# Patient Record
Sex: FEMALE | Race: WHITE | NOT HISPANIC OR LATINO | Employment: FULL TIME | URBAN - METROPOLITAN AREA
[De-identification: names, ages, dates, MRNs, and addresses within clinical notes are randomized per-mention and may not be internally consistent; named-entity substitution may affect disease eponyms.]

---

## 2020-06-12 ENCOUNTER — COMMUNICATION - HEALTHEAST (OUTPATIENT)
Dept: SCHEDULING | Facility: CLINIC | Age: 60
End: 2020-06-12

## 2020-06-15 ENCOUNTER — OFFICE VISIT - HEALTHEAST (OUTPATIENT)
Dept: FAMILY MEDICINE | Facility: CLINIC | Age: 60
End: 2020-06-15

## 2020-06-15 DIAGNOSIS — F17.200 TOBACCO USE DISORDER: ICD-10-CM

## 2020-06-15 DIAGNOSIS — R06.02 SHORTNESS OF BREATH: ICD-10-CM

## 2020-06-15 DIAGNOSIS — J01.00 ACUTE NON-RECURRENT MAXILLARY SINUSITIS: ICD-10-CM

## 2020-06-15 DIAGNOSIS — R22.1 NECK MASS: ICD-10-CM

## 2020-06-15 ASSESSMENT — PATIENT HEALTH QUESTIONNAIRE - PHQ9: SUM OF ALL RESPONSES TO PHQ QUESTIONS 1-9: 6

## 2020-06-23 ENCOUNTER — OFFICE VISIT - HEALTHEAST (OUTPATIENT)
Dept: FAMILY MEDICINE | Facility: CLINIC | Age: 60
End: 2020-06-23

## 2020-06-23 DIAGNOSIS — R35.0 URINARY FREQUENCY: ICD-10-CM

## 2020-06-23 DIAGNOSIS — R22.1 NECK MASS: ICD-10-CM

## 2020-06-23 DIAGNOSIS — R60.0 BILATERAL LEG EDEMA: ICD-10-CM

## 2020-06-23 DIAGNOSIS — F17.200 TOBACCO USE DISORDER: ICD-10-CM

## 2020-06-23 LAB
ALBUMIN SERPL-MCNC: 3.1 G/DL (ref 3.5–5)
ALP SERPL-CCNC: 98 U/L (ref 45–120)
ALT SERPL W P-5'-P-CCNC: 27 U/L (ref 0–45)
ANION GAP SERPL CALCULATED.3IONS-SCNC: 5 MMOL/L (ref 5–18)
AST SERPL W P-5'-P-CCNC: 13 U/L (ref 0–40)
BASOPHILS # BLD AUTO: 0.1 THOU/UL (ref 0–0.2)
BASOPHILS NFR BLD AUTO: 1 % (ref 0–2)
BILIRUB SERPL-MCNC: 0.4 MG/DL (ref 0–1)
BUN SERPL-MCNC: 8 MG/DL (ref 8–22)
CALCIUM SERPL-MCNC: 9.4 MG/DL (ref 8.5–10.5)
CHLORIDE BLD-SCNC: 98 MMOL/L (ref 98–107)
CO2 SERPL-SCNC: 32 MMOL/L (ref 22–31)
CREAT SERPL-MCNC: 0.73 MG/DL (ref 0.6–1.1)
EOSINOPHIL # BLD AUTO: 0.2 THOU/UL (ref 0–0.4)
EOSINOPHIL NFR BLD AUTO: 2 % (ref 0–6)
ERYTHROCYTE [DISTWIDTH] IN BLOOD BY AUTOMATED COUNT: 13.2 % (ref 11–14.5)
GFR SERPL CREATININE-BSD FRML MDRD: >60 ML/MIN/1.73M2
GLUCOSE BLD-MCNC: 115 MG/DL (ref 70–125)
HBA1C MFR BLD: 6.8 % (ref 3.5–6)
HCT VFR BLD AUTO: 53 % (ref 35–47)
HGB BLD-MCNC: 16.3 G/DL (ref 12–16)
LYMPHOCYTES # BLD AUTO: 2.4 THOU/UL (ref 0.8–4.4)
LYMPHOCYTES NFR BLD AUTO: 30 % (ref 20–40)
MCH RBC QN AUTO: 31.3 PG (ref 27–34)
MCHC RBC AUTO-ENTMCNC: 30.8 G/DL (ref 32–36)
MCV RBC AUTO: 102 FL (ref 80–100)
MONOCYTES # BLD AUTO: 0.7 THOU/UL (ref 0–0.9)
MONOCYTES NFR BLD AUTO: 8 % (ref 2–10)
NEUTROPHILS # BLD AUTO: 4.6 THOU/UL (ref 2–7.7)
NEUTROPHILS NFR BLD AUTO: 57 % (ref 50–70)
PLATELET # BLD AUTO: 228 THOU/UL (ref 140–440)
PMV BLD AUTO: 11.2 FL (ref 8.5–12.5)
POTASSIUM BLD-SCNC: 4.4 MMOL/L (ref 3.5–5)
PROT SERPL-MCNC: 8.7 G/DL (ref 6–8)
RBC # BLD AUTO: 5.21 MILL/UL (ref 3.8–5.4)
SODIUM SERPL-SCNC: 135 MMOL/L (ref 136–145)
T3FREE SERPL-MCNC: 2.9 PG/ML (ref 1.9–3.9)
T4 FREE SERPL-MCNC: 0.6 NG/DL (ref 0.7–1.8)
TSH SERPL DL<=0.005 MIU/L-ACNC: 14.85 UIU/ML (ref 0.3–5)
WBC: 8 THOU/UL (ref 4–11)

## 2020-06-23 ASSESSMENT — MIFFLIN-ST. JEOR: SCORE: 1498.48

## 2020-06-25 ENCOUNTER — HOSPITAL ENCOUNTER (OUTPATIENT)
Dept: CT IMAGING | Facility: CLINIC | Age: 60
Discharge: HOME | End: 2020-06-25
Attending: FAMILY MEDICINE

## 2020-06-25 ENCOUNTER — AMBULATORY - HEALTHEAST (OUTPATIENT)
Dept: FAMILY MEDICINE | Facility: CLINIC | Age: 60
End: 2020-06-25

## 2020-06-25 DIAGNOSIS — R22.1 NECK MASS: ICD-10-CM

## 2020-06-25 DIAGNOSIS — R06.02 SHORTNESS OF BREATH: ICD-10-CM

## 2020-06-25 DIAGNOSIS — E01.0 THYROMEGALY: ICD-10-CM

## 2020-06-26 ENCOUNTER — COMMUNICATION - HEALTHEAST (OUTPATIENT)
Dept: FAMILY MEDICINE | Facility: CLINIC | Age: 60
End: 2020-06-26

## 2020-07-31 ENCOUNTER — TRANSCRIBE ORDERS (OUTPATIENT)
Dept: OTHER | Age: 60
End: 2020-07-31

## 2020-07-31 ENCOUNTER — OFFICE VISIT - HEALTHEAST (OUTPATIENT)
Dept: OTOLARYNGOLOGY | Facility: CLINIC | Age: 60
End: 2020-07-31

## 2020-07-31 DIAGNOSIS — E01.0 THYROMEGALY: Primary | ICD-10-CM

## 2020-07-31 DIAGNOSIS — E01.0 THYROMEGALY: ICD-10-CM

## 2020-08-03 ENCOUNTER — TELEPHONE (OUTPATIENT)
Dept: OTOLARYNGOLOGY | Facility: CLINIC | Age: 60
End: 2020-08-03

## 2020-08-03 NOTE — TELEPHONE ENCOUNTER
[facility] Call Center    Phone Message    May a detailed message be left on voicemail: yes     Reason for Call: Appointment Intake    Referring Provider Name: Referred by Dr. Jatin Nam at [facility]   Diagnosis and/or Symptoms: Thyromegaly    Referral diagnosis is not listed in our scheduling protocols. Please advise and contact patient for scheduling. Referral/Records have been faxed to clinic for review    Action Taken: Message routed to:  [facility]    Travel Screening: Not Applicable

## 2020-08-04 NOTE — TELEPHONE ENCOUNTER
FUTURE VISIT INFORMATION      FUTURE VISIT INFORMATION:    Date: 8/14/2020    Time: 1PM    Location: [facility]  REFERRAL INFORMATION:    Referring provider:  Jatin Nam MD      Referring providers clinic:  [facility]       Reason for visit/diagnosis  Thyromegaly - referred by Dr. Jatin Nam at [facility]    RECORDS REQUESTED FROM:       Clinic name Comments Records Status Imaging Status   [facility]  07/31/2020 note with Jatin Nam MD   Care Everywhere     [facility] 06/23/2020 note with Ana Goetz MD   Care Everywhere     [facility] imaging 6/25/2020 CT Neck and CT Chest  Care everywhere req 8/4/2020 - PACS                       8/4/2020 3:45PM sent a fax to [facility] for images - [place]   8/5/2020 4:03PM images from [facility] in PACS - [facility]

## 2020-08-04 NOTE — TELEPHONE ENCOUNTER
Spoke with patient and scheduled for 8/14 with Dr. Martinez.     Patients stated that she will call back for clinic directions, as her phone was about to die. I provided her with the ENT call center number.

## 2020-08-14 ENCOUNTER — RECORDS - HEALTHEAST (OUTPATIENT)
Dept: ADMINISTRATIVE | Facility: OTHER | Age: 60
End: 2020-08-14

## 2020-08-14 ENCOUNTER — PRE VISIT (OUTPATIENT)
Dept: OTOLARYNGOLOGY | Facility: CLINIC | Age: 60
End: 2020-08-14

## 2020-08-14 ENCOUNTER — OFFICE VISIT (OUTPATIENT)
Dept: OTOLARYNGOLOGY | Facility: CLINIC | Age: 60
End: 2020-08-14
Payer: COMMERCIAL

## 2020-08-14 VITALS
DIASTOLIC BLOOD PRESSURE: 85 MMHG | HEART RATE: 102 BPM | OXYGEN SATURATION: 93 % | HEIGHT: 62 IN | BODY MASS INDEX: 44.35 KG/M2 | SYSTOLIC BLOOD PRESSURE: 128 MMHG | TEMPERATURE: 97 F | RESPIRATION RATE: 16 BRPM | WEIGHT: 241 LBS

## 2020-08-14 DIAGNOSIS — E01.0 THYROMEGALY: Primary | ICD-10-CM

## 2020-08-14 RX ORDER — LEVOTHYROXINE SODIUM 50 UG/1
TABLET ORAL
COMMUNITY
Start: 2020-07-27 | End: 2022-09-09

## 2020-08-14 SDOH — HEALTH STABILITY: MENTAL HEALTH: HOW OFTEN DO YOU HAVE A DRINK CONTAINING ALCOHOL?: 2-3 TIMES A WEEK

## 2020-08-14 ASSESSMENT — MIFFLIN-ST. JEOR: SCORE: 1621.42

## 2020-08-14 ASSESSMENT — PAIN SCALES - GENERAL: PAINLEVEL: NO PAIN (0)

## 2020-08-14 NOTE — NURSING NOTE
"Chief Complaint   Patient presents with     Consult     thyromegaly      Blood pressure 128/85, pulse 102, temperature 97  F (36.1  C), resp. rate 16, height 1.575 m (5' 2\"), weight 109.3 kg (241 lb), SpO2 93 %.    Sujit Ngo LPN    "

## 2020-08-14 NOTE — PROGRESS NOTES
"Dear Dr. Nma    I had the pleasure of meeting Lidia Cyril in consultation today at the [facility] at your request.     HISTORY OF PRESENT ILLNESS:   As you know, Mrs. Nam is a pleasant 60F who you referred for a thyroid goiter. This has been present since at least 2008. It has slowly grown in size and she has particularly noticed this over the last year. She has never had a biopsy of this. Her recent TSH was 14.85 and T4 0.6 and she was appropriately started on synthroid. She is currently on 50 mcg synthroid.     She does notice some maneuvers that seem to aggravate her breathing. When she flexes her neck forward and when she lifts her arms above her head she does feel that her breathing gets tight. She has not noticed food getting stuck but she does make sure to chew her food well prior to swallowing. She does not have any pain in the neck nor has she noticed any voice changes.     She has no family history of thyroid cancer.     MEDICATIONS:     Current Outpatient Medications   Medication Sig Dispense Refill     levothyroxine (SYNTHROID/LEVOTHROID) 50 MCG tablet          ALLERGIES:    Allergies   Allergen Reactions     Erythromycin      Psyllium      Seasonal Allergies      Hay fever       HABITS/SOCIAL HISTORY:   She has smoked 1 PPD for about 30 years. She drinks ETOH occasionally.     PAST MEDICAL HISTORY:   Hypothyroid     PAST SURGICAL HISTORY:   Appendectomy    FAMILY HISTORY:    DM  CAD  HTN    REVIEW OF SYSTEMS:  12 point ROS was negative other than the symptoms noted above in the HPI.  Patient Supplied Answers to Review of Systems   ENT ROS 8/14/2020   Ears, Nose, Throat Nasal congestion or drainage   Cardiopulmonary Cough   Allergy/Immunology Allergies or hay fever   Endocrine Frequent urination         PHYSICAL EXAMINATION:   /85   Pulse 102   Temp 97  F (36.1  C)   Resp 16   Ht 1.575 m (5' 2\")   Wt 109.3 kg (241 lb)   SpO2 93%   BMI 44.08 kg/m  "   Alert, NAD  Normocephalic, atraumatic  EOMs intact  Normal auricles bilaterally  No anterior nasal drainage  No lesions seen in the oral cavity or oropharynx  Large thyroid goiter involving the central and lateral neck. Extends superiorly to the angles of the mandible bilaterally. Does not seem to extend into the chest on exam. Mobile  CN II-XII intact  Breathing comfortably     PROCEDURES:   Fiberoptic laryngoscopy was performed today due to the large thyroid goiter. Scope was advanced into the right nasal cavity. Nasopharynx was clear. Base of tongue and vallecula was clear. There was a posterior bulge in the oropharynx at the level of the base of tongue secondary to the goiter. The vocal cords appeared mobile bilaterally. The airway was patent.     RESULTS REVIEWED:   Reviewed the CT neck from 6/26/20. Large thyroid goiter without mediastinal extension.     IMPRESSION AND PLAN:   60F presenting with a large thyroid goiter that has been present since at least 2008. She seems to have some signs of airway obstruction with certain maneuvers such as raising her arms and flexing her neck forward. There are no signs of malignancy on her CT scan and the natural history favors a benign goiter.     I discussed treatment options with her that include observation vs total thyroidectomy. Given her symptoms, she will likely need this removed at some point but at this point she seems like she would like to continue thinking about her options. I discussed the risks including but not limited to infection, bleeding, injury to unilateral recurrent laryngeal nerve that could affect her voice or risk of bilateral recurrent laryngeal nerve injury that could lead to airway compromise and potential need for tracheostomy. She also understands the risk of hypoparathyroidism and the need for lifelong thyroid hormone replacement. She comfortable with the discussion and understands the risks. At this point she would like to think about it  and begin to arrange some things for work and will get back to us.  We will set up an appt for 8 weeks.     Thank you very much for the opportunity to participate in the care of your patient.      Armaan Martinez MD  Otolaryngology- Head & Neck Surgery

## 2020-08-14 NOTE — LETTER
8/14/2020       RE: Lidia Nam  [address] [address]     Dear Colleague,    Thank you for referring your patient, Lidia Nam, to the [facility] at [facility]. Please see a copy of my visit note below.    Dear Dr. Nam    I had the pleasure of meeting Lidia Nam in consultation today at the [facility] at your request.     HISTORY OF PRESENT ILLNESS:   As you know, Mrs. Nam is a pleasant 60F who you referred for a thyroid goiter. This has been present since at least 2008. It has slowly grown in size and she has particularly noticed this over the last year. She has never had a biopsy of this. Her recent TSH was 14.85 and T4 0.6 and she was appropriately started on synthroid. She is currently on 50 mcg synthroid.     She does notice some maneuvers that seem to aggravate her breathing. When she flexes her neck forward and when she lifts her arms above her head she does feel that her breathing gets tight. She has not noticed food getting stuck but she does make sure to chew her food well prior to swallowing. She does not have any pain in the neck nor has she noticed any voice changes.     She has no family history of thyroid cancer.     MEDICATIONS:     Current Outpatient Medications   Medication Sig Dispense Refill     levothyroxine (SYNTHROID/LEVOTHROID) 50 MCG tablet          ALLERGIES:    Allergies   Allergen Reactions     Erythromycin      Psyllium      Seasonal Allergies      Hay fever       HABITS/SOCIAL HISTORY:   She has smoked 1 PPD for about 30 years. She drinks ETOH occasionally.     PAST MEDICAL HISTORY:   Hypothyroid     PAST SURGICAL HISTORY:   Appendectomy    FAMILY HISTORY:    DM  CAD  HTN    REVIEW OF SYSTEMS:  12 point ROS was negative other than the symptoms noted above in the HPI.  Patient Supplied Answers to Review of Systems  [facility] ROS 8/14/2020   Ears, Nose, Throat Nasal  "congestion or drainage   Cardiopulmonary Cough   Allergy/Immunology Allergies or hay fever   Endocrine Frequent urination         PHYSICAL EXAMINATION:   /85   Pulse 102   Temp 97  F (36.1  C)   Resp 16   Ht 1.575 m (5' 2\")   Wt 109.3 kg (241 lb)   SpO2 93%   BMI 44.08 kg/m    Alert, NAD  Normocephalic, atraumatic  EOMs intact  Normal auricles bilaterally  No anterior nasal drainage  No lesions seen in the oral cavity or oropharynx  Large thyroid goiter involving the central and lateral neck. Extends superiorly to the angles of the mandible bilaterally. Does not seem to extend into the chest on exam. Mobile  CN II-XII intact  Breathing comfortably     PROCEDURES:   Fiberoptic laryngoscopy was performed today due to the large thyroid goiter. Scope was advanced into the right nasal cavity. Nasopharynx was clear. Base of tongue and vallecula was clear. There was a posterior bulge in the oropharynx at the level of the base of tongue secondary to the goiter. The vocal cords appeared mobile bilaterally. The airway was patent.     RESULTS REVIEWED:   Reviewed the CT neck from 6/26/20. Large thyroid goiter without mediastinal extension.     IMPRESSION AND PLAN:   60F presenting with a large thyroid goiter that has been present since at least 2008. She seems to have some signs of airway obstruction with certain maneuvers such as raising her arms and flexing her neck forward. There are no signs of malignancy on her CT scan and the natural history favors a benign goiter.     I discussed treatment options with her that include observation vs total thyroidectomy. Given her symptoms, she will likely need this removed at some point but at this point she seems like she would like to continue thinking about her options. I discussed the risks including but not limited to infection, bleeding, injury to unilateral recurrent laryngeal nerve that could affect her voice or risk of bilateral recurrent laryngeal nerve injury " that could lead to airway compromise and potential need for tracheostomy. She also understands the risk of hypoparathyroidism and the need for lifelong thyroid hormone replacement. She comfortable with the discussion and understands the risks. At this point she would like to think about it and begin to arrange some things for work and will get back to us.  We will set up an appt for 8 weeks.     Thank you very much for the opportunity to participate in the care of your patient.      Armaan Martinez MD  Otolaryngology- Head & Neck Surgery      Again, thank you for allowing me to participate in the care of your patient.      Sincerely,    Armaan Martinez MD

## 2020-09-28 ENCOUNTER — TELEPHONE (OUTPATIENT)
Dept: OTOLARYNGOLOGY | Facility: CLINIC | Age: 60
End: 2020-09-28

## 2020-09-28 NOTE — TELEPHONE ENCOUNTER
Writer called patient in regards to rescheduling missed appointment with Dr. Martinez. Writer left message and provided patient with name and call back number.      Lilian Huber LPN

## 2020-09-30 ENCOUNTER — TELEPHONE (OUTPATIENT)
Dept: OTOLARYNGOLOGY | Facility: CLINIC | Age: 60
End: 2020-09-30

## 2020-09-30 ENCOUNTER — COMMUNICATION - HEALTHEAST (OUTPATIENT)
Dept: FAMILY MEDICINE | Facility: CLINIC | Age: 60
End: 2020-09-30

## 2020-09-30 DIAGNOSIS — E01.0 THYROMEGALY: ICD-10-CM

## 2020-09-30 NOTE — TELEPHONE ENCOUNTER
Writer called patient. Line was not available.     Writer awaiting call back to reschedule telephone visit with Dr. Michelle Huber LPN

## 2020-10-06 ENCOUNTER — AMBULATORY - HEALTHEAST (OUTPATIENT)
Dept: FAMILY MEDICINE | Facility: CLINIC | Age: 60
End: 2020-10-06

## 2020-10-06 DIAGNOSIS — Z12.31 VISIT FOR SCREENING MAMMOGRAM: ICD-10-CM

## 2021-05-27 ASSESSMENT — PATIENT HEALTH QUESTIONNAIRE - PHQ9: SUM OF ALL RESPONSES TO PHQ QUESTIONS 1-9: 6

## 2021-06-04 VITALS
WEIGHT: 215 LBS | BODY MASS INDEX: 39.56 KG/M2 | SYSTOLIC BLOOD PRESSURE: 138 MMHG | DIASTOLIC BLOOD PRESSURE: 87 MMHG | HEART RATE: 87 BPM | HEIGHT: 62 IN | TEMPERATURE: 99 F | RESPIRATION RATE: 16 BRPM

## 2021-06-08 NOTE — PROGRESS NOTES
"Lidia Nam is a 60 y.o. female who is being evaluated via a billable telephone visit.      The patient has been notified of following:     \"This telephone visit will be conducted via a call between you and your physician/provider. We have found that certain health care needs can be provided without the need for a physical exam.  This service lets us provide the care you need with a short phone conversation.  If a prescription is necessary we can send it directly to your pharmacy.  If lab work is needed we can place an order for that and you can then stop by our lab to have the test done at a later time.    Telephone visits are billed at different rates depending on your insurance coverage. During this emergency period, for some insurers they may be billed the same as an in-person visit.  Please reach out to your insurance provider with any questions.    If during the course of the call the physician/provider feels a telephone visit is not appropriate, you will not be charged for this service.\"    Patient has given verbal consent to a Telephone visit? Yes    What phone number would you like to be contacted at? [phone]    Patient would like to receive their AVS by AVS Preference: Mail a copy.    Additional provider notes:     ASSESSMENT/PLAN:  1. Acute non-recurrent maxillary sinusitis  cefdinir (OMNICEF) 300 MG capsule   2. Neck mass  CT Soft Tissue Neck With Contrast    CT Chest With Contrast   3. Shortness of breath  CT Soft Tissue Neck With Contrast    CT Chest With Contrast   4. Nicotine Dependence         This is a 59 yo female with:  1.  Reported signs/symptoms of acute sinusitis - it is reasonable to treat with Cefdinir.  She has chronic symptoms, but acute worsening at this time.     2.  Neck mass - she has bilateral neck masses, increasing in size over time.  No clear explanation for the masses - will order a CT - soft tissue neck and CT - chest.  She is agreeable -  3.  Shortness of breath - this " "may be multifactorial - given that she is still smoking.  She may (and likely does) have COPD, but have to worry about possibility of malignancy with these symptoms.  Will order the CT of chest as well.   4.  Nicotine dependence - needs to quit smoking now!  Return in about 2 weeks (around 6/29/2020) for Recheck and review of workup. .      There are no discontinued medications.  There are no Patient Instructions on file for this visit.    Chief Complaint:  No chief complaint on file.      HPI:   Lidia Nam is a 60 y.o. female c/o    Last visit to medical provider was 2008  \"I work full time and just keep going\"  This year, got a flu vaccine - mandated to wear a mask - would cause a sinus infection; did lot of fluids/    Now, has to wear a mask/flu shield all day long  Pain under her eyes  Starting to develop \"thrush\"  When moving something out, it is horrible smelling - green    Has been having an issue x several months  When it dries up, there is a \"fur ball\" in the back of her throat - wakes up to clear it    Also - minor throat swelling on each side of neck in 2008 -- now it is so large it cuts off breathing; like a cluster - both sides; feels like there is several different shapes  Worries about her airway  Needs to open window/curtain to breathe when showering    Fluid retention - bottoms of feet to knees - calves are firm  Has maintained a steady 190#; after fluid started - 214#    Taking deep breaths, lung sounds are okay  Short of breath when it starts to clog up  Moves around slowly  Tries not to talk too much  Doesn't talk, because then triggers spasms  Gets winded when walking around    Exhausted when gets home from work    Used to wear a 16 inch necklace, now needs 22 inch necklace    Smokes 1 ppd - \"It didn't go up any\" - rolls her own (packs are only 18 cigarettes);  quit smoking about 2  Years  Son/daughter are both trying to quit    Has not been tested for COVID-19 at her facility - "   Facility has had a couple (4) employees test positive, but no residents positive ([facility]).  Patient works on behavior floor -       PMH:   Patient Active Problem List    Diagnosis Date Noted     Obesity      Nicotine Dependence      Urticaria      Allergies      No past medical history on file.  Past Surgical History:   Procedure Laterality Date     AR APPENDECTOMY      Description: Appendectomy;  Recorded: 11/21/2008;     AR LIGATE FALLOPIAN TUBE      Description: Tubal Ligation;  Recorded: 11/21/2008;     Social History     Socioeconomic History     Marital status:      Spouse name: Not on file     Number of children: Not on file     Years of education: Not on file     Highest education level: Not on file   Occupational History     Not on file   Social Needs     Financial resource strain: Not on file     Food insecurity     Worry: Not on file     Inability: Not on file     Transportation needs     Medical: Not on file     Non-medical: Not on file   Tobacco Use     Smoking status: Current Every Day Smoker     Smokeless tobacco: Never Used   Substance and Sexual Activity     Alcohol use: Not on file     Drug use: Not on file     Sexual activity: Not on file   Lifestyle     Physical activity     Days per week: Not on file     Minutes per session: Not on file     Stress: Not on file   Relationships     Social connections     Talks on phone: Not on file     Gets together: Not on file     Attends [employer]ism service: Not on file     Active member of club or organization: Not on file     Attends meetings of clubs or organizations: Not on file     Relationship status: Not on file     Intimate partner violence     Fear of current or ex partner: Not on file     Emotionally abused: Not on file     Physically abused: Not on file     Forced sexual activity: Not on file   Other Topics Concern     Not on file   Social History Narrative     Not on file     No family history on file.    Meds:    Current  Outpatient Medications:      cefdinir (OMNICEF) 300 MG capsule, Take 1 capsule (300 mg total) by mouth 2 (two) times a day for 10 days., Disp: 20 capsule, Rfl: 0    Allergies:  Allergies   Allergen Reactions     Erythromycin Base      Psyllium        ROS:  Pertinent positives as noted in HPI; otherwise 12 point ROS negative.      Physical Exam:  EXAM:    This is a telephone visit.       Results:  No results found for this or any previous visit.      Phone call duration:  24 minutes  4:20 - 4:44

## 2021-06-08 NOTE — TELEPHONE ENCOUNTER
"Pt reports \"pressure below eyes, stuffy congested nose with thick yellow to green nasal discharge and white patches on tongue\". No fever. Pain under L eye \"2-3\".  Pt reports \"couple of months\".     Advised pt to use a neti pot and see provider within three days per protocol.    Pt verbalizes understanding and agrees to plan.     Reason for Disposition    [1] Sinus congestion (pressure, fullness) AND [2] present > 10 days    Additional Information    Negative: Severe difficulty breathing (e.g., struggling for each breath, speaks in single words)    Negative: Sounds like a life-threatening emergency to the triager    Negative: [1] Sinus infection AND [2] taking an antibiotic AND [3] symptoms continue    Negative: [1] Difficulty breathing AND [2] not from stuffy nose (e.g., not relieved by cleaning out the nose)    Negative: [1] SEVERE headache AND [2] fever    Negative: [1] Redness or swelling on the cheek, forehead or around the eye AND [2] fever    Negative: Fever > 104 F (40 C)    Negative: Patient sounds very sick or weak to the triager    Negative: [1] SEVERE pain AND [2] not improved 2 hours after pain medicine    Negative: [1] Redness or swelling on the cheek, forehead or around the eye AND [2] no fever    Negative: [1] Fever > 101 F (38.3 C) AND [2] age > 60    Negative: [1] Fever > 100.0 F (37.8 C) AND [2] bedridden (e.g., nursing home patient, CVA, chronic illness, recovering from surgery)    Negative: [1] Fever > 100.0 F (37.8 C) AND [2] diabetes mellitus or weak immune system (e.g., HIV positive, cancer chemo, splenectomy, organ transplant, chronic steroids)    Negative: Fever present > 3 days (72 hours)    Negative: [1] Fever returns after gone for over 24 hours AND [2] symptoms worse or not improved    Negative: [1] Sinus pain (not just congestion) AND [2] fever    Negative: Earache    Protocols used: SINUS PAIN OR CONGESTION-A-AH      "

## 2021-06-09 NOTE — PROGRESS NOTES
"ASSESSMENT/PLAN:  1. Neck mass  Thyroid Stimulating Hormone (TSH)    T4, Free    T3 (Triiodothyronine), Free    HM1(CBC and Differential)    HM1 (CBC with Diff)   2. Urinary frequency  Glycosylated Hemoglobin A1c   3. Bilateral leg edema  Comprehensive Metabolic Panel   4. Nicotine Dependence         This is a 59 yo female with:  1.  Neck mass - this is extraordinarily large - it has been growing over at least 10 years.  Patient has been watching this grow, but didn't feel she had \"time\" to come in for evaluation.  She is beginning to find that she feels some pressure on her trachea with this - feels a little difficulty with swallowing/breathing.  This a a very large lump - bilateral - anterior neck - ?thyroid vs. Submandibular.  Will check thyroid labs - encourage patient to get her CT scan scheduled.    2.  Urinary frequency - will check A1c to rule out diabetes  3.  Bilateral leg edema - ?may have some relation to #1?  Is there some chest/abdominal compression  4.  Nicotine Dependence - patient continues to smoke -  has quit smoking, but patient continues.    Return in about 2 weeks (around 7/7/2020) for Recheck.      There are no discontinued medications.  There are no Patient Instructions on file for this visit.    Chief Complaint:  Chief Complaint   Patient presents with     Follow-up     sinus infection       HPI:   Lidia Nam is a 60 y.o. female c/o  Treated for sinus infection -  Feeling better -   Still has mass in anterior neck - \"since the last time I saw you (2007?)\"  Able to move mass back and forth - no pain, just feels like it's starting to choke her    Now having swelling in lower extremities    PMH:   Patient Active Problem List    Diagnosis Date Noted     Thyromegaly 06/25/2020     Obesity      Nicotine Dependence      Urticaria      Allergies      History reviewed. No pertinent past medical history.  Past Surgical History:   Procedure Laterality Date     WI APPENDECTOMY      " Description: Appendectomy;  Recorded: 11/21/2008;     [place] LIGATE FALLOPIAN TUBE      Description: Tubal Ligation;  Recorded: 11/21/2008;     Social History     Socioeconomic History     Marital status:      Spouse name: Not on file     Number of children: Not on file     Years of education: Not on file     Highest education level: Not on file   Occupational History     Not on file   Social Needs     Financial resource strain: Not on file     Food insecurity     Worry: Not on file     Inability: Not on file     Transportation needs     Medical: Not on file     Non-medical: Not on file   Tobacco Use     Smoking status: Current Every Day Smoker     Smokeless tobacco: Never Used   Substance and Sexual Activity     Alcohol use: Not on file     Drug use: Not on file     Sexual activity: Not on file   Lifestyle     Physical activity     Days per week: Not on file     Minutes per session: Not on file     Stress: Not on file   Relationships     Social connections     Talks on phone: Not on file     Gets together: Not on file     Attends Confucianism service: Not on file     Active member of club or organization: Not on file     Attends meetings of clubs or organizations: Not on file     Relationship status: Not on file     Intimate partner violence     Fear of current or ex partner: Not on file     Emotionally abused: Not on file     Physically abused: Not on file     Forced sexual activity: Not on file   Other Topics Concern     Not on file   Social History Narrative     Not on file     History reviewed. No pertinent family history.    Meds:    Current Outpatient Medications:      levothyroxine (SYNTHROID, LEVOTHROID) 50 MCG tablet, Take 1 tablet (50 mcg total) by mouth daily., Disp: 30 tablet, Rfl: 2    Allergies:  Allergies   Allergen Reactions     Erythromycin Base      Psyllium        ROS:  Pertinent positives as noted in HPI; otherwise 12 point ROS negative.  Recent swelling in lower extremities  Voids frequently  "  Started eating lunch - never used to      Physical Exam:  EXAM:  /87 (Patient Site: Right Arm, Patient Position: Sitting, Cuff Size: Adult Regular)   Pulse 87   Temp 99  F (37.2  C) (Tympanic)   Resp 16   Ht 5' 2\" (1.575 m)   Wt 215 lb (97.5 kg)   BMI 39.32 kg/m     Gen:  NAD, appears well, well-hydrated  HEENT:  TMs nl, oropharynx benign, nasal mucosa nl, conjunctiva clear  Neck:  Supple, mass - anterior neck 30 x 20 mm;  no thyromegaly, no carotid bruits, no JVD; no other adenopathy noted  Lungs: rhonchi at bilateral lung bases  Cor:  RRR no murmur  Abd:  Soft, nontender, BS+, no masses, no guarding or rebound, no HSM  Extr:  Neg., right shoulder limited ROM ; swelling at base of left thumb; lower extremity swelling  Neuro:  No asymmetry  Skin:  Warm/dry        Results:  Results for orders placed or performed in visit on 06/23/20   Comprehensive Metabolic Panel   Result Value Ref Range    Sodium 135 (L) 136 - 145 mmol/L    Potassium 4.4 3.5 - 5.0 mmol/L    Chloride 98 98 - 107 mmol/L    CO2 32 (H) 22 - 31 mmol/L    Anion Gap, Calculation 5 5 - 18 mmol/L    Glucose 115 70 - 125 mg/dL    BUN 8 8 - 22 mg/dL    Creatinine 0.73 0.60 - 1.10 mg/dL    GFR MDRD Af Amer >60 >60 mL/min/1.73m2    GFR MDRD Non Af Amer >60 >60 mL/min/1.73m2    Bilirubin, Total 0.4 0.0 - 1.0 mg/dL    Calcium 9.4 8.5 - 10.5 mg/dL    Protein, Total 8.7 (H) 6.0 - 8.0 g/dL    Albumin 3.1 (L) 3.5 - 5.0 g/dL    Alkaline Phosphatase 98 45 - 120 U/L    AST 13 0 - 40 U/L    ALT 27 0 - 45 U/L   Glycosylated Hemoglobin A1c   Result Value Ref Range    Hemoglobin A1c 6.8 (H) 3.5 - 6.0 %   Thyroid Stimulating Hormone (TSH)   Result Value Ref Range    TSH 14.85 (H) 0.30 - 5.00 uIU/mL   T4, Free   Result Value Ref Range    Free T4 0.6 (L) 0.7 - 1.8 ng/dL   T3 (Triiodothyronine), Free   Result Value Ref Range    T3, Free 2.9 1.9 - 3.9 pg/mL   HM1 (CBC with Diff)   Result Value Ref Range    WBC 8.0 4.0 - 11.0 thou/uL    RBC 5.21 3.80 - 5.40 mill/uL "    Hemoglobin 16.3 (H) 12.0 - 16.0 g/dL    Hematocrit 53.0 (H) 35.0 - 47.0 %     (H) 80 - 100 fL    MCH 31.3 27.0 - 34.0 pg    MCHC 30.8 (L) 32.0 - 36.0 g/dL    RDW 13.2 11.0 - 14.5 %    Platelets 228 140 - 440 thou/uL    MPV 11.2 8.5 - 12.5 fL    Neutrophils % 57 50 - 70 %    Lymphocytes % 30 20 - 40 %    Monocytes % 8 2 - 10 %    Eosinophils % 2 0 - 6 %    Basophils % 1 0 - 2 %    Neutrophils Absolute 4.6 2.0 - 7.7 thou/uL    Lymphocytes Absolute 2.4 0.8 - 4.4 thou/uL    Monocytes Absolute 0.7 0.0 - 0.9 thou/uL    Eosinophils Absolute 0.2 0.0 - 0.4 thou/uL    Basophils Absolute 0.1 0.0 - 0.2 thou/uL

## 2021-06-10 NOTE — PATIENT INSTRUCTIONS - HE
Your exam today shows normal vocal cord mobility  Your recent CT show the thyroid gland is pressing on your airway  I am referring you to the [facility] Otolaryngology department for further evaluation.

## 2021-06-11 NOTE — TELEPHONE ENCOUNTER
Refill Request  Did you contact pharmacy: No  Medication name:   Requested Prescriptions     Pending Prescriptions Disp Refills     levothyroxine (SYNTHROID, LEVOTHROID) 50 MCG tablet 30 tablet 2     Sig: Take 1 tablet (50 mcg total) by mouth daily.     Who prescribed the medication: Ana Goetz MD   Requested [facility]: [facility]  Is patient out of medication: No.  1 days left  Patient notified refills processed in 3 business days:  yes  Okay to leave a detailed message: yes  After the message was routed to Ana Goetz MD, the patient states am I coming in tomorrow for a recheck blood test? The patient would like a call.

## 2021-06-16 PROBLEM — E01.0 THYROMEGALY: Status: ACTIVE | Noted: 2020-06-25

## 2021-06-20 NOTE — LETTER
Letter by Ana Goetz MD at      Author: Ana Goetz MD Service: -- Author Type: --    Filed:  Encounter Date: 6/26/2020 Status: (Other)         Lidia Nam  [address]  [facility] [address]             June 26, 2020         Dear Ms. Cyril,    Below are the results from your recent visit:    Resulted Orders   CT Soft Tissue Neck With Contrast    Narrative    EXAM: CT SOFT TISSUE NECK W CONTRAST  LOCATION: [facility]  DATE/TIME: 6/25/2020 11:26 AM    INDICATION: Neck mass, multiple (Age > 15y)  COMPARISON: None.  CONTRAST: Iohexol (Omni) 100 mL  TECHNIQUE: Routine with IV contrast. Multiplanar reformats. Dose reduction techniques were used.    FINDINGS:   THYROID: Markedly enlarged multinodular goiter. The cephalad extent of the goiter extends up to the level of the C1 lateral masses on the right up to the C2 vertebral body on the left. The thyroid lobes of the contact each other medially in the   retropharyngeal space to the level of C2-C3, displacing the oropharynx and hypopharynx ventrally. The trachea and esophagus both appeared narrowed by the thyroid goiter with the trachea measuring as narrow as 9 mm in the transverse dimension at the level   of C7 (series 3, image 70). No substernal extension.    SUPRAHYOID NECK: Normal nasopharynx. Oropharynx is distorted by the thyroid goiter and narrowed in the AP diameter. Normal parapharyngeal and  spaces. Normal oral cavity and floor of mouth structures.    INFRAHYOID NECK: Supraglottic larynx is distorted and narrowed in the AP diameter. Hypopharynx is distorted and displaced by the goiter. The glottic and infraglottic larynx are normal.    SALIVARY GLANDS: Normal parotid and submandibular glands.    LYMPH NODES: No pathologic lymph nodes by size or morphology criteria.     VESSELS: Mild atherosclerotic plaque at the carotid bifurcations.    VISUALIZED INTRACRANIAL/ORBITS/SINUSES: No abnormality of the  visualized intracranial compartment or orbits. Visualized paranasal sinuses and mastoid air cells are clear.    OTHER: No destructive osseous lesion. Multilevel cervical spondylosis. Disc bulge at C3-C4 causes severe central spinal canal stenosis. Severe neural foraminal stenosis on the left C3-C4 through C6-C7. The included lung apices are clear.      Impression    1.  Massive multinodular goiter distorts and displaces the upper aerodigestive tract and causes mild stenosis of the trachea. No substernal extension. Recommend otolaryngology consultation.  2.  Multilevel cervical spondylosis. Severe spinal canal stenosis at C3-C4. Severe neural foraminal stenosis on the left from C3-C4 through C6-C7         This is as we expected - and as we discussed.  Please make sure to get to the ENT specialist.      Please call with questions or contact us using [email or web address].    Sincerely,        Electronically signed by Ana Goetz MD

## 2021-06-20 NOTE — LETTER
Letter by Ana Goetz MD at      Author: Ana Goetz MD Service: -- Author Type: --    Filed:  Encounter Date: 6/26/2020 Status: (Other)         Lidia Nam  [address]  [facility] [address]             June 26, 2020         Dear Ms. Nam,    Below are the results from your recent visit:    Resulted Orders   CT Chest With Contrast    Narrative    EXAM: CT CHEST W CONTRAST  LOCATION: [facility]  DATE/TIME: 6/25/2020 11:24 AM    INDICATION: multiple lumps bilateral neck - feels like they are cutting off airway  COMPARISON: CT neck the same date  TECHNIQUE: CT chest with IV contrast. Multiplanar reformats were obtained. Dose reduction techniques were used.    CONTRAST: Iohexol (Omni) 350 100 mL    FINDINGS:   LUNGS AND PLEURA: Bilateral markedly enlarged heterogeneous thyroid gland containing multiple coarse calcifications. Associated moderate tracheal narrowing. The central airways are otherwise unremarkable. No focal consolidation or pleural effusion. No   mass or suspicious nodule.    MEDIASTINUM/AXILLAE: No thoracic adenopathy. Normal heart size. Trace pericardial fluid. Mild coronary artery calcifications.    UPPER ABDOMEN: Hepatic steatosis.    MUSCULOSKELETAL: Spinal degenerative changes.      Impression    1.  Markedly enlarged heterogeneous thyroid with scattered coarse calcifications. This likely reflects a large thyroid goiter. Associated moderate tracheal narrowing. Given the patient's symptoms, consider ENT consultation.  2.  No acute or suspicious intrathoracic findings.  3.  Mild coronary calcifications and trace pericardial fluid.  4.  Hepatic steatosis.      A phone call report regarding the critical findings on this exam was made to Dr. Trevizo at 12:52 PM on 6/25/2020.        Here's your CT scan - that we discussed.      Please call with questions or contact us using [employer].    Sincerely,        Electronically signed by Ana Goetz  MD

## 2021-06-20 NOTE — LETTER
Letter by Ana Goetz MD at      Author: Ana Goetz MD Service: -- Author Type: --    Filed:  Encounter Date: 6/26/2020 Status: (Other)         Lidia Nam  [facility] [address]  [facility] [address]             June 26, 2020         Dear Ms. Nam,    Below are the results from your recent visit:    Resulted Orders   Comprehensive Metabolic Panel   Result Value Ref Range    Sodium 135 (L) 136 - 145 mmol/L    Potassium 4.4 3.5 - 5.0 mmol/L    Chloride 98 98 - 107 mmol/L    CO2 32 (H) 22 - 31 mmol/L    Anion Gap, Calculation 5 5 - 18 mmol/L    Glucose 115 70 - 125 mg/dL    BUN 8 8 - 22 mg/dL    Creatinine 0.73 0.60 - 1.10 mg/dL    GFR MDRD Af Amer >60 >60 mL/min/1.73m2    GFR MDRD Non Af Amer >60 >60 mL/min/1.73m2    Bilirubin, Total 0.4 0.0 - 1.0 mg/dL    Calcium 9.4 8.5 - 10.5 mg/dL    Protein, Total 8.7 (H) 6.0 - 8.0 g/dL    Albumin 3.1 (L) 3.5 - 5.0 g/dL    Alkaline Phosphatase 98 45 - 120 U/L    AST 13 0 - 40 U/L    ALT 27 0 - 45 U/L    Narrative    Fasting Glucose reference range is 70-99 mg/dL per  American Diabetes Association (ADA) guidelines.   Glycosylated Hemoglobin A1c   Result Value Ref Range    Hemoglobin A1c 6.8 (H) 3.5 - 6.0 %   Thyroid Stimulating Hormone (TSH)   Result Value Ref Range    TSH 14.85 (H) 0.30 - 5.00 uIU/mL   T4, Free   Result Value Ref Range    Free T4 0.6 (L) 0.7 - 1.8 ng/dL   T3 (Triiodothyronine), Free   Result Value Ref Range    T3, Free 2.9 1.9 - 3.9 pg/mL   HM1 (CBC with Diff)   Result Value Ref Range    WBC 8.0 4.0 - 11.0 thou/uL    RBC 5.21 3.80 - 5.40 mill/uL    Hemoglobin 16.3 (H) 12.0 - 16.0 g/dL    Hematocrit 53.0 (H) 35.0 - 47.0 %     (H) 80 - 100 fL    MCH 31.3 27.0 - 34.0 pg    MCHC 30.8 (L) 32.0 - 36.0 g/dL    RDW 13.2 11.0 - 14.5 %    Platelets 228 140 - 440 thou/uL    MPV 11.2 8.5 - 12.5 fL    Neutrophils % 57 50 - 70 %    Lymphocytes % 30 20 - 40 %    Monocytes % 8 2 - 10 %    Eosinophils % 2 0 - 6 %    Basophils % 1 0 - 2 %     Neutrophils Absolute 4.6 2.0 - 7.7 thou/uL    Lymphocytes Absolute 2.4 0.8 - 4.4 thou/uL    Monocytes Absolute 0.7 0.0 - 0.9 thou/uL    Eosinophils Absolute 0.2 0.0 - 0.4 thou/uL    Basophils Absolute 0.1 0.0 - 0.2 thou/uL       Here's the results we discussed.  Make sure to start your Levothyroxine - we should recheck labs in 6 weeks.  Make sure to follow up with an ENT specialist.     Please call with questions or contact us using [email or web address].    Sincerely,        Electronically signed by Ana Goetz MD

## 2021-06-29 NOTE — PROGRESS NOTES
"Progress Notes by Jatin Nam MD at 7/31/2020 11:20 AM     Author: Jatin Nam MD Service: -- Author Type: Physician    Filed: 8/3/2020  8:05 AM Encounter Date: 7/31/2020 Status: Signed    : Jatin Nam MD (Physician)       CHIEF COMPLAINT:   Chief Complaint   Patient presents with   ? Advice Only     thyromegaly, CT 6/25           HISTORY OF PRESENT ILLNESS    Lidia\"was seen at the behest of [facility] for evaluation of enlarged thyroid gland (enlarging over 10 years at least).   Patient notes some difficulty with breathing when she tilts her head forward.    Sometimes will have dependent nasal obstruction at nighttime.   Recent CT scan was performed.  She has been diagnosed as hypothyroid and has been on synthyroid 50 mcg daily for the past 4 weeks.   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   Active Non-Hospital Problems    Diagnosis   ? Thyromegaly   ? Obesity     Created by Conversion       ? Nicotine Dependence     Created by [facility] Annotation: Nov 21 2008 11:22AM Ana Jeter: one   ppd       ? Urticaria     Created by [facility] Annotation: Nov 21 2008 11:27AM - Ana Goetz:   years ago had an Epi-pen       ? Allergies     Created by [facility] Annotation: Nov 21 2008 11:27Ana Puente:   mild seasonal -              REVIEW OF SYSTEMS    Review of Systems: a 10-system review is reviewed at this encounter.  See scanned document.     Erythromycin base and Psyllium     PHYSICAL EXAM:     CONSTITUTIONAL:   General Appearance:  Normal, well developed, well nourished, no obvious distress     HEAD: Normal appearance and Symmetry:  No cutaneous lesions.      EARS:    Clinical speech reception threshold:  Normal, able to hear normal speech.  Auricles:  Normal    Normal TM's bilaterally. Normal auditory canals and external ears. Non-tender.       NOSE:    Architecture: "  Normal, Grossly normal external nasal architecture with no masses or lesions.  Mucosa:  Normal mucosa, No polyps or masses.  Septum:  Normal   Turbinates:  Normal, No turbinate abnormalities     ORAL CAVITY/OROPHARYNX    Lips:  Normal.  Mucosa:  Normal, Moist mucous membranes.  Tongue:  Normal, midline  Palate: Normal  Oral pharynx:  Normal,   Tonsil Hypertrophy:2+     NECK    Lymphadenopathy:  None  Trachea:  midline.  Thyroid:  enlarged (see photo)               NEUROLOGIC:   Normal gait and station     RESPIRATORY:   Symmetry and Respiratory effort       Fiberoptic exam:    Is introduced into the right nasal cavity.   There is a left sided pharyngeal wall bulge obscurring the laryngx.   There is considerable pachydermia of arytenoid mucosa/posterior commisure.  TVCs are fully mobile.  Scope is withdrawn.     CT Imagin/25    1.  Massive multinodular goiter distorts and displaces the upper aerodigestive tract and causes mild stenosis of the trachea. No substernal extension. Recommend otolaryngology consultation.  2.  Multilevel cervical spondylosis. Severe spinal canal stenosis at C3-C4. Severe neural foraminal stenosis on the left from C3-C4 through C6-C7    IMPRESSION:    Encounter Diagnosis   Name Primary?   ? Thyromegaly Yes          RECOMMENDATIONS:  Your exam today shows normal vocal cord mobility  Your recent CT show the thyroid gland is pressing on your airway  I am referring you to the [facility] Otolaryngology department for further evaluation.

## 2021-07-28 NOTE — PATIENT INSTRUCTIONS
1. You were seen in the ENT Clinic today by Dr. Martinez.  If you have any questions or concerns after your appointment, please call   -  ENT Clinic: [phone]      Lilian Huber LPN  [facility] Otolaryngology  [phone]     Detail Level: Detailed

## 2022-08-31 ENCOUNTER — OFFICE VISIT (OUTPATIENT)
Dept: FAMILY MEDICINE | Facility: CLINIC | Age: 62
End: 2022-08-31
Payer: MEDICAID

## 2022-08-31 VITALS
HEART RATE: 98 BPM | BODY MASS INDEX: 41.15 KG/M2 | WEIGHT: 225 LBS | SYSTOLIC BLOOD PRESSURE: 171 MMHG | RESPIRATION RATE: 20 BRPM | DIASTOLIC BLOOD PRESSURE: 108 MMHG | OXYGEN SATURATION: 94 % | TEMPERATURE: 99.6 F

## 2022-08-31 DIAGNOSIS — J01.00 ACUTE NON-RECURRENT MAXILLARY SINUSITIS: Primary | ICD-10-CM

## 2022-08-31 PROCEDURE — 99213 OFFICE O/P EST LOW 20 MIN: CPT | Performed by: PHYSICIAN ASSISTANT

## 2022-08-31 RX ORDER — DOXYCYCLINE 100 MG/1
100 CAPSULE ORAL 2 TIMES DAILY
Qty: 20 CAPSULE | Refills: 0 | Status: SHIPPED | OUTPATIENT
Start: 2022-08-31 | End: 2022-09-10

## 2022-08-31 ASSESSMENT — ENCOUNTER SYMPTOMS
SINUS PRESSURE: 1
FEVER: 0
SINUS PAIN: 1

## 2022-08-31 NOTE — PROGRESS NOTES
Patient presents with:  Sinus Problem: Pain under left eye congestion and stuffy nose x  2 months.      Clinical Decision Making:  Description of symptoms are consistent with bacterial sinusitis.  We discussed treatment options and patient would prefer to have treatment with doxycycline.      ICD-10-CM    1. Acute non-recurrent maxillary sinusitis  J01.00 doxycycline hyclate (VIBRAMYCIN) 100 MG capsule       Patient Instructions   1.  Take antibiotic according to bottle instructions with food to avoid stomach upset.   2. May consider Mucinex to help thin mucus secretions.  Continue with steam therapy.  3.  Take Advil or Tylenol as needed for pain or fever control.  4.  Follow-up if you not having any improvement in your symptoms over the next 5 days.        HPI:  Lidia Nam is a 62 year old female who presents today complaining of nasal congestion for the past 2 months and now experiencing pain under the left eye.  She has been using steam therapy.    Patient spoke in depth about the loss of her job, changing of her insurance, difficulty with her thyroid condition.  I did allow the patient to vent, but these facts were not significantly pertinent to her current condition.    History obtained from the patient.    Problem List:  2020-06: Thyromegaly  Obesity  Nicotine Dependence  Urticaria  Allergies      No past medical history on file.    Social History     Tobacco Use     Smoking status: Current Every Day Smoker     Packs/day: 1.00     Years: 39.00     Pack years: 39.00     Types: Cigarettes     Smokeless tobacco: Never Used   Substance Use Topics     Alcohol use: Yes       Review of Systems   Constitutional: Negative for fever.   HENT: Positive for congestion, postnasal drip, sinus pressure and sinus pain.        Vitals:    08/31/22 1821   BP: (!) 171/108   Pulse: 98   Resp: 20   Temp: 99.6  F (37.6  C)   TempSrc: Oral   SpO2: 94%   Weight: 102.1 kg (225 lb)       Physical Exam  Vitals and nursing note  reviewed.   Constitutional:       General: She is not in acute distress.     Appearance: She is not toxic-appearing or diaphoretic.   HENT:      Head: Normocephalic and atraumatic.      Right Ear: External ear normal.      Left Ear: External ear normal.   Eyes:      Conjunctiva/sclera: Conjunctivae normal.   Pulmonary:      Effort: Pulmonary effort is normal. No respiratory distress.      Breath sounds: No stridor. Rales (Expiratory in all lung fields secondary to thyromegaly) present. No wheezing or rhonchi.   Neurological:      Mental Status: She is alert.   Psychiatric:         Mood and Affect: Mood normal.         Behavior: Behavior normal.         Thought Content: Thought content normal.         Judgment: Judgment normal.       At the end of the encounter, I discussed results, diagnosis, medications. Discussed red flags for immediate return to clinic/ER, as well as indications for follow up if no improvement. Patient understood and agreed to plan. Patient was stable for discharge.

## 2022-08-31 NOTE — PATIENT INSTRUCTIONS
1.  Take antibiotic according to bottle instructions with food to avoid stomach upset.   2. May consider Mucinex to help thin mucus secretions.  Continue with steam therapy.  3.  Take Advil or Tylenol as needed for pain or fever control.  4.  Follow-up if you not having any improvement in your symptoms over the next 5 days.

## 2022-09-06 PROBLEM — F17.200 NICOTINE DEPENDENCE: Status: ACTIVE | Noted: 2022-09-06

## 2022-09-07 ENCOUNTER — OFFICE VISIT (OUTPATIENT)
Dept: FAMILY MEDICINE | Facility: CLINIC | Age: 62
End: 2022-09-07
Payer: COMMERCIAL

## 2022-09-07 VITALS
SYSTOLIC BLOOD PRESSURE: 136 MMHG | DIASTOLIC BLOOD PRESSURE: 74 MMHG | RESPIRATION RATE: 24 BRPM | BODY MASS INDEX: 43.75 KG/M2 | HEART RATE: 94 BPM | TEMPERATURE: 97.2 F | HEIGHT: 61 IN | WEIGHT: 231.75 LBS

## 2022-09-07 DIAGNOSIS — Z13.220 SCREENING FOR HYPERLIPIDEMIA: ICD-10-CM

## 2022-09-07 DIAGNOSIS — R60.1 GENERALIZED EDEMA: ICD-10-CM

## 2022-09-07 DIAGNOSIS — Z11.59 NEED FOR HEPATITIS C SCREENING TEST: ICD-10-CM

## 2022-09-07 DIAGNOSIS — Z23 NEED FOR INFLUENZA VACCINATION: ICD-10-CM

## 2022-09-07 DIAGNOSIS — Z11.4 SCREENING FOR HIV (HUMAN IMMUNODEFICIENCY VIRUS): ICD-10-CM

## 2022-09-07 DIAGNOSIS — E01.0 THYROMEGALY: ICD-10-CM

## 2022-09-07 DIAGNOSIS — E11.9 TYPE 2 DIABETES MELLITUS WITHOUT COMPLICATION, WITHOUT LONG-TERM CURRENT USE OF INSULIN (H): ICD-10-CM

## 2022-09-07 DIAGNOSIS — Z12.31 VISIT FOR SCREENING MAMMOGRAM: ICD-10-CM

## 2022-09-07 DIAGNOSIS — Z00.00 ADULT GENERAL MEDICAL EXAM: Primary | ICD-10-CM

## 2022-09-07 DIAGNOSIS — Z12.4 CERVICAL CANCER SCREENING: ICD-10-CM

## 2022-09-07 DIAGNOSIS — Z12.11 SCREEN FOR COLON CANCER: ICD-10-CM

## 2022-09-07 DIAGNOSIS — R39.9 UTI SYMPTOMS: ICD-10-CM

## 2022-09-07 DIAGNOSIS — E66.01 MORBID OBESITY (H): ICD-10-CM

## 2022-09-07 LAB
ALBUMIN SERPL BCG-MCNC: 3.8 G/DL (ref 3.5–5.2)
ALBUMIN UR-MCNC: NEGATIVE MG/DL
ALP SERPL-CCNC: 98 U/L (ref 35–104)
ALT SERPL W P-5'-P-CCNC: 22 U/L (ref 10–35)
ANION GAP SERPL CALCULATED.3IONS-SCNC: 8 MMOL/L (ref 7–15)
APPEARANCE UR: CLEAR
AST SERPL W P-5'-P-CCNC: 27 U/L (ref 10–35)
BACTERIA #/AREA URNS HPF: ABNORMAL /HPF
BILIRUB SERPL-MCNC: 0.5 MG/DL
BILIRUB UR QL STRIP: NEGATIVE
BUN SERPL-MCNC: 7.3 MG/DL (ref 8–23)
CALCIUM SERPL-MCNC: 9.7 MG/DL (ref 8.8–10.2)
CHLORIDE SERPL-SCNC: 94 MMOL/L (ref 98–107)
CHOLEST SERPL-MCNC: 273 MG/DL
COLOR UR AUTO: YELLOW
CREAT SERPL-MCNC: 0.67 MG/DL (ref 0.51–0.95)
DEPRECATED HCO3 PLAS-SCNC: 30 MMOL/L (ref 22–29)
ERYTHROCYTE [DISTWIDTH] IN BLOOD BY AUTOMATED COUNT: 13.4 % (ref 10–15)
GFR SERPL CREATININE-BSD FRML MDRD: >90 ML/MIN/1.73M2
GLUCOSE SERPL-MCNC: 153 MG/DL (ref 70–99)
GLUCOSE UR STRIP-MCNC: NEGATIVE MG/DL
HBA1C MFR BLD: 7.7 % (ref 0–5.6)
HCT VFR BLD AUTO: 53.8 % (ref 35–47)
HDLC SERPL-MCNC: 41 MG/DL
HGB BLD-MCNC: 17.7 G/DL (ref 11.7–15.7)
HGB UR QL STRIP: ABNORMAL
KETONES UR STRIP-MCNC: NEGATIVE MG/DL
LDLC SERPL CALC-MCNC: 207 MG/DL
LEUKOCYTE ESTERASE UR QL STRIP: NEGATIVE
MCH RBC QN AUTO: 32.7 PG (ref 26.5–33)
MCHC RBC AUTO-ENTMCNC: 32.9 G/DL (ref 31.5–36.5)
MCV RBC AUTO: 99 FL (ref 78–100)
NITRATE UR QL: NEGATIVE
NONHDLC SERPL-MCNC: 232 MG/DL
PH UR STRIP: 6.5 [PH] (ref 5–8)
PLATELET # BLD AUTO: 201 10E3/UL (ref 150–450)
POTASSIUM SERPL-SCNC: 4.4 MMOL/L (ref 3.4–5.3)
PROT SERPL-MCNC: 9 G/DL (ref 6.4–8.3)
RBC # BLD AUTO: 5.41 10E6/UL (ref 3.8–5.2)
RBC #/AREA URNS AUTO: ABNORMAL /HPF
SODIUM SERPL-SCNC: 132 MMOL/L (ref 136–145)
SP GR UR STRIP: 1.01 (ref 1–1.03)
SQUAMOUS #/AREA URNS AUTO: ABNORMAL /LPF
T4 FREE SERPL-MCNC: 0.22 NG/DL (ref 0.9–1.7)
TRIGL SERPL-MCNC: 126 MG/DL
TSH SERPL DL<=0.005 MIU/L-ACNC: 59.7 UIU/ML (ref 0.3–4.2)
UROBILINOGEN UR STRIP-ACNC: 0.2 E.U./DL
WBC # BLD AUTO: 7.6 10E3/UL (ref 4–11)
WBC #/AREA URNS AUTO: ABNORMAL /HPF

## 2022-09-07 PROCEDURE — 84443 ASSAY THYROID STIM HORMONE: CPT | Performed by: FAMILY MEDICINE

## 2022-09-07 PROCEDURE — 80053 COMPREHEN METABOLIC PANEL: CPT | Performed by: FAMILY MEDICINE

## 2022-09-07 PROCEDURE — 84439 ASSAY OF FREE THYROXINE: CPT | Performed by: FAMILY MEDICINE

## 2022-09-07 PROCEDURE — 90471 IMMUNIZATION ADMIN: CPT | Performed by: FAMILY MEDICINE

## 2022-09-07 PROCEDURE — 99213 OFFICE O/P EST LOW 20 MIN: CPT | Mod: 25 | Performed by: FAMILY MEDICINE

## 2022-09-07 PROCEDURE — 83036 HEMOGLOBIN GLYCOSYLATED A1C: CPT | Performed by: FAMILY MEDICINE

## 2022-09-07 PROCEDURE — 85027 COMPLETE CBC AUTOMATED: CPT | Performed by: FAMILY MEDICINE

## 2022-09-07 PROCEDURE — 90682 RIV4 VACC RECOMBINANT DNA IM: CPT | Performed by: FAMILY MEDICINE

## 2022-09-07 PROCEDURE — 36415 COLL VENOUS BLD VENIPUNCTURE: CPT | Performed by: FAMILY MEDICINE

## 2022-09-07 PROCEDURE — G0145 SCR C/V CYTO,THINLAYER,RESCR: HCPCS | Performed by: FAMILY MEDICINE

## 2022-09-07 PROCEDURE — 81001 URINALYSIS AUTO W/SCOPE: CPT | Performed by: FAMILY MEDICINE

## 2022-09-07 PROCEDURE — 99396 PREV VISIT EST AGE 40-64: CPT | Mod: 25 | Performed by: FAMILY MEDICINE

## 2022-09-07 PROCEDURE — 87624 HPV HI-RISK TYP POOLED RSLT: CPT | Performed by: FAMILY MEDICINE

## 2022-09-07 PROCEDURE — 80061 LIPID PANEL: CPT | Performed by: FAMILY MEDICINE

## 2022-09-07 ASSESSMENT — ENCOUNTER SYMPTOMS
DIZZINESS: 0
SORE THROAT: 0
HEMATOCHEZIA: 0
ARTHRALGIAS: 1
COUGH: 1
FREQUENCY: 1
DYSURIA: 0
HEADACHES: 0
BREAST MASS: 0
WEAKNESS: 1
DIARRHEA: 0
CONSTIPATION: 1
NERVOUS/ANXIOUS: 1
JOINT SWELLING: 0
PALPITATIONS: 0
HEMATURIA: 0
ABDOMINAL PAIN: 1
EYE PAIN: 0
FEVER: 0
HEARTBURN: 0
NAUSEA: 0
CHILLS: 0
MYALGIAS: 1

## 2022-09-07 NOTE — LETTER
September 15, 2022      Lidai Nam  [address] [place] [id number]        Dear ,    We are writing to inform you of your test results.    Your labs show:  1.  Hypothyroidism -  You are back on your Levothyroxine - it is important to recheck this. Orders already in the chart.  2.  Your blood sugar is high; and the A1c is 7.7    This is consistent with diabetes.  I'd be happy to put in a referral for     Resulted Orders   UA reflex to Microscopic and Culture   Result Value Ref Range    Color Urine Yellow Colorless, Straw, Light Yellow, Yellow    Appearance Urine Clear Clear    Glucose Urine Negative Negative mg/dL    Bilirubin Urine Negative Negative    Ketones Urine Negative Negative mg/dL    Specific Gravity Urine 1.010 1.005 - 1.030    Blood Urine Trace (A) Negative    pH Urine 6.5 5.0 - 8.0    Protein Albumin Urine Negative Negative mg/dL    Urobilinogen Urine 0.2 0.2, 1.0 E.U./dL    Nitrite Urine Negative Negative    Leukocyte Esterase Urine Negative Negative   Urine Microscopic   Result Value Ref Range    Bacteria Urine None Seen None Seen /HPF    RBC Urine None Seen 0-2 /HPF /HPF    WBC Urine None Seen 0-5 /HPF /HPF    Squamous Epithelials Urine Few (A) None Seen /LPF    Narrative    Urine Culture not indicated   Lipid panel reflex to direct LDL Fasting   Result Value Ref Range    Cholesterol 273 (H) <200 mg/dL    Triglycerides 126 <150 mg/dL    Direct Measure HDL 41 (L) >=50 mg/dL    LDL Cholesterol Calculated 207 (H) <=100 mg/dL    Non HDL Cholesterol 232 (H) <130 mg/dL    Narrative    Cholesterol  Desirable:  <200 mg/dL    Triglycerides  Normal:  Less than 150 mg/dL  Borderline High:  150-199 mg/dL  High:  200-499 mg/dL  Very High:  Greater than or equal to 500 mg/dL    Direct Measure HDL  Female:  Greater than or equal to 50 mg/dL   Male:  Greater than or equal to 40 mg/dL    LDL Cholesterol  Desirable:  <100mg/dL  Above Desirable:  100-129 mg/dL   Borderline High:  130-159 mg/dL    High:  160-189 mg/dL   Very High:  >= 190 mg/dL    Non HDL Cholesterol  Desirable:  130 mg/dL  Above Desirable:  130-159 mg/dL  Borderline High:  160-189 mg/dL  High:  190-219 mg/dL  Very High:  Greater than or equal to 220 mg/dL   TSH   Result Value Ref Range    TSH 59.70 (H) 0.30 - 4.20 uIU/mL   T4, free   Result Value Ref Range    Free T4 0.22 (L) 0.90 - 1.70 ng/dL   CBC with platelets   Result Value Ref Range    WBC Count 7.6 4.0 - 11.0 10e3/uL    RBC Count 5.41 (H) 3.80 - 5.20 10e6/uL    Hemoglobin 17.7 (H) 11.7 - 15.7 g/dL    Hematocrit 53.8 (H) 35.0 - 47.0 %    MCV 99 78 - 100 fL    MCH 32.7 26.5 - 33.0 pg    MCHC 32.9 31.5 - 36.5 g/dL    RDW 13.4 10.0 - 15.0 %    Platelet Count 201 150 - 450 10e3/uL   Comprehensive metabolic panel (BMP + Alb, Alk Phos, ALT, AST, Total. Bili, TP)   Result Value Ref Range    Sodium 132 (L) 136 - 145 mmol/L    Potassium 4.4 3.4 - 5.3 mmol/L    Creatinine 0.67 0.51 - 0.95 mg/dL    Urea Nitrogen 7.3 (L) 8.0 - 23.0 mg/dL    Chloride 94 (L) 98 - 107 mmol/L    Carbon Dioxide (CO2) 30 (H) 22 - 29 mmol/L    Anion Gap 8 7 - 15 mmol/L    Glucose 153 (H) 70 - 99 mg/dL    Calcium 9.7 8.8 - 10.2 mg/dL    Protein Total 9.0 (H) 6.4 - 8.3 g/dL    Albumin 3.8 3.5 - 5.2 g/dL    Bilirubin Total 0.5 <=1.2 mg/dL    Alkaline Phosphatase 98 35 - 104 U/L    AST 27 10 - 35 U/L    ALT 22 10 - 35 U/L    GFR Estimate >90 >60 mL/min/1.73m2      Comment:      Effective December 21, 2021 eGFRcr in adults is calculated using the 2021 CKD-EPI creatinine equation which includes age and gender ( et al., NEJM, DOI: 10.1056/WYITxc7371430)   Hemoglobin A1c   Result Value Ref Range    Hemoglobin A1C 7.7 (H) 0.0 - 5.6 %      Comment:      Normal <5.7%   Prediabetes 5.7-6.4%    Diabetes 6.5% or higher     Note: Adopted from ADA consensus guidelines.       If you have any questions or concerns, please call the clinic at the number listed above.       Sincerely,      Ana Goetz MD

## 2022-09-07 NOTE — PROGRESS NOTES
"   SUBJECTIVE:   CC: Lidia Nam is an 62 year old woman who presents for preventive health visit.     Hasn't been taking thyroid medications - some phone messages - gave up -   Thyroid is getting bigger  Then, COVID - lock down -   Saw doctor at  - was told she didn't need surgery unless it was interfering with her swallowing        Swelling , everywhere is body got better  Ended up in Urgent Care last week -   \"no one looked at my sinus\"  Blood pressure is high  Was told how to take care of her sinus -   \"I knew what to do\"    Allergy pills dries it out too much  Gets stuck like a fur ball in base of neck  Was light green previously -   At home, can cough and spit -     In April, boss celebrated 40 years of service  Before [facility] - called in to office:    Lost her job - now unemployed  Finally [place] ([place])    Taking Mucinex - thought she was taking Nyquil    Dries her up a bit  Especially at bedtime -   Gets constipated -   Can't pass gas - needs to get to bathroom quickly -  Got some antibiotics -     Doesn't want to do colon cancer screening  Mom had polyps - but \"nothing every came of it\"  Has some bleeding with hemorrhoids    Belly is bloated -   Too much pressure - may have incontinence    Doesn't want mammogram              Healthy Habits:     Getting at least 3 servings of Calcium per day:  NO    Bi-annual eye exam:  NO    Dental care twice a year:  NO    Sleep apnea or symptoms of sleep apnea:  Sleep apnea    Diet:  Regular (no restrictions)    Frequency of exercise:  None    Taking medications regularly:  Yes    Medication side effects:  Not applicable    PHQ-2 Total Score: 2    Additional concerns today:  No      Today's PHQ-2 Score:   PHQ-2 ( 1999 Pfizer) 9/7/2022   Q1: Little interest or pleasure in doing things 1   Q2: Feeling down, depressed or hopeless 1   PHQ-2 Score 2   PHQ-2 Total Score (12-17 Years)- Positive if 3 or more points; Administer PHQ-A if positive -   Q1: " Little interest or pleasure in doing things Several days   Q2: Feeling down, depressed or hopeless Several days   PHQ-2 Score 2       Abuse: Current or Past (Physical, Sexual or Emotional) - No  Do you feel safe in your environment? Yes    Have you ever done Advance Care Planning? (For example, a Health Directive, POLST, or a discussion with a medical provider or your loved ones about your wishes): No, advance care planning information given to patient to review.  Advanced care planning was discussed at today's visit.    Social History     Tobacco Use     Smoking status: Current Every Day Smoker     Packs/day: 1.00     Years: 39.00     Pack years: 39.00     Types: Cigarettes     Smokeless tobacco: Never Used   Substance Use Topics     Alcohol use: Yes     If you drink alcohol do you typically have >3 drinks per day or >7 drinks per week? Yes      Alcohol Use 9/7/2022   Prescreen: >3 drinks/day or >7 drinks/week? Yes   Prescreen: >3 drinks/day or >7 drinks/week? -   AUDIT SCORE  11     AUDIT - Alcohol Use Disorders Identification Test - Reproduced from the World Health Organization Audit 2001 (Second Edition) 9/7/2022   1.  How often do you have a drink containing alcohol? 4 or more times a week   2.  How many drinks containing alcohol do you have on a typical day when you are drinking? 3 or 4   3.  How often do you have five or more drinks on one occasion? Less than monthly   4.  How often during the last year have you found that you were not able to stop drinking once you had started? Never   5.  How often during the last year have you failed to do what was normally expected of you because of drinking? Never   6.  How often during the last year have you needed a first drink in the morning to get yourself going after a heavy drinking session? Never   7.  How often during the last year have you had a feeling of guilt or remorse after drinking? Less than monthly   8.  How often during the last year have you been  unable to remember what happened the night before because of your drinking? Never   9.  Have you or someone else been injured because of your drinking? No   10. Has a relative, friend, doctor or other health care worker been concerned about your drinking or suggested you cut down? Yes, during the last year   TOTAL SCORE 11       Reviewed orders with patient.  Reviewed health maintenance and updated orders accordingly - Yes  BP Readings from Last 3 Encounters:   09/07/22 136/74   08/31/22 (!) 171/108   08/14/20 128/85    Wt Readings from Last 3 Encounters:   09/07/22 105.1 kg (231 lb 12 oz)   08/31/22 102.1 kg (225 lb)   08/14/20 109.3 kg (241 lb)                  Patient Active Problem List   Diagnosis     Obesity     Nicotine Dependence     Urticaria     Allergies     Thyromegaly     Nicotine dependence     Diabetes mellitus, type 2 (H)     Morbid obesity (H)     Past Surgical History:   Procedure Laterality Date     [facility] APPENDECTOMY      Description: Appendectomy;  Recorded: 11/21/2008;     [facility] LIGATE FALLOPIAN TUBE      Description: Tubal Ligation;  Recorded: 11/21/2008;       Social History     Tobacco Use     Smoking status: Current Every Day Smoker     Packs/day: 1.00     Years: 39.00     Pack years: 39.00     Types: Cigarettes     Smokeless tobacco: Never Used   Substance Use Topics     Alcohol use: Yes     No family history on file.      Current Outpatient Medications   Medication Sig Dispense Refill     levothyroxine (SYNTHROID/LEVOTHROID) 50 MCG tablet Take 1 tablet (50 mcg) by mouth daily 90 tablet 1     Allergies   Allergen Reactions     Erythromycin      Psyllium      Seasonal Allergies      Hay fever       Breast Cancer Screening:    Breast CA Risk Assessment (FHS-7) 9/7/2022   Do you have a family history of breast, colon, or ovarian cancer? No / Unknown         Mammogram Screening: Recommended mammography every 1-2 years with patient discussion and risk factor consideration  Pertinent mammograms are  "reviewed under the imaging tab.    History of abnormal Pap smear: NO - age 30-65 PAP every 5 years with negative HPV co-testing recommended     Reviewed and updated as needed this visit by clinical staff   Tobacco  Allergies  Meds                Reviewed and updated as needed this visit by Provider                   No past medical history on file.   Past Surgical History:   Procedure Laterality Date     [facility] APPENDECTOMY      Description: Appendectomy;  Recorded: 11/21/2008;     [facility] LIGATE FALLOPIAN TUBE      Description: Tubal Ligation;  Recorded: 11/21/2008;     OB History   No obstetric history on file.       Review of Systems   Constitutional: Negative for chills and fever.   HENT: Positive for congestion. Negative for ear pain, hearing loss and sore throat.    Eyes: Negative for pain and visual disturbance.   Respiratory: Positive for cough.    Cardiovascular: Positive for peripheral edema. Negative for chest pain and palpitations.   Gastrointestinal: Positive for abdominal pain and constipation. Negative for diarrhea, heartburn, hematochezia and nausea.   Breasts:  Negative for tenderness, breast mass and discharge.   Genitourinary: Positive for frequency. Negative for dysuria, genital sores, hematuria, pelvic pain, urgency, vaginal bleeding and vaginal discharge.   Musculoskeletal: Positive for arthralgias and myalgias. Negative for joint swelling.   Skin: Negative for rash.   Neurological: Positive for weakness. Negative for dizziness and headaches.   Psychiatric/Behavioral: Positive for mood changes. The patient is nervous/anxious.           OBJECTIVE:   BP (!) 152/88 (BP Location: Right arm, Patient Position: Sitting, Cuff Size: Adult Large)   Pulse 94   Temp 97.2  F (36.2  C)   Resp 24   Ht 1.562 m (5' 1.5\")   Wt 105.1 kg (231 lb 12 oz)   LMP  (LMP Unknown)   BMI 43.09 kg/m    Physical Exam  Constitutional:       General: She is not in acute distress.     Appearance: She is well-developed. "   HENT:      Right Ear: Tympanic membrane and external ear normal.      Left Ear: Tympanic membrane and external ear normal.      Nose: Nose normal.      Mouth/Throat:      Pharynx: No oropharyngeal exudate.   Eyes:      General:         Right eye: No discharge.         Left eye: No discharge.      Conjunctiva/sclera: Conjunctivae normal.      Pupils: Pupils are equal, round, and reactive to light.   Neck:      Thyroid: No thyromegaly.      Trachea: No tracheal deviation.      Comments: Very large swollen thyroid gland covering entire anterior neck -   Cardiovascular:      Rate and Rhythm: Normal rate and regular rhythm.      Pulses: Normal pulses.      Heart sounds: Normal heart sounds, S1 normal and S2 normal. No murmur heard.    No friction rub. No S3 or S4 sounds.   Pulmonary:      Effort: Pulmonary effort is normal. No respiratory distress.      Breath sounds: Normal breath sounds. No wheezing or rales.   Chest:   Breasts:      Right: No mass, nipple discharge or tenderness.      Left: No mass, nipple discharge or tenderness.       Abdominal:      General: Bowel sounds are normal.      Palpations: Abdomen is soft. There is no mass.      Tenderness: There is no abdominal tenderness.   Genitourinary:     Comments: PELVIC EXAM:External genitalia: normal  Vaginal mucosa normal  Vaginal discharge:none  Speculum exam shows a normal appearing cervix .   Bimanual exam: Cervix closed, firm, non tender  to motion.  Uterus  firm, regular, mobile, non tender to palpation. No adnexal masses or tenderness.     Musculoskeletal:         General: Normal range of motion.      Cervical back: Neck supple.   Lymphadenopathy:      Cervical: No cervical adenopathy.   Skin:     General: Skin is warm and dry.      Findings: No rash.   Neurological:      Mental Status: She is alert and oriented to person, place, and time.      Motor: No abnormal muscle tone.      Deep Tendon Reflexes: Reflexes are normal and symmetric.   Psychiatric:          Thought Content: Thought content normal.         Judgment: Judgment normal.           Diagnostic Test Results:  Labs reviewed in Epic  Results for orders placed or performed in visit on 09/07/22   UA reflex to Microscopic and Culture     Status: Abnormal    Specimen: Urine, Midstream   Result Value Ref Range    Color Urine Yellow Colorless, Straw, Light Yellow, Yellow    Appearance Urine Clear Clear    Glucose Urine Negative Negative mg/dL    Bilirubin Urine Negative Negative    Ketones Urine Negative Negative mg/dL    Specific Gravity Urine 1.010 1.005 - 1.030    Blood Urine Trace (A) Negative    pH Urine 6.5 5.0 - 8.0    Protein Albumin Urine Negative Negative mg/dL    Urobilinogen Urine 0.2 0.2, 1.0 E.U./dL    Nitrite Urine Negative Negative    Leukocyte Esterase Urine Negative Negative   Urine Microscopic     Status: Abnormal   Result Value Ref Range    Bacteria Urine None Seen None Seen /HPF    RBC Urine None Seen 0-2 /HPF /HPF    WBC Urine None Seen 0-5 /HPF /HPF    Squamous Epithelials Urine Few (A) None Seen /LPF    Narrative    Urine Culture not indicated   Lipid panel reflex to direct LDL Fasting     Status: Abnormal   Result Value Ref Range    Cholesterol 273 (H) <200 mg/dL    Triglycerides 126 <150 mg/dL    Direct Measure HDL 41 (L) >=50 mg/dL    LDL Cholesterol Calculated 207 (H) <=100 mg/dL    Non HDL Cholesterol 232 (H) <130 mg/dL    Narrative    Cholesterol  Desirable:  <200 mg/dL    Triglycerides  Normal:  Less than 150 mg/dL  Borderline High:  150-199 mg/dL  High:  200-499 mg/dL  Very High:  Greater than or equal to 500 mg/dL    Direct Measure HDL  Female:  Greater than or equal to 50 mg/dL   Male:  Greater than or equal to 40 mg/dL    LDL Cholesterol  Desirable:  <100mg/dL  Above Desirable:  100-129 mg/dL   Borderline High:  130-159 mg/dL   High:  160-189 mg/dL   Very High:  >= 190 mg/dL    Non HDL Cholesterol  Desirable:  130 mg/dL  Above Desirable:  130-159 mg/dL  Borderline High:  160-189  mg/dL  High:  190-219 mg/dL  Very High:  Greater than or equal to 220 mg/dL   TSH     Status: Abnormal   Result Value Ref Range    TSH 59.70 (H) 0.30 - 4.20 uIU/mL   T4, free     Status: Abnormal   Result Value Ref Range    Free T4 0.22 (L) 0.90 - 1.70 ng/dL   CBC with platelets     Status: Abnormal   Result Value Ref Range    WBC Count 7.6 4.0 - 11.0 10e3/uL    RBC Count 5.41 (H) 3.80 - 5.20 10e6/uL    Hemoglobin 17.7 (H) 11.7 - 15.7 g/dL    Hematocrit 53.8 (H) 35.0 - 47.0 %    MCV 99 78 - 100 fL    MCH 32.7 26.5 - 33.0 pg    MCHC 32.9 31.5 - 36.5 g/dL    RDW 13.4 10.0 - 15.0 %    Platelet Count 201 150 - 450 10e3/uL   Comprehensive metabolic panel (BMP + Alb, Alk Phos, ALT, AST, Total. Bili, TP)     Status: Abnormal   Result Value Ref Range    Sodium 132 (L) 136 - 145 mmol/L    Potassium 4.4 3.4 - 5.3 mmol/L    Creatinine 0.67 0.51 - 0.95 mg/dL    Urea Nitrogen 7.3 (L) 8.0 - 23.0 mg/dL    Chloride 94 (L) 98 - 107 mmol/L    Carbon Dioxide (CO2) 30 (H) 22 - 29 mmol/L    Anion Gap 8 7 - 15 mmol/L    Glucose 153 (H) 70 - 99 mg/dL    Calcium 9.7 8.8 - 10.2 mg/dL    Protein Total 9.0 (H) 6.4 - 8.3 g/dL    Albumin 3.8 3.5 - 5.2 g/dL    Bilirubin Total 0.5 <=1.2 mg/dL    Alkaline Phosphatase 98 35 - 104 U/L    AST 27 10 - 35 U/L    ALT 22 10 - 35 U/L    GFR Estimate >90 >60 mL/min/1.73m2   Hemoglobin A1c     Status: Abnormal   Result Value Ref Range    Hemoglobin A1C 7.7 (H) 0.0 - 5.6 %       ASSESSMENT/PLAN:   1. Adult general medical exam  This is a 63 yo female here for physical exam     2. Thyromegaly  Patient has profound thyromegaly - started on levothyroxine after last visit - but didn't continue due to difficulty with making contact with our clinic - and then blames pandemic and busy schedule not allowing her to follow up - nonetheless, she thinks she'll feel better if she gets back on her medication;  Will check labs - but will need Levothyroxine again.  I have put in future labs as well, so she doesn't have to  worry about telephone contact at that time.   - TSH; Future  - T4, free; Future  - CBC with platelets; Future  - TSH; Future  - T4, free; Future  - TSH  - T4, free  - CBC with platelets    3. Generalized edema  Likely related to hypothyroidism.  Check labs - restart Levothyroxine.   - Comprehensive metabolic panel (BMP + Alb, Alk Phos, ALT, AST, Total. Bili, TP); Future  - Hemoglobin A1c; Future  - Comprehensive metabolic panel (BMP + Alb, Alk Phos, ALT, AST, Total. Bili, TP)  - Hemoglobin A1c    4. Type 2 diabetes mellitus without complication, without long-term current use of insulin (H)  H/o type II DM per labs - will recheck     5. Morbid obesity (H)  May be related to thyroid issues - puts her at risk for DM.     6. Urinary tract infection  Patient has h/o UTI - feels some dysuria/linda quency - will check UA/UC.  Treat if culture positive.   - UA reflex to Microscopic and Culture; Future  - UA reflex to Microscopic and Culture  - Urine Microscopic    7. Visit for screening mammogram  Need breast cancer screening -     8. Screen for colon cancer  Due for colon cancer screening - discussed     9. Screening for HIV (human immunodeficiency virus)  Discussed recommendation for HIV screening - low risk    10. Need for hepatitis C screening test  Discussed recommendation for Hepatitis C screening - low risk - ordered      11. Cervical cancer screening  Due fore cervix cancer screening - ordered  - Pap Screen with HPV - recommended age 30 - 65 years  - HPV Hold (Lab Only)    12. Screening for hyperlipidemia  Due for lipid screening   - Lipid panel reflex to direct LDL Fasting; Future  - Lipid panel reflex to direct LDL Fasting    13. Need for influenza vaccination  Agrees to influenza screening   - INFLUENZA QUAD, RECOMBINANT, P-FREE (RIV4) (FLUBLOK) [[id number]]          COUNSELING:  Reviewed preventive health counseling, as reflected in patient instructions       Regular exercise       Healthy diet/nutrition    Estimated  "body mass index is 43.09 kg/m  as calculated from the following:    Height as of this encounter: 1.562 m (5' 1.5\").    Weight as of this encounter: 105.1 kg (231 lb 12 oz).    Weight management plan: Discussed healthy diet and exercise guidelines    She reports that she has been smoking cigarettes. She has a 39.00 pack-year smoking history. She has never used smokeless tobacco.  Nicotine/Tobacco Cessation Plan:   Information offered: Patient not interested at this time    Discussed lung cancer screening - declines.    Counseling Resources:  ATP IV Guidelines  Pooled Cohorts Equation Calculator  Breast Cancer Risk Calculator  BRCA-Related Cancer Risk Assessment: FHS-7 Tool  FRAX Risk Assessment  ICSI Preventive Guidelines  Dietary Guidelines for Americans, 2010  USDA's MyPlate  ASA Prophylaxis  Lung CA Screening    RUSSELL DELGADILLO MD  [facility]  "

## 2022-09-07 NOTE — LETTER
September 30, 2022      Lidia Nam  [address] [place] [id number]        Dear ,    Just wanted to make sure you got a copy of these:    Your cholesterol numbers are high, but may come down as your thyroid numbers improve.  Sugars are too high as well.  It would be reasonable to repeat those as well - if remains high, you will need medicine for that as well.      Resulted Orders   UA reflex to Microscopic and Culture   Result Value Ref Range    Color Urine Yellow Colorless, Straw, Light Yellow, Yellow    Appearance Urine Clear Clear    Glucose Urine Negative Negative mg/dL    Bilirubin Urine Negative Negative    Ketones Urine Negative Negative mg/dL    Specific Gravity Urine 1.010 1.005 - 1.030    Blood Urine Trace (A) Negative    pH Urine 6.5 5.0 - 8.0    Protein Albumin Urine Negative Negative mg/dL    Urobilinogen Urine 0.2 0.2, 1.0 E.U./dL    Nitrite Urine Negative Negative    Leukocyte Esterase Urine Negative Negative   Urine Microscopic   Result Value Ref Range    Bacteria Urine None Seen None Seen /HPF    RBC Urine None Seen 0-2 /HPF /HPF    WBC Urine None Seen 0-5 /HPF /HPF    Squamous Epithelials Urine Few (A) None Seen /LPF    Narrative    Urine Culture not indicated   Lipid panel reflex to direct LDL Fasting   Result Value Ref Range    Cholesterol 273 (H) <200 mg/dL    Triglycerides 126 <150 mg/dL    Direct Measure HDL 41 (L) >=50 mg/dL    LDL Cholesterol Calculated 207 (H) <=100 mg/dL    Non HDL Cholesterol 232 (H) <130 mg/dL    Narrative    Cholesterol  Desirable:  <200 mg/dL    Triglycerides  Normal:  Less than 150 mg/dL  Borderline High:  150-199 mg/dL  High:  200-499 mg/dL  Very High:  Greater than or equal to 500 mg/dL    Direct Measure HDL  Female:  Greater than or equal to 50 mg/dL   Male:  Greater than or equal to 40 mg/dL    LDL Cholesterol  Desirable:  <100mg/dL  Above Desirable:  100-129 mg/dL   Borderline High:  130-159 mg/dL   High:  160-189 mg/dL   Very High:  >= 190  mg/dL    Non HDL Cholesterol  Desirable:  130 mg/dL  Above Desirable:  130-159 mg/dL  Borderline High:  160-189 mg/dL  High:  190-219 mg/dL  Very High:  Greater than or equal to 220 mg/dL   TSH   Result Value Ref Range    TSH 59.70 (H) 0.30 - 4.20 uIU/mL   T4, free   Result Value Ref Range    Free T4 0.22 (L) 0.90 - 1.70 ng/dL   CBC with platelets   Result Value Ref Range    WBC Count 7.6 4.0 - 11.0 10e3/uL    RBC Count 5.41 (H) 3.80 - 5.20 10e6/uL    Hemoglobin 17.7 (H) 11.7 - 15.7 g/dL    Hematocrit 53.8 (H) 35.0 - 47.0 %    MCV 99 78 - 100 fL    MCH 32.7 26.5 - 33.0 pg    MCHC 32.9 31.5 - 36.5 g/dL    RDW 13.4 10.0 - 15.0 %    Platelet Count 201 150 - 450 10e3/uL   Comprehensive metabolic panel (BMP + Alb, Alk Phos, ALT, AST, Total. Bili, TP)   Result Value Ref Range    Sodium 132 (L) 136 - 145 mmol/L    Potassium 4.4 3.4 - 5.3 mmol/L    Creatinine 0.67 0.51 - 0.95 mg/dL    Urea Nitrogen 7.3 (L) 8.0 - 23.0 mg/dL    Chloride 94 (L) 98 - 107 mmol/L    Carbon Dioxide (CO2) 30 (H) 22 - 29 mmol/L    Anion Gap 8 7 - 15 mmol/L    Glucose 153 (H) 70 - 99 mg/dL    Calcium 9.7 8.8 - 10.2 mg/dL    Protein Total 9.0 (H) 6.4 - 8.3 g/dL    Albumin 3.8 3.5 - 5.2 g/dL    Bilirubin Total 0.5 <=1.2 mg/dL    Alkaline Phosphatase 98 35 - 104 U/L    AST 27 10 - 35 U/L    ALT 22 10 - 35 U/L    GFR Estimate >90 >60 mL/min/1.73m2      Comment:      Effective December 21, 2021 eGFRcr in adults is calculated using the 2021 CKD-EPI creatinine equation which includes age and gender (Cuco chavez al., NEJ, DOI: 10.1056/YIUXyv9174063)   Hemoglobin A1c   Result Value Ref Range    Hemoglobin A1C 7.7 (H) 0.0 - 5.6 %      Comment:      Normal <5.7%   Prediabetes 5.7-6.4%    Diabetes 6.5% or higher     Note: Adopted from ADA consensus guidelines.       If you have any questions or concerns, please call the clinic at the number listed above.       Sincerely,      Ana Goetz MD

## 2022-09-08 RX ORDER — LEVOTHYROXINE SODIUM 50 UG/1
TABLET ORAL
Status: CANCELLED | OUTPATIENT
Start: 2022-09-08

## 2022-09-08 NOTE — TELEPHONE ENCOUNTER
Medication Question or Refill        What medication are you calling about (include dose and sig)?:   levothyroxine (SYNTHROID/LEVOTHROID) 50 MCG tablet    Controlled Substance Agreement on file:   CSA -- Patient Level:    CSA: None found at the patient level.       Who prescribed the medication?: Dr. Trevizo    Do you need a refill? Yes:     When did you use the medication last? 2 years    Patient offered an appointment? No    Do you have any questions or concerns?  Yes:     Patient called to request provider to send in RX to pharmacy. This was not sent to pharmacy yesterday after her appt with provider. Please send RX to pharmacy.      Preferred Pharmacy:     [id number] - [place], [place] - [address] [place] [address] AT [facility] [address]  [address] [place] [address]  Phone: [phone] Fax: [phone]      Okay to leave a detailed message?: Yes at Home number on file [phone] (home)

## 2022-09-09 ENCOUNTER — TELEPHONE (OUTPATIENT)
Dept: FAMILY MEDICINE | Facility: CLINIC | Age: 62
End: 2022-09-09

## 2022-09-09 DIAGNOSIS — E01.0 THYROMEGALY: Primary | ICD-10-CM

## 2022-09-09 DIAGNOSIS — E03.9 HYPOTHYROIDISM, UNSPECIFIED TYPE: ICD-10-CM

## 2022-09-09 RX ORDER — LEVOTHYROXINE SODIUM 50 UG/1
50 TABLET ORAL DAILY
Qty: 90 TABLET | Refills: 1 | Status: SHIPPED | OUTPATIENT
Start: 2022-09-09 | End: 2022-11-15

## 2022-09-09 NOTE — TELEPHONE ENCOUNTER
General Call      Reason for Call:  Requesting lab results     What are your questions or concerns:  Pt would like all lab results done on 9/7/22 print out for pick when all results are in. Please print all labs results done on 9/7/22 for pt to . Pt would like a call when ok to come in to . Thank you.     Date of last appointment with provider: 9/7/22    Okay to leave a detailed message?: Yes at Home number on file [phone] (home)

## 2022-09-12 LAB
BKR LAB AP GYN ADEQUACY: NORMAL
BKR LAB AP GYN INTERPRETATION: NORMAL
BKR LAB AP HPV REFLEX: NORMAL
BKR LAB AP PREVIOUS ABNORMAL: NORMAL
PATH REPORT.COMMENTS IMP SPEC: NORMAL
PATH REPORT.COMMENTS IMP SPEC: NORMAL
PATH REPORT.RELEVANT HX SPEC: NORMAL

## 2022-09-14 LAB
HUMAN PAPILLOMA VIRUS 16 DNA: NEGATIVE
HUMAN PAPILLOMA VIRUS 18 DNA: NEGATIVE
HUMAN PAPILLOMA VIRUS FINAL DIAGNOSIS: NORMAL
HUMAN PAPILLOMA VIRUS OTHER HR: NEGATIVE

## 2022-11-03 ENCOUNTER — TELEPHONE (OUTPATIENT)
Dept: FAMILY MEDICINE | Facility: CLINIC | Age: 62
End: 2022-11-03

## 2022-11-03 NOTE — TELEPHONE ENCOUNTER
Reason for Call:  Appointment Request    Patient requesting this type of appt:  OV follow up for Thyroid med, SOB, bilateral edema    Requested provider: Ana Goetz    Reason patient unable to be scheduled: scheduled for 11/15/22 with PCP     When does patient want to be seen/preferred time: did not say    Okay to leave a detailed message?: Yes at Home number on file [phone] (home)    Call taken on 11/3/2022 at 5:23 PM by FUAD [place]

## 2022-11-15 ENCOUNTER — OFFICE VISIT (OUTPATIENT)
Dept: FAMILY MEDICINE | Facility: CLINIC | Age: 62
End: 2022-11-15
Payer: COMMERCIAL

## 2022-11-15 VITALS
WEIGHT: 229 LBS | TEMPERATURE: 97.4 F | OXYGEN SATURATION: 95 % | DIASTOLIC BLOOD PRESSURE: 84 MMHG | BODY MASS INDEX: 42.57 KG/M2 | HEART RATE: 95 BPM | SYSTOLIC BLOOD PRESSURE: 138 MMHG | RESPIRATION RATE: 26 BRPM

## 2022-11-15 DIAGNOSIS — R60.0 BILATERAL LOWER EXTREMITY EDEMA: ICD-10-CM

## 2022-11-15 DIAGNOSIS — B35.1 ONYCHOMYCOSIS: ICD-10-CM

## 2022-11-15 DIAGNOSIS — E03.9 HYPOTHYROIDISM, UNSPECIFIED TYPE: ICD-10-CM

## 2022-11-15 DIAGNOSIS — E01.0 THYROMEGALY: Primary | ICD-10-CM

## 2022-11-15 DIAGNOSIS — E11.65 TYPE 2 DIABETES MELLITUS WITH HYPERGLYCEMIA, WITHOUT LONG-TERM CURRENT USE OF INSULIN (H): ICD-10-CM

## 2022-11-15 DIAGNOSIS — E01.0 THYROMEGALY: ICD-10-CM

## 2022-11-15 DIAGNOSIS — R89.9 ABNORMAL LABORATORY TEST: ICD-10-CM

## 2022-11-15 LAB
ALBUMIN SERPL BCG-MCNC: 3.7 G/DL (ref 3.5–5.2)
ALP SERPL-CCNC: 100 U/L (ref 35–104)
ALT SERPL W P-5'-P-CCNC: 24 U/L (ref 10–35)
ANION GAP SERPL CALCULATED.3IONS-SCNC: 7 MMOL/L (ref 7–15)
AST SERPL W P-5'-P-CCNC: 26 U/L (ref 10–35)
BILIRUB SERPL-MCNC: 0.4 MG/DL
BUN SERPL-MCNC: 5.6 MG/DL (ref 8–23)
CALCIUM SERPL-MCNC: 9.8 MG/DL (ref 8.8–10.2)
CHLORIDE SERPL-SCNC: 96 MMOL/L (ref 98–107)
CREAT SERPL-MCNC: 0.62 MG/DL (ref 0.51–0.95)
DEPRECATED HCO3 PLAS-SCNC: 32 MMOL/L (ref 22–29)
GFR SERPL CREATININE-BSD FRML MDRD: >90 ML/MIN/1.73M2
GLUCOSE SERPL-MCNC: 111 MG/DL (ref 70–99)
HBA1C MFR BLD: 7.5 % (ref 0–5.6)
POTASSIUM SERPL-SCNC: 4.4 MMOL/L (ref 3.4–5.3)
PROT SERPL-MCNC: 8.8 G/DL (ref 6.4–8.3)
SODIUM SERPL-SCNC: 135 MMOL/L (ref 136–145)
T3FREE SERPL-MCNC: 2 PG/ML (ref 2–4.4)
T4 FREE SERPL-MCNC: 0.64 NG/DL (ref 0.9–1.7)
TSH SERPL DL<=0.005 MIU/L-ACNC: 35.3 UIU/ML (ref 0.3–4.2)

## 2022-11-15 PROCEDURE — 36415 COLL VENOUS BLD VENIPUNCTURE: CPT | Performed by: FAMILY MEDICINE

## 2022-11-15 PROCEDURE — 84439 ASSAY OF FREE THYROXINE: CPT | Performed by: FAMILY MEDICINE

## 2022-11-15 PROCEDURE — 99214 OFFICE O/P EST MOD 30 MIN: CPT | Performed by: FAMILY MEDICINE

## 2022-11-15 PROCEDURE — 83036 HEMOGLOBIN GLYCOSYLATED A1C: CPT | Performed by: FAMILY MEDICINE

## 2022-11-15 PROCEDURE — 80053 COMPREHEN METABOLIC PANEL: CPT | Performed by: FAMILY MEDICINE

## 2022-11-15 PROCEDURE — 84481 FREE ASSAY (FT-3): CPT | Performed by: FAMILY MEDICINE

## 2022-11-15 PROCEDURE — 84443 ASSAY THYROID STIM HORMONE: CPT | Performed by: FAMILY MEDICINE

## 2022-11-15 RX ORDER — LEVOTHYROXINE SODIUM 75 UG/1
75 TABLET ORAL DAILY
Qty: 90 TABLET | Refills: 0 | Status: SHIPPED | OUTPATIENT
Start: 2022-11-15 | End: 2023-02-15

## 2022-11-15 RX ORDER — POTASSIUM CHLORIDE 750 MG/1
10 TABLET, EXTENDED RELEASE ORAL 2 TIMES DAILY
Qty: 30 TABLET | Refills: 1 | Status: SHIPPED | OUTPATIENT
Start: 2022-11-15 | End: 2023-10-02

## 2022-11-15 RX ORDER — FUROSEMIDE 20 MG
20 TABLET ORAL 2 TIMES DAILY
Qty: 160 TABLET | Refills: 1 | Status: ON HOLD | OUTPATIENT
Start: 2022-11-15 | End: 2023-10-27

## 2022-11-15 NOTE — LETTER
November 15, 2022      Lidia Nam  [address] [place] [id number]        Dear ,    We are writing to inform you of your test results.    Your thyroid numbers suggest you would benefit from a higher dose of Levothyroxine.  I have sent a new prescription to your pharmacy.    Your A1c is slightly improved (from 7.7 to 7.5).  This is the right direction!  Keep up the diet/exercise .  We'll reheck in 2-3 months.      Resulted Orders   TSH   Result Value Ref Range    TSH 35.30 (H) 0.30 - 4.20 uIU/mL   T4, free   Result Value Ref Range    Free T4 0.64 (L) 0.90 - 1.70 ng/dL   T3, Free   Result Value Ref Range    T3 Free 2.0 2.0 - 4.4 pg/mL   Hemoglobin A1c   Result Value Ref Range    Hemoglobin A1C 7.5 (H) 0.0 - 5.6 %      Comment:      Normal <5.7%   Prediabetes 5.7-6.4%    Diabetes 6.5% or higher     Note: Adopted from ADA consensus guidelines.   Comprehensive metabolic panel (BMP + Alb, Alk Phos, ALT, AST, Total. Bili, TP)   Result Value Ref Range    Sodium 135 (L) 136 - 145 mmol/L    Potassium 4.4 3.4 - 5.3 mmol/L    Chloride 96 (L) 98 - 107 mmol/L    Carbon Dioxide (CO2) 32 (H) 22 - 29 mmol/L    Anion Gap 7 7 - 15 mmol/L    Urea Nitrogen 5.6 (L) 8.0 - 23.0 mg/dL    Creatinine 0.62 0.51 - 0.95 mg/dL    Calcium 9.8 8.8 - 10.2 mg/dL    Glucose 111 (H) 70 - 99 mg/dL    Alkaline Phosphatase 100 35 - 104 U/L    AST 26 10 - 35 U/L    ALT 24 10 - 35 U/L    Protein Total 8.8 (H) 6.4 - 8.3 g/dL    Albumin 3.7 3.5 - 5.2 g/dL    Bilirubin Total 0.4 <=1.2 mg/dL    GFR Estimate >90 >60 mL/min/1.73m2      Comment:      Effective December 21, 2021 eGFRcr in adults is calculated using the 2021 CKD-EPI creatinine equation which includes age and gender (Cuco et al., NEJM, DOI: 10.1056/[id number])       If you have any questions or concerns, please call the clinic at the number listed above.       Sincerely,      Ana Goetz MD

## 2022-11-15 NOTE — PROGRESS NOTES
1. Thyromegaly  This is a 63 yo female with thyromegaly - opted against any surgical option in spite of this very large thyroid (had exam a couple years ago); will recheck labs - continue to adjust levothyroxine   - TSH; Future  - T4, free; Future  - T3, Free; Future  - TSH  - T4, free  - T3, Free    2. Type 2 diabetes mellitus with hyperglycemia, without long-term current use of insulin (H)  Type II DM - reviewed -   - Hemoglobin A1c; Future  - Orthopedic  Referral; Future  - Hemoglobin A1c    3. Onychomycosis  Patient desires foot/toenail treatment - refer to podiatry (patient declined my intervention).   - Orthopedic  Referral; Future    4. Bilateral lower extremity edema  Patient does note she has increasing lower extremity edema.  Add Furosemide - she is reluctant to use Potassium.    - furosemide (LASIX) 20 MG tablet; Take 1 tablet (20 mg) by mouth 2 times daily  Dispense: 160 tablet; Refill: 1  - potassium chloride ER (K-TAB/KLOR-CON) 10 MEQ CR tablet; Take 1 tablet (10 mEq) by mouth 2 times daily  Dispense: 30 tablet; Refill: 1    5. Abnormal laboratory test  H/o abnormal labs - check CMP  - Comprehensive metabolic panel (BMP + Alb, Alk Phos, ALT, AST, Total. Bili, TP); Future  - Comprehensive metabolic panel (BMP + Alb, Alk Phos, ALT, AST, Total. 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  She exercises with enough effort to increase her heart rate 3 or less days per week.   She is taking medications regularly.       Review of Systems   Constitutional: Positive for fatigue. Negative for chills, fever and unexpected weight change.   HENT:        Large thyroid gland   Eyes: Negative for visual disturbance.   Respiratory: Positive for cough. Negative for shortness of breath and stridor.    Cardiovascular: Negative for chest pain and peripheral edema.   Endocrine: Negative for polydipsia and polyuria.   Genitourinary: Negative.    Musculoskeletal: Negative.    Skin: Negative.    Allergic/Immunologic: Negative.    Neurological: Negative.    Hematological: Does not bruise/bleed easily.   Psychiatric/Behavioral: Negative.    All other systems reviewed and are negative.           Objective    /84 (BP Location: Left arm, Patient Position: Sitting, Cuff Size: Adult Large)   Pulse 95   Temp 97.4  F (36.3  C) (Temporal)   Resp 26   Wt 103.9 kg (229 lb)   LMP  (LMP Unknown)   SpO2 95%   BMI 42.57 kg/m    Body mass index is 42.57 kg/m .  Physical Exam  Vitals reviewed.   Constitutional:       General: She is not in acute distress.     Appearance: Normal appearance. She is well-developed. She is not ill-appearing.   HENT:      Head: Normocephalic and atraumatic.      Right Ear: Tympanic membrane and external ear normal.      Left Ear: Tympanic membrane and external ear normal.      Nose: Nose normal.      Mouth/Throat:      Mouth: Mucous membranes are moist.      Pharynx: No oropharyngeal  exudate.   Eyes:      General:         Right eye: No discharge.         Left eye: No discharge.      Extraocular Movements: Extraocular movements intact.      Conjunctiva/sclera: Conjunctivae normal.   Neck:      Thyroid: No thyromegaly.      Trachea: No tracheal deviation.      Comments: Severe thyromegaly    Cardiovascular:      Rate and Rhythm: Normal rate and regular rhythm.      Pulses: Normal pulses.      Heart sounds: Normal heart sounds, S1 normal and S2 normal. No murmur heard.    No friction rub. No S3 or S4 sounds.   Pulmonary:      Effort: Pulmonary effort is normal. No respiratory distress.      Breath sounds: Normal breath sounds. No wheezing or rales.   Chest:   Breasts:     Right: No inverted nipple, mass, nipple discharge or skin change.      Left: No inverted nipple, mass, nipple discharge or skin change.   Abdominal:      General: Bowel sounds are normal.      Palpations: Abdomen is soft. There is no mass.      Tenderness: There is no abdominal tenderness.   Musculoskeletal:         General: Normal range of motion.      Cervical back: Neck supple.   Lymphadenopathy:      Cervical: No cervical adenopathy.      Upper Body:      Right upper body: No supraclavicular or axillary adenopathy.      Left upper body: No supraclavicular or axillary adenopathy.   Skin:     General: Skin is warm and dry.      Capillary Refill: Capillary refill takes less than 2 seconds.      Findings: No rash.   Neurological:      Mental Status: She is alert and oriented to person, place, and time.      Motor: No abnormal muscle tone.      Deep Tendon Reflexes: Reflexes are normal and symmetric.   Psychiatric:         Behavior: Behavior normal.         Thought Content: Thought content normal.         Judgment: Judgment normal.            Results for orders placed or performed in visit on 11/15/22   TSH     Status: Abnormal   Result Value Ref Range    TSH 35.30 (H) 0.30 - 4.20 uIU/mL   T4, free     Status: Abnormal   Result  Value Ref Range    Free T4 0.64 (L) 0.90 - 1.70 ng/dL   T3, Free     Status: Normal   Result Value Ref Range    T3 Free 2.0 2.0 - 4.4 pg/mL   Hemoglobin A1c     Status: Abnormal   Result Value Ref Range    Hemoglobin A1C 7.5 (H) 0.0 - 5.6 %   Comprehensive metabolic panel (BMP + Alb, Alk Phos, ALT, AST, Total. Bili, TP)     Status: Abnormal   Result Value Ref Range    Sodium 135 (L) 136 - 145 mmol/L    Potassium 4.4 3.4 - 5.3 mmol/L    Chloride 96 (L) 98 - 107 mmol/L    Carbon Dioxide (CO2) 32 (H) 22 - 29 mmol/L    Anion Gap 7 7 - 15 mmol/L    Urea Nitrogen 5.6 (L) 8.0 - 23.0 mg/dL    Creatinine 0.62 0.51 - 0.95 mg/dL    Calcium 9.8 8.8 - 10.2 mg/dL    Glucose 111 (H) 70 - 99 mg/dL    Alkaline Phosphatase 100 35 - 104 U/L    AST 26 10 - 35 U/L    ALT 24 10 - 35 U/L    Protein Total 8.8 (H) 6.4 - 8.3 g/dL    Albumin 3.7 3.5 - 5.2 g/dL    Bilirubin Total 0.4 <=1.2 mg/dL    GFR Estimate >90 >60 mL/min/1.73m2

## 2022-11-16 ENCOUNTER — TELEPHONE (OUTPATIENT)
Dept: FAMILY MEDICINE | Facility: CLINIC | Age: 62
End: 2022-11-16

## 2022-11-16 NOTE — TELEPHONE ENCOUNTER
----- Message from Ana Goetz MD sent at 11/15/2022 11:52 PM CST -----  Let patient know that she needs higher dose of Levothyroxine (thyroid).  I'll send a prescription to her pharmacy.   As well, her A1c is about the same - keep working on the diet/exercise.    Recheck in another couple months.

## 2022-11-20 ASSESSMENT — ENCOUNTER SYMPTOMS
PSYCHIATRIC NEGATIVE: 1
MUSCULOSKELETAL NEGATIVE: 1
FATIGUE: 1
STRIDOR: 0
BRUISES/BLEEDS EASILY: 0
POLYDIPSIA: 0
SHORTNESS OF BREATH: 0
CHILLS: 0
ALLERGIC/IMMUNOLOGIC NEGATIVE: 1
UNEXPECTED WEIGHT CHANGE: 0
NEUROLOGICAL NEGATIVE: 1
COUGH: 1
FEVER: 0

## 2023-02-02 ENCOUNTER — TELEPHONE (OUTPATIENT)
Dept: FAMILY MEDICINE | Facility: CLINIC | Age: 63
End: 2023-02-02
Payer: COMMERCIAL

## 2023-02-02 DIAGNOSIS — B35.1 ONYCHOMYCOSIS: ICD-10-CM

## 2023-02-02 DIAGNOSIS — E01.0 THYROMEGALY: Primary | ICD-10-CM

## 2023-02-02 DIAGNOSIS — E11.65 TYPE 2 DIABETES MELLITUS WITH HYPERGLYCEMIA, WITHOUT LONG-TERM CURRENT USE OF INSULIN (H): ICD-10-CM

## 2023-02-02 DIAGNOSIS — E03.9 HYPOTHYROIDISM, UNSPECIFIED TYPE: ICD-10-CM

## 2023-02-02 NOTE — TELEPHONE ENCOUNTER
Order/Referral Request    Who is requesting: Patient    Orders being requested: lab order for A1C, TSH, Potassium. Podiatry referral needed for toe nail.    Reason service is needed/diagnosis: Patient is requesting to recheck A1C, TSH, Potassium since the last time patient saw Dr. Trevizo on 11/15/2022 and with change of medication and future medication refill request.    When are orders needed by: as soon as possible before medication Levothyroxine Sodium 75 MCG Oral Tablet (SYNTHROID/LEVOTHROID) runs out.    Has this been discussed with Provider: No    Does patient have a preference on a Group/Provider/Facility? [facility].    Does patient have an appointment scheduled?: No. Please contact patient when lab order is enter and referral is enter to notify patient.    Where to send orders: Place orders within Epic    Okay to leave a detailed message?: Yes at Cell number on file:    Telephone Information:   Mobile [phone]

## 2023-02-09 ENCOUNTER — TELEPHONE (OUTPATIENT)
Dept: FAMILY MEDICINE | Facility: CLINIC | Age: 63
End: 2023-02-09
Payer: COMMERCIAL

## 2023-02-09 NOTE — TELEPHONE ENCOUNTER
Reason for Call:  Appointment Request    Patient requesting this type of appt:  Lab only    Requested provider: lab    Reason patient able to be scheduled: pt scheduled 2/15/23 at 8 am     Comments: A1C, BMP. T4, TSH    Okay to leave a detailed message?: No task is completed. Thanks    Call taken on 2/9/2023 at 9:23 AM by FUAD [place]

## 2023-02-12 DIAGNOSIS — E03.9 HYPOTHYROIDISM, UNSPECIFIED TYPE: ICD-10-CM

## 2023-02-12 DIAGNOSIS — E01.0 THYROMEGALY: ICD-10-CM

## 2023-02-13 NOTE — TELEPHONE ENCOUNTER
"Routing refill request to provider for review/approval because:  Labs out of range:  tsh    Last Written Prescription Date:  11/15/22  Last Fill Quantity: 90,  # refills: 0   Last office visit provider:  11/15/22     Requested Prescriptions   Pending Prescriptions Disp Refills     levothyroxine (SYNTHROID/LEVOTHROID) 75 MCG tablet [Pharmacy Med Name: LEVOTHYROXINE 0.075MG (75MCG) TABS] 90 tablet 0     Sig: TAKE 1 TABLET(75 MCG) BY MOUTH DAILY       Thyroid Protocol Failed - 2/12/2023  3:54 AM        Failed - Normal TSH on file in past 12 months     Recent Labs   Lab Test 11/15/22  1428   TSH 35.30*              Passed - Patient is 12 years or older        Passed - Recent (12 mo) or future (30 days) visit within the authorizing provider's specialty     Patient has had an office visit with the authorizing provider or a provider within the authorizing providers department within the previous 12 mos or has a future within next 30 days. See \"Patient Info\" tab in inbasket, or \"Choose Columns\" in Meds & Orders section of the refill encounter.              Passed - Medication is active on med list        Passed - No active pregnancy on record     If patient is pregnant or has had a positive pregnancy test, please check TSH.          Passed - No positive pregnancy test in past 12 months     If patient is pregnant or has had a positive pregnancy test, please check TSH.               Grecia Owen, RN 02/13/23 12:30 PM  "

## 2023-02-14 ENCOUNTER — TELEPHONE (OUTPATIENT)
Dept: FAMILY MEDICINE | Facility: CLINIC | Age: 63
End: 2023-02-14
Payer: COMMERCIAL

## 2023-02-14 DIAGNOSIS — E03.9 HYPOTHYROIDISM, UNSPECIFIED TYPE: ICD-10-CM

## 2023-02-14 DIAGNOSIS — E01.0 THYROMEGALY: ICD-10-CM

## 2023-02-14 NOTE — TELEPHONE ENCOUNTER
Patient calls back inquiring about phone number for the podiatry referral. Phone number for conciege given to pt. Caller verbalizes understanding.     Yue Berger CMA ,   [facility]  February 14, 2023 12:18 PM

## 2023-02-15 ENCOUNTER — LAB (OUTPATIENT)
Dept: LAB | Facility: CLINIC | Age: 63
End: 2023-02-15
Payer: COMMERCIAL

## 2023-02-15 DIAGNOSIS — E11.9 DIABETES MELLITUS, TYPE 2 (H): ICD-10-CM

## 2023-02-15 DIAGNOSIS — E03.9 HYPOTHYROIDISM, UNSPECIFIED TYPE: ICD-10-CM

## 2023-02-15 DIAGNOSIS — E11.65 TYPE 2 DIABETES MELLITUS WITH HYPERGLYCEMIA, WITHOUT LONG-TERM CURRENT USE OF INSULIN (H): ICD-10-CM

## 2023-02-15 DIAGNOSIS — E01.0 THYROMEGALY: ICD-10-CM

## 2023-02-15 DIAGNOSIS — Z11.4 SCREENING FOR HIV (HUMAN IMMUNODEFICIENCY VIRUS): ICD-10-CM

## 2023-02-15 DIAGNOSIS — Z11.59 NEED FOR HEPATITIS C SCREENING TEST: ICD-10-CM

## 2023-02-15 LAB
ANION GAP SERPL CALCULATED.3IONS-SCNC: 12 MMOL/L (ref 7–15)
BUN SERPL-MCNC: 8 MG/DL (ref 8–23)
CALCIUM SERPL-MCNC: 9.9 MG/DL (ref 8.8–10.2)
CHLORIDE SERPL-SCNC: 96 MMOL/L (ref 98–107)
CREAT SERPL-MCNC: 0.64 MG/DL (ref 0.51–0.95)
CREAT UR-MCNC: 76.7 MG/DL
DEPRECATED HCO3 PLAS-SCNC: 26 MMOL/L (ref 22–29)
GFR SERPL CREATININE-BSD FRML MDRD: >90 ML/MIN/1.73M2
GLUCOSE SERPL-MCNC: 252 MG/DL (ref 70–99)
HBA1C MFR BLD: 8.4 % (ref 0–5.6)
HCV AB SERPL QL IA: NONREACTIVE
HIV 1+2 AB+HIV1 P24 AG SERPL QL IA: NONREACTIVE
MICROALBUMIN UR-MCNC: 142 MG/L
MICROALBUMIN/CREAT UR: 185.14 MG/G CR (ref 0–25)
POTASSIUM SERPL-SCNC: 4.4 MMOL/L (ref 3.4–5.3)
SODIUM SERPL-SCNC: 134 MMOL/L (ref 136–145)
T4 FREE SERPL-MCNC: 0.73 NG/DL (ref 0.9–1.7)
TSH SERPL DL<=0.005 MIU/L-ACNC: 26.8 UIU/ML (ref 0.3–4.2)

## 2023-02-15 PROCEDURE — 84439 ASSAY OF FREE THYROXINE: CPT

## 2023-02-15 PROCEDURE — 82043 UR ALBUMIN QUANTITATIVE: CPT

## 2023-02-15 PROCEDURE — 80048 BASIC METABOLIC PNL TOTAL CA: CPT

## 2023-02-15 PROCEDURE — 84443 ASSAY THYROID STIM HORMONE: CPT

## 2023-02-15 PROCEDURE — 87389 HIV-1 AG W/HIV-1&-2 AB AG IA: CPT

## 2023-02-15 PROCEDURE — 86803 HEPATITIS C AB TEST: CPT

## 2023-02-15 PROCEDURE — 82570 ASSAY OF URINE CREATININE: CPT

## 2023-02-15 PROCEDURE — 36415 COLL VENOUS BLD VENIPUNCTURE: CPT

## 2023-02-15 PROCEDURE — 83036 HEMOGLOBIN GLYCOSYLATED A1C: CPT

## 2023-02-15 RX ORDER — LEVOTHYROXINE SODIUM 75 UG/1
TABLET ORAL
Qty: 90 TABLET | Refills: 0 | Status: SHIPPED | OUTPATIENT
Start: 2023-02-15 | End: 2023-02-16

## 2023-02-16 ENCOUNTER — TELEPHONE (OUTPATIENT)
Dept: FAMILY MEDICINE | Facility: CLINIC | Age: 63
End: 2023-02-16
Payer: COMMERCIAL

## 2023-02-16 DIAGNOSIS — E11.65 TYPE 2 DIABETES MELLITUS WITH HYPERGLYCEMIA, WITHOUT LONG-TERM CURRENT USE OF INSULIN (H): Primary | ICD-10-CM

## 2023-02-16 DIAGNOSIS — E01.0 THYROMEGALY: ICD-10-CM

## 2023-02-16 DIAGNOSIS — E03.9 HYPOTHYROIDISM, UNSPECIFIED TYPE: ICD-10-CM

## 2023-02-16 RX ORDER — LEVOTHYROXINE SODIUM 100 UG/1
100 TABLET ORAL DAILY
Qty: 90 TABLET | Refills: 1 | Status: SHIPPED | OUTPATIENT
Start: 2023-02-16 | End: 2023-05-04

## 2023-02-16 RX ORDER — METFORMIN HCL 500 MG
500 TABLET, EXTENDED RELEASE 24 HR ORAL
Qty: 90 TABLET | Refills: 1 | Status: SHIPPED | OUTPATIENT
Start: 2023-02-16 | End: 2023-05-04

## 2023-02-16 RX ORDER — LEVOTHYROXINE SODIUM 75 UG/1
75 TABLET ORAL DAILY
Qty: 90 TABLET | Refills: 0 | OUTPATIENT
Start: 2023-02-16

## 2023-02-16 NOTE — TELEPHONE ENCOUNTER
Patient calling back for A1C results.  Patient already informed of result note below.  Patient requesting results letter mailed to her home address which was done.  No further action needed.    Linh Boone RN  [facility]       Hemoglobin A1C 0.0 - 5.6 % 8.4 High   7.5 High

## 2023-02-16 NOTE — TELEPHONE ENCOUNTER
----- Message from Ana Goetz MD sent at 2/16/2023  9:44 AM CST -----  Please call patient and let her know we need to increase her thyroid medication dose; I've sent a new prescription to her pharmacy.  Recheck in 2 months.  As well, I'm going to send in a medication for diabetes as well - her sugars have worsened at this point.  Recheck in 2 months as well.

## 2023-02-16 NOTE — LETTER
February 16, 2023      Lidia Nam  [address] [place] [phone]        Dear ,    We are writing to inform you of your test results.      Resulted Orders   TSH   Result Value Ref Range    TSH 26.80 (H) 0.30 - 4.20 uIU/mL   T4, free   Result Value Ref Range    Free T4 0.73 (L) 0.90 - 1.70 ng/dL   Basic metabolic panel  (Ca, Cl, CO2, Creat, Gluc, K, Na, BUN)   Result Value Ref Range    Sodium 134 (L) 136 - 145 mmol/L    Potassium 4.4 3.4 - 5.3 mmol/L    Chloride 96 (L) 98 - 107 mmol/L    Carbon Dioxide (CO2) 26 22 - 29 mmol/L    Anion Gap 12 7 - 15 mmol/L    Urea Nitrogen 8.0 8.0 - 23.0 mg/dL    Creatinine 0.64 0.51 - 0.95 mg/dL    Calcium 9.9 8.8 - 10.2 mg/dL    Glucose 252 (H) 70 - 99 mg/dL    GFR Estimate >90 >60 mL/min/1.73m2      Comment:      eGFR calculated using 2021 CKD-EPI equation.   Hemoglobin A1c   Result Value Ref Range    Hemoglobin A1C 8.4 (H) 0.0 - 5.6 %      Comment:      Normal <5.7%   Prediabetes 5.7-6.4%    Diabetes 6.5% or higher     Note: Adopted from ADA consensus guidelines.   HIV Antigen Antibody Combo   Result Value Ref Range    HIV Antigen Antibody Combo Nonreactive Nonreactive      Comment:      HIV-1 p24 Ag & HIV-1/HIV-2 Ab Not Detected   Hepatitis C Screen Reflex to HCV RNA Quant and Genotype   Result Value Ref Range    Hepatitis C Antibody Nonreactive Nonreactive    Narrative    Assay performance characteristics have not been established for newborns, infants, and children.   Albumin Random Urine Quantitative with Creat Ratio   Result Value Ref Range    Creatinine Urine mg/dL 76.7 mg/dL      Comment:      The reference ranges have not been established in urine creatinine. The results should be integrated into the clinical context for interpretation.    Albumin Urine mg/L 142.0 mg/L      Comment:      The reference ranges have not been established in urine albumin. The results should be integrated into the clinical context for interpretation.    Albumin Urine mg/g  Cr 185.14 (H) 0.00 - 25.00 mg/g Cr      Comment:      Microalbuminuria is defined as an albumin:creatinine ratio of 17 to 299 for males and 25 to 299 for females. A ratio of albumin:creatinine of 300 or higher is indicative of overt proteinuria.  Due to biologic variability, positive results should be confirmed by a second, first-morning random or 24-hour timed urine specimen. If there is discrepancy, a third specimen is recommended. When 2 out of 3 results are in the microalbuminuria range, this is evidence for incipient nephropathy and warrants increased efforts at glucose control, blood pressure control, and institution of therapy with an angiotensin-converting-enzyme (ACE) inhibitor (if the patient can tolerate it).         If you have any questions or concerns, please call the clinic at the number listed above.       Sincerely,

## 2023-02-16 NOTE — TELEPHONE ENCOUNTER
Unable to leave message x 1. Please review message thread below and advise the patient as indicated. Please schedule if necessary or indicated in message thread.

## 2023-02-23 ENCOUNTER — TELEPHONE (OUTPATIENT)
Dept: FAMILY MEDICINE | Facility: CLINIC | Age: 63
End: 2023-02-23
Payer: COMMERCIAL

## 2023-02-23 NOTE — TELEPHONE ENCOUNTER
Order/Referral Request    Who is requesting: Pt    Orders being requested: Podiatry not orthopedic    Reason service is needed/diagnosis: Thick toenails    When are orders needed by: ASAP    Has this been discussed with Provider: Yes    Does patient have a preference on a Group/Provider/Facility? NA    Does patient have an appointment scheduled?: No    Where to send orders: Fax    Okay to leave a detailed message?: Yes at Home number on file [phone] (home)

## 2023-02-23 NOTE — TELEPHONE ENCOUNTER
Looks like referral was already placed by Dr. Brandt, Podiatrist referral is under orthopedics.they will have them see a podiatrist

## 2023-02-25 ENCOUNTER — NURSE TRIAGE (OUTPATIENT)
Dept: NURSING | Facility: CLINIC | Age: 63
End: 2023-02-25
Payer: COMMERCIAL

## 2023-02-25 NOTE — TELEPHONE ENCOUNTER
"Patient calling reporting she was too have a referral placed to a Podiatrist for \"thick toe nails.\"     Reviewed per Epic referral placed 2/8/23 to Ortho. Advised patient to contact Ortho for scheduling.    Stating she has received calls from Ortho and thought it should be podiatry? Reviewed Ortho may schedule for podiatrists.     Patient agrees to call back Ortho  during clinic hours.    Caller verbalized understanding. Denies further questions.    Sindhu Nuno RN  [facility]      Reason for Disposition    Requesting regular office appointment    Additional Information    Negative: [1] Caller is not with the adult (patient) AND [2] reporting urgent symptoms    Negative: Lab result questions    Negative: Medication questions    Negative: Caller can't be reached by phone    Negative: Caller has already spoken to PCP or another triager    Negative: RN needs further essential information from caller in order to complete triage    Protocols used: INFORMATION ONLY CALL - NO TRIAGE-A-      "

## 2023-03-14 ENCOUNTER — OFFICE VISIT (OUTPATIENT)
Dept: PODIATRY | Facility: CLINIC | Age: 63
End: 2023-03-14
Attending: FAMILY MEDICINE
Payer: COMMERCIAL

## 2023-03-14 VITALS — HEART RATE: 104 BPM | HEIGHT: 61 IN | WEIGHT: 229 LBS | RESPIRATION RATE: 96 BRPM | BODY MASS INDEX: 43.23 KG/M2

## 2023-03-14 DIAGNOSIS — B35.1 ONYCHOMYCOSIS: ICD-10-CM

## 2023-03-14 DIAGNOSIS — L60.2 ONYCHAUXIS: Primary | ICD-10-CM

## 2023-03-14 DIAGNOSIS — E11.9 TYPE 2 DIABETES MELLITUS WITHOUT COMPLICATION, WITHOUT LONG-TERM CURRENT USE OF INSULIN (H): ICD-10-CM

## 2023-03-14 PROCEDURE — 11721 DEBRIDE NAIL 6 OR MORE: CPT | Performed by: PODIATRIST

## 2023-03-14 PROCEDURE — 99203 OFFICE O/P NEW LOW 30 MIN: CPT | Mod: 25 | Performed by: PODIATRIST

## 2023-03-14 ASSESSMENT — PAIN SCALES - GENERAL: PAINLEVEL: MILD PAIN (2)

## 2023-03-14 NOTE — PROGRESS NOTES
FOOT AND ANKLE SURGERY/PODIATRY CONSULT NOTE         ASSESSMENT:   Onychomycosis  Onychauxis  Type 2 diabetes without complications      TREATMENT:  Debrided nails 1 through 5 both feet today.  The patient is to return to the clinic as needed.        HPI: I was asked to see Lidia Nam today to evaluate and treat long thick painful nails both feet.  The patient indicated that she can no longer treat her nails.  They can be quite painful with ambulation and shoe gear.  She has not had any redness, swelling, drainage or bleeding surrounding any of the nails of both feet.  The discomfort is partially relieved after removing her shoes.  She denies any trauma to her feet.  She denies any other previous treatment.  The patient was seen in consultation at the request of Ana Mckeon MD for evaluation and treatment of long thick nails both feet..     History reviewed. No pertinent past medical history.    Social History     Socioeconomic History     Marital status:      Spouse name: Not on file     Number of children: Not on file     Years of education: Not on file     Highest education level: Not on file   Occupational History     Not on file   Tobacco Use     Smoking status: Every Day     Packs/day: 1.00     Years: 39.00     Pack years: 39.00     Types: Cigarettes     Smokeless tobacco: Never   Substance and Sexual Activity     Alcohol use: Yes     Drug use: Not on file     Sexual activity: Not on file   Other Topics Concern     Not on file   Social History Narrative     Not on file     Social Determinants of Health     Financial Resource Strain: Not on file   Food Insecurity: Not on file   Transportation Needs: Not on file   Physical Activity: Not on file   Stress: Not on file   Social Connections: Not on file   Intimate Partner Violence: Not on file   Housing Stability: Not on file          Allergies   Allergen Reactions     Erythromycin      Psyllium      Seasonal Allergies      Hay fever           Current Outpatient Medications:      furosemide (LASIX) 20 MG tablet, Take 1 tablet (20 mg) by mouth 2 times daily, Disp: 160 tablet, Rfl: 1     levothyroxine (SYNTHROID/LEVOTHROID) 100 MCG tablet, Take 1 tablet (100 mcg) by mouth daily, Disp: 90 tablet, Rfl: 1     metFORMIN (GLUCOPHAGE XR) 500 MG 24 hr tablet, Take 1 tablet (500 mg) by mouth daily (with dinner), Disp: 90 tablet, Rfl: 1     potassium chloride ER (K-TAB/KLOR-CON) 10 MEQ CR tablet, Take 1 tablet (10 mEq) by mouth 2 times daily (Patient not taking: Reported on 3/14/2023), Disp: 30 tablet, Rfl: 1     No family history on file.     Social History     Socioeconomic History     Marital status:      Spouse name: Not on file     Number of children: Not on file     Years of education: Not on file     Highest education level: Not on file   Occupational History     Not on file   Tobacco Use     Smoking status: Every Day     Packs/day: 1.00     Years: 39.00     Pack years: 39.00     Types: Cigarettes     Smokeless tobacco: Never   Substance and Sexual Activity     Alcohol use: Yes     Drug use: Not on file     Sexual activity: Not on file   Other Topics Concern     Not on file   Social History Narrative     Not on file     Social Determinants of Health     Financial Resource Strain: Not on file   Food Insecurity: Not on file   Transportation Needs: Not on file   Physical Activity: Not on file   Stress: Not on file   Social Connections: Not on file   Intimate Partner Violence: Not on file   Housing Stability: Not on file        Review of Systems - Patient denies fever, chills, rash, wound, stiffness, limping, numbness, weakness, heart burn, blood in stool, chest pain with activity, calf pain when walking, shortness of breath with activity, chronic cough, easy bleeding/bruising, swelling of ankles, excessive thirst, fatigue, depression, anxiety.  Patient admits to long thick painful nails both feet.      OBJECTIVE:  Appearance: alert, well appearing,  and in no distress.    LMP  (LMP Unknown)      There is no height or weight on file to calculate BMI.     General appearance: Patient is alert and fully cooperative with history & exam.  No sign of distress is noted during the visit.  Psychiatric: Affect is pleasant & appropriate.  Patient appears motivated to improve health.  Respiratory: Breathing is regular & unlabored while sitting.  HEENT: Hearing is intact to spoken word.  Speech is clear.  No gross evidence of visual impairment that would impact ambulation.    Vascular: Dorsalis pedis and posterior tibial pulses are non- palpable. There is no pedal hair growth bilaterally.  CFT < 3 sec from anterior tibial surface to distal digits bilaterally. There is no appreciable edema noted.  Dermatologic: Long, thick, discolored, dystrophic nails 1 through 5 both feet.  All nails with 3-4 times normal thickness with subungual debris present.  Turgor and texture are within normal limits. No coloration or temperature changes. No primary or secondary lesions noted.  Neurologic: All epicritic and proprioceptive sensations are grossly intact bilaterally.  Musculoskeletal: All active and passive ankle, subtalar, midtarsal, and 1st MPJ range of motion are grossly intact without pain or crepitus, with the exception of none. Manual muscle strength is within normal limits bilaterally. All dorsiflexors, plantarflexors, invertors, evertors are intact bilaterally.   No tenderness present to both great toenails on palpation.  No tenderness to bilateral feet or ankles with range of motion. Calf is soft/non-tender without warmth/induration    Imaging:       No images are attached to the encounter or orders placed in the encounter.     No results found.   No results found.       Stiven Sepulveda DPM  [facility] Foot & Ankle Surgery/Podiatry

## 2023-03-14 NOTE — LETTER
3/14/2023         RE: Lidia Nam  [address] [address]        Dear Colleague,    Thank you for referring your patient, Lidia Nam, to the [facility]. Please see a copy of my visit note below.    FOOT AND ANKLE SURGERY/PODIATRY CONSULT NOTE         ASSESSMENT:   Onychomycosis  Onychauxis  Type 2 diabetes without complications      TREATMENT:  Debrided nails 1 through 5 both feet today.  The patient is to return to the clinic as needed.        HPI: I was asked to see Lidia Nam today to evaluate and treat long thick painful nails both feet.  The patient indicated that she can no longer treat her nails.  They can be quite painful with ambulation and shoe gear.  She has not had any redness, swelling, drainage or bleeding surrounding any of the nails of both feet.  The discomfort is partially relieved after removing her shoes.  She denies any trauma to her feet.  She denies any other previous treatment.  The patient was seen in consultation at the request of Ana Mckeon MD for evaluation and treatment of long thick nails both feet..     History reviewed. No pertinent past medical history.    Social History     Socioeconomic History     Marital status:      Spouse name: Not on file     Number of children: Not on file     Years of education: Not on file     Highest education level: Not on file   Occupational History     Not on file   Tobacco Use     Smoking status: Every Day     Packs/day: 1.00     Years: 39.00     Pack years: 39.00     Types: Cigarettes     Smokeless tobacco: Never   Substance and Sexual Activity     Alcohol use: Yes     Drug use: Not on file     Sexual activity: Not on file   Other Topics Concern     Not on file   Social History Narrative     Not on file     Social Determinants of Health     Financial Resource Strain: Not on file   Food Insecurity: Not on file   Transportation Needs: Not on file   Physical Activity: Not on file    Stress: Not on file   Social Connections: Not on file   Intimate Partner Violence: Not on file   Housing Stability: Not on file          Allergies   Allergen Reactions     Erythromycin      Psyllium      Seasonal Allergies      Hay fever          Current Outpatient Medications:      furosemide (LASIX) 20 MG tablet, Take 1 tablet (20 mg) by mouth 2 times daily, Disp: 160 tablet, Rfl: 1     levothyroxine (SYNTHROID/LEVOTHROID) 100 MCG tablet, Take 1 tablet (100 mcg) by mouth daily, Disp: 90 tablet, Rfl: 1     metFORMIN (GLUCOPHAGE XR) 500 MG 24 hr tablet, Take 1 tablet (500 mg) by mouth daily (with dinner), Disp: 90 tablet, Rfl: 1     potassium chloride ER (K-TAB/KLOR-CON) 10 MEQ CR tablet, Take 1 tablet (10 mEq) by mouth 2 times daily (Patient not taking: Reported on 3/14/2023), Disp: 30 tablet, Rfl: 1     No family history on file.     Social History     Socioeconomic History     Marital status:      Spouse name: Not on file     Number of children: Not on file     Years of education: Not on file     Highest education level: Not on file   Occupational History     Not on file   Tobacco Use     Smoking status: Every Day     Packs/day: 1.00     Years: 39.00     Pack years: 39.00     Types: Cigarettes     Smokeless tobacco: Never   Substance and Sexual Activity     Alcohol use: Yes     Drug use: Not on file     Sexual activity: Not on file   Other Topics Concern     Not on file   Social History Narrative     Not on file     Social Determinants of Health     Financial Resource Strain: Not on file   Food Insecurity: Not on file   Transportation Needs: Not on file   Physical Activity: Not on file   Stress: Not on file   Social Connections: Not on file   Intimate Partner Violence: Not on file   Housing Stability: Not on file        Review of Systems - Patient denies fever, chills, rash, wound, stiffness, limping, numbness, weakness, heart burn, blood in stool, chest pain with activity, calf pain when walking,  shortness of breath with activity, chronic cough, easy bleeding/bruising, swelling of ankles, excessive thirst, fatigue, depression, anxiety.  Patient admits to long thick painful nails both feet.      OBJECTIVE:  Appearance: alert, well appearing, and in no distress.    LMP  (LMP Unknown)      There is no height or weight on file to calculate BMI.     General appearance: Patient is alert and fully cooperative with history & exam.  No sign of distress is noted during the visit.  Psychiatric: Affect is pleasant & appropriate.  Patient appears motivated to improve health.  Respiratory: Breathing is regular & unlabored while sitting.  HEENT: Hearing is intact to spoken word.  Speech is clear.  No gross evidence of visual impairment that would impact ambulation.    Vascular: Dorsalis pedis and posterior tibial pulses are non- palpable. There is no pedal hair growth bilaterally.  CFT < 3 sec from anterior tibial surface to distal digits bilaterally. There is no appreciable edema noted.  Dermatologic: Long, thick, discolored, dystrophic nails 1 through 5 both feet.  All nails with 3-4 times normal thickness with subungual debris present.  Turgor and texture are within normal limits. No coloration or temperature changes. No primary or secondary lesions noted.  Neurologic: All epicritic and proprioceptive sensations are grossly intact bilaterally.  Musculoskeletal: All active and passive ankle, subtalar, midtarsal, and 1st MPJ range of motion are grossly intact without pain or crepitus, with the exception of none. Manual muscle strength is within normal limits bilaterally. All dorsiflexors, plantarflexors, invertors, evertors are intact bilaterally.   No tenderness present to both great toenails on palpation.  No tenderness to bilateral feet or ankles with range of motion. Calf is soft/non-tender without warmth/induration    Imaging:       No images are attached to the encounter or orders placed in the encounter.     No  results found.   No results found.       Stiven Sepulveda DPM  [facility] Foot & Ankle Surgery/Podiatry         Again, thank you for allowing me to participate in the care of your patient.        Sincerely,        Stiven Sellers DPM

## 2023-05-01 ENCOUNTER — OFFICE VISIT (OUTPATIENT)
Dept: FAMILY MEDICINE | Facility: CLINIC | Age: 63
End: 2023-05-01
Payer: COMMERCIAL

## 2023-05-01 VITALS
RESPIRATION RATE: 20 BRPM | TEMPERATURE: 97.9 F | SYSTOLIC BLOOD PRESSURE: 138 MMHG | BODY MASS INDEX: 40.98 KG/M2 | HEIGHT: 62 IN | WEIGHT: 222.7 LBS | DIASTOLIC BLOOD PRESSURE: 66 MMHG | HEART RATE: 101 BPM | OXYGEN SATURATION: 96 %

## 2023-05-01 DIAGNOSIS — Z12.31 VISIT FOR SCREENING MAMMOGRAM: ICD-10-CM

## 2023-05-01 DIAGNOSIS — E66.01 MORBID OBESITY (H): ICD-10-CM

## 2023-05-01 DIAGNOSIS — E03.9 HYPOTHYROIDISM, UNSPECIFIED TYPE: Primary | ICD-10-CM

## 2023-05-01 DIAGNOSIS — Z12.11 SCREEN FOR COLON CANCER: ICD-10-CM

## 2023-05-01 DIAGNOSIS — R60.0 BILATERAL LOWER EXTREMITY EDEMA: ICD-10-CM

## 2023-05-01 DIAGNOSIS — E11.65 TYPE 2 DIABETES MELLITUS WITH HYPERGLYCEMIA, WITHOUT LONG-TERM CURRENT USE OF INSULIN (H): ICD-10-CM

## 2023-05-01 LAB
ANION GAP SERPL CALCULATED.3IONS-SCNC: 14 MMOL/L (ref 7–15)
BUN SERPL-MCNC: 5.1 MG/DL (ref 8–23)
CALCIUM SERPL-MCNC: 10.3 MG/DL (ref 8.8–10.2)
CHLORIDE SERPL-SCNC: 95 MMOL/L (ref 98–107)
CREAT SERPL-MCNC: 0.59 MG/DL (ref 0.51–0.95)
DEPRECATED HCO3 PLAS-SCNC: 25 MMOL/L (ref 22–29)
GFR SERPL CREATININE-BSD FRML MDRD: >90 ML/MIN/1.73M2
GLUCOSE SERPL-MCNC: 153 MG/DL (ref 70–99)
HBA1C MFR BLD: 7.6 % (ref 0–5.6)
POTASSIUM SERPL-SCNC: 4.2 MMOL/L (ref 3.4–5.3)
SODIUM SERPL-SCNC: 134 MMOL/L (ref 136–145)
T4 FREE SERPL-MCNC: 1.09 NG/DL (ref 0.9–1.7)
TSH SERPL DL<=0.005 MIU/L-ACNC: 7.16 UIU/ML (ref 0.3–4.2)

## 2023-05-01 PROCEDURE — 83036 HEMOGLOBIN GLYCOSYLATED A1C: CPT | Performed by: FAMILY MEDICINE

## 2023-05-01 PROCEDURE — 84443 ASSAY THYROID STIM HORMONE: CPT | Performed by: FAMILY MEDICINE

## 2023-05-01 PROCEDURE — 84439 ASSAY OF FREE THYROXINE: CPT | Performed by: FAMILY MEDICINE

## 2023-05-01 PROCEDURE — 99214 OFFICE O/P EST MOD 30 MIN: CPT | Performed by: FAMILY MEDICINE

## 2023-05-01 PROCEDURE — 80048 BASIC METABOLIC PNL TOTAL CA: CPT | Performed by: FAMILY MEDICINE

## 2023-05-01 PROCEDURE — 36415 COLL VENOUS BLD VENIPUNCTURE: CPT | Performed by: FAMILY MEDICINE

## 2023-05-01 ASSESSMENT — ENCOUNTER SYMPTOMS
PSYCHIATRIC NEGATIVE: 1
NEUROLOGICAL NEGATIVE: 1
FEVER: 0
ABDOMINAL PAIN: 0
BRUISES/BLEEDS EASILY: 0
COUGH: 1
ALLERGIC/IMMUNOLOGIC NEGATIVE: 1
CHILLS: 0
POLYDIPSIA: 0

## 2023-05-01 NOTE — PROGRESS NOTES
"  1. Hypothyroidism, unspecified type  Large thyroid gland - less swallowing difficulty - recheck TSH/T4 - declines other intervention   - TSH; Future  - T4, free; Future    2. Type 2 diabetes mellitus with hyperglycemia, without long-term current use of insulin (H)  Taking Metformin for diabetes -   - HEMOGLOBIN A1C; Future    3. Bilateral lower extremity edema  Taking one dose of Furosemide, no potassium - check BMP  - Basic metabolic panel  (Ca, Cl, CO2, Creat, Gluc, K, Na, BUN); Future    4. Morbid obesity (H)  Has lost 10 pounds - feeling better, more mobile    5. Visit for screening mammogram  6. Screen for colon cancer  Declines all health maintenance screenings/vaccines - discussed at length -         Tara Murillo is a 63 year old, presenting for the following health issues:  Follow Up (On labs )        5/1/2023     6:56 AM   Additional Questions   Roomed by Gerri CHANDRA     Taking Furosemide - not taking it twice a day as ordered (most days)  Bathroom on second floor - knee discomfort -   Takes just one a day in morning -     Takes Metformin - trying to monitor diet -    does grocery shopping  Patient doesn't leave house   still buying sugary foods/treats    Has switched to a 12 grain bread -     Almost a year since she worked -   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 license for another year    Feels like she is more mobile - can bend over and touch her toes    Not taking her potassium - never started it      Declines colon cancer screening - including colonoscopy/Cologuard  Shingles/tetanus booster - needs to be done at pharmacy  Declines pneumonia shot  Declines mammogram     Last eye exam a couple years ago  Has okay to go to [facility] -     Still smoking -   Discussed low dose CT scan -   Has hay fever/allergies  Was using OTC Mucinex for a while -   Coughs a lot - clear secretions    Saw podiatry - he \"chopped my nails\"  Was referred to a nurse program to clip nails          History of Present " "Illness       Reason for visit:  Follow up        Review of Systems   Constitutional: Negative for chills and fever.   HENT: Negative.    Eyes: Negative for visual disturbance.   Respiratory: Positive for cough (chronic).    Cardiovascular: Negative for chest pain and peripheral edema.   Gastrointestinal: Negative for abdominal pain.   Endocrine: Negative for polydipsia and polyuria.   Genitourinary: Negative.    Skin:        History of shingles - no sequelae   Allergic/Immunologic: Negative.    Neurological: Negative.    Hematological: Does not bruise/bleed easily.   Psychiatric/Behavioral: Negative.    All other systems reviewed and are negative.           Objective    /66 (BP Location: Right arm, Patient Position: Sitting, Cuff Size: Adult Large)   Pulse 101   Temp 97.9  F (36.6  C)   Resp 20   Ht 1.586 m (5' 2.44\")   Wt 101 kg (222 lb 11.2 oz)   LMP  (LMP Unknown)   SpO2 96%   BMI 40.16 kg/m    Body mass index is 40.16 kg/m .  Physical Exam  Vitals reviewed.   Constitutional:       General: She is not in acute distress.     Appearance: Normal appearance.   HENT:      Head: Normocephalic.      Right Ear: Tympanic membrane, ear canal and external ear normal.      Left Ear: Tympanic membrane, ear canal and external ear normal.      Nose: Nose normal.      Mouth/Throat:      Mouth: Mucous membranes are moist.      Pharynx: No posterior oropharyngeal erythema.   Eyes:      Extraocular Movements: Extraocular movements intact.      Conjunctiva/sclera: Conjunctivae normal.      Pupils: Pupils are equal, round, and reactive to light.   Neck:      Comments: Large thyromegaly  Cardiovascular:      Rate and Rhythm: Normal rate and regular rhythm.      Pulses: Normal pulses.      Heart sounds: Normal heart sounds. No murmur heard.  Pulmonary:      Effort: Pulmonary effort is normal.      Breath sounds: Normal breath sounds.   Abdominal:      Palpations: Abdomen is soft. There is no mass.      Tenderness: There " is no abdominal tenderness. There is no guarding or rebound.   Musculoskeletal:         General: No deformity. Normal range of motion.      Cervical back: Normal range of motion and neck supple.      Right lower leg: Edema (trace) present.      Left lower leg: Edema (trace) present.   Lymphadenopathy:      Cervical: No cervical adenopathy.   Skin:     General: Skin is warm and dry.   Neurological:      General: No focal deficit present.      Mental Status: She is alert.   Psychiatric:         Mood and Affect: Mood normal.         Behavior: Behavior normal.            No results found for this or any previous visit (from the past 24 hour(s)).

## 2023-05-03 ENCOUNTER — TELEPHONE (OUTPATIENT)
Dept: FAMILY MEDICINE | Facility: CLINIC | Age: 63
End: 2023-05-03
Payer: COMMERCIAL

## 2023-05-03 DIAGNOSIS — E11.65 TYPE 2 DIABETES MELLITUS WITH HYPERGLYCEMIA, WITHOUT LONG-TERM CURRENT USE OF INSULIN (H): ICD-10-CM

## 2023-05-03 DIAGNOSIS — E03.9 HYPOTHYROIDISM, UNSPECIFIED TYPE: ICD-10-CM

## 2023-05-03 DIAGNOSIS — E01.0 THYROMEGALY: ICD-10-CM

## 2023-05-03 NOTE — TELEPHONE ENCOUNTER
Test Results        Who ordered the test:       Type of test: Lab    Date of test:  5/1/23    Where was the test performed:  Pt requesting for lab results and want to know if there's any changes to her meds. Please follow back up with pt at your earliest convenience. Per pt, ok to lvm, thank you.      What are your questions/concerns?:  [facility]    Okay to leave a detailed message?: Yes at Home number on file [phone] (home)

## 2023-05-03 NOTE — LETTER
May 4, 2023      Lidia Nam  [address] [address]        Dear Lidia,     Enclosed are your requested lab results and visit notes from 5/1/2023.          Sincerely,    RUSSELL DELGADILLO MD and [facility] RN

## 2023-05-04 RX ORDER — LEVOTHYROXINE SODIUM 112 UG/1
112 TABLET ORAL DAILY
Qty: 90 TABLET | Refills: 1 | Status: SHIPPED | OUTPATIENT
Start: 2023-05-04 | End: 2024-03-04

## 2023-05-04 RX ORDER — METFORMIN HCL 500 MG
1000 TABLET, EXTENDED RELEASE 24 HR ORAL
Qty: 180 TABLET | Refills: 1 | Status: SHIPPED | OUTPATIENT
Start: 2023-05-04 | End: 2024-03-04

## 2023-05-04 NOTE — TELEPHONE ENCOUNTER
Test results:  1.  Thyroid is much better, but I'm going to push her dose up just a little bit - she can  new prescription at her pharmacy.   2.  Her sugars are better - A1c is  7.6 - this hits the goal of less than 8, but she'll do better in the long run if she can get it closer/under 7.  I'm going to increase her Metformin to 2 tablets daily (take both together with dinner).      Recheck 3 months.  Ok to do lab only.  I'll set up labs as well.  She can come in any time during August to get those labs done.

## 2023-05-04 NOTE — TELEPHONE ENCOUNTER
Called patient and relayed lab results from 5/1/23 visit. Discussed medication changes, patient verbalized understanding.     Results mailed to patient's home address per pt request.

## 2023-08-01 ENCOUNTER — LAB (OUTPATIENT)
Dept: LAB | Facility: CLINIC | Age: 63
End: 2023-08-01
Payer: COMMERCIAL

## 2023-08-01 DIAGNOSIS — E11.65 TYPE 2 DIABETES MELLITUS WITH HYPERGLYCEMIA, WITHOUT LONG-TERM CURRENT USE OF INSULIN (H): ICD-10-CM

## 2023-08-01 DIAGNOSIS — E03.9 HYPOTHYROIDISM, UNSPECIFIED TYPE: ICD-10-CM

## 2023-08-01 LAB
ANION GAP SERPL CALCULATED.3IONS-SCNC: 12 MMOL/L (ref 7–15)
BUN SERPL-MCNC: 6 MG/DL (ref 8–23)
CALCIUM SERPL-MCNC: 9.5 MG/DL (ref 8.8–10.2)
CHLORIDE SERPL-SCNC: 94 MMOL/L (ref 98–107)
CREAT SERPL-MCNC: 0.58 MG/DL (ref 0.51–0.95)
DEPRECATED HCO3 PLAS-SCNC: 25 MMOL/L (ref 22–29)
GFR SERPL CREATININE-BSD FRML MDRD: >90 ML/MIN/1.73M2
GLUCOSE SERPL-MCNC: 116 MG/DL (ref 70–99)
HBA1C MFR BLD: 6.8 % (ref 0–5.6)
POTASSIUM SERPL-SCNC: 4.3 MMOL/L (ref 3.4–5.3)
SODIUM SERPL-SCNC: 131 MMOL/L (ref 136–145)
T4 FREE SERPL-MCNC: 1.46 NG/DL (ref 0.9–1.7)
TSH SERPL DL<=0.005 MIU/L-ACNC: 1.34 UIU/ML (ref 0.3–4.2)

## 2023-08-01 PROCEDURE — 36415 COLL VENOUS BLD VENIPUNCTURE: CPT

## 2023-08-01 PROCEDURE — 84443 ASSAY THYROID STIM HORMONE: CPT

## 2023-08-01 PROCEDURE — 84439 ASSAY OF FREE THYROXINE: CPT

## 2023-08-01 PROCEDURE — 80048 BASIC METABOLIC PNL TOTAL CA: CPT

## 2023-08-01 PROCEDURE — 83036 HEMOGLOBIN GLYCOSYLATED A1C: CPT

## 2023-10-02 ENCOUNTER — APPOINTMENT (OUTPATIENT)
Dept: CT IMAGING | Facility: HOSPITAL | Age: 63
DRG: 853 | End: 2023-10-02
Attending: STUDENT IN AN ORGANIZED HEALTH CARE EDUCATION/TRAINING PROGRAM
Payer: COMMERCIAL

## 2023-10-02 ENCOUNTER — ANESTHESIA EVENT (OUTPATIENT)
Dept: SURGERY | Facility: HOSPITAL | Age: 63
DRG: 853 | End: 2023-10-02
Payer: COMMERCIAL

## 2023-10-02 ENCOUNTER — HOSPITAL ENCOUNTER (INPATIENT)
Facility: HOSPITAL | Age: 63
LOS: 25 days | Discharge: ANOTHER HOSPITAL | DRG: 853 | End: 2023-10-27
Attending: STUDENT IN AN ORGANIZED HEALTH CARE EDUCATION/TRAINING PROGRAM | Admitting: INTERNAL MEDICINE
Payer: COMMERCIAL

## 2023-10-02 ENCOUNTER — ANESTHESIA (OUTPATIENT)
Dept: SURGERY | Facility: HOSPITAL | Age: 63
DRG: 853 | End: 2023-10-02
Payer: COMMERCIAL

## 2023-10-02 DIAGNOSIS — R65.20 SEVERE SEPSIS (H): ICD-10-CM

## 2023-10-02 DIAGNOSIS — T88.4XXA HARD TO INTUBATE, INITIAL ENCOUNTER: Primary | ICD-10-CM

## 2023-10-02 DIAGNOSIS — E11.65 TYPE 2 DIABETES MELLITUS WITH HYPERGLYCEMIA, WITHOUT LONG-TERM CURRENT USE OF INSULIN (H): Primary | ICD-10-CM

## 2023-10-02 DIAGNOSIS — M79.89 NECROTIZING SOFT TISSUE INFECTION: ICD-10-CM

## 2023-10-02 DIAGNOSIS — K59.00 CONSTIPATION, UNSPECIFIED CONSTIPATION TYPE: ICD-10-CM

## 2023-10-02 DIAGNOSIS — A41.9 SEVERE SEPSIS (H): ICD-10-CM

## 2023-10-02 DIAGNOSIS — R11.0 NAUSEA: ICD-10-CM

## 2023-10-02 LAB
ABO/RH(D): NORMAL
ALBUMIN SERPL BCG-MCNC: 2.2 G/DL (ref 3.5–5.2)
ALP SERPL-CCNC: 171 U/L (ref 35–104)
ALT SERPL W P-5'-P-CCNC: 42 U/L (ref 0–50)
ANION GAP SERPL CALCULATED.3IONS-SCNC: 18 MMOL/L (ref 7–15)
ANTIBODY SCREEN: NEGATIVE
APTT PPP: 28 SECONDS (ref 22–38)
AST SERPL W P-5'-P-CCNC: 25 U/L (ref 0–45)
BASE EXCESS BLDA CALC-SCNC: -5.4 MMOL/L
BASOPHILS # BLD MANUAL: 0 10E3/UL (ref 0–0.2)
BASOPHILS NFR BLD MANUAL: 0 %
BILIRUB SERPL-MCNC: 0.7 MG/DL
BUN SERPL-MCNC: 48.9 MG/DL (ref 8–23)
CALCIUM SERPL-MCNC: 9.5 MG/DL (ref 8.8–10.2)
CHLORIDE SERPL-SCNC: 92 MMOL/L (ref 98–107)
COHGB MFR BLD: 97.4 % (ref 96–97)
CREAT BLD-MCNC: 0.9 MG/DL (ref 0.6–1.1)
CREAT SERPL-MCNC: 0.84 MG/DL (ref 0.51–0.95)
DEPRECATED HCO3 PLAS-SCNC: 18 MMOL/L (ref 22–29)
EGFRCR SERPLBLD CKD-EPI 2021: 78 ML/MIN/1.73M2
EGFRCR SERPLBLD CKD-EPI 2021: >60 ML/MIN/1.73M2
EOSINOPHIL # BLD MANUAL: 0 10E3/UL (ref 0–0.7)
EOSINOPHIL NFR BLD MANUAL: 0 %
ERYTHROCYTE [DISTWIDTH] IN BLOOD BY AUTOMATED COUNT: 13.3 % (ref 10–15)
FIBRINOGEN PPP-MCNC: 1090 MG/DL (ref 170–490)
FLUAV RNA SPEC QL NAA+PROBE: NEGATIVE
FLUBV RNA RESP QL NAA+PROBE: NEGATIVE
GLUCOSE BLDC GLUCOMTR-MCNC: 195 MG/DL (ref 70–99)
GLUCOSE SERPL-MCNC: 199 MG/DL (ref 70–99)
HCO3 BLD-SCNC: 21 MMOL/L (ref 23–29)
HCT VFR BLD AUTO: 46 % (ref 35–47)
HGB BLD-MCNC: 16 G/DL (ref 11.7–15.7)
HOLD SPECIMEN: NORMAL
HOLD SPECIMEN: NORMAL
INR PPP: 1.26 (ref 0.85–1.15)
LACTATE SERPL-SCNC: 2.8 MMOL/L (ref 0.7–2)
LACTATE SERPL-SCNC: 4.4 MMOL/L (ref 0.7–2)
LYMPHOCYTES # BLD MANUAL: 0.9 10E3/UL (ref 0.8–5.3)
LYMPHOCYTES NFR BLD MANUAL: 5 %
MCH RBC QN AUTO: 31.9 PG (ref 26.5–33)
MCHC RBC AUTO-ENTMCNC: 34.8 G/DL (ref 31.5–36.5)
MCV RBC AUTO: 92 FL (ref 78–100)
MONOCYTES # BLD MANUAL: 0.9 10E3/UL (ref 0–1.3)
MONOCYTES NFR BLD MANUAL: 5 %
NEUTROPHILS # BLD MANUAL: 16.2 10E3/UL (ref 1.6–8.3)
NEUTROPHILS NFR BLD MANUAL: 90 %
NT-PROBNP SERPL-MCNC: 2243 PG/ML (ref 0–900)
OXYHGB MFR BLD: 96.9 % (ref 96–97)
PCO2 BLD: 39 MM HG (ref 35–45)
PH BLD: 7.33 [PH] (ref 7.37–7.44)
PLAT MORPH BLD: ABNORMAL
PLATELET # BLD AUTO: 234 10E3/UL (ref 150–450)
PO2 BLD: 119 MM HG (ref 80–90)
POTASSIUM SERPL-SCNC: 3.6 MMOL/L (ref 3.4–5.3)
PROT SERPL-MCNC: 6.5 G/DL (ref 6.4–8.3)
RADIOLOGIST FLAGS: ABNORMAL
RBC # BLD AUTO: 5.01 10E6/UL (ref 3.8–5.2)
RBC MORPH BLD: ABNORMAL
RSV RNA SPEC NAA+PROBE: NEGATIVE
SARS-COV-2 RNA RESP QL NAA+PROBE: NEGATIVE
SODIUM SERPL-SCNC: 128 MMOL/L (ref 135–145)
SPECIMEN EXPIRATION DATE: NORMAL
TEMPERATURE: 37 DEGREES C
TROPONIN T SERPL HS-MCNC: 21 NG/L
WBC # BLD AUTO: 18 10E3/UL (ref 4–11)

## 2023-10-02 PROCEDURE — 96365 THER/PROPH/DIAG IV INF INIT: CPT | Mod: 59

## 2023-10-02 PROCEDURE — 82805 BLOOD GASES W/O2 SATURATION: CPT | Performed by: ANESTHESIOLOGY

## 2023-10-02 PROCEDURE — 84443 ASSAY THYROID STIM HORMONE: CPT | Performed by: INTERNAL MEDICINE

## 2023-10-02 PROCEDURE — 250N000009 HC RX 250: Performed by: NURSE ANESTHETIST, CERTIFIED REGISTERED

## 2023-10-02 PROCEDURE — 96368 THER/DIAG CONCURRENT INF: CPT

## 2023-10-02 PROCEDURE — 84439 ASSAY OF FREE THYROXINE: CPT | Performed by: INTERNAL MEDICINE

## 2023-10-02 PROCEDURE — 17999 UNLISTD PX SKN MUC MEMB SUBQ: CPT | Performed by: SURGERY

## 2023-10-02 PROCEDURE — 258N000001 HC RX 258: Performed by: SURGERY

## 2023-10-02 PROCEDURE — 85384 FIBRINOGEN ACTIVITY: CPT | Performed by: ANESTHESIOLOGY

## 2023-10-02 PROCEDURE — 250N000011 HC RX IP 250 OP 636: Performed by: STUDENT IN AN ORGANIZED HEALTH CARE EDUCATION/TRAINING PROGRAM

## 2023-10-02 PROCEDURE — 86923 COMPATIBILITY TEST ELECTRIC: CPT | Performed by: NURSE PRACTITIONER

## 2023-10-02 PROCEDURE — 96361 HYDRATE IV INFUSION ADD-ON: CPT

## 2023-10-02 PROCEDURE — P9045 ALBUMIN (HUMAN), 5%, 250 ML: HCPCS | Mod: JZ | Performed by: NURSE ANESTHETIST, CERTIFIED REGISTERED

## 2023-10-02 PROCEDURE — 88309 TISSUE EXAM BY PATHOLOGIST: CPT | Mod: TC | Performed by: SURGERY

## 2023-10-02 PROCEDURE — 370N000017 HC ANESTHESIA TECHNICAL FEE, PER MIN: Performed by: SURGERY

## 2023-10-02 PROCEDURE — 200N000001 HC R&B ICU

## 2023-10-02 PROCEDURE — 258N000003 HC RX IP 258 OP 636: Performed by: STUDENT IN AN ORGANIZED HEALTH CARE EDUCATION/TRAINING PROGRAM

## 2023-10-02 PROCEDURE — 85610 PROTHROMBIN TIME: CPT | Performed by: ANESTHESIOLOGY

## 2023-10-02 PROCEDURE — 258N000003 HC RX IP 258 OP 636: Performed by: NURSE ANESTHETIST, CERTIFIED REGISTERED

## 2023-10-02 PROCEDURE — 250N000011 HC RX IP 250 OP 636: Performed by: NURSE ANESTHETIST, CERTIFIED REGISTERED

## 2023-10-02 PROCEDURE — 87637 SARSCOV2&INF A&B&RSV AMP PRB: CPT | Performed by: STUDENT IN AN ORGANIZED HEALTH CARE EDUCATION/TRAINING PROGRAM

## 2023-10-02 PROCEDURE — 87040 BLOOD CULTURE FOR BACTERIA: CPT | Performed by: STUDENT IN AN ORGANIZED HEALTH CARE EDUCATION/TRAINING PROGRAM

## 2023-10-02 PROCEDURE — 0JB90ZZ EXCISION OF BUTTOCK SUBCUTANEOUS TISSUE AND FASCIA, OPEN APPROACH: ICD-10-PCS | Performed by: SURGERY

## 2023-10-02 PROCEDURE — 36415 COLL VENOUS BLD VENIPUNCTURE: CPT | Performed by: STUDENT IN AN ORGANIZED HEALTH CARE EDUCATION/TRAINING PROGRAM

## 2023-10-02 PROCEDURE — 360N000075 HC SURGERY LEVEL 2, PER MIN: Performed by: SURGERY

## 2023-10-02 PROCEDURE — 87075 CULTR BACTERIA EXCEPT BLOOD: CPT | Performed by: SURGERY

## 2023-10-02 PROCEDURE — 80053 COMPREHEN METABOLIC PANEL: CPT | Performed by: STUDENT IN AN ORGANIZED HEALTH CARE EDUCATION/TRAINING PROGRAM

## 2023-10-02 PROCEDURE — 88309 TISSUE EXAM BY PATHOLOGIST: CPT | Mod: 26 | Performed by: PATHOLOGY

## 2023-10-02 PROCEDURE — 999N000141 HC STATISTIC PRE-PROCEDURE NURSING ASSESSMENT: Performed by: SURGERY

## 2023-10-02 PROCEDURE — 250N000009 HC RX 250: Performed by: SURGERY

## 2023-10-02 PROCEDURE — 85730 THROMBOPLASTIN TIME PARTIAL: CPT | Performed by: ANESTHESIOLOGY

## 2023-10-02 PROCEDURE — 250N000011 HC RX IP 250 OP 636: Performed by: INTERNAL MEDICINE

## 2023-10-02 PROCEDURE — 87205 SMEAR GRAM STAIN: CPT | Performed by: SURGERY

## 2023-10-02 PROCEDURE — 71275 CT ANGIOGRAPHY CHEST: CPT

## 2023-10-02 PROCEDURE — 250N000011 HC RX IP 250 OP 636: Performed by: SURGERY

## 2023-10-02 PROCEDURE — 5A1955Z RESPIRATORY VENTILATION, GREATER THAN 96 CONSECUTIVE HOURS: ICD-10-PCS | Performed by: STUDENT IN AN ORGANIZED HEALTH CARE EDUCATION/TRAINING PROGRAM

## 2023-10-02 PROCEDURE — 85027 COMPLETE CBC AUTOMATED: CPT | Performed by: STUDENT IN AN ORGANIZED HEALTH CARE EDUCATION/TRAINING PROGRAM

## 2023-10-02 PROCEDURE — 88304 TISSUE EXAM BY PATHOLOGIST: CPT | Mod: 26 | Performed by: PATHOLOGY

## 2023-10-02 PROCEDURE — 84484 ASSAY OF TROPONIN QUANT: CPT | Performed by: STUDENT IN AN ORGANIZED HEALTH CARE EDUCATION/TRAINING PROGRAM

## 2023-10-02 PROCEDURE — 0JBB0ZZ EXCISION OF PERINEUM SUBCUTANEOUS TISSUE AND FASCIA, OPEN APPROACH: ICD-10-PCS | Performed by: SURGERY

## 2023-10-02 PROCEDURE — 250N000009 HC RX 250: Performed by: ANESTHESIOLOGY

## 2023-10-02 PROCEDURE — 86901 BLOOD TYPING SEROLOGIC RH(D): CPT | Performed by: ANESTHESIOLOGY

## 2023-10-02 PROCEDURE — 258N000003 HC RX IP 258 OP 636: Performed by: ANESTHESIOLOGY

## 2023-10-02 PROCEDURE — 11004 DBRDMT SKIN XTRNL GENT&PER: CPT | Performed by: SURGERY

## 2023-10-02 PROCEDURE — 83605 ASSAY OF LACTIC ACID: CPT | Performed by: STUDENT IN AN ORGANIZED HEALTH CARE EDUCATION/TRAINING PROGRAM

## 2023-10-02 PROCEDURE — 250N000011 HC RX IP 250 OP 636: Mod: JZ | Performed by: STUDENT IN AN ORGANIZED HEALTH CARE EDUCATION/TRAINING PROGRAM

## 2023-10-02 PROCEDURE — 83880 ASSAY OF NATRIURETIC PEPTIDE: CPT | Performed by: STUDENT IN AN ORGANIZED HEALTH CARE EDUCATION/TRAINING PROGRAM

## 2023-10-02 PROCEDURE — 87070 CULTURE OTHR SPECIMN AEROBIC: CPT | Performed by: SURGERY

## 2023-10-02 PROCEDURE — 99291 CRITICAL CARE FIRST HOUR: CPT | Mod: 25

## 2023-10-02 PROCEDURE — 82565 ASSAY OF CREATININE: CPT

## 2023-10-02 PROCEDURE — 74177 CT ABD & PELVIS W/CONTRAST: CPT

## 2023-10-02 PROCEDURE — 85007 BL SMEAR W/DIFF WBC COUNT: CPT | Performed by: STUDENT IN AN ORGANIZED HEALTH CARE EDUCATION/TRAINING PROGRAM

## 2023-10-02 PROCEDURE — 3E043XZ INTRODUCTION OF VASOPRESSOR INTO CENTRAL VEIN, PERCUTANEOUS APPROACH: ICD-10-PCS | Performed by: STUDENT IN AN ORGANIZED HEALTH CARE EDUCATION/TRAINING PROGRAM

## 2023-10-02 PROCEDURE — 94002 VENT MGMT INPAT INIT DAY: CPT

## 2023-10-02 PROCEDURE — 250N000025 HC SEVOFLURANE, PER MIN: Performed by: SURGERY

## 2023-10-02 PROCEDURE — 272N000001 HC OR GENERAL SUPPLY STERILE: Performed by: SURGERY

## 2023-10-02 PROCEDURE — 0JBM0ZZ EXCISION OF LEFT UPPER LEG SUBCUTANEOUS TISSUE AND FASCIA, OPEN APPROACH: ICD-10-PCS | Performed by: SURGERY

## 2023-10-02 PROCEDURE — 99222 1ST HOSP IP/OBS MODERATE 55: CPT | Mod: 57 | Performed by: SURGERY

## 2023-10-02 RX ORDER — NICOTINE POLACRILEX 4 MG
15-30 LOZENGE BUCCAL
Status: DISCONTINUED | OUTPATIENT
Start: 2023-10-02 | End: 2023-10-03

## 2023-10-02 RX ORDER — PIPERACILLIN SODIUM, TAZOBACTAM SODIUM 3; .375 G/15ML; G/15ML
3.38 INJECTION, POWDER, LYOPHILIZED, FOR SOLUTION INTRAVENOUS EVERY 8 HOURS
Status: DISCONTINUED | OUTPATIENT
Start: 2023-10-03 | End: 2023-10-18

## 2023-10-02 RX ORDER — LIDOCAINE HYDROCHLORIDE 10 MG/ML
INJECTION, SOLUTION INFILTRATION; PERINEURAL
Status: COMPLETED | OUTPATIENT
Start: 2023-10-02 | End: 2023-10-02

## 2023-10-02 RX ORDER — DEXTROSE MONOHYDRATE 25 G/50ML
25-50 INJECTION, SOLUTION INTRAVENOUS
Status: DISCONTINUED | OUTPATIENT
Start: 2023-10-02 | End: 2023-10-03

## 2023-10-02 RX ORDER — IOPAMIDOL 755 MG/ML
90 INJECTION, SOLUTION INTRAVASCULAR ONCE
Status: COMPLETED | OUTPATIENT
Start: 2023-10-02 | End: 2023-10-02

## 2023-10-02 RX ORDER — LIDOCAINE 40 MG/G
CREAM TOPICAL
Status: ACTIVE | OUTPATIENT
Start: 2023-10-02 | End: 2023-10-05

## 2023-10-02 RX ORDER — ONDANSETRON 2 MG/ML
4 INJECTION INTRAMUSCULAR; INTRAVENOUS EVERY 30 MIN PRN
Status: DISCONTINUED | OUTPATIENT
Start: 2023-10-02 | End: 2023-10-03

## 2023-10-02 RX ORDER — HYDROMORPHONE HCL IN WATER/PF 6 MG/30 ML
0.4 PATIENT CONTROLLED ANALGESIA SYRINGE INTRAVENOUS EVERY 5 MIN PRN
Status: DISCONTINUED | OUTPATIENT
Start: 2023-10-02 | End: 2023-10-03

## 2023-10-02 RX ORDER — SODIUM CHLORIDE 9 MG/ML
INJECTION, SOLUTION INTRAVENOUS CONTINUOUS PRN
Status: DISCONTINUED | OUTPATIENT
Start: 2023-10-02 | End: 2023-10-02

## 2023-10-02 RX ORDER — PIPERACILLIN SODIUM, TAZOBACTAM SODIUM 3; .375 G/15ML; G/15ML
3.38 INJECTION, POWDER, LYOPHILIZED, FOR SOLUTION INTRAVENOUS ONCE
Status: COMPLETED | OUTPATIENT
Start: 2023-10-02 | End: 2023-10-02

## 2023-10-02 RX ORDER — ONDANSETRON 4 MG/1
4 TABLET, ORALLY DISINTEGRATING ORAL EVERY 30 MIN PRN
Status: DISCONTINUED | OUTPATIENT
Start: 2023-10-02 | End: 2023-10-03

## 2023-10-02 RX ORDER — NALOXONE HYDROCHLORIDE 0.4 MG/ML
0.2 INJECTION, SOLUTION INTRAMUSCULAR; INTRAVENOUS; SUBCUTANEOUS
Status: DISCONTINUED | OUTPATIENT
Start: 2023-10-02 | End: 2023-10-27 | Stop reason: HOSPADM

## 2023-10-02 RX ORDER — CEFAZOLIN SODIUM/WATER 2 G/20 ML
2 SYRINGE (ML) INTRAVENOUS SEE ADMIN INSTRUCTIONS
Status: DISCONTINUED | OUTPATIENT
Start: 2023-10-02 | End: 2023-10-02 | Stop reason: HOSPADM

## 2023-10-02 RX ORDER — ROPIVACAINE IN 0.9% SOD CHL/PF 0.1 %
.03-.125 PLASTIC BAG, INJECTION (ML) EPIDURAL CONTINUOUS
Status: DISCONTINUED | OUTPATIENT
Start: 2023-10-02 | End: 2023-10-02 | Stop reason: HOSPADM

## 2023-10-02 RX ORDER — MAGNESIUM HYDROXIDE 1200 MG/15ML
LIQUID ORAL PRN
Status: DISCONTINUED | OUTPATIENT
Start: 2023-10-02 | End: 2023-10-02 | Stop reason: HOSPADM

## 2023-10-02 RX ORDER — LEVOTHYROXINE SODIUM 112 UG/1
112 TABLET ORAL DAILY
Status: DISCONTINUED | OUTPATIENT
Start: 2023-10-03 | End: 2023-10-05

## 2023-10-02 RX ORDER — FENTANYL CITRATE 50 UG/ML
25 INJECTION, SOLUTION INTRAMUSCULAR; INTRAVENOUS EVERY 5 MIN PRN
Status: DISCONTINUED | OUTPATIENT
Start: 2023-10-02 | End: 2023-10-02 | Stop reason: ALTCHOICE

## 2023-10-02 RX ORDER — DEXTROSE MONOHYDRATE 25 G/50ML
25-50 INJECTION, SOLUTION INTRAVENOUS
Status: DISCONTINUED | OUTPATIENT
Start: 2023-10-02 | End: 2023-10-27 | Stop reason: HOSPADM

## 2023-10-02 RX ORDER — NICOTINE POLACRILEX 4 MG
15-30 LOZENGE BUCCAL
Status: DISCONTINUED | OUTPATIENT
Start: 2023-10-02 | End: 2023-10-27 | Stop reason: HOSPADM

## 2023-10-02 RX ORDER — HYDROMORPHONE HCL IN WATER/PF 6 MG/30 ML
0.2 PATIENT CONTROLLED ANALGESIA SYRINGE INTRAVENOUS EVERY 5 MIN PRN
Status: DISCONTINUED | OUTPATIENT
Start: 2023-10-02 | End: 2023-10-03

## 2023-10-02 RX ORDER — CEFAZOLIN SODIUM/WATER 2 G/20 ML
2 SYRINGE (ML) INTRAVENOUS
Status: DISCONTINUED | OUTPATIENT
Start: 2023-10-02 | End: 2023-10-02 | Stop reason: HOSPADM

## 2023-10-02 RX ORDER — SODIUM CHLORIDE, SODIUM LACTATE, POTASSIUM CHLORIDE, CALCIUM CHLORIDE 600; 310; 30; 20 MG/100ML; MG/100ML; MG/100ML; MG/100ML
INJECTION, SOLUTION INTRAVENOUS CONTINUOUS
Status: DISCONTINUED | OUTPATIENT
Start: 2023-10-02 | End: 2023-10-02 | Stop reason: HOSPADM

## 2023-10-02 RX ORDER — PIPERACILLIN SODIUM, TAZOBACTAM SODIUM 3; .375 G/15ML; G/15ML
3.38 INJECTION, POWDER, LYOPHILIZED, FOR SOLUTION INTRAVENOUS EVERY 6 HOURS
Status: DISCONTINUED | OUTPATIENT
Start: 2023-10-03 | End: 2023-10-02

## 2023-10-02 RX ORDER — CEFAZOLIN SODIUM 1 G/50ML
2000 SOLUTION INTRAVENOUS ONCE
Status: COMPLETED | OUTPATIENT
Start: 2023-10-02 | End: 2023-10-02

## 2023-10-02 RX ORDER — NOREPINEPHRINE BITARTRATE 0.02 MG/ML
.01-.6 INJECTION, SOLUTION INTRAVENOUS CONTINUOUS
Status: DISCONTINUED | OUTPATIENT
Start: 2023-10-03 | End: 2023-10-11

## 2023-10-02 RX ORDER — NALOXONE HYDROCHLORIDE 0.4 MG/ML
0.4 INJECTION, SOLUTION INTRAMUSCULAR; INTRAVENOUS; SUBCUTANEOUS
Status: DISCONTINUED | OUTPATIENT
Start: 2023-10-02 | End: 2023-10-27 | Stop reason: HOSPADM

## 2023-10-02 RX ORDER — FENTANYL CITRATE 50 UG/ML
INJECTION, SOLUTION INTRAMUSCULAR; INTRAVENOUS PRN
Status: DISCONTINUED | OUTPATIENT
Start: 2023-10-02 | End: 2023-10-02

## 2023-10-02 RX ORDER — CLINDAMYCIN PHOSPHATE 900 MG/50ML
900 INJECTION, SOLUTION INTRAVENOUS ONCE
Status: COMPLETED | OUTPATIENT
Start: 2023-10-02 | End: 2023-10-02

## 2023-10-02 RX ORDER — PROPOFOL 10 MG/ML
INJECTION, EMULSION INTRAVENOUS PRN
Status: DISCONTINUED | OUTPATIENT
Start: 2023-10-02 | End: 2023-10-02

## 2023-10-02 RX ORDER — LIDOCAINE 40 MG/G
CREAM TOPICAL
Status: DISCONTINUED | OUTPATIENT
Start: 2023-10-02 | End: 2023-10-02 | Stop reason: HOSPADM

## 2023-10-02 RX ORDER — VASOPRESSIN IN 0.9 % NACL 2 UNIT/2ML
SYRINGE (ML) INTRAVENOUS PRN
Status: DISCONTINUED | OUTPATIENT
Start: 2023-10-02 | End: 2023-10-02

## 2023-10-02 RX ORDER — FENTANYL CITRATE 50 UG/ML
50 INJECTION, SOLUTION INTRAMUSCULAR; INTRAVENOUS EVERY 5 MIN PRN
Status: DISCONTINUED | OUTPATIENT
Start: 2023-10-02 | End: 2023-10-02 | Stop reason: ALTCHOICE

## 2023-10-02 RX ORDER — DEXMEDETOMIDINE HYDROCHLORIDE 4 UG/ML
INJECTION, SOLUTION INTRAVENOUS CONTINUOUS PRN
Status: DISCONTINUED | OUTPATIENT
Start: 2023-10-02 | End: 2023-10-02

## 2023-10-02 RX ORDER — CLINDAMYCIN PHOSPHATE 900 MG/50ML
900 INJECTION, SOLUTION INTRAVENOUS EVERY 8 HOURS
Status: DISCONTINUED | OUTPATIENT
Start: 2023-10-03 | End: 2023-10-10

## 2023-10-02 RX ORDER — DEXMEDETOMIDINE HYDROCHLORIDE 4 UG/ML
.1-1.2 INJECTION, SOLUTION INTRAVENOUS CONTINUOUS
Status: DISCONTINUED | OUTPATIENT
Start: 2023-10-03 | End: 2023-10-11

## 2023-10-02 RX ORDER — SODIUM CHLORIDE, SODIUM LACTATE, POTASSIUM CHLORIDE, CALCIUM CHLORIDE 600; 310; 30; 20 MG/100ML; MG/100ML; MG/100ML; MG/100ML
INJECTION, SOLUTION INTRAVENOUS CONTINUOUS
Status: DISCONTINUED | OUTPATIENT
Start: 2023-10-03 | End: 2023-10-03

## 2023-10-02 RX ADMIN — MIDAZOLAM 1 MG: 1 INJECTION INTRAMUSCULAR; INTRAVENOUS at 21:21

## 2023-10-02 RX ADMIN — PROPOFOL 20 MG: 10 INJECTION, EMULSION INTRAVENOUS at 21:24

## 2023-10-02 RX ADMIN — LIDOCAINE HYDROCHLORIDE 2 ML: 10 INJECTION, SOLUTION INFILTRATION; PERINEURAL at 20:40

## 2023-10-02 RX ADMIN — VANCOMYCIN HYDROCHLORIDE 2000 MG: 500 INJECTION, POWDER, LYOPHILIZED, FOR SOLUTION INTRAVENOUS at 19:21

## 2023-10-02 RX ADMIN — Medication 1 UNITS: at 21:39

## 2023-10-02 RX ADMIN — PHENYLEPHRINE HYDROCHLORIDE 0.5 MCG/KG/MIN: 10 INJECTION INTRAVENOUS at 21:55

## 2023-10-02 RX ADMIN — SODIUM CHLORIDE, POTASSIUM CHLORIDE, SODIUM LACTATE AND CALCIUM CHLORIDE: 600; 310; 30; 20 INJECTION, SOLUTION INTRAVENOUS at 20:28

## 2023-10-02 RX ADMIN — DEXMEDETOMIDINE HYDROCHLORIDE 4 MCG: 100 INJECTION, SOLUTION INTRAVENOUS at 21:21

## 2023-10-02 RX ADMIN — PROPOFOL 20 MG: 10 INJECTION, EMULSION INTRAVENOUS at 21:22

## 2023-10-02 RX ADMIN — Medication 50 MCG/HR: at 23:57

## 2023-10-02 RX ADMIN — ALBUMIN HUMAN: 0.05 INJECTION, SOLUTION INTRAVENOUS at 22:01

## 2023-10-02 RX ADMIN — IOPAMIDOL 90 ML: 755 INJECTION, SOLUTION INTRAVENOUS at 18:55

## 2023-10-02 RX ADMIN — SODIUM BICARBONATE 25 MEQ: 84 INJECTION, SOLUTION INTRAVENOUS at 21:44

## 2023-10-02 RX ADMIN — Medication 1 UNITS: at 22:41

## 2023-10-02 RX ADMIN — SODIUM CHLORIDE, POTASSIUM CHLORIDE, SODIUM LACTATE AND CALCIUM CHLORIDE: 600; 310; 30; 20 INJECTION, SOLUTION INTRAVENOUS at 23:03

## 2023-10-02 RX ADMIN — MIDAZOLAM 1 MG: 1 INJECTION INTRAMUSCULAR; INTRAVENOUS at 23:12

## 2023-10-02 RX ADMIN — VASOPRESSIN 2.4 UNITS/HR: 20 INJECTION INTRAVENOUS at 22:49

## 2023-10-02 RX ADMIN — PIPERACILLIN AND TAZOBACTAM 3.38 G: 3; .375 INJECTION, POWDER, LYOPHILIZED, FOR SOLUTION INTRAVENOUS at 18:29

## 2023-10-02 RX ADMIN — FENTANYL CITRATE 25 MCG: 50 INJECTION, SOLUTION INTRAMUSCULAR; INTRAVENOUS at 21:51

## 2023-10-02 RX ADMIN — MIDAZOLAM 1 MG: 1 INJECTION INTRAMUSCULAR; INTRAVENOUS at 23:10

## 2023-10-02 RX ADMIN — Medication 1 UNITS: at 22:04

## 2023-10-02 RX ADMIN — ROCURONIUM BROMIDE 50 MG: 50 INJECTION, SOLUTION INTRAVENOUS at 23:25

## 2023-10-02 RX ADMIN — SODIUM BICARBONATE 25 MEQ: 84 INJECTION, SOLUTION INTRAVENOUS at 21:53

## 2023-10-02 RX ADMIN — DEXMEDETOMIDINE HYDROCHLORIDE 4 MCG: 100 INJECTION, SOLUTION INTRAVENOUS at 21:19

## 2023-10-02 RX ADMIN — DEXMEDETOMIDINE HYDROCHLORIDE 4 MCG: 100 INJECTION, SOLUTION INTRAVENOUS at 21:18

## 2023-10-02 RX ADMIN — PHENYLEPHRINE HYDROCHLORIDE 150 MCG: 10 INJECTION INTRAVENOUS at 22:00

## 2023-10-02 RX ADMIN — Medication 1 UNITS: at 22:20

## 2023-10-02 RX ADMIN — Medication 0.03 MCG/KG/MIN: at 22:27

## 2023-10-02 RX ADMIN — PHENYLEPHRINE HYDROCHLORIDE 150 MCG: 10 INJECTION INTRAVENOUS at 22:17

## 2023-10-02 RX ADMIN — PHENYLEPHRINE HYDROCHLORIDE 100 MCG: 10 INJECTION INTRAVENOUS at 21:54

## 2023-10-02 RX ADMIN — ROCURONIUM BROMIDE 50 MG: 50 INJECTION, SOLUTION INTRAVENOUS at 21:28

## 2023-10-02 RX ADMIN — SODIUM CHLORIDE, POTASSIUM CHLORIDE, SODIUM LACTATE AND CALCIUM CHLORIDE: 600; 310; 30; 20 INJECTION, SOLUTION INTRAVENOUS at 22:36

## 2023-10-02 RX ADMIN — SODIUM CHLORIDE: 0.9 INJECTION, SOLUTION INTRAVENOUS at 21:31

## 2023-10-02 RX ADMIN — SODIUM CHLORIDE, POTASSIUM CHLORIDE, SODIUM LACTATE AND CALCIUM CHLORIDE: 600; 310; 30; 20 INJECTION, SOLUTION INTRAVENOUS at 23:00

## 2023-10-02 RX ADMIN — PHENYLEPHRINE HYDROCHLORIDE 100 MCG: 10 INJECTION INTRAVENOUS at 22:39

## 2023-10-02 RX ADMIN — Medication 1 UNITS: at 21:30

## 2023-10-02 RX ADMIN — SODIUM CHLORIDE, POTASSIUM CHLORIDE, SODIUM LACTATE AND CALCIUM CHLORIDE: 600; 310; 30; 20 INJECTION, SOLUTION INTRAVENOUS at 23:53

## 2023-10-02 RX ADMIN — FENTANYL CITRATE 25 MCG: 50 INJECTION, SOLUTION INTRAMUSCULAR; INTRAVENOUS at 22:34

## 2023-10-02 RX ADMIN — PHENYLEPHRINE HYDROCHLORIDE 150 MCG: 10 INJECTION INTRAVENOUS at 22:31

## 2023-10-02 RX ADMIN — DEXMEDETOMIDINE HYDROCHLORIDE 4 MCG: 100 INJECTION, SOLUTION INTRAVENOUS at 21:20

## 2023-10-02 RX ADMIN — PROPOFOL 70 MG: 10 INJECTION, EMULSION INTRAVENOUS at 21:28

## 2023-10-02 RX ADMIN — DEXMEDETOMIDINE HYDROCHLORIDE 0.5 MCG/KG/HR: 400 INJECTION INTRAVENOUS at 21:12

## 2023-10-02 RX ADMIN — SODIUM CHLORIDE 1000 ML: 9 INJECTION, SOLUTION INTRAVENOUS at 18:25

## 2023-10-02 RX ADMIN — CLINDAMYCIN PHOSPHATE 900 MG: 900 INJECTION, SOLUTION INTRAVENOUS at 19:24

## 2023-10-02 RX ADMIN — PHENYLEPHRINE HYDROCHLORIDE 100 MCG: 10 INJECTION INTRAVENOUS at 22:37

## 2023-10-02 ASSESSMENT — ACTIVITIES OF DAILY LIVING (ADL)
ADLS_ACUITY_SCORE: 35

## 2023-10-02 ASSESSMENT — LIFESTYLE VARIABLES: TOBACCO_USE: 1

## 2023-10-02 NOTE — PHARMACY-VANCOMYCIN DOSING SERVICE
Pharmacy Vancomycin Initial Note  Date of Service 2023  Patient's  1960  63 year old, female    Indication: Sepsis    Current estimated CrCl = Estimated Creatinine Clearance: 105.6 mL/min (based on SCr of 0.58 mg/dL).    Creatinine for last 3 days  No results found for requested labs within last 3 days.    Recent Vancomycin Level(s) for last 3 days  No results found for requested labs within last 3 days.      Vancomycin IV Administrations (past 72 hours)        No vancomycin orders with administrations in past 72 hours.                    Nephrotoxins and other renal medications (From now, onward)      Start     Dose/Rate Route Frequency Ordered Stop    10/02/23 1900  vancomycin (VANCOCIN) 2,000 mg in sodium chloride 0.9 % 500 mL intermittent infusion         2,000 mg  over 2 Hours Intravenous ONCE 10/02/23 1829      10/02/23 1830  piperacillin-tazobactam (ZOSYN) 3.375 g vial to attach to  mL bag         3.375 g  over 30 Minutes Intravenous ONCE 10/02/23 1817              Contrast Orders - past 72 hours (72h ago, onward)      None                    Plan:    Vancomycin 2000 mg IV x 1 in ED.  Re-consult pharmacy if vancomycin is to be continued inpatient.    Juan R Barney [facility]

## 2023-10-02 NOTE — ED NOTES
Bed: JNED-18  Expected date:   Expected time:   Means of arrival:   Comments:  SPF- 52yo F Weak, SOB

## 2023-10-02 NOTE — ED PROVIDER NOTES
NAME: Lidia Nam  AGE: 63 year old female  YOB: 1960  MRN: [id number]  EVALUATION DATE & TIME: 10/2/2023  6:06 PM    PCP: Ana Goetz  ED PROVIDER: Yael Victor MD.    Chief Complaint   Patient presents with    Groin Pain    Shortness of Breath    Generalized Weakness       FINAL IMPRESSION:  1. Necrotizing soft tissue infection    2. Severe sepsis (H)        MEDICAL DECISION MAKIN:11 PM I met with the patient, obtained history, performed an initial exam, and discussed options and plan for diagnostics and treatment here in the ED.  6:38 PM I spoke to Dr. Howard, general surgery, regarding patient's plan for care.   7:02 PM Updated patient and family.  7:47 PM Received a call from radiology regarding patient's abdomen CT.    62 y/o F with h/o obesity, T2DM who presents with groin pain, shortness of breath, generalized weakness. On exam she had findings highly concerning for necrotizing infection in her left groin, vanc/zosyn/clindamycin were started and general surgery was paged even before CT scan was obtained. CT abd/pelvis does show findings consistent with this.    She also reported some shortness of breath. CT PE can without PE, some findings of possible pneumonia. BNP elevated, doesn't appear significantly volume up but will monitor respiratory status while getting fluids. EKG is sinus rhythm with some nonspecific changes, troponin 21, no chest pain.      She was taken emergently to the OR with general surgery. Patient and family updated. Intensivist paged but patient went to OR prior to talking to them.     Medical Decision Making    History:  Supplemental history from: EMS  External Record(s) reviewed: Documented in chart, if applicable.    Work Up:  Chart documentation includes differential considered and any EKGs or imaging interpreted by provider.  In additional to work up documented, I considered the following work up: Documented in chart, if  applicable.    External consultation:  Discussion of management with another provider: General Surgery, Intensivist, and Radiology (regarding imaging)    Complicating factors:  Care impacted by chronic illness: Diabetes and Smoking / Nicotine Use  Care affected by social determinants of health: Access to Medical Care    Disposition considerations: Admit.    Critical Care  Performed by: Yael Victor MD  Authorized by: Yael Victor MD     Total critical care time: 60 minutes  Critical care time was exclusive of separately billable procedures and treating other patients.  Critical care was necessary to treat or prevent imminent or life-threatening deterioration of the following conditions: severe sepsis, necrotizing infection  Critical care was time spent personally by me on the following activities: development of treatment plan with patient or surrogate, discussions with consultants, examination of patient, evaluation of patient's response to treatment, obtaining history from patient or surrogate, ordering and performing treatments and interventions, ordering and review of laboratory studies, ordering and review of radiographic studies and re-evaluation of patient's condition, this excludes any separately billable procedures.      MEDICATIONS GIVEN IN THE EMERGENCY:  Medications   vancomycin (VANCOCIN) 2,000 mg in sodium chloride 0.9 % 500 mL intermittent infusion (2,000 mg Intravenous $New Bag 10/2/23 1921)   piperacillin-tazobactam (ZOSYN) 3.375 g vial to attach to  mL bag (0 g Intravenous Stopped 10/2/23 1915)   sodium chloride 0.9% BOLUS 1,000 mL (0 mLs Intravenous Stopped 10/2/23 1915)   iopamidol (ISOVUE-370) solution 90 mL (90 mLs Intravenous $Given 10/2/23 1855)   clindamycin (CLEOCIN) 900 mg in 50 mL NS intermittent infusion (900 mg Intravenous $Given 10/2/23 1924)       NEW PRESCRIPTIONS STARTED AT TODAY'S ER VISIT:  New Prescriptions    No medications on file      =================================================================  HPI    History limited due to patient's acuity of condition.    Patient information was obtained from: Patient, EMS  Use of : N/A       Lidia Nam is a 63 year old female with a past medical history of DM II, thyromegaly, hypothyroidism, nicotine dependence, and morbid obesity, who presents to the ED by EMS for evaluation of shortness of breath, cough.    Per EMS, patient arrives from private home with a few day history of generalized weakness, cough, chest pain, and shortness of breath. On arrival, patient oxygen saturations were 88% on room air, patient appeared altered, and very slow to answer questions.    Per patient, endorses some ongoing cough and shortness of breath. In addition, patient endorses worsening pain, redness, and draining from her inguinal/vaginal area. Wound severely malodorous.     REVIEW OF SYSTEMS   ROS limited due to patient's acuity of condition.    PAST MEDICAL HISTORY:  History reviewed. No pertinent past medical history.    PAST SURGICAL HISTORY:  Past Surgical History:   Procedure Laterality Date    [facility] APPENDECTOMY      Description: Appendectomy;  Recorded: 11/21/2008;    [facility] LIGATE FALLOPIAN TUBE      Description: Tubal Ligation;  Recorded: 11/21/2008;       CURRENT MEDICATIONS:      Current Facility-Administered Medications:     vancomycin (VANCOCIN) 2,000 mg in sodium chloride 0.9 % 500 mL intermittent infusion, 2,000 mg, Intravenous, Once, Yael Victor MD, 2,000 mg at 10/02/23 1921    Current Outpatient Medications:     furosemide (LASIX) 20 MG tablet, Take 1 tablet (20 mg) by mouth 2 times daily, Disp: 160 tablet, Rfl: 1    levothyroxine (SYNTHROID/LEVOTHROID) 112 MCG tablet, Take 1 tablet (112 mcg) by mouth daily, Disp: 90 tablet, Rfl: 1    metFORMIN (GLUCOPHAGE XR) 500 MG 24 hr tablet, Take 2 tablets (1,000 mg) by mouth daily (with dinner), Disp: 180 tablet, Rfl: 1    ALLERGIES:  Allergies  "  Allergen Reactions    Erythromycin     Psyllium     Seasonal Allergies      Hay fever - tree pollens, dust, mold, mildew       FAMILY HISTORY:  No family history on file.    SOCIAL HISTORY:   Social History     Socioeconomic History    Marital status:    Tobacco Use    Smoking status: Every Day     Packs/day: 1.00     Years: 39.00     Pack years: 39.00     Types: Cigarettes    Smokeless tobacco: Never   Substance and Sexual Activity    Alcohol use: Yes       PHYSICAL EXAM:    Vitals: /81   Pulse 115   Temp 98  F (36.7  C) (Oral)   Resp 22   Ht 1.575 m (5' 2\")   Wt 91.2 kg (201 lb)   LMP  (LMP Unknown)   SpO2 96%   BMI 36.76 kg/m     Constitutional: Well developed, well nourished  HENT: Normocephalic, atraumatic, mucous membranes moist. Neck-significant thyroidmegaly noted  Eyes: Pupils mid-range, sclera white  Respiratory: CTAB, no respiratory distress, no wheezing  Cardiovascular: Tachycardic heart rate, regular rhythm. No significant LE edema  GI: Soft, not distended, not tender to palpation  Musculoskeletal: Moving all 4 extremities intentionally and without pain. No obvious deformity.  Skin: Warm, dry  : significanct erythema and swelling to the left groin/labia/inguinal area spreading some onto the abdomen with area of hemorrhagic blister vs necrotic tissue  Neurologic: Alert & oriented, mild confusion noted but answering most questions, speech clear, Cns grossly intact, no focal deficits noted     LAB:  All pertinent labs reviewed and interpreted.  Labs Ordered and Resulted from Time of ED Arrival to Time of ED Departure   COMPREHENSIVE METABOLIC PANEL - Abnormal       Result Value    Sodium 128 (*)     Potassium 3.6      Carbon Dioxide (CO2) 18 (*)     Anion Gap 18 (*)     Urea Nitrogen 48.9 (*)     Creatinine 0.84      GFR Estimate 78      Calcium 9.5      Chloride 92 (*)     Glucose 199 (*)     Alkaline Phosphatase 171 (*)     AST 25      ALT 42      Protein Total 6.5      Albumin " 2.2 (*)     Bilirubin Total 0.7     LACTIC ACID WHOLE BLOOD - Abnormal    Lactic Acid 4.4 (*)    TROPONIN T, HIGH SENSITIVITY - Abnormal    Troponin T, High Sensitivity 21 (*)    NT PROBNP INPATIENT - Abnormal    N terminal Pro BNP Inpatient 2,243 (*)    CBC WITH PLATELETS AND DIFFERENTIAL - Abnormal    WBC Count 18.0 (*)     RBC Count 5.01      Hemoglobin 16.0 (*)     Hematocrit 46.0      MCV 92      MCH 31.9      MCHC 34.8      RDW 13.3      Platelet Count 234     DIFFERENTIAL - Abnormal    % Neutrophils 90      % Lymphocytes 5      % Monocytes 5      % Eosinophils 0      % Basophils 0      Absolute Neutrophils 16.2 (*)     Absolute Lymphocytes 0.9      Absolute Monocytes 0.9      Absolute Eosinophils 0.0      Absolute Basophils 0.0      RBC Morphology Confirmed RBC Indices      Platelet Assessment        Value: Automated Count Confirmed. Platelet morphology is normal.   INR - Abnormal    INR 1.26 (*)    FIBRINOGEN ACTIVITY - Abnormal    Fibrinogen Activity 1,090 (*)    INFLUENZA A/B, RSV, & SARS-COV2 PCR - Normal    Influenza A PCR Negative      Influenza B PCR Negative      RSV PCR Negative      SARS CoV2 PCR Negative     ISTAT CREATININE POCT - Normal    Creatinine POCT 0.9      GFR, ESTIMATED POCT >60     PARTIAL THROMBOPLASTIN TIME - Normal    aPTT 28     LACTIC ACID WHOLE BLOOD   ISTAT CREATININE POCT   TYPE AND SCREEN, ADULT    SPECIMEN EXPIRATION DATE [id number]     BLOOD CULTURE   BLOOD CULTURE   ABO/RH TYPE AND SCREEN       RADIOLOGY:  CT Chest Pulmonary Embolism w Contrast   Final Result   IMPRESSION:   1.  Findings suspicious for left upper lobe pneumonia   2.  Negative for PE      CT Abdomen Pelvis w Contrast    (Results Pending)       EKG:   Performed at: 18:10  Impression: Sinus tachycardia. Possible left atrial enlargement. Left axis deviation. Inferior infarct, age undetermined. Anterolateral infarct, age undetermined.   Rate: 115 bpm  Rhythm: Sinus  QRS Interval: 88 ms  QTc Interval: 439  ms  Comparison: None    I have independently reviewed and interpreted the EKG(s) documented above.     I, Jimbo Jacob, am serving as a scribe to document services personally performed by Dr. Yael Victor based on my observation and the provider's statements to me. I, Yael Victor MD attest that Jimbo Jacob is acting in a scribe capacity, has observed my performance of the services and has documented them in accordance with my direction.    Yael Victor M.D.  Emergency Medicine  [facility] EMERGENCY DEPARTMENT  [address] [place] [id number]  [phone]  Dept: [phone]     Yael Victor MD  10/02/23 1958

## 2023-10-03 ENCOUNTER — APPOINTMENT (OUTPATIENT)
Dept: RADIOLOGY | Facility: HOSPITAL | Age: 63
DRG: 853 | End: 2023-10-03
Attending: INTERNAL MEDICINE
Payer: COMMERCIAL

## 2023-10-03 LAB
ALBUMIN SERPL BCG-MCNC: 1.7 G/DL (ref 3.5–5.2)
ALP SERPL-CCNC: 104 U/L (ref 35–104)
ALT SERPL W P-5'-P-CCNC: 25 U/L (ref 0–50)
ANION GAP SERPL CALCULATED.3IONS-SCNC: 12 MMOL/L (ref 7–15)
ANION GAP SERPL CALCULATED.3IONS-SCNC: 15 MMOL/L (ref 7–15)
AST SERPL W P-5'-P-CCNC: 24 U/L (ref 0–45)
BASE EXCESS BLDA CALC-SCNC: -4.1 MMOL/L
BASE EXCESS BLDA CALC-SCNC: -5.3 MMOL/L
BASE EXCESS BLDA CALC-SCNC: -6 MMOL/L
BILIRUB SERPL-MCNC: 0.5 MG/DL
BUN SERPL-MCNC: 37 MG/DL (ref 8–23)
BUN SERPL-MCNC: 38.1 MG/DL (ref 8–23)
CALCIUM SERPL-MCNC: 7.7 MG/DL (ref 8.8–10.2)
CALCIUM SERPL-MCNC: 7.9 MG/DL (ref 8.8–10.2)
CHLORIDE SERPL-SCNC: 102 MMOL/L (ref 98–107)
CHLORIDE SERPL-SCNC: 104 MMOL/L (ref 98–107)
COHGB MFR BLD: 100 % (ref 96–97)
COHGB MFR BLD: 97.1 % (ref 96–97)
COHGB MFR BLD: 97.4 % (ref 96–97)
CREAT SERPL-MCNC: 0.56 MG/DL (ref 0.51–0.95)
CREAT SERPL-MCNC: 0.57 MG/DL (ref 0.51–0.95)
DEPRECATED HCO3 PLAS-SCNC: 17 MMOL/L (ref 22–29)
DEPRECATED HCO3 PLAS-SCNC: 19 MMOL/L (ref 22–29)
EGFRCR SERPLBLD CKD-EPI 2021: >90 ML/MIN/1.73M2
EGFRCR SERPLBLD CKD-EPI 2021: >90 ML/MIN/1.73M2
ERYTHROCYTE [DISTWIDTH] IN BLOOD BY AUTOMATED COUNT: 13.8 % (ref 10–15)
ERYTHROCYTE [DISTWIDTH] IN BLOOD BY AUTOMATED COUNT: 14.1 % (ref 10–15)
GLUCOSE BLDC GLUCOMTR-MCNC: 185 MG/DL (ref 70–99)
GLUCOSE BLDC GLUCOMTR-MCNC: 191 MG/DL (ref 70–99)
GLUCOSE BLDC GLUCOMTR-MCNC: 192 MG/DL (ref 70–99)
GLUCOSE BLDC GLUCOMTR-MCNC: 208 MG/DL (ref 70–99)
GLUCOSE BLDC GLUCOMTR-MCNC: 210 MG/DL (ref 70–99)
GLUCOSE BLDC GLUCOMTR-MCNC: 215 MG/DL (ref 70–99)
GLUCOSE SERPL-MCNC: 194 MG/DL (ref 70–99)
GLUCOSE SERPL-MCNC: 203 MG/DL (ref 70–99)
GRAM STAIN RESULT: ABNORMAL
HCO3 BLD-SCNC: 20 MMOL/L (ref 23–29)
HCO3 BLD-SCNC: 21 MMOL/L (ref 23–29)
HCO3 BLD-SCNC: 21 MMOL/L (ref 23–29)
HCT VFR BLD AUTO: 30.2 % (ref 35–47)
HCT VFR BLD AUTO: 32.8 % (ref 35–47)
HGB BLD-MCNC: 10.3 G/DL (ref 11.7–15.7)
HGB BLD-MCNC: 11.1 G/DL (ref 11.7–15.7)
LACTATE SERPL-SCNC: 2.3 MMOL/L (ref 0.7–2)
MAGNESIUM SERPL-MCNC: 1.5 MG/DL (ref 1.7–2.3)
MAGNESIUM SERPL-MCNC: 2.8 MG/DL (ref 1.7–2.3)
MCH RBC QN AUTO: 31.9 PG (ref 26.5–33)
MCH RBC QN AUTO: 32.1 PG (ref 26.5–33)
MCHC RBC AUTO-ENTMCNC: 33.8 G/DL (ref 31.5–36.5)
MCHC RBC AUTO-ENTMCNC: 34.1 G/DL (ref 31.5–36.5)
MCV RBC AUTO: 94 FL (ref 78–100)
MCV RBC AUTO: 94 FL (ref 78–100)
OXYHGB MFR BLD: 96.6 % (ref 96–97)
OXYHGB MFR BLD: 96.9 % (ref 96–97)
OXYHGB MFR BLD: >98.5 % (ref 96–97)
PCO2 BLD: 32 MM HG (ref 35–45)
PCO2 BLD: 33 MM HG (ref 35–45)
PCO2 BLD: 42 MM HG (ref 35–45)
PH BLD: 7.3 [PH] (ref 7.37–7.44)
PH BLD: 7.39 [PH] (ref 7.37–7.44)
PH BLD: 7.4 [PH] (ref 7.37–7.44)
PLATELET # BLD AUTO: 164 10E3/UL (ref 150–450)
PLATELET # BLD AUTO: 191 10E3/UL (ref 150–450)
PO2 BLD: 106 MM HG (ref 80–90)
PO2 BLD: 226 MM HG (ref 80–90)
PO2 BLD: 95 MM HG (ref 80–90)
POTASSIUM SERPL-SCNC: 3.5 MMOL/L (ref 3.4–5.3)
POTASSIUM SERPL-SCNC: 4.1 MMOL/L (ref 3.4–5.3)
PROT SERPL-MCNC: 4.8 G/DL (ref 6.4–8.3)
RBC # BLD AUTO: 3.21 10E6/UL (ref 3.8–5.2)
RBC # BLD AUTO: 3.48 10E6/UL (ref 3.8–5.2)
SODIUM SERPL-SCNC: 134 MMOL/L (ref 135–145)
SODIUM SERPL-SCNC: 135 MMOL/L (ref 135–145)
T4 FREE SERPL-MCNC: 0.78 NG/DL (ref 0.9–1.7)
TEMPERATURE: 37 DEGREES C
TSH SERPL DL<=0.005 MIU/L-ACNC: 3.68 UIU/ML (ref 0.3–4.2)
WBC # BLD AUTO: 15.4 10E3/UL (ref 4–11)
WBC # BLD AUTO: 19.3 10E3/UL (ref 4–11)

## 2023-10-03 PROCEDURE — 999N000009 HC STATISTIC AIRWAY CARE

## 2023-10-03 PROCEDURE — 250N000011 HC RX IP 250 OP 636: Mod: JZ | Performed by: INTERNAL MEDICINE

## 2023-10-03 PROCEDURE — 999N000157 HC STATISTIC RCP TIME EA 10 MIN

## 2023-10-03 PROCEDURE — 87077 CULTURE AEROBIC IDENTIFY: CPT | Performed by: SURGERY

## 2023-10-03 PROCEDURE — 82805 BLOOD GASES W/O2 SATURATION: CPT | Performed by: SURGERY

## 2023-10-03 PROCEDURE — 250N000012 HC RX MED GY IP 250 OP 636 PS 637: Performed by: INTERNAL MEDICINE

## 2023-10-03 PROCEDURE — 250N000011 HC RX IP 250 OP 636: Mod: JZ | Performed by: SURGERY

## 2023-10-03 PROCEDURE — 250N000009 HC RX 250: Performed by: SURGERY

## 2023-10-03 PROCEDURE — 83735 ASSAY OF MAGNESIUM: CPT | Performed by: INTERNAL MEDICINE

## 2023-10-03 PROCEDURE — 71045 X-RAY EXAM CHEST 1 VIEW: CPT

## 2023-10-03 PROCEDURE — 999N000287 HC ICU ADULT ROUNDING, EACH 10 MINS

## 2023-10-03 PROCEDURE — 85027 COMPLETE CBC AUTOMATED: CPT | Performed by: ANESTHESIOLOGY

## 2023-10-03 PROCEDURE — 999N000055 HC STATISTIC END TITIAL CO2 MONITORING

## 2023-10-03 PROCEDURE — C9113 INJ PANTOPRAZOLE SODIUM, VIA: HCPCS | Mod: JZ | Performed by: SURGERY

## 2023-10-03 PROCEDURE — 250N000011 HC RX IP 250 OP 636: Mod: JZ | Performed by: NURSE PRACTITIONER

## 2023-10-03 PROCEDURE — 250N000009 HC RX 250: Performed by: INTERNAL MEDICINE

## 2023-10-03 PROCEDURE — 82805 BLOOD GASES W/O2 SATURATION: CPT | Performed by: INTERNAL MEDICINE

## 2023-10-03 PROCEDURE — 85027 COMPLETE CBC AUTOMATED: CPT | Performed by: SURGERY

## 2023-10-03 PROCEDURE — 250N000011 HC RX IP 250 OP 636: Performed by: NURSE PRACTITIONER

## 2023-10-03 PROCEDURE — 272N000452 HC KIT SHRLOCK 5FR POWER PICC TRIPLE LUMEN

## 2023-10-03 PROCEDURE — 83605 ASSAY OF LACTIC ACID: CPT | Performed by: ANESTHESIOLOGY

## 2023-10-03 PROCEDURE — 36569 INSJ PICC 5 YR+ W/O IMAGING: CPT

## 2023-10-03 PROCEDURE — 99291 CRITICAL CARE FIRST HOUR: CPT | Performed by: INTERNAL MEDICINE

## 2023-10-03 PROCEDURE — 99207 PR NO BILLABLE SERVICE THIS VISIT: CPT | Performed by: INTERNAL MEDICINE

## 2023-10-03 PROCEDURE — 99207 PR NO BILLABLE SERVICE THIS VISIT: CPT | Performed by: STUDENT IN AN ORGANIZED HEALTH CARE EDUCATION/TRAINING PROGRAM

## 2023-10-03 PROCEDURE — 258N000003 HC RX IP 258 OP 636: Performed by: SURGERY

## 2023-10-03 PROCEDURE — 82805 BLOOD GASES W/O2 SATURATION: CPT | Performed by: NURSE PRACTITIONER

## 2023-10-03 PROCEDURE — 87205 SMEAR GRAM STAIN: CPT | Performed by: SURGERY

## 2023-10-03 PROCEDURE — 94003 VENT MGMT INPAT SUBQ DAY: CPT

## 2023-10-03 PROCEDURE — 258N000003 HC RX IP 258 OP 636: Performed by: INTERNAL MEDICINE

## 2023-10-03 PROCEDURE — 258N000003 HC RX IP 258 OP 636: Performed by: NURSE PRACTITIONER

## 2023-10-03 PROCEDURE — 250N000013 HC RX MED GY IP 250 OP 250 PS 637: Performed by: NURSE PRACTITIONER

## 2023-10-03 PROCEDURE — 99223 1ST HOSP IP/OBS HIGH 75: CPT | Performed by: INTERNAL MEDICINE

## 2023-10-03 PROCEDURE — 80053 COMPREHEN METABOLIC PANEL: CPT | Performed by: ANESTHESIOLOGY

## 2023-10-03 PROCEDURE — 200N000001 HC R&B ICU

## 2023-10-03 PROCEDURE — 250N000013 HC RX MED GY IP 250 OP 250 PS 637: Performed by: SURGERY

## 2023-10-03 RX ORDER — IPRATROPIUM BROMIDE AND ALBUTEROL SULFATE 2.5; .5 MG/3ML; MG/3ML
3 SOLUTION RESPIRATORY (INHALATION) EVERY 4 HOURS PRN
Status: DISCONTINUED | OUTPATIENT
Start: 2023-10-03 | End: 2023-10-27 | Stop reason: HOSPADM

## 2023-10-03 RX ORDER — FAMOTIDINE 20 MG/1
20 TABLET, FILM COATED ORAL 2 TIMES DAILY
Status: DISCONTINUED | OUTPATIENT
Start: 2023-10-03 | End: 2023-10-03

## 2023-10-03 RX ORDER — CEFAZOLIN SODIUM/WATER 2 G/20 ML
2 SYRINGE (ML) INTRAVENOUS
Status: DISCONTINUED | OUTPATIENT
Start: 2023-10-04 | End: 2023-10-04 | Stop reason: HOSPADM

## 2023-10-03 RX ORDER — PROCHLORPERAZINE MALEATE 10 MG
10 TABLET ORAL EVERY 6 HOURS PRN
Status: DISCONTINUED | OUTPATIENT
Start: 2023-10-03 | End: 2023-10-27 | Stop reason: HOSPADM

## 2023-10-03 RX ORDER — ACETAMINOPHEN 325 MG/1
975 TABLET ORAL EVERY 8 HOURS
Status: DISCONTINUED | OUTPATIENT
Start: 2023-10-03 | End: 2023-10-05

## 2023-10-03 RX ORDER — LORAZEPAM 2 MG/ML
1 INJECTION INTRAMUSCULAR EVERY 4 HOURS
Status: DISCONTINUED | OUTPATIENT
Start: 2023-10-03 | End: 2023-10-04

## 2023-10-03 RX ORDER — BISACODYL 10 MG
10 SUPPOSITORY, RECTAL RECTAL DAILY PRN
Status: DISCONTINUED | OUTPATIENT
Start: 2023-10-03 | End: 2023-10-27 | Stop reason: HOSPADM

## 2023-10-03 RX ORDER — ACETAMINOPHEN 325 MG/1
650 TABLET ORAL EVERY 4 HOURS PRN
Status: DISCONTINUED | OUTPATIENT
Start: 2023-10-06 | End: 2023-10-27 | Stop reason: HOSPADM

## 2023-10-03 RX ORDER — POLYETHYLENE GLYCOL 3350 17 G/17G
17 POWDER, FOR SOLUTION ORAL DAILY
Status: DISCONTINUED | OUTPATIENT
Start: 2023-10-04 | End: 2023-10-05

## 2023-10-03 RX ORDER — ONDANSETRON 2 MG/ML
4 INJECTION INTRAMUSCULAR; INTRAVENOUS EVERY 6 HOURS PRN
Status: DISCONTINUED | OUTPATIENT
Start: 2023-10-03 | End: 2023-10-27 | Stop reason: HOSPADM

## 2023-10-03 RX ORDER — IBUPROFEN 100 MG/5ML
600 SUSPENSION, ORAL (FINAL DOSE FORM) ORAL ONCE
Status: COMPLETED | OUTPATIENT
Start: 2023-10-03 | End: 2023-10-03

## 2023-10-03 RX ORDER — OXYCODONE HYDROCHLORIDE 5 MG/1
10 TABLET ORAL EVERY 4 HOURS PRN
Status: DISCONTINUED | OUTPATIENT
Start: 2023-10-03 | End: 2023-10-27 | Stop reason: HOSPADM

## 2023-10-03 RX ORDER — ENOXAPARIN SODIUM 100 MG/ML
40 INJECTION SUBCUTANEOUS EVERY 24 HOURS
Status: DISCONTINUED | OUTPATIENT
Start: 2023-10-04 | End: 2023-10-27 | Stop reason: HOSPADM

## 2023-10-03 RX ORDER — CEFAZOLIN SODIUM 1 G/50ML
1250 SOLUTION INTRAVENOUS EVERY 12 HOURS
Status: DISCONTINUED | OUTPATIENT
Start: 2023-10-03 | End: 2023-10-04

## 2023-10-03 RX ORDER — SODIUM CHLORIDE, SODIUM LACTATE, POTASSIUM CHLORIDE, CALCIUM CHLORIDE 600; 310; 30; 20 MG/100ML; MG/100ML; MG/100ML; MG/100ML
INJECTION, SOLUTION INTRAVENOUS CONTINUOUS
Status: DISCONTINUED | OUTPATIENT
Start: 2023-10-03 | End: 2023-10-11

## 2023-10-03 RX ORDER — HYDROMORPHONE HYDROCHLORIDE 1 MG/ML
0.5 INJECTION, SOLUTION INTRAMUSCULAR; INTRAVENOUS; SUBCUTANEOUS
Status: DISCONTINUED | OUTPATIENT
Start: 2023-10-03 | End: 2023-10-15

## 2023-10-03 RX ORDER — ONDANSETRON 4 MG/1
4 TABLET, ORALLY DISINTEGRATING ORAL EVERY 6 HOURS PRN
Status: DISCONTINUED | OUTPATIENT
Start: 2023-10-03 | End: 2023-10-27 | Stop reason: HOSPADM

## 2023-10-03 RX ORDER — HYDROXYZINE HYDROCHLORIDE 25 MG/1
25 TABLET, FILM COATED ORAL EVERY 6 HOURS PRN
Status: DISCONTINUED | OUTPATIENT
Start: 2023-10-03 | End: 2023-10-27 | Stop reason: HOSPADM

## 2023-10-03 RX ORDER — POTASSIUM CHLORIDE 29.8 MG/ML
20 INJECTION INTRAVENOUS ONCE
Status: COMPLETED | OUTPATIENT
Start: 2023-10-03 | End: 2023-10-03

## 2023-10-03 RX ORDER — HYDROMORPHONE HCL IN WATER/PF 6 MG/30 ML
0.2 PATIENT CONTROLLED ANALGESIA SYRINGE INTRAVENOUS
Status: DISCONTINUED | OUTPATIENT
Start: 2023-10-03 | End: 2023-10-15

## 2023-10-03 RX ORDER — CEFAZOLIN SODIUM/WATER 2 G/20 ML
2 SYRINGE (ML) INTRAVENOUS SEE ADMIN INSTRUCTIONS
Status: DISCONTINUED | OUTPATIENT
Start: 2023-10-04 | End: 2023-10-04 | Stop reason: HOSPADM

## 2023-10-03 RX ORDER — OXYCODONE HYDROCHLORIDE 5 MG/1
5 TABLET ORAL EVERY 4 HOURS PRN
Status: DISCONTINUED | OUTPATIENT
Start: 2023-10-03 | End: 2023-10-27 | Stop reason: HOSPADM

## 2023-10-03 RX ORDER — MAGNESIUM SULFATE 4 G/50ML
4 INJECTION INTRAVENOUS ONCE
Status: COMPLETED | OUTPATIENT
Start: 2023-10-03 | End: 2023-10-03

## 2023-10-03 RX ORDER — LIDOCAINE 40 MG/G
CREAM TOPICAL
Status: DISCONTINUED | OUTPATIENT
Start: 2023-10-03 | End: 2023-10-27 | Stop reason: HOSPADM

## 2023-10-03 RX ORDER — AMOXICILLIN 250 MG
1 CAPSULE ORAL 2 TIMES DAILY
Status: DISCONTINUED | OUTPATIENT
Start: 2023-10-03 | End: 2023-10-05

## 2023-10-03 RX ADMIN — MAGNESIUM SULFATE HEPTAHYDRATE 4 G: 80 INJECTION, SOLUTION INTRAVENOUS at 03:11

## 2023-10-03 RX ADMIN — PANTOPRAZOLE SODIUM 40 MG: 40 INJECTION, POWDER, FOR SOLUTION INTRAVENOUS at 05:06

## 2023-10-03 RX ADMIN — ACETAMINOPHEN 975 MG: 325 TABLET ORAL at 10:02

## 2023-10-03 RX ADMIN — ACETAMINOPHEN 975 MG: 325 TABLET ORAL at 02:17

## 2023-10-03 RX ADMIN — CLINDAMYCIN PHOSPHATE 900 MG: 900 INJECTION, SOLUTION INTRAVENOUS at 18:18

## 2023-10-03 RX ADMIN — INSULIN ASPART 2 UNITS: 100 INJECTION, SOLUTION INTRAVENOUS; SUBCUTANEOUS at 08:18

## 2023-10-03 RX ADMIN — SENNOSIDES AND DOCUSATE SODIUM 1 TABLET: 50; 8.6 TABLET ORAL at 08:16

## 2023-10-03 RX ADMIN — PIPERACILLIN AND TAZOBACTAM 3.38 G: 3; .375 INJECTION, POWDER, LYOPHILIZED, FOR SOLUTION INTRAVENOUS at 01:18

## 2023-10-03 RX ADMIN — LIDOCAINE HYDROCHLORIDE 2 ML: 10 INJECTION, SOLUTION EPIDURAL; INFILTRATION; INTRACAUDAL; PERINEURAL at 00:28

## 2023-10-03 RX ADMIN — Medication 0.06 MCG/KG/MIN: at 15:11

## 2023-10-03 RX ADMIN — INSULIN ASPART 2 UNITS: 100 INJECTION, SOLUTION INTRAVENOUS; SUBCUTANEOUS at 04:31

## 2023-10-03 RX ADMIN — INSULIN ASPART 2 UNITS: 100 INJECTION, SOLUTION INTRAVENOUS; SUBCUTANEOUS at 20:49

## 2023-10-03 RX ADMIN — IBUPROFEN 600 MG: 100 SUSPENSION ORAL at 15:04

## 2023-10-03 RX ADMIN — Medication 0.12 MCG/KG/MIN: at 04:50

## 2023-10-03 RX ADMIN — Medication 25 MCG: at 02:45

## 2023-10-03 RX ADMIN — VANCOMYCIN HYDROCHLORIDE 1250 MG: 500 INJECTION, POWDER, LYOPHILIZED, FOR SOLUTION INTRAVENOUS at 06:11

## 2023-10-03 RX ADMIN — CLINDAMYCIN PHOSPHATE 900 MG: 900 INJECTION, SOLUTION INTRAVENOUS at 11:18

## 2023-10-03 RX ADMIN — PIPERACILLIN AND TAZOBACTAM 3.38 G: 3; .375 INJECTION, POWDER, LYOPHILIZED, FOR SOLUTION INTRAVENOUS at 16:15

## 2023-10-03 RX ADMIN — DEXMEDETOMIDINE HYDROCHLORIDE 1 MCG/KG/HR: 400 INJECTION INTRAVENOUS at 14:15

## 2023-10-03 RX ADMIN — DEXMEDETOMIDINE HYDROCHLORIDE 1 MCG/KG/HR: 400 INJECTION INTRAVENOUS at 18:18

## 2023-10-03 RX ADMIN — DEXMEDETOMIDINE HYDROCHLORIDE 0.8 MCG/KG/HR: 400 INJECTION INTRAVENOUS at 02:26

## 2023-10-03 RX ADMIN — SODIUM CHLORIDE, POTASSIUM CHLORIDE, SODIUM LACTATE AND CALCIUM CHLORIDE: 600; 310; 30; 20 INJECTION, SOLUTION INTRAVENOUS at 21:00

## 2023-10-03 RX ADMIN — DEXMEDETOMIDINE HYDROCHLORIDE 1 MCG/KG/HR: 400 INJECTION INTRAVENOUS at 10:01

## 2023-10-03 RX ADMIN — Medication 25 MCG: at 12:15

## 2023-10-03 RX ADMIN — SODIUM CHLORIDE, POTASSIUM CHLORIDE, SODIUM LACTATE AND CALCIUM CHLORIDE: 600; 310; 30; 20 INJECTION, SOLUTION INTRAVENOUS at 06:57

## 2023-10-03 RX ADMIN — FAMOTIDINE 20 MG: 10 INJECTION, SOLUTION INTRAVENOUS at 08:16

## 2023-10-03 RX ADMIN — LORAZEPAM 1 MG: 2 INJECTION INTRAMUSCULAR; INTRAVENOUS at 18:14

## 2023-10-03 RX ADMIN — ACETAMINOPHEN 975 MG: 325 TABLET ORAL at 18:14

## 2023-10-03 RX ADMIN — INSULIN ASPART 2 UNITS: 100 INJECTION, SOLUTION INTRAVENOUS; SUBCUTANEOUS at 00:15

## 2023-10-03 RX ADMIN — PIPERACILLIN AND TAZOBACTAM 3.38 G: 3; .375 INJECTION, POWDER, LYOPHILIZED, FOR SOLUTION INTRAVENOUS at 08:16

## 2023-10-03 RX ADMIN — VANCOMYCIN HYDROCHLORIDE 1250 MG: 500 INJECTION, POWDER, LYOPHILIZED, FOR SOLUTION INTRAVENOUS at 18:58

## 2023-10-03 RX ADMIN — LORAZEPAM 1 MG: 2 INJECTION INTRAMUSCULAR; INTRAVENOUS at 20:47

## 2023-10-03 RX ADMIN — DEXMEDETOMIDINE HYDROCHLORIDE 1.2 MCG/KG/HR: 400 INJECTION INTRAVENOUS at 05:56

## 2023-10-03 RX ADMIN — VASOPRESSIN 2.4 UNITS/HR: 20 INJECTION, SOLUTION INTRAMUSCULAR; SUBCUTANEOUS at 04:52

## 2023-10-03 RX ADMIN — CLINDAMYCIN PHOSPHATE 900 MG: 900 INJECTION, SOLUTION INTRAVENOUS at 02:31

## 2023-10-03 RX ADMIN — INSULIN ASPART 2 UNITS: 100 INJECTION, SOLUTION INTRAVENOUS; SUBCUTANEOUS at 16:12

## 2023-10-03 RX ADMIN — INSULIN ASPART 1 UNITS: 100 INJECTION, SOLUTION INTRAVENOUS; SUBCUTANEOUS at 12:25

## 2023-10-03 RX ADMIN — POTASSIUM CHLORIDE 20 MEQ: 29.8 INJECTION, SOLUTION INTRAVENOUS at 02:16

## 2023-10-03 RX ADMIN — LEVOTHYROXINE SODIUM 112 MCG: 0.11 TABLET ORAL at 05:07

## 2023-10-03 RX ADMIN — SENNOSIDES AND DOCUSATE SODIUM 1 TABLET: 50; 8.6 TABLET ORAL at 20:47

## 2023-10-03 RX ADMIN — VASOPRESSIN 2.4 UNITS/HR: 20 INJECTION, SOLUTION INTRAMUSCULAR; SUBCUTANEOUS at 14:08

## 2023-10-03 ASSESSMENT — ACTIVITIES OF DAILY LIVING (ADL)
ADLS_ACUITY_SCORE: 45
ADLS_ACUITY_SCORE: 35
ADLS_ACUITY_SCORE: 45
DEPENDENT_IADLS:: CLEANING;COOKING;LAUNDRY;SHOPPING;MEAL PREPARATION;MONEY MANAGEMENT;TRANSPORTATION
ADLS_ACUITY_SCORE: 45
ADLS_ACUITY_SCORE: 35
ADLS_ACUITY_SCORE: 45

## 2023-10-03 NOTE — CONSULTS
General surgery consult         ASSESSMENT     1. Necrotizing soft tissue infection    2. Severe sepsis (H)       This is a 63-year-old patient with a necrotizing fasciitis affecting the left aspect of the perineum the left buttock and progressing down the posterior aspect of the thigh.      PLAN      Emergent surgery for removal of dead tissue and tissue debridement to get out ahead of the necrotizing fasciitis infection         CHIEF COMPLAINT     Chief Complaint   Patient presents with    Groin Pain    Shortness of Breath    Generalized Weakness         HPI     Lidia Nam is a 63 year old diabetic obese female who presents intense pain necrotic tissue tachycardia and a lactic acidosis in association with an infection of the left side of her perineum out into her left buttock and down her left posterior thigh.  The patient is not able to give a good history at this time but her family tells me that she was complaining of a boil in her left groin from over a week ago.  That started to drain which was bothering her yesterday she started to have more notable troubles with this infection then today she was brought in by her family with the onset of confusion and outward evidence that she is quite ill.  She was evaluated with a CT scan and she is noted to have air tracking along the mons pubis the right labia majora out into the left buttock area and down the left posterior thigh.         PAST MEDICAL HISTORY SURGICAL HISTORY     History reviewed. No pertinent past medical history. Past Surgical History:   Procedure Laterality Date    [facility] APPENDECTOMY      Description: Appendectomy;  Recorded: 11/21/2008;    [facility] LIGATE FALLOPIAN TUBE      Description: Tubal Ligation;  Recorded: 11/21/2008;          CURRENT MEDICATIONS ALLERGIES     No current outpatient medications on file.    Allergies   Allergen Reactions    Erythromycin     Psyllium     Seasonal Allergies      Hay fever - tree pollens, dust, mold, mildew     "      FAMILY HISTORY   No family history on file.      SOCIAL HISTORY     Social History     Socioeconomic History    Marital status:      Spouse name: None    Number of children: None    Years of education: None    Highest education level: None   Tobacco Use    Smoking status: Every Day     Packs/day: 1.00     Years: 39.00     Pack years: 39.00     Types: Cigarettes    Smokeless tobacco: Never   Substance and Sexual Activity    Alcohol use: Yes         REVIEW OF SYSTEMS       12 point review of systems are unremarkable except for the symptoms described in the HPI    PHYSICAL EXAM     VITAL SIGNS: /81   Pulse 115   Temp 98.7  F (37.1  C) (Temporal)   Resp 22   Ht 1.575 m (5' 2\")   Wt 91.2 kg (201 lb)   LMP  (LMP Unknown)   SpO2 96%   BMI 36.76 kg/m     General : She is confused, she does respond to her name but cannot put together a coherent story.  She is tachycardic and looks ill.  Skin: She has a blackened area over the left labia majora.  She has erythematous induration along the medial and posterior aspect of the left thigh  Head: Normocephalic, without obvious abnormality, atraumatic   HEENT: She has an exceptionally large goiter all across the front of her neck. conjunctiva/corneas clear, EOM's intact, no scleral icterus   Neck: Huge goiter  Lungs: Rasping sound particular with inspiration, rhonchorous breath sounds  Chest Wall: No tenderness or deformity   Heart: Tachycardic, no murmur, rub or gallop   Abdomen: Soft, non-tender, no guarding, + bowel sounds active,   Extremities : Tender erythema and induration across the left medial and posterior upper thigh extending into the buttocks and all through the perineum.  Neurologic:  moves all extremities, no focal findings          RADIOLOGY      CT Chest Pulmonary Embolism w Contrast   Final Result   IMPRESSION:   1.  Findings suspicious for left upper lobe pneumonia   2.  Negative for PE      CT Abdomen Pelvis w Contrast    (Results " Pending)       EKG     Reviewed        LABS     Results for orders placed or performed during the hospital encounter of 10/02/23   CT Chest Pulmonary Embolism w Contrast    Impression    IMPRESSION:  1.  Findings suspicious for left upper lobe pneumonia  2.  Negative for PE   Comprehensive metabolic panel   Result Value Ref Range    Sodium 128 (L) 135 - 145 mmol/L    Potassium 3.6 3.4 - 5.3 mmol/L    Carbon Dioxide (CO2) 18 (L) 22 - 29 mmol/L    Anion Gap 18 (H) 7 - 15 mmol/L    Urea Nitrogen 48.9 (H) 8.0 - 23.0 mg/dL    Creatinine 0.84 0.51 - 0.95 mg/dL    GFR Estimate 78 >60 mL/min/1.73m2    Calcium 9.5 8.8 - 10.2 mg/dL    Chloride 92 (L) 98 - 107 mmol/L    Glucose 199 (H) 70 - 99 mg/dL    Alkaline Phosphatase 171 (H) 35 - 104 U/L    AST 25 0 - 45 U/L    ALT 42 0 - 50 U/L    Protein Total 6.5 6.4 - 8.3 g/dL    Albumin 2.2 (L) 3.5 - 5.2 g/dL    Bilirubin Total 0.7 <=1.2 mg/dL   Lactic acid whole blood   Result Value Ref Range    Lactic Acid 4.4 (HH) 0.7 - 2.0 mmol/L   Troponin T, High Sensitivity (now)   Result Value Ref Range    Troponin T, High Sensitivity 21 (H) <=14 ng/L   Nt probnp inpatient   Result Value Ref Range    N terminal Pro BNP Inpatient 2,243 (H) 0 - 900 pg/mL   Symptomatic Influenza A/B, RSV, & SARS-CoV2 PCR (COVID-19) Nasopharyngeal    Specimen: Nasopharyngeal; Swab   Result Value Ref Range    Influenza A PCR Negative Negative    Influenza B PCR Negative Negative    RSV PCR Negative Negative    SARS CoV2 PCR Negative Negative   CBC with platelets and differential   Result Value Ref Range    WBC Count 18.0 (H) 4.0 - 11.0 10e3/uL    RBC Count 5.01 3.80 - 5.20 10e6/uL    Hemoglobin 16.0 (H) 11.7 - 15.7 g/dL    Hematocrit 46.0 35.0 - 47.0 %    MCV 92 78 - 100 fL    MCH 31.9 26.5 - 33.0 pg    MCHC 34.8 31.5 - 36.5 g/dL    RDW 13.3 10.0 - 15.0 %    Platelet Count 234 150 - 450 10e3/uL   Extra Blue Top Tube   Result Value Ref Range    Hold Specimen [facility]    Extra Purple Top Tube   Result Value Ref Range     Hold Specimen [facility]    Creatinine POCT   Result Value Ref Range    Creatinine POCT 0.9 0.6 - 1.1 mg/dL    GFR, ESTIMATED POCT >60 >60 mL/min/1.73m2   Manual Differential   Result Value Ref Range    % Neutrophils 90 %    % Lymphocytes 5 %    % Monocytes 5 %    % Eosinophils 0 %    % Basophils 0 %    Absolute Neutrophils 16.2 (H) 1.6 - 8.3 10e3/uL    Absolute Lymphocytes 0.9 0.8 - 5.3 10e3/uL    Absolute Monocytes 0.9 0.0 - 1.3 10e3/uL    Absolute Eosinophils 0.0 0.0 - 0.7 10e3/uL    Absolute Basophils 0.0 0.0 - 0.2 10e3/uL    RBC Morphology Confirmed RBC Indices     Platelet Assessment  Automated Count Confirmed. Platelet morphology is normal.     Automated Count Confirmed. Platelet morphology is normal.   Result Value Ref Range    INR 1.26 (H) 0.85 - 1.15   Partial thromboplastin time   Result Value Ref Range    aPTT 28 22 - 38 Seconds   Fibrinogen activity   Result Value Ref Range    Fibrinogen Activity 1,090 (H) 170 - 490 mg/dL   Glucose by meter   Result Value Ref Range    GLUCOSE BY METER POCT 195 (H) 70 - 99 mg/dL   Adult Type and Screen   Result Value Ref Range    ABO/RH(D) B POS     Antibody Screen Negative Negative    SPECIMEN EXPIRATION DATE [id number]            [facility]  [phone]

## 2023-10-03 NOTE — PROGRESS NOTES
ICU FOLLOW UP:    63-year-old female with a history of hypothyroidism, diabetes mellitus and obesity who presented with groin pain to the emergency room.  Patient has been complaining of shortness of breath and a wound in her left inguinal area that has been draining pus and is severely malodorous.  She was immediately given antibiotics and had a CT done that showed necrotizing fasciitis.     She was emergently taken to the operating room where she underwent debridement.  Arrives to the ICU intubated on pressors     She was intubated in the OR by ENT due to significant goiter.      Seen by my colleague, Dr. Nguyen early am.  Continue current plan of care:    Seen by ID, appreciate, they recommend continuing the current antibiotic regime of Vanco, Zosyn, and Clindamycin, and to follow surgical cultures and blood cultures.    2.  Continue sedation with precedex and fentanyl.  HR in the 50's.  Ok to continue as long as pressor needs to not increase, or HR dose not go below 40 bpm.    3.  Likely will go back to OR today?  Deferring starting any nutrition/tube feeds for this reason.    PEDRITO Johns, CNP  Pulmonary Critical Care  Securely Message Through Vocera Web Console

## 2023-10-03 NOTE — PHARMACY-ADMISSION MEDICATION HISTORY
Pharmacist Admission Medication History    Admission medication history is complete. The information provided in this note is only as accurate as the sources available at the time of the update.    Medication reconciliation/reorder completed by provider prior to medication history? No    Information Source(s): Patient, Family member, and CareEverywhere/SureScripts via in-person    Pertinent Information:   -meds based mostly on fill history, pt had difficulty answering questions and family was unsure of meds. Unable to verify if pt is taking any otc meds but family said they did not think so    Changes made to PTA medication list:  Added: None  Deleted: None  Changed: None    Medication Affordability:  Not including over the counter (OTC) medications, was there a time in the past 3 months when you did not take your medications as prescribed because of cost?: Unable to Assess    Allergies reviewed with patient and updates made in EHR: unable to assess    Medication History Completed By: Katiana Ureña [facility] 10/2/2023 7:18 PM    Prior to Admission medications    Medication Sig Last Dose Taking? Auth Provider Long Term End Date   furosemide (LASIX) 20 MG tablet Take 1 tablet (20 mg) by mouth 2 times daily Unknown at ? Yes Ana Goetz MD Yes    levothyroxine (SYNTHROID/LEVOTHROID) 112 MCG tablet Take 1 tablet (112 mcg) by mouth daily Unknown at ? Yes Ana Goetz MD Yes    metFORMIN (GLUCOPHAGE XR) 500 MG 24 hr tablet Take 2 tablets (1,000 mg) by mouth daily (with dinner) Unknown at ? Yes Ana Goetz MD Yes

## 2023-10-03 NOTE — CONSULTS
Care Management Initial Consult    General Information  Assessment completed with: Family, , Arjun  Type of CM/SW Visit: Initial Assessment    Primary Care Provider verified and updated as needed: No   Readmission within the last 30 days: no previous admission in last 30 days      Reason for Consult: discharge planning  Advance Care Planning:            Communication Assessment  Patient's communication style: spoken language (English or Bilingual)             Cognitive  Cognitive/Neuro/Behavioral: .WDL except  Level of Consciousness: sedated  Arousal Level: unresponsive  Orientation: other (see comments) (MANAS)  Mood/Behavior: calm, cooperative     Speech: endotracheal tube    Living Environment:   People in home: spouse     Current living Arrangements: house      Able to return to prior arrangements:         Family/Social Support:  Care provided by: self, spouse/significant other  Provides care for: no one, unable/limited ability to care for self  Marital Status:              Description of Support System:           Current Resources:   Patient receiving home care services: No     Community Resources: None  Equipment currently used at home:    Supplies currently used at home: None    Employment/Financial:  Employment Status: unemployed        Financial Concerns:             Does the patient's insurance plan have a 3 day qualifying hospital stay waiver?  Yes     Which insurance plan 3 day waiver is available? Alternative insurance waiver    Will the waiver be used for post-acute placement? Undetermined at this time    Lifestyle & Psychosocial Needs:  Social Determinants of Health     Food Insecurity: Not on file   Depression: Not at risk (5/1/2023)    PHQ-2     PHQ-2 Score: 2   Housing Stability: Not on file   Tobacco Use: High Risk (10/3/2023)    Patient History     Smoking Tobacco Use: Every Day     Smokeless Tobacco Use: Never     Passive Exposure: Not on file   Financial Resource Strain: Not on file    Alcohol Use: Unknown (8/14/2020)    AUDIT-C     Frequency of Alcohol Consumption: 2-3 times a week     Average Number of Drinks: Not on file     Frequency of Binge Drinking: Not on file   Transportation Needs: Not on file   Physical Activity: Not on file   Interpersonal Safety: Not on file   Stress: Not on file   Social Connections: Not on file       Functional Status:  Prior to admission patient needed assistance:   Dependent ADLs:: Independent  Dependent IADLs:: Cleaning, Cooking, Laundry, Shopping, Meal Preparation, Money Management, Transportation       Mental Health Status:          Chemical Dependency Status:                Values/Beliefs:  Spiritual, Cultural Beliefs, Denominational Practices, Values that affect care:                 Additional Information:  Assessed with pt  as pt is intubated.  Per pt's  pt lives with him in a house. She has not been taking care of herself, not bathing, laying in bed most days.  says she drinks 6-12 beers/day and smokes, and not eating properly.  helps with all IADLs.    RNCM to follow for medical progression, recommendations, and final discharge plan.      Rona Perez RN

## 2023-10-03 NOTE — ANESTHESIA POSTPROCEDURE EVALUATION
Patient: Lidia Nam    Procedure: Procedure(s):  vulvectomy, broad debridment of left butock and left thigh       Anesthesia Type:  General    Note:  Disposition: Inpatient   Postop Pain Control: Uneventful            Sign Out: Well controlled pain (sedated on ventilator)   PONV: No   Neuro/Psych: Uneventful            Sign Out: PLANNED postop sedation (sedated)   Airway/Respiratory: Uneventful            Sign Out: AIRWAY IN SITU/Resp. Support   CV/Hemodynamics: Uneventful            Sign Out: Acceptable CV status; No obvious hypovolemia; No obvious fluid overload (on pressors for sepsis)   Other NRE:    DID A NON-ROUTINE EVENT OCCUR?            Last vitals:  Vitals:    10/03/23 0130 10/03/23 0145 10/03/23 0200   BP:      Pulse: 76 73 74   Resp: 20 20 20   Temp: 37.3  C (99.1  F) 37.4  C (99.3  F) 37.5  C (99.5  F)   SpO2: 97% 96% 96%       Electronically Signed By: Esteban Redman MD  October 3, 2023  2:28 AM

## 2023-10-03 NOTE — ANESTHESIA PROCEDURE NOTES
Arterial Line Procedure Note    Pre-Procedure   Staff -        Anesthesiologist:  Esteban Redman MD       Performed By: anesthesiologist       Location: pre-op       Pre-Anesthestic Checklist: patient identified, IV checked, risks and benefits discussed, informed consent, monitors and equipment checked, pre-op evaluation and at physician/surgeon's request  Timeout:       Correct Patient: Yes        Correct Procedure: Yes        Correct Site: Yes        Correct Position: Yes   Line Placement:   This line was placed Pre Induction starting at 10/2/2023 8:40 PM and ending at 10/2/2023 8:45 PM  Procedure   Procedure: arterial line       Laterality: right       Insertion Site: radial.  Sterile Prep        Standard elements of sterile barrier followed       Skin prep: Chloraprep  Insertion/Injection        Technique: Seldinger Technique and Carlito's test completed        Catheter Type/Size: 20 G, 12 cm  Narrative         Secured by: anchor securement device       Tegaderm and Biopatch dressing used.       Complications: None apparent,        Arterial waveform: Yes        IBP within 10% of NIBP: Yes

## 2023-10-03 NOTE — ANESTHESIA CARE TRANSFER NOTE
Patient: Lidia Nam    Procedure: Procedure(s):  vulvectomy, broad debridment of left butock and left thigh       Diagnosis: Abscess of female pelvis [N73.9]  Diagnosis Additional Information: No value filed.    Anesthesia Type:   General     Note:    Oropharynx: endotracheal tube in place  Level of consciousness: intubated/sedated/on ventilator.  Patient oxygen source: intubated/sedated/on ventilator.    Independent Airway: airway patency not satisfactory and stable (intubated/sedated/on ventilator)  Dentition: dentition unchanged  Vital Signs Stable: post-procedure vital signs reviewed and stable  Report to RN Given: handoff report given  Patient transferred to: ICU    ICU Handoff: Call for PAUSE to initiate/utilize ICU HANDOFF, Identified Patient, Identified Responsible Provider, Reviewed the Pertinent Medical History, Discussed Surgical Course, Reviewed Intra-OP Anesthesia Management and Issues during Anesthesia, Set Expectations for Post Procedure Period and Allowed Opportunity for Questions and Acknowledgement of Understanding      Vitals:  Vitals Value Taken Time   /65    Temp     Pulse 80 10/02/23 2340   Resp 18    SpO2 100 % 10/02/23 2340   Vitals shown include unvalidated device data.    Electronically Signed By: PEDRITO Mujica CRNA  October 2, 2023  11:41 PM

## 2023-10-03 NOTE — CONSULTS
Consult received, reviewed chart. Remains intubated post operatively, Intensivist team actively managing at this time.    Will add Ms Nam to hospitalist service list, will follow peripherally, awaiting extubation, anticipated later morning of 10/3.    Summary statement:    Lidia Nam 63F presents with acute groin pain; pmhx includes elevated BMI (36), DM2, hypothyroid/thyromegaly, tobacco dependence; admitted to ICU intubated, post operatively for necrotizing fasciitis 10/3.    Alex Ortiz MD

## 2023-10-03 NOTE — ANESTHESIA PREPROCEDURE EVALUATION
Anesthesia Pre-Procedure Evaluation    Patient: Lidia Nam   MRN: [id number] : 1960        Procedure : Procedure(s):  LEFT GROIN DISSECTION          History reviewed. No pertinent past medical history.   Past Surgical History:   Procedure Laterality Date    Z APPENDECTOMY      Description: Appendectomy;  Recorded: 2008;    [facility] LIGATE FALLOPIAN TUBE      Description: Tubal Ligation;  Recorded: 2008;      Allergies   Allergen Reactions    Erythromycin     Psyllium     Seasonal Allergies      Hay fever - tree pollens, dust, mold, mildew      Social History     Tobacco Use    Smoking status: Every Day     Packs/day: 1.00     Years: 39.00     Pack years: 39.00     Types: Cigarettes    Smokeless tobacco: Never   Substance Use Topics    Alcohol use: Yes      Wt Readings from Last 1 Encounters:   10/02/23 91.2 kg (201 lb)        Anesthesia Evaluation            ROS/MED HX  ENT/Pulmonary:     (+)                tobacco use, Current use,                      Neurologic:  - neg neurologic ROS     Cardiovascular:  - neg cardiovascular ROS     METS/Exercise Tolerance: >4 METS    Hematologic:  - neg hematologic  ROS     Musculoskeletal:  - neg musculoskeletal ROS     GI/Hepatic:  - neg GI/hepatic ROS     Renal/Genitourinary:  - neg Renal ROS     Endo:  - neg endo ROS   (+)  type II DM,        thyroid problem,     Obesity,       Psychiatric/Substance Use:  - neg psychiatric ROS     Infectious Disease:  - neg infectious disease ROS     Malignancy:  - neg malignancy ROS     Other:  - neg other ROS          Physical Exam    Airway      Comment: Large neck mass    Mallampati: IV       Respiratory Devices and Support         Dental  no notable dental history         Cardiovascular   cardiovascular exam normal       Rhythm and rate: regular and normal     Pulmonary   pulmonary exam normal        breath sounds clear to auscultation           OUTSIDE LABS:  CBC:   Lab Results   Component Value Date    WBC 18.0  (H) 10/02/2023    WBC 7.6 09/07/2022    HGB 16.0 (H) 10/02/2023    HGB 17.7 (H) 09/07/2022    HCT 46.0 10/02/2023    HCT 53.8 (H) 09/07/2022     10/02/2023     09/07/2022     BMP:   Lab Results   Component Value Date     (L) 10/02/2023     (L) 08/01/2023    POTASSIUM 3.6 10/02/2023    POTASSIUM 4.3 08/01/2023    CHLORIDE 92 (L) 10/02/2023    CHLORIDE 94 (L) 08/01/2023    CO2 18 (L) 10/02/2023    CO2 25 08/01/2023    BUN 48.9 (H) 10/02/2023    BUN 6.0 (L) 08/01/2023    CR 0.9 10/02/2023    CR 0.84 10/02/2023     (H) 10/02/2023     (H) 08/01/2023     COAGS: No results found for: PTT, INR, FIBR  POC: No results found for: BGM, HCG, HCGS  HEPATIC:   Lab Results   Component Value Date    ALBUMIN 2.2 (L) 10/02/2023    PROTTOTAL 6.5 10/02/2023    ALT 42 10/02/2023    AST 25 10/02/2023    ALKPHOS 171 (H) 10/02/2023    BILITOTAL 0.7 10/02/2023     OTHER:   Lab Results   Component Value Date    LACT 4.4 (HH) 10/02/2023    A1C 6.8 (H) 08/01/2023    JW 9.5 10/02/2023    TSH 1.34 08/01/2023    T4 1.46 08/01/2023       Anesthesia Plan    ASA Status:  4, emergent    NPO Status:  ELEVATED Aspiration Risk/Unknown    Anesthesia Type: General.     - Airway: ETT   Induction: Intravenous.   Maintenance: Balanced.   Techniques and Equipment:     - Lines/Monitors: 2nd IV, Arterial Line     - Drips/Meds: Vasopressin, Norepi     Consents    Anesthesia Plan(s) and associated risks, benefits, and realistic alternatives discussed. Questions answered and patient/representative(s) expressed understanding.     - Discussed:     - Discussed with:  Patient      - Extended Intubation/Ventilatory Support Discussed: No.      - Patient is DNR/DNI Status: No     Use of blood products discussed: No .     Postoperative Care    Pain management: IV analgesics, Multi-modal analgesia.   PONV prophylaxis: Ondansetron (or other 5HT-3), Dexamethasone or Solumedrol     Comments:    Other Comments: AMBER  Awake fiberoptic  intubation  ENT STAT evaluation  ENT on standby  2 IV  Kinney  Fluid resuscitation  Type and screen added  Coags added  Likely requires levo/vasopressin intra op   Central access internal jugulars/subclavians impeded greatly by goiter. Groin access impeded by infection            Esteban Redman MD

## 2023-10-03 NOTE — PROGRESS NOTES
RT PROGRESS NOTE    VENT DAY# Ventilation Day(s): 2    CURRENT SETTINGS:   Vent Mode: CMV/AC  (Continuous Mandatory Ventilation/ Assist Control)  FiO2 (%): 30 %  Resp Rate (Set): 220 breaths/min  Tidal Volume (Set, mL): 400 mL  PEEP (cm H2O): 5 cmH2O  Resp: 22      PATIENT PARAMETERS:  Ventilator - Patient   Patient Resp Rate : 18 breaths/min  Expiratory Vt (ml): 386  Minute Volume (ml): 8.85 L/min  Peak Inspiratory Pressure (cm H2O): 18 cmH2O  Mean Airway Pressure (cm H2O): 8.6 cmH2O  Plateau Pressure (cm H2O): 16 cmH2O  Dynamic Compliance (mL/cm H2O): 36.3 mL/cm H2O  Airway Resistance: 11  Auto/ Intrinsic PEEP (cm H2O): 2.3 cmH2O     SBT completed No  Secretions: scant white thick  Breath Sounds: diminished, clear    ETT Cuffed Single;Single Subglottic Suction 7.5 mm-Secured at (cm): 23 cm at teeth/gums  Emergency Ambu bag, mask and peep valve at bedside.       Respiratory Medications: none       Recent Labs   Lab 10/03/23  0812 10/03/23  0520 10/03/23  0011 10/02/23  2100   PH 7.40 7.39 7.30* 7.33*   PCO2 33* 32* 42 39   PO2 106* 95* 226* 119*   HCO3 21* 20* 21* 21*        NOTE / SHIFT SUMMARY:     Today's Changes    PEEP decreased to 5 from 6. Skin checked and tube moved Q2, this responsibility is shared between RN and RT. Endotracheal tube cuff assessed and appropriate at the time of the assessment. RT will continue to monitor.     Shahrzad Penny, RT

## 2023-10-03 NOTE — PROGRESS NOTES
ASSESSMENT:  1. Necrotizing soft tissue infection    2. Severe sepsis (H)        Lidia Nam is a 63 year old female who is s/p I and D perineum, vulvectomy bilaterally, and buttocks for necrotizing fasciitis. Further necrosis into left mons and overall on skin flaps that are dying off and some spotty necrosis around wound.   Continues to be in intensive care, intubated and on pressors with fevers.   Needs further debridement    PLAN:  Will schedule tomorrow for OR further debridement.   Continue supportive cares and ICU cares  Wound can be left until tomorrow. Change dressings as needed if becomes too saturated.     SUBJECTIVE:   She is intubated and sedated      Patient Vitals for the past 24 hrs:   BP Temp Temp src Pulse Resp SpO2 Height Weight   10/03/23 1030 116/59 (!) 102.2  F (39  C) -- 53 21 100 % -- --   10/03/23 1015 113/59 (!) 102  F (38.9  C) -- 52 21 100 % -- --   10/03/23 1001 108/58 (!) 102.2  F (39  C) -- 52 21 100 % -- --   10/03/23 1000 108/58 (!) 102.2  F (39  C) Esophageal 52 20 100 % -- --   10/03/23 0945 118/56 (!) 102  F (38.9  C) -- 52 21 100 % -- --   10/03/23 0930 126/56 (!) 102  F (38.9  C) -- 51 21 100 % -- --   10/03/23 0915 108/55 (!) 101.8  F (38.8  C) -- 51 22 100 % -- --   10/03/23 0900 109/56 (!) 101.8  F (38.8  C) -- 52 20 100 % -- --   10/03/23 0845 113/58 (!) 101.7  F (38.7  C) -- 52 21 100 % -- --   10/03/23 0830 119/58 (!) 100.9  F (38.3  C) -- 52 20 100 % -- --   10/03/23 0818 124/58 (!) 101.8  F (38.8  C) -- 53 21 100 % -- --   10/03/23 0815 -- (!) 101.8  F (38.8  C) -- 53 20 100 % -- --   10/03/23 0800 123/59 (!) 101.7  F (38.7  C) -- 53 20 100 % -- --   10/03/23 0745 120/57 (!) 101.7  F (38.7  C) -- 53 22 100 % -- --   10/03/23 0730 118/59 (!) 101.7  F (38.7  C) -- 53 21 100 % -- --   10/03/23 0715 118/58 (!) 101.7  F (38.7  C) -- 54 20 100 % -- --   10/03/23 0700 115/59 (!) 101.7  F (38.7  C) -- 55 21 100 % -- --   10/03/23 0645 109/59 (!) 101.8  F (38.8  C) -- 55 21  "100 % -- --   10/03/23 0615 121/60 (!) 101.8  F (38.8  C) -- 58 24 100 % -- --   10/03/23 0600 110/55 (!) 101.8  F (38.8  C) -- 56 23 100 % -- --   10/03/23 0545 -- (!) 102  F (38.9  C) -- 59 24 100 % -- --   10/03/23 0540 114/55 (!) 102  F (38.9  C) -- 60 24 100 % -- --   10/03/23 0530 124/63 (!) 102  F (38.9  C) -- 60 25 100 % -- --   10/03/23 0500 120/63 (!) 101.7  F (38.7  C) -- 59 25 100 % -- --   10/03/23 0430 -- (!) 101.3  F (38.5  C) -- 60 26 100 % -- --   10/03/23 0415 -- (!) 101.1  F (38.4  C) -- 64 25 100 % -- --   10/03/23 0400 -- (!) 100.8  F (38.2  C) -- 61 25 100 % -- --   10/03/23 0359 -- -- -- -- -- 100 % -- --   10/03/23 0345 -- (!) 100.6  F (38.1  C) -- 62 23 100 % -- --   10/03/23 0330 -- 100.4  F (38  C) -- 62 23 100 % -- --   10/03/23 0315 -- 100  F (37.8  C) -- 63 24 100 % -- --   10/03/23 0300 -- 100  F (37.8  C) -- 65 22 100 % -- --   10/03/23 0245 -- 99.9  F (37.7  C) -- 71 22 98 % -- --   10/03/23 0230 -- 99.5  F (37.5  C) -- 71 21 96 % -- --   10/03/23 0215 -- 99.7  F (37.6  C) -- 75 20 93 % -- --   10/03/23 0200 -- 99.5  F (37.5  C) -- 74 20 96 % -- --   10/03/23 0145 -- 99.3  F (37.4  C) -- 73 20 96 % -- --   10/03/23 0130 -- 99.1  F (37.3  C) -- 76 20 97 % -- 90.8 kg (200 lb 2.8 oz)   10/03/23 0115 -- 99  F (37.2  C) -- 75 20 100 % -- --   10/03/23 0100 -- 99  F (37.2  C) -- 78 20 100 % -- --   10/03/23 0045 -- 98.8  F (37.1  C) -- 77 22 100 % -- --   10/03/23 0030 111/59 (!) 96.3  F (35.7  C) -- 73 21 100 % -- --   10/03/23 0000 -- 98.6  F (37  C) Esophageal 76 20 100 % -- --   10/02/23 2345 -- -- -- 77 19 100 % -- --   10/02/23 2336 -- -- -- 78 18 100 % -- --   10/02/23 2030 -- -- -- 110 30 96 % -- --   10/02/23 2005 -- 98.7  F (37.1  C) Temporal -- -- 93 % -- --   10/02/23 1944 -- -- -- -- 22 96 % -- --   10/02/23 1815 128/81 -- -- 115 (!) 33 96 % -- --   10/02/23 1807 128/81 98  F (36.7  C) Oral 115 24 97 % 1.575 m (5' 2\") 91.2 kg (201 lb)         PHYSICAL EXAM:  GEN: No acute " distress, comfortable  Large perineal opened wound with induration of left mons with blister with necrosis. No crepitus. Faint pink . Wound with obvious necrosis superficial along skin flaps and wound itself.   Output by Drain (mL) 10/01/23 0700 - 10/01/23 1459 10/01/23 1500 - 10/01/23 2259 10/01/23 2300 - 10/02/23 0659 10/02/23 0700 - 10/02/23 1459 10/02/23 1500 - 10/02/23 2259 10/02/23 2300 - 10/03/23 0659 10/03/23 0700 - 10/03/23 1256   Requested LDAs do not have output data documented.      EXT:No cyanosis, edema or obvious abnormalities    10/02 0700 - 10/03 0659  In: 5168.33 [I.V.:3553.33]  Out: 1580 [Urine:1460]    Admission on 10/02/2023   Component Date Value    Sodium 10/02/2023 128 (L)     Potassium 10/02/2023 3.6     Carbon Dioxide (CO2) 10/02/2023 18 (L)     Anion Gap 10/02/2023 18 (H)     Urea Nitrogen 10/02/2023 48.9 (H)     Creatinine 10/02/2023 0.84     GFR Estimate 10/02/2023 78     Calcium 10/02/2023 9.5     Chloride 10/02/2023 92 (L)     Glucose 10/02/2023 199 (H)     Alkaline Phosphatase 10/02/2023 171 (H)     AST 10/02/2023 25     ALT 10/02/2023 42     Protein Total 10/02/2023 6.5     Albumin 10/02/2023 2.2 (L)     Bilirubin Total 10/02/2023 0.7     Lactic Acid 10/02/2023 4.4 (HH)     Culture 10/02/2023 No growth after 12 hours     Culture 10/02/2023 No growth after 12 hours     Troponin T, High Sensiti* 10/02/2023 21 (H)     N terminal Pro BNP Inpat* 10/02/2023 2,243 (H)     Influenza A PCR 10/02/2023 Negative     Influenza B PCR 10/02/2023 Negative     RSV PCR 10/02/2023 Negative     SARS CoV2 PCR 10/02/2023 Negative     Radiologist flags 10/02/2023 Necrotizing fasciitis (AA)     WBC Count 10/02/2023 18.0 (H)     RBC Count 10/02/2023 5.01     Hemoglobin 10/02/2023 16.0 (H)     Hematocrit 10/02/2023 46.0     MCV 10/02/2023 92     MCH 10/02/2023 31.9     MCHC 10/02/2023 34.8     RDW 10/02/2023 13.3     Platelet Count 10/02/2023 234     Hold Specimen 10/02/2023 JIC     Hold Specimen 10/02/2023  JIC     Lactic Acid 10/02/2023 2.8 (H)     Creatinine POCT 10/02/2023 0.9     GFR, ESTIMATED POCT 10/02/2023 >60     % Neutrophils 10/02/2023 90     % Lymphocytes 10/02/2023 5     % Monocytes 10/02/2023 5     % Eosinophils 10/02/2023 0     % Basophils 10/02/2023 0     Absolute Neutrophils 10/02/2023 16.2 (H)     Absolute Lymphocytes 10/02/2023 0.9     Absolute Monocytes 10/02/2023 0.9     Absolute Eosinophils 10/02/2023 0.0     Absolute Basophils 10/02/2023 0.0     RBC Morphology 10/02/2023 Confirmed RBC Indices     Platelet Assessment 10/02/2023 Automated Count Confirmed. Platelet morphology is normal.     INR 10/02/2023 1.26 (H)     aPTT 10/02/2023 28     Fibrinogen Activity 10/02/2023 1,090 (H)     ABO/RH(D) 10/02/2023 B POS     Antibody Screen 10/02/2023 Negative     SPECIMEN EXPIRATION DATE 10/02/2023 [id number]     GLUCOSE BY METER POCT 10/02/2023 195 (H)     pH Arterial 10/02/2023 7.33 (L)     pCO2 Arterial 10/02/2023 39     pO2 Arterial 10/02/2023 119 (H)     Bicarbonate Arterial 10/02/2023 21 (L)     O2 Sat, Arterial 10/02/2023 97.4 (H)     Oxyhemoglobin 10/02/2023 96.9     Base Excess/Deficit 10/02/2023 -5.4     Sample Stabilized Temper* 10/02/2023 37.0     Gram Stain Result 10/02/2023 3+ Gram positive bacilli (A)     Gram Stain Result 10/02/2023 3+ Gram positive cocci (A)     Gram Stain Result 10/02/2023 2+ Gram negative bacilli (A)     Gram Stain Result 10/02/2023 1+ WBC seen (A)     Gram Stain Result 10/02/2023 2+ Gram positive bacilli (A)     Gram Stain Result 10/02/2023 1+ Gram negative bacilli (A)     Gram Stain Result 10/02/2023 1+ Gram positive cocci (A)     Gram Stain Result 10/02/2023 No white blood cells seen (A)     Lactic Acid 10/03/2023 2.3 (H)     WBC Count 10/03/2023 15.4 (H)     RBC Count 10/03/2023 3.21 (L)     Hemoglobin 10/03/2023 10.3 (L)     Hematocrit 10/03/2023 30.2 (L)     MCV 10/03/2023 94     MCH 10/03/2023 32.1     MCHC 10/03/2023 34.1     RDW 10/03/2023 13.8     Platelet  Count 10/03/2023 164     Sodium 10/03/2023 135     Potassium 10/03/2023 3.5     Chloride 10/03/2023 104     Carbon Dioxide (CO2) 10/03/2023 19 (L)     Anion Gap 10/03/2023 12     Urea Nitrogen 10/03/2023 38.1 (H)     Creatinine 10/03/2023 0.56     GFR Estimate 10/03/2023 >90     Calcium 10/03/2023 7.7 (L)     Glucose 10/03/2023 194 (H)     pH Arterial 10/03/2023 7.30 (L)     pCO2 Arterial 10/03/2023 42     pO2 Arterial 10/03/2023 226 (H)     Bicarbonate Arterial 10/03/2023 21 (L)     O2 Sat, Arterial 10/03/2023 100.0 (H)     Oxyhemoglobin 10/03/2023 >98.5 (H)     Base Excess/Deficit 10/03/2023 -6.0     Sample Stabilized Temper* 10/03/2023 37.0     pH Arterial 10/03/2023 7.39     pCO2 Arterial 10/03/2023 32 (L)     pO2 Arterial 10/03/2023 95 (H)     Bicarbonate Arterial 10/03/2023 20 (L)     O2 Sat, Arterial 10/03/2023 97.1 (H)     Oxyhemoglobin 10/03/2023 96.6     Base Excess/Deficit 10/03/2023 -5.3     Sample Stabilized Temper* 10/03/2023 37.0     TSH 10/02/2023 3.68     Free T4 10/02/2023 0.78 (L)     Gram Stain Result 10/03/2023 >25 PMNs/low power field (A)     Gram Stain Result 10/03/2023 3+ Gram negative bacilli (A)     GLUCOSE BY METER POCT 10/03/2023 215 (H)     Sodium 10/03/2023 134 (L)     Potassium 10/03/2023 4.1     Carbon Dioxide (CO2) 10/03/2023 17 (L)     Anion Gap 10/03/2023 15     Urea Nitrogen 10/03/2023 37.0 (H)     Creatinine 10/03/2023 0.57     GFR Estimate 10/03/2023 >90     Calcium 10/03/2023 7.9 (L)     Chloride 10/03/2023 102     Glucose 10/03/2023 203 (H)     Alkaline Phosphatase 10/03/2023 104     AST 10/03/2023 24     ALT 10/03/2023 25     Protein Total 10/03/2023 4.8 (L)     Albumin 10/03/2023 1.7 (L)     Bilirubin Total 10/03/2023 0.5     WBC Count 10/03/2023 19.3 (H)     RBC Count 10/03/2023 3.48 (L)     Hemoglobin 10/03/2023 11.1 (L)     Hematocrit 10/03/2023 32.8 (L)     MCV 10/03/2023 94     MCH 10/03/2023 31.9     MCHC 10/03/2023 33.8     RDW 10/03/2023 14.1     Platelet Count  10/03/2023 191     Magnesium 10/03/2023 1.5 (L)     Magnesium 10/03/2023 2.8 (H)     GLUCOSE BY METER POCT 10/03/2023 192 (H)     pH Arterial 10/03/2023 7.40     pCO2 Arterial 10/03/2023 33 (L)     pO2 Arterial 10/03/2023 106 (H)     Bicarbonate Arterial 10/03/2023 21 (L)     O2 Sat, Arterial 10/03/2023 97.4 (H)     Oxyhemoglobin 10/03/2023 96.9     Base Excess/Deficit 10/03/2023 -4.1     Sample Stabilized Temper* 10/03/2023 37.0     GLUCOSE BY METER POCT 10/03/2023 191 (H)           JOSE Mckeon  [facility] General Surgery & Bariatric [facility]  [address] [place] [id number]  Phone- [phone]  Fax- [phone]

## 2023-10-03 NOTE — PROGRESS NOTES
General Surgery Progress Note    Who underwent a debridement of a perineal area for necrotizing soft tissue infection overnight.  She is improving in her physiology but still has debridement needs.  I had a discussion with her  Arjun this afternoon and explained our plans for return to the operating room tomorrow morning for further incision and debridement of the devitalized and necrotic tissue.  He understands and has given his consent.  A paper consent form is been left in the ICU which she can sign when he comes this evening.    In addition to that conversation, we also discussed the likely need for patient to be transferred to the hospital with more extensive wound care capabilities.  The patient lives quite close to [facility] and that would be his preference.  We will reach out to them tomorrow to see if they have capability capacity to manage a transfer.    Her care is somewhat complicated by her large multinodular goiter making her airway quite difficult and requiring ENT for intubation.  For now I would recommend leaving her intubated until we know we are completely finished with debridements.  I think this will likely be at least another 48 hours.    For now I appreciate the excellent care by the ICU team.  We will continue IV antibiotics and further debridement in the morning.    Julito Molina MD  General Surgeon  [facility]  Surgery [address]  [address]  [place] [address]  Office: [phone]

## 2023-10-03 NOTE — PLAN OF CARE
[facility] - ICU    RN Progress Note:            Pertinent Assessments:      Please refer to flowsheet rows for full assessment     Admitted from OR around midnight. PICC, OG placed. PCXR done. Placement confirmed. Arterial line positional. Dr. Nicholson aware. Ok to go by NIBP for Levo titration.  Arjun, Son and daughter present on admission. Updated with plan of care.           Key Events - This Shift:       Max temperature 102. Started on Zosyn, Vancomycin and Clindamycin. Ice pack and fan provided.Titrated Fentanyl and Precedex gtt per RASS goal of -2 to -3 and CPOT <2. Copious oral and ETT secretions. Sputum culture sent.        CHAD SAT (Sedation Awakening Trial): For use ONLY if intubated    SAT Safety Screen N/A   If FAILED why?    SAT Performed N/A   If FAILED why?               Barriers to Discharge / Downgrade:     Pressors, intubated, Septic         Point of Contact Update YES-OR-NO: Yes  If No, reason:   Name:Arjun Nam ( Spouse)  Phone Number:present at bedside  Summary of Conversation: Plan of care.

## 2023-10-03 NOTE — PROCEDURES
"  Pt. Name: Lidia Nam  MRN:        [id number]    Procedure: Insertion of a  triple Lumen  5 fr  Bard SOLO (valved) Power PICC, Lot number [id number]    Indications: Pressors    Contraindications : None    Procedure Details     Patient identified with 2 identifiers and \"Time Out\" conducted.  .     Central line insertion bundle followed: hand hygiene performed prior to procedure, site cleansed with cholraprep, hat, mask, sterile gloves,sterile gown worn, patient draped with maximum barrier head to toe drape, sterile field maintained.    The vein was assessed and found to be compressible and of adequate size. 2 ml 1% Lidocaine administered sq to the insertion site. A 5 Fr PICC was inserted into the Basilic vein of the right arm with ultrasound guidance. One attempt(s) required to access vein.   Catheter threaded without difficulty. Good blood return noted.    Modified Seldinger Technique used for insertion.    The 8 sharps that are included in the PICC insertion kit were accounted for and disposed of in the sharps container prior to breakdown of the sterile field.    Catheter secured with Statlock, biopatch and Tegaderm dressing applied.    Findings:  Total catheter length  38 cm, with 0 cm exposed. Mid upper arm circumference is 36 cm. Catheter was flushed with 30 cc NS. Patient  tolerated procedure well.    Tip placement verified by 3CG    CLABSI prevention brochure left at bedside.    Patient's primary RN notified PICC is ready for use.    Comments:        Randee Mancera RN    [facility] - [facility]    "

## 2023-10-03 NOTE — PROGRESS NOTES
Admitted earlier today to ICU with necrotizing fasciitis of left groin, severe sepsis, septic shock.  She is requiring ongoing debridement.  Patient intubated.  Requiring pressor support.  Her care in ICU is managed by intensivist, general surgery, ID.  D/W ICU NP-ICU team will continue managing patient.  [facility] has nothing to offer to POC at this time and will follow peripherally.    Cat Ramsey MD

## 2023-10-03 NOTE — PROGRESS NOTES
Right wrist, Left wrist, and soft restraints restraints continued 10/3/2023    Clinical Justification: Pulling lines, pulling tubes, and pulling equipment  Less Restrictive Alternative: Repositioning, Re-evaluate equipment, Pain management, Alarm, Reorientation  Attending Physician Notified: MD ordered restraint,     New orders placed Yes  Length of Order: 1 Day      PEDRITO Hernandez CNP

## 2023-10-03 NOTE — OP NOTE
Operative Note    Name:  Lidia Nam  Location: [facility] OR  Procedure Date:  10/2/2023  PCP:  Ana Goetz    Surgery Performed:  Procedure/Surgery Information   Procedure: Procedure(s):  vulvectomy, broad debridment of left butock and left thigh   Surgeon(s): Surgeon(s) and Role:     * Ken Howard MD - Primary     * Magan Lopez MD - Assisting   Specimens: ID Type Source Tests Collected by Time Destination   1 : left labia Tissue Vulva SURGICAL PATHOLOGY EXAM Ken Howard MD 10/2/2023 10:28 PM    2 : left intragluteal cleft Tissue Buttock, Left SURGICAL PATHOLOGY EXAM Ken Howard MD 10/2/2023 10:29 PM    A : left labia culture swab Tissue Vulva ANAEROBIC BACTERIAL CULTURE ROUTINE, GRAM STAIN, AEROBIC BACTERIAL CULTURE ROUTINE Ken Howard MD 10/2/2023 10:27 PM    B : left intragluteal cleft swab culture Tissue Buttock, Left ANAEROBIC BACTERIAL CULTURE ROUTINE, GRAM STAIN, AEROBIC BACTERIAL CULTURE ROUTINE Ken Howard MD 10/2/2023 10:28 PM                Pre-Procedure Diagnosis:  Abscess of female pelvis [N73.9]    Post-Procedure Diagnosis:    Abscess of female pelvis [N73.9]    Anesthesia:  General    This patient has a very large thyroid and is critically ill.  The ENT coverage, Magan Lopez MD,  came in to assist with the intubation of this patient such that we could move forward with the debridement of her necrotizing fasciitis.    Findings:  The area of debridement along the perineum and buttocks was 50 x 50 cm  The debridement along the posterior aspect of the left thigh was 20 cm wide and 40 cm long  This is a combined area of debridement that covers 3300 cm  of debridement.    Necrotic tissue with gray purulent infection built up in the subcutaneous and fascial planes of the vulva on the left side along the left lower half of the buttock and along the left posterior thigh extending down to just above the knee.    Operative  Report:    Patient is brought the operating room placed in supine position given general endotracheal anesthesia she is turned prone she sterilely prepped and draped in usual surgical fashion and a timeout process is undertaken.  I started with the obviously discolored and dead appearing tissue out on the skin.  These areas were excised the first was the vulva on the left side.  The entire dead segment of epidermis and dermis was excised sharply underlying there is a pool of gray purulent pus and this purulent tissue was dissected along right down to the pelvic floor going above and below the skin that was excised.  There is another purple lesion along the medial aspect of the thigh coming out along the intragluteal cleft.  This was excised and again there was gray purulent material in the subcutaneous and perifascial planes.  The nonviable tissue was all dissected free of the operative field.  An incision was extended all the way out across the infra gluteal cleft.  It was then extended all the way down along the posterior aspect of the thigh.  Another segment was dissected down along the medial aspect of the thigh and these tissue planes with the gray purulent fluid were followed until I got into good healthy appearing tissue.  These tissue planes and around the hamstring muscles were dissected around freeing up this concerning looking infectious tissue.  Once all this was debrided clear I then used a triple antibiotic irrigation with pulse lavage and irrigated the entire area with 2-1/2 L of antibiotic impregnated normal saline.  I am in I dressed the wound with electrocautery to achieve hemostasis and packed the wound with Kerlix rolls that were slightly damp with antibiotic impregnated irrigant.  The patient had ABD pads placed over the wounds were packed and she was then placed in mesh panties.  She was turned from the operative table onto a bed she was intubated and taken to the intensive care  unit.    Estimated Blood Loss:   500 cc       Drains:   Urethral Catheter 10/02/23 Straight-tip 16 fr (Active)   Collection Container Standard 10/02/23 2030   Securement Method Securing device (Describe) 10/02/23 2030   Urine Output 550 mL 10/02/23 2030       Implants:  * No implants in log *    Complications:    None    Ken Howard MD     Date: 10/3/2023  Time: 12:28 AM

## 2023-10-03 NOTE — CONSULTS
Consultation - INFECTIOUS DISEASE CONSULTATION  Lidia Nam ,  1960, MRN [id number]      Severe sepsis (H) [A41.9, R65.20]  Necrotizing soft tissue infection [M79.89]    PCP: Ana Goetz, [phone]   Code status:  Full Code               Assessment:  Necrotizing fasciitis: CT with necrotizing fasciitis L groin, perineum, and visualized LLE. s/p OR 10/2, GS and cultures are pending. May need repeat OR. Remains febrile with leukocytosis.   Severe sepsis: pressors in ICU  DM2  Goiter: ENT intubated in OR.     Principal Problem:    Severe sepsis (H)  Active Problems:    Necrotizing soft tissue infection      Recommendations:   - continue vanc, zosyn, clinda   - follow GS, OR cultures  - surgery following, may need additional OR  - discussed abx with nursing staff and pharmacy  - further recommendations to follow clinical course  - ID will follow, thanks    Veronica Wilburn MD  [facility]  [phone]       HPI:    Lidia Nam is a 63 year old female. History is provided by chart, nursing staff.  Presented on 10/2 with dyspnea, cough in addition to worsening pain, redness, drainage from groin. Malodorous. CT with necrotizing fasciitis and went to OR with surgery last night. Remains intubated in ICU today. On pressors. On broad abx. Still febrile. Leukocytosis. GS, Cultures all pending.     Chief complaint: Principal Problem:    Severe sepsis (H)  Active Problems:    Necrotizing soft tissue infection      Medical History  Active Ambulatory Problems     Diagnosis Date Noted    Obesity     Nicotine Dependence     Urticaria     Allergies     Thyromegaly 2020    Nicotine dependence 2022    Type 2 diabetes mellitus with hyperglycemia, without long-term current use of insulin (H) 2022    Morbid obesity (H) 2022    Hard to intubate 10/02/2023     Resolved Ambulatory Problems     Diagnosis Date Noted    No Resolved Ambulatory Problems     No  Additional Past Medical History         Surgical History  She  has a past surgical history that includes APPENDECTOMY; LIGATE FALLOPIAN TUBE; PICC/Midline Placement (10/3/2023); Irrigation and debridement sacral wound, combined (Left, 10/2/2023); Irrigation and debridement lower extremity, combined (Left, 10/2/2023); and Excise lesion perineal (N/A, 10/2/2023).       Social History  Reviewed, and she  reports that she has been smoking cigarettes. She has a 39.00 pack-year smoking history. She has never used smokeless tobacco. She reports current alcohol use.        Family History  Reviewed and noncontributory to present problem   Psychosocial Needs  Social History     Social History Narrative    Not on file     Additional psychosocial needs reviewed per nursing assessment.       Allergies   Allergen Reactions    Erythromycin     Psyllium     Seasonal Allergies      Hay fever - tree pollens, dust, mold, mildew      Medications Prior to Admission   Medication Sig Dispense Refill Last Dose    furosemide (LASIX) 20 MG tablet Take 1 tablet (20 mg) by mouth 2 times daily 160 tablet 1 Unknown at ?    levothyroxine (SYNTHROID/LEVOTHROID) 112 MCG tablet Take 1 tablet (112 mcg) by mouth daily 90 tablet 1 Unknown at ?    metFORMIN (GLUCOPHAGE XR) 500 MG 24 hr tablet Take 2 tablets (1,000 mg) by mouth daily (with dinner) 180 tablet 1 Unknown at ?        Review of Systems:  Unable to obtain due to intubation.  Physical Exam:  Temp:  [96.3  F (35.7  C)-102  F (38.9  C)] 101.8  F (38.8  C)  Pulse:  [] 51  Resp:  [18-33] 22  BP: (108-128)/(55-81) 108/55  MAP:  [61 mmHg-105 mmHg] 79 mmHg  Arterial Line BP: ()/(44-93) 124/58  FiO2 (%):  [40 %-80 %] 40 %  SpO2:  [93 %-100 %] 100 %    GEN: intubated, sedated, pressors.   HEAD: atraumatic  ENT: ETT.   NECK: Goiter.   CARDIOVASCULAR: regular rate and rhythm, no murmurs, rubs, or gallops  PULMONARY: lungs clear to ausculation bilaterally  ABDOMEN: soft, nontender,  nondistended. Normal bowel sounds  SKIN: groin covered in post operative dressings.   PSYCH: intubated.   MUSCULOSKELETAL: no synovitis               Pertinent Labs  personally reviewed.   CBC RESULTS:   Recent Labs   Lab Test 10/03/23  0521   WBC 19.3*   RBC 3.48*   HGB 11.1*   HCT 32.8*   MCV 94   MCH 31.9   MCHC 33.8   RDW 14.1           Last Comprehensive Metabolic Panel:  Sodium   Date Value Ref Range Status   10/03/2023 134 (L) 135 - 145 mmol/L Final     Comment:     Reference intervals for this test were updated on 09/26/2023 to more accurately reflect our healthy population. There may be differences in the flagging of prior results with similar values performed with this method. Interpretation of those prior results can be made in the context of the updated reference intervals.      Potassium   Date Value Ref Range Status   10/03/2023 4.1 3.4 - 5.3 mmol/L Final   06/23/2020 4.4 3.5 - 5.0 mmol/L Final     Chloride   Date Value Ref Range Status   10/03/2023 102 98 - 107 mmol/L Final   06/23/2020 98 98 - 107 mmol/L Final     Carbon Dioxide (CO2)   Date Value Ref Range Status   10/03/2023 17 (L) 22 - 29 mmol/L Final   06/23/2020 32 (H) 22 - 31 mmol/L Final     Anion Gap   Date Value Ref Range Status   10/03/2023 15 7 - 15 mmol/L Final   06/23/2020 5 5 - 18 mmol/L Final     Glucose   Date Value Ref Range Status   10/03/2023 203 (H) 70 - 99 mg/dL Final   06/23/2020 115 70 - 125 mg/dL Final     GLUCOSE BY METER POCT   Date Value Ref Range Status   10/03/2023 191 (H) 70 - 99 mg/dL Final     Urea Nitrogen   Date Value Ref Range Status   10/03/2023 37.0 (H) 8.0 - 23.0 mg/dL Final   06/23/2020 8 8 - 22 mg/dL Final     Creatinine   Date Value Ref Range Status   10/03/2023 0.57 0.51 - 0.95 mg/dL Final     GFR Estimate   Date Value Ref Range Status   10/03/2023 >90 >60 mL/min/1.73m2 Final   06/23/2020 >60 >60 mL/min/1.73m2 Final     GFR, ESTIMATED POCT   Date Value Ref Range Status   10/02/2023 >60 >60  mL/min/1.73m2 Final     Calcium   Date Value Ref Range Status   10/03/2023 7.9 (L) 8.8 - 10.2 mg/dL Final       No results found for: CRP     The following microbiology studies were personally reviewed:  No results found for: CULT    Urine Studies    Recent Labs   Lab Test 09/07/22  1001   LEUKEST Negative   WBCU None Seen       Vancomycin Levels  No lab results found.    Invalid input(s): VANCO    MICROBIOLOGY DATA:    All cultures:  7-Day Micro Results       Collected Updated Procedure Result Status      10/03/2023 0252 10/03/2023 0254 Respiratory Aerobic Bacterial Culture with Gram Stain [[id number]]   Sputum from Endotracheal    In process Component Value   No component results               10/02/2023 2228 10/02/2023 2325 Anaerobic Bacterial Culture Routine [[id number]]   Tissue from Buttock, Left    In process Component Value   No component results               10/02/2023 2228 10/02/2023 2325 Gram Stain [[id number]]   Tissue from Buttock, Left    In process Component Value   No component results            10/02/2023 2228 10/02/2023 2325 Tissue Aerobic Bacterial Culture Routine [[id number]]   Tissue from Buttock, Left    In process Component Value   No component results               10/02/2023 2227 10/02/2023 2325 Anaerobic Bacterial Culture Routine [[id number]]   Tissue from Vulva    In process Component Value   No component results               10/02/2023 2227 10/02/2023 2325 Gram Stain [[id number]]   Tissue from Vulva    In process Component Value   No component results            10/02/2023 2227 10/02/2023 2325 Tissue Aerobic Bacterial Culture Routine [[id number]]   Tissue from Vulva    In process Component Value   No component results               10/02/2023 1829 10/02/2023 1910 Symptomatic Influenza A/B, RSV, & SARS-CoV2 PCR (COVID-19) Nasopharyngeal [[id number]]    Swab from Nasopharyngeal    Final result Component Value   Influenza A PCR Negative   Influenza B PCR Negative   RSV PCR  Negative   SARS CoV2 PCR Negative   NEGATIVE: SARS-CoV-2 (COVID-19) RNA not detected, presumed negative.            10/02/2023 1825 10/03/2023 0931 Blood Culture Peripheral Blood [[id number]]   Peripheral Blood    Preliminary result Component Value   Culture No growth after 12 hours  [P]                10/02/2023 1825 10/03/2023 0931 Blood Culture Peripheral Blood [[id number]]   Peripheral Blood    Preliminary result Component Value   Culture No growth after 12 hours  [P]                         Pertinent Radiology  personally reviewed.     XR Chest Port 1 View    Result Date: 10/3/2023  EXAM: XR CHEST PORT 1 VIEW LOCATION: [facility] DATE: 10/3/2023 INDICATION: Endotracheal tube positioning COMPARISON: None. FINDINGS: ET tube approximately 3.5 cm above the suresh. NG tube passes into the stomach. Right PICC with tip in the distal SVC. There is no pneumothorax. The heart size is normal. There is infiltrate in the left lower lung. The right lung is clear.     IMPRESSION: ET tube 3.5 cm above the suresh.    CT Abdomen Pelvis w Contrast    Result Date: 10/2/2023  EXAM: CT ABDOMEN PELVIS W CONTRAST LOCATION: [facility] DATE: 10/2/2023 INDICATION: groin infection concerning for necrotizing infection COMPARISON: Same day CTA chest. TECHNIQUE: CT scan of the abdomen and pelvis was performed following injection of IV contrast. Multiplanar reformats were obtained. Dose reduction techniques were used. CONTRAST: 90ml isovue 370 FINDINGS: LOWER CHEST: Mild left basilar atelectasis and/or scarring. Aortic valvular and coronary artery calcifications. HEPATOBILIARY: Hepatomegaly and hepatic steatosis. PANCREAS: Normal. SPLEEN: Normal. ADRENAL GLANDS: Unremarkable right adrenal gland. Thickening of the left adrenal gland without a discrete nodule. KIDNEYS/BLADDER: Right renal hypodensity is too small to characterize. No dedicated imaging follow-up is required for this. No  hydronephrosis. Mildly distended urinary bladder is grossly unremarkable. BOWEL: No bowel obstruction. Colonic diverticulosis. No ascites or pneumoperitoneum. LYMPH NODES: Prominent asymmetric left inguinal lymph nodes, for example lymph node measuring 2.3 x 1.2 cm (series 2, image 78), likely reactive. VASCULATURE: Normal caliber abdominal aorta with extensive calcified aortoiliac atherosclerotic plaque. PELVIC ORGANS: 1.7 cm left adnexal cyst (series 2, image 61). No dedicated imaging follow-up is required for this given size. MUSCULOSKELETAL: Asymmetric skin thickening and subcutaneous stranding in the visualized left lower extremity, perineum, and groin. There is moderate associated subcutaneous and subfascial/intermuscular gas. No drainable fluid collection. Thoracolumbar spondylosis. No destructive osseous lesion.     IMPRESSION: 1.  Findings compatible with clinically suspected necrotizing fasciitis of the left groin, perineum, and visualized left lower extremity. No drainable fluid collection to suggest abscess. 2.  Hepatomegaly and hepatic steatosis. [Critical Result: Necrotizing fasciitis] Finding was identified on 10/2/2023 7:33 PM CDT. Dr. Victor was contacted by me on 10/2/2023 7:44 PM CDT and verbalized understanding of the critical result.     CT Chest Pulmonary Embolism w Contrast    Result Date: 10/2/2023  EXAM: CT CHEST PULMONARY EMBOLISM W CONTRAST LOCATION: [facility] DATE: 10/2/2023 INDICATION: shortness of breath COMPARISON: 06/25/2020 TECHNIQUE: CT chest pulmonary angiogram during arterial phase injection of IV contrast. Multiplanar reformats and MIP reconstructions were performed. Dose reduction techniques were used. CONTRAST: 90ml isovue 370 FINDINGS: ANGIOGRAM CHEST: Pulmonary arteries are normal caliber and negative for pulmonary emboli. Thoracic aorta is negative for dissection. LUNGS AND PLEURA: Patchy airspace consolidation and groundglass attenuation has  developed in the inferior lingular segment of the left upper lobe. Mild dependent atelectasis bilaterally. Stable mild extrinsic compression of the trachea by the goiter. MEDIASTINUM/AXILLAE: Severely enlarged goiter. Calcified aortic valve leaflets. CORONARY ARTERY CALCIFICATION: Moderate calcified atherosclerosis left anterior descending and right coronary arteries. UPPER ABDOMEN: Diffuse bilateral adrenal enlargement. MUSCULOSKELETAL: Mild degenerative change thoracic spine.     IMPRESSION: 1.  Findings suspicious for left upper lobe pneumonia 2.  Negative for PE

## 2023-10-03 NOTE — PLAN OF CARE
[facility] - ICU    RN Progress Note:            Pertinent Assessments:      Please refer to flowsheet rows for full assessment     Titrating pressors and sedation as able. A-line removed this evening as it was not functioning. Rdz patent and draining, output slowly decreasing throughout the shift. IVF and antibiotics given per order. Bilateral shoulder twitching noted, provider notified. Scheduled ativan ordered.           Key Events - This Shift:       Vaso stopped at 1830, BP stable. Dressing changed this afternoon with surgery. Consent for surgery signed this evening by . Surgery planned for 0730 tomorrow. Temps elevated most of the shift. Scheduled tylenol given with little effect. One time dose of Ibuprofen given, fever 100 after administration. Fever now increasing again. -4 current RASS score, not responding to stimuli. Responding only when changing dressing, PRN fentanyl given.         [facility] SAT (Sedation Awakening Trial): For use ONLY if intubated    SAT Safety Screen Not Applicable   If FAILED why?    SAT Performed Not Applicable   If FAILED why?               Barriers to Discharge / Downgrade:     Pressors, sedation, vent.          Point of Contact Update YES-OR-NO: Yes  If No, reason:   Name:Arjun  Phone Number:[phone]  Summary of Conversation: How pt is doing and plan of care. At bedside along with daughter this evening, updated on plan of care.       Goal Outcome Evaluation:

## 2023-10-03 NOTE — PROGRESS NOTES
RT PROGRESS NOTE    VENT DAY# 1    CURRENT SETTINGS:  Vent Mode: CMV/AC  (Continuous Mandatory Ventilation/ Assist Control)  FiO2 (%): 40 %  Resp Rate (Set): 20 breaths/min  Tidal Volume (Set, mL): 400 mL  PEEP (cm H2O): 6 cmH2O  Resp: 25    PATIENT PARAMETERS:  PIP 22  Pplat:  16  Pmean:  11  Compliance: 26.3   SBT: No     02 Sats:  100%  BS: diminished and coarse    ETT SIZE 7.5 Secured at 23 cm at teeth/gums    Respiratory Medications:  Duo neb Q4 prn available     ABG: @0520 pH 7.39; pCO2 32; pO2 95; HCO3 20, %O2 Sat 96.6, BE -5.3 on 40% FiO2      NOTE / SHIFT SUMMARY:  Patient arrived from the OR overnight and placed on the ventilator. Changed made to vent settings post ABG results.     Ken Mar, RT

## 2023-10-03 NOTE — PHARMACY-VANCOMYCIN DOSING SERVICE
"Pharmacy Vancomycin Initial Note  Date of Service October 3, 2023  Patient's  1960  63 year old, female  Indication: Skin and Soft Tissue Infection  Current estimated CrCl = Estimated Creatinine Clearance: 68 mL/min (based on SCr of 0.9 mg/dL).  Creatinine for last 3 days  10/2/2023:  6:25 PM Creatinine 0.84 mg/dL;  6:37 PM Creatinine POCT 0.9 mg/dL      Vancomycin IV Administrations (past 72 hours)                     vancomycin (VANCOCIN) 2,000 mg in sodium chloride 0.9 % 500 mL intermittent infusion (mg) 2,000 mg New Bag 10/02/23 192                    Nephrotoxins and other renal medications (From now, onward)      Start     Dose/Rate Route Frequency Ordered Stop    10/03/23 0500  vasopressin (VASOSTRICT) 20 Units in sodium chloride 0.9 % 100 mL standard conc infusion         2.4 Units/hr  12 mL/hr  Intravenous CONTINUOUS 10/02/23 2348      10/03/23 0030  piperacillin-tazobactam (ZOSYN) 3.375 g vial to attach to  mL bag        Note to Pharmacy: For [facility], [facility] and [facility]: For Zosyn-naive patients, use the \"Zosyn initial dose + extended infusion\" order panel.    3.375 g  over 240 Minutes Intravenous EVERY 8 HOURS 10/02/23 2348      10/03/23 0000  norepinephrine (LEVOPHED) 4 mg in  mL infusion PREMIX         0.01-0.6 mcg/kg/min × 91.2 kg  3.4-205.2 mL/hr  Intravenous CONTINUOUS 10/02/23 2348      10/02/23 2100  ceFAZolin (ANCEF) 1 g, gentamicin (GARAMYCIN) 80 mg, polymyxin B 500,000 Units in sodium chloride 0.9% irrigation (bag) 1,000 mL for irrigation        Note to Pharmacy: X 3 bags     Irrigation CONTINUOUS 10/02/23 2054              Contrast Orders - past 72 hours (72h ago, onward)      Start     Dose/Rate Route Frequency Stop    10/02/23 1900  iopamidol (ISOVUE-370) solution 90 mL         90 mL Intravenous ONCE 10/02/23 1855            [employer] Prediction of Planned Initial Vancomycin Regimen  Regimen: 1250 mg IV every 12 hours.  Start time: 07:21 on 10/03/2023  Exposure target: AUC24 " (range)400-600 mg/L.hr   AUC24,ss: 465 mg/L.hr  Probability of AUC24 > 400: 65 %  Ctrough,ss: 13.9 mg/L  Probability of Ctrough,ss > 20: 24 %  Probability of nephrotoxicity (Lodise JOY 2009): 9 %  Plan:  Start vancomycin  1250 mg IV q12h.   Vancomycin monitoring method: AUC  Vancomycin therapeutic monitoring goal: 400-600 mg*h/L  Pharmacy will check vancomycin levels as appropriate in 1-3 Days.    Serum creatinine levels will be ordered daily for the first week of therapy and at least twice weekly for subsequent weeks.      Candy Antonio, H

## 2023-10-03 NOTE — ANESTHESIA PROCEDURE NOTES
Airway       Patient location during procedure: OR       Procedure Start/Stop Times: 10/2/2023 9:25 PM  Staff -        Anesthesiologist:  Esteban Redman MD       CRNA: Lory Montes APRN CRNA       Performed By: anesthesiologistIndications and Patient Condition       Indications for airway management: giovanna-procedural       Induction type:intravenous       Mask difficulty assessment: 0 - not attempted (spontaneously ventilated throughout intubation procedure)    Final Airway Details       Final airway type: endotracheal airway       Successful airway: ETT - single and Single subglottic suction  Endotracheal Airway Details        ETT size (mm): 7.5       Cuffed: yes       Successful intubation technique: video laryngoscopy       VL Blade Size: Glidescope 3       Grade View of Cords: 1       Adjucts: stylet       Position: Right       Measured from: gums/teeth       Secured at (cm): 24       Bite block used: None    Post intubation assessment        Placement verified by: capnometry, equal breath sounds and chest rise        Number of attempts at approach: 1       Number of other approaches attempted: 0       Secured with: silk tape       Ease of procedure: difficult (neck goiter. See additional note)       Dentition: Intact and Unchanged    Medication(s) Administered   Medication Administration Time: 10/2/2023 9:25 PM    Additional Comments       Dr. Redman and Dr. Lopez, ENT present during intubation. Pt spontaneously breathing throughout intubation with precedex and versed boluses. Pt with large neck goiter.

## 2023-10-03 NOTE — OP NOTE
PREOPERATIVE DIAGNOSES: Lower extremity necrotizing fasciitis, large multinodular goiter, sepsis.     POSTOPERATIVE DIAGNOSES: Same.      PROCEDURE PERFORMED:   1.  Controlled intubation with ability of surgical airway if necessary     SURGEON: Magan Lopez MD     ANESTHESIA: Esteban Redman MD     ASSISTANTS: None.     BLOOD LOSS: None.     COMPLICATIONS: None.      SPECIMENS: None.     ANESTHESIA: General.     GRAFTS, IMPLANTS, DRAINS: None.     INDICATIONS: Prevent complications, treat disease.     FINDINGS:   Relatively normal supraglottic and glottic airway.  Easy passage without resistance of a 7.5 cuffed endotracheal tube     OPERATIVE TECHNIQUE: The patient was brought to the operating room and identified by name clinic number.  They were placed at a 30 degree incline.  Preoperative imaging revealed a large multinodular goiter extending up to the thyroid notch and substernally.  The goiter did wrap posteriorly around the posterior margin of the esophagus.      General anesthesia was induced by the anesthesia service using dexmedetomidine and some Versed to keep the patient spontaneously breathing.  4% lidocaine was sprayed in the supraglottic area.  Once the patient obtained an adequate plane of anesthesia after preoxygenation, she was suspended using the glide scope.  We had a good grade 1 view of the supraglottic and glottic structures.  The 7.5 cuffed endotracheal with infraglottic suction was a bit bulky making visualization of the cords difficult during tube placement.  Once we were comfortable that the tube was through the vocal folds, end-tidal CO2 was confirmed, chest rise and tube fog were noted.  The patient was given propofol and was paralyzed.  The patient was turned over to the surgical team for their portion of the procedure.      If the patient will be requiring intubation for more than 10 to 14 days, 1 could consider tracheostomy.  This would be quite complicated and difficult given her  large multinodular goiter.  Depending on the situation, we might consider discussing transfer to the [place] for tertiary care, potential need for hyperbaric oxygen therapy for her necrotizing fasciitis.     Magan Lopez MD  Department of Otolaryngology-Head and Neck Surgery  [facility]

## 2023-10-04 ENCOUNTER — ANESTHESIA EVENT (OUTPATIENT)
Dept: SURGERY | Facility: HOSPITAL | Age: 63
DRG: 853 | End: 2023-10-04
Payer: COMMERCIAL

## 2023-10-04 ENCOUNTER — APPOINTMENT (OUTPATIENT)
Dept: RADIOLOGY | Facility: HOSPITAL | Age: 63
DRG: 853 | End: 2023-10-04
Attending: NURSE PRACTITIONER
Payer: COMMERCIAL

## 2023-10-04 ENCOUNTER — ANESTHESIA (OUTPATIENT)
Dept: SURGERY | Facility: HOSPITAL | Age: 63
DRG: 853 | End: 2023-10-04
Payer: COMMERCIAL

## 2023-10-04 PROBLEM — R65.21 SEPTIC SHOCK (H): Status: ACTIVE | Noted: 2023-10-04

## 2023-10-04 PROBLEM — A41.9 SEPTIC SHOCK (H): Status: ACTIVE | Noted: 2023-10-04

## 2023-10-04 LAB
ANION GAP SERPL CALCULATED.3IONS-SCNC: 11 MMOL/L (ref 7–15)
BASE EXCESS BLDA CALC-SCNC: -5 MMOL/L
BUN SERPL-MCNC: 34 MG/DL (ref 8–23)
CALCIUM SERPL-MCNC: 8.1 MG/DL (ref 8.8–10.2)
CHLORIDE SERPL-SCNC: 105 MMOL/L (ref 98–107)
COHGB MFR BLD: 97.2 % (ref 96–97)
CREAT SERPL-MCNC: 0.68 MG/DL (ref 0.51–0.95)
DEPRECATED HCO3 PLAS-SCNC: 21 MMOL/L (ref 22–29)
EGFRCR SERPLBLD CKD-EPI 2021: >90 ML/MIN/1.73M2
ERYTHROCYTE [DISTWIDTH] IN BLOOD BY AUTOMATED COUNT: 14.6 % (ref 10–15)
GLUCOSE BLDC GLUCOMTR-MCNC: 190 MG/DL (ref 70–99)
GLUCOSE BLDC GLUCOMTR-MCNC: 193 MG/DL (ref 70–99)
GLUCOSE BLDC GLUCOMTR-MCNC: 193 MG/DL (ref 70–99)
GLUCOSE BLDC GLUCOMTR-MCNC: 197 MG/DL (ref 70–99)
GLUCOSE BLDC GLUCOMTR-MCNC: 203 MG/DL (ref 70–99)
GLUCOSE BLDC GLUCOMTR-MCNC: 219 MG/DL (ref 70–99)
GLUCOSE SERPL-MCNC: 197 MG/DL (ref 70–99)
GRAM STAIN RESULT: ABNORMAL
HCO3 BLD-SCNC: 20 MMOL/L (ref 23–29)
HCT VFR BLD AUTO: 27.5 % (ref 35–47)
HGB BLD-MCNC: 9 G/DL (ref 11.7–15.7)
MAGNESIUM SERPL-MCNC: 2.4 MG/DL (ref 1.7–2.3)
MCH RBC QN AUTO: 31.7 PG (ref 26.5–33)
MCHC RBC AUTO-ENTMCNC: 32.7 G/DL (ref 31.5–36.5)
MCV RBC AUTO: 97 FL (ref 78–100)
OXYHGB MFR BLD: 95.9 % (ref 96–97)
PCO2 BLD: 34 MM HG (ref 35–45)
PH BLD: 7.38 [PH] (ref 7.37–7.44)
PLATELET # BLD AUTO: 155 10E3/UL (ref 150–450)
PO2 BLD: 90 MM HG (ref 80–90)
POTASSIUM SERPL-SCNC: 3.6 MMOL/L (ref 3.4–5.3)
RBC # BLD AUTO: 2.84 10E6/UL (ref 3.8–5.2)
SODIUM SERPL-SCNC: 137 MMOL/L (ref 135–145)
TEMPERATURE: 37 DEGREES C
VANCOMYCIN SERPL-MCNC: 18.7 UG/ML
WBC # BLD AUTO: 16.8 10E3/UL (ref 4–11)

## 2023-10-04 PROCEDURE — 370N000017 HC ANESTHESIA TECHNICAL FEE, PER MIN: Performed by: SURGERY

## 2023-10-04 PROCEDURE — 71045 X-RAY EXAM CHEST 1 VIEW: CPT

## 2023-10-04 PROCEDURE — 0JB80ZZ EXCISION OF ABDOMEN SUBCUTANEOUS TISSUE AND FASCIA, OPEN APPROACH: ICD-10-PCS | Performed by: SURGERY

## 2023-10-04 PROCEDURE — 80048 BASIC METABOLIC PNL TOTAL CA: CPT | Performed by: NURSE PRACTITIONER

## 2023-10-04 PROCEDURE — 200N000001 HC R&B ICU

## 2023-10-04 PROCEDURE — 999N000055 HC STATISTIC END TITIAL CO2 MONITORING

## 2023-10-04 PROCEDURE — 94003 VENT MGMT INPAT SUBQ DAY: CPT

## 2023-10-04 PROCEDURE — 258N000003 HC RX IP 258 OP 636: Performed by: NURSE ANESTHETIST, CERTIFIED REGISTERED

## 2023-10-04 PROCEDURE — 0KBT0ZZ EXCISION OF LEFT LOWER LEG MUSCLE, OPEN APPROACH: ICD-10-PCS | Performed by: SURGERY

## 2023-10-04 PROCEDURE — 80202 ASSAY OF VANCOMYCIN: CPT | Performed by: NURSE PRACTITIONER

## 2023-10-04 PROCEDURE — 250N000009 HC RX 250: Performed by: NURSE ANESTHETIST, CERTIFIED REGISTERED

## 2023-10-04 PROCEDURE — 82805 BLOOD GASES W/O2 SATURATION: CPT | Performed by: NURSE PRACTITIONER

## 2023-10-04 PROCEDURE — 250N000011 HC RX IP 250 OP 636: Performed by: INTERNAL MEDICINE

## 2023-10-04 PROCEDURE — 11046 DBRDMT MUSC&/FSCA EA ADDL: CPT | Mod: 59 | Performed by: SURGERY

## 2023-10-04 PROCEDURE — 272N000001 HC OR GENERAL SUPPLY STERILE: Performed by: SURGERY

## 2023-10-04 PROCEDURE — 83735 ASSAY OF MAGNESIUM: CPT | Performed by: NURSE PRACTITIONER

## 2023-10-04 PROCEDURE — 250N000011 HC RX IP 250 OP 636: Mod: JZ | Performed by: NURSE PRACTITIONER

## 2023-10-04 PROCEDURE — 87176 TISSUE HOMOGENIZATION CULTR: CPT | Performed by: SURGERY

## 2023-10-04 PROCEDURE — 250N000009 HC RX 250: Performed by: SURGERY

## 2023-10-04 PROCEDURE — 250N000025 HC SEVOFLURANE, PER MIN: Performed by: SURGERY

## 2023-10-04 PROCEDURE — 0JBB0ZZ EXCISION OF PERINEUM SUBCUTANEOUS TISSUE AND FASCIA, OPEN APPROACH: ICD-10-PCS | Performed by: SURGERY

## 2023-10-04 PROCEDURE — 87205 SMEAR GRAM STAIN: CPT | Performed by: SURGERY

## 2023-10-04 PROCEDURE — 258N000003 HC RX IP 258 OP 636: Performed by: NURSE PRACTITIONER

## 2023-10-04 PROCEDURE — 258N000003 HC RX IP 258 OP 636: Performed by: INTERNAL MEDICINE

## 2023-10-04 PROCEDURE — 87075 CULTR BACTERIA EXCEPT BLOOD: CPT | Performed by: SURGERY

## 2023-10-04 PROCEDURE — 99233 SBSQ HOSP IP/OBS HIGH 50: CPT | Performed by: INTERNAL MEDICINE

## 2023-10-04 PROCEDURE — 250N000011 HC RX IP 250 OP 636: Mod: JZ | Performed by: SURGERY

## 2023-10-04 PROCEDURE — 999N000185 HC STATISTIC TRANSPORT TIME EA 15 MIN

## 2023-10-04 PROCEDURE — 999N000009 HC STATISTIC AIRWAY CARE

## 2023-10-04 PROCEDURE — 360N000075 HC SURGERY LEVEL 2, PER MIN: Performed by: SURGERY

## 2023-10-04 PROCEDURE — 250N000013 HC RX MED GY IP 250 OP 250 PS 637: Performed by: SURGERY

## 2023-10-04 PROCEDURE — 99291 CRITICAL CARE FIRST HOUR: CPT | Performed by: NURSE PRACTITIONER

## 2023-10-04 PROCEDURE — 85027 COMPLETE CBC AUTOMATED: CPT | Performed by: NURSE PRACTITIONER

## 2023-10-04 PROCEDURE — 999N000287 HC ICU ADULT ROUNDING, EACH 10 MINS

## 2023-10-04 PROCEDURE — 250N000011 HC RX IP 250 OP 636: Performed by: NURSE PRACTITIONER

## 2023-10-04 PROCEDURE — 11043 DBRDMT MUSC&/FSCA 1ST 20/<: CPT | Mod: 59 | Performed by: SURGERY

## 2023-10-04 PROCEDURE — 11006 DBRDMT SKIN XTRNL GENT PER: CPT | Performed by: SURGERY

## 2023-10-04 PROCEDURE — 250N000011 HC RX IP 250 OP 636: Mod: JZ | Performed by: INTERNAL MEDICINE

## 2023-10-04 PROCEDURE — 999N000157 HC STATISTIC RCP TIME EA 10 MIN

## 2023-10-04 PROCEDURE — C9113 INJ PANTOPRAZOLE SODIUM, VIA: HCPCS | Mod: JZ | Performed by: SURGERY

## 2023-10-04 PROCEDURE — 250N000011 HC RX IP 250 OP 636: Performed by: SURGERY

## 2023-10-04 PROCEDURE — 250N000011 HC RX IP 250 OP 636: Mod: JZ | Performed by: NURSE ANESTHETIST, CERTIFIED REGISTERED

## 2023-10-04 RX ORDER — FENTANYL CITRATE 50 UG/ML
INJECTION, SOLUTION INTRAMUSCULAR; INTRAVENOUS PRN
Status: DISCONTINUED | OUTPATIENT
Start: 2023-10-04 | End: 2023-10-04

## 2023-10-04 RX ORDER — MULTIPLE VITAMINS W/ MINERALS TAB 9MG-400MCG
1 TAB ORAL DAILY
Status: DISCONTINUED | OUTPATIENT
Start: 2023-10-05 | End: 2023-10-05

## 2023-10-04 RX ORDER — METOPROLOL TARTRATE 1 MG/ML
5 INJECTION, SOLUTION INTRAVENOUS EVERY 6 HOURS PRN
Status: DISCONTINUED | OUTPATIENT
Start: 2023-10-04 | End: 2023-10-20

## 2023-10-04 RX ORDER — LORAZEPAM 2 MG/ML
0.5 INJECTION INTRAMUSCULAR EVERY 4 HOURS
Status: DISCONTINUED | OUTPATIENT
Start: 2023-10-04 | End: 2023-10-05

## 2023-10-04 RX ORDER — SODIUM CHLORIDE, SODIUM LACTATE, POTASSIUM CHLORIDE, CALCIUM CHLORIDE 600; 310; 30; 20 MG/100ML; MG/100ML; MG/100ML; MG/100ML
INJECTION, SOLUTION INTRAVENOUS CONTINUOUS PRN
Status: DISCONTINUED | OUTPATIENT
Start: 2023-10-04 | End: 2023-10-04

## 2023-10-04 RX ORDER — HALOPERIDOL 5 MG/ML
5 INJECTION INTRAMUSCULAR EVERY 4 HOURS PRN
Status: DISCONTINUED | OUTPATIENT
Start: 2023-10-04 | End: 2023-10-11

## 2023-10-04 RX ORDER — FOLIC ACID 5 MG/ML
1 INJECTION, SOLUTION INTRAMUSCULAR; INTRAVENOUS; SUBCUTANEOUS DAILY
Status: COMPLETED | OUTPATIENT
Start: 2023-10-05 | End: 2023-10-06

## 2023-10-04 RX ORDER — FLUMAZENIL 0.1 MG/ML
0.2 INJECTION, SOLUTION INTRAVENOUS
Status: DISCONTINUED | OUTPATIENT
Start: 2023-10-04 | End: 2023-10-27 | Stop reason: HOSPADM

## 2023-10-04 RX ORDER — THIAMINE HYDROCHLORIDE 100 MG/ML
200 INJECTION, SOLUTION INTRAMUSCULAR; INTRAVENOUS 3 TIMES DAILY
Status: COMPLETED | OUTPATIENT
Start: 2023-10-04 | End: 2023-10-06

## 2023-10-04 RX ORDER — METRONIDAZOLE 500 MG/100ML
500 INJECTION, SOLUTION INTRAVENOUS EVERY 8 HOURS
Status: DISCONTINUED | OUTPATIENT
Start: 2023-10-04 | End: 2023-10-16

## 2023-10-04 RX ORDER — POTASSIUM CHLORIDE 29.8 MG/ML
20 INJECTION INTRAVENOUS ONCE
Status: COMPLETED | OUTPATIENT
Start: 2023-10-04 | End: 2023-10-04

## 2023-10-04 RX ORDER — FENTANYL CITRATE-0.9 % NACL/PF 10 MCG/ML
PLASTIC BAG, INJECTION (ML) INTRAVENOUS
Status: DISCONTINUED
Start: 2023-10-04 | End: 2023-10-04 | Stop reason: HOSPADM

## 2023-10-04 RX ORDER — EPHEDRINE SULFATE 50 MG/ML
INJECTION, SOLUTION INTRAMUSCULAR; INTRAVENOUS; SUBCUTANEOUS PRN
Status: DISCONTINUED | OUTPATIENT
Start: 2023-10-04 | End: 2023-10-04

## 2023-10-04 RX ORDER — FOLIC ACID 5 MG/ML
1 INJECTION, SOLUTION INTRAMUSCULAR; INTRAVENOUS; SUBCUTANEOUS ONCE
Status: COMPLETED | OUTPATIENT
Start: 2023-10-04 | End: 2023-10-04

## 2023-10-04 RX ORDER — FOLIC ACID 1 MG/1
1 TABLET ORAL DAILY
Status: DISCONTINUED | OUTPATIENT
Start: 2023-10-07 | End: 2023-10-27

## 2023-10-04 RX ORDER — SODIUM CHLORIDE 9 MG/ML
INJECTION, SOLUTION INTRAVENOUS CONTINUOUS PRN
Status: DISCONTINUED | OUTPATIENT
Start: 2023-10-04 | End: 2023-10-04

## 2023-10-04 RX ADMIN — INSULIN ASPART 2 UNITS: 100 INJECTION, SOLUTION INTRAVENOUS; SUBCUTANEOUS at 23:54

## 2023-10-04 RX ADMIN — CLINDAMYCIN PHOSPHATE 900 MG: 900 INJECTION, SOLUTION INTRAVENOUS at 04:01

## 2023-10-04 RX ADMIN — POTASSIUM CHLORIDE 20 MEQ: 29.8 INJECTION, SOLUTION INTRAVENOUS at 05:41

## 2023-10-04 RX ADMIN — Medication 5 MG: at 10:18

## 2023-10-04 RX ADMIN — ACETAMINOPHEN 975 MG: 325 TABLET ORAL at 02:42

## 2023-10-04 RX ADMIN — PHENYLEPHRINE HYDROCHLORIDE 100 MCG: 10 INJECTION INTRAVENOUS at 09:39

## 2023-10-04 RX ADMIN — DEXMEDETOMIDINE HYDROCHLORIDE 0.5 MCG/KG/HR: 400 INJECTION INTRAVENOUS at 08:00

## 2023-10-04 RX ADMIN — PHENYLEPHRINE HYDROCHLORIDE 100 MCG: 10 INJECTION INTRAVENOUS at 08:25

## 2023-10-04 RX ADMIN — PHENYLEPHRINE HYDROCHLORIDE 100 MCG: 10 INJECTION INTRAVENOUS at 09:46

## 2023-10-04 RX ADMIN — SODIUM CHLORIDE, POTASSIUM CHLORIDE, SODIUM LACTATE AND CALCIUM CHLORIDE: 600; 310; 30; 20 INJECTION, SOLUTION INTRAVENOUS at 09:47

## 2023-10-04 RX ADMIN — FENTANYL CITRATE 50 MCG: 50 INJECTION, SOLUTION INTRAMUSCULAR; INTRAVENOUS at 09:59

## 2023-10-04 RX ADMIN — FOLIC ACID 1 MG: 5 INJECTION, SOLUTION INTRAMUSCULAR; INTRAVENOUS; SUBCUTANEOUS at 15:52

## 2023-10-04 RX ADMIN — INSULIN ASPART 2 UNITS: 100 INJECTION, SOLUTION INTRAVENOUS; SUBCUTANEOUS at 03:58

## 2023-10-04 RX ADMIN — LEVOTHYROXINE SODIUM 112 MCG: 0.11 TABLET ORAL at 05:20

## 2023-10-04 RX ADMIN — Medication 25 MCG: at 17:38

## 2023-10-04 RX ADMIN — THIAMINE HYDROCHLORIDE 200 MG: 100 INJECTION, SOLUTION INTRAMUSCULAR; INTRAVENOUS at 15:52

## 2023-10-04 RX ADMIN — LORAZEPAM 1 MG: 2 INJECTION INTRAMUSCULAR; INTRAVENOUS at 00:30

## 2023-10-04 RX ADMIN — Medication 0.06 MCG/KG/MIN: at 02:41

## 2023-10-04 RX ADMIN — LORAZEPAM 1 MG: 2 INJECTION INTRAMUSCULAR; INTRAVENOUS at 12:46

## 2023-10-04 RX ADMIN — INSULIN ASPART 2 UNITS: 100 INJECTION, SOLUTION INTRAVENOUS; SUBCUTANEOUS at 15:52

## 2023-10-04 RX ADMIN — LORAZEPAM 0.5 MG: 2 INJECTION INTRAMUSCULAR; INTRAVENOUS at 21:55

## 2023-10-04 RX ADMIN — PIPERACILLIN AND TAZOBACTAM 3.38 G: 3; .375 INJECTION, POWDER, LYOPHILIZED, FOR SOLUTION INTRAVENOUS at 00:26

## 2023-10-04 RX ADMIN — INSULIN ASPART 2 UNITS: 100 INJECTION, SOLUTION INTRAVENOUS; SUBCUTANEOUS at 00:35

## 2023-10-04 RX ADMIN — DEXMEDETOMIDINE HYDROCHLORIDE 0.5 MCG/KG/HR: 400 INJECTION INTRAVENOUS at 17:12

## 2023-10-04 RX ADMIN — VANCOMYCIN HYDROCHLORIDE 1250 MG: 500 INJECTION, POWDER, LYOPHILIZED, FOR SOLUTION INTRAVENOUS at 06:51

## 2023-10-04 RX ADMIN — CLINDAMYCIN PHOSPHATE 900 MG: 900 INJECTION, SOLUTION INTRAVENOUS at 18:29

## 2023-10-04 RX ADMIN — Medication 0.06 MCG/KG/MIN: at 14:02

## 2023-10-04 RX ADMIN — INSULIN ASPART 2 UNITS: 100 INJECTION, SOLUTION INTRAVENOUS; SUBCUTANEOUS at 20:50

## 2023-10-04 RX ADMIN — INSULIN ASPART 2 UNITS: 100 INJECTION, SOLUTION INTRAVENOUS; SUBCUTANEOUS at 11:52

## 2023-10-04 RX ADMIN — Medication 50 MCG/HR: at 00:30

## 2023-10-04 RX ADMIN — CLINDAMYCIN PHOSPHATE 900 MG: 900 INJECTION, SOLUTION INTRAVENOUS at 11:33

## 2023-10-04 RX ADMIN — Medication 5 MG: at 09:52

## 2023-10-04 RX ADMIN — PHENYLEPHRINE HYDROCHLORIDE 100 MCG: 10 INJECTION INTRAVENOUS at 08:12

## 2023-10-04 RX ADMIN — SODIUM CHLORIDE, POTASSIUM CHLORIDE, SODIUM LACTATE AND CALCIUM CHLORIDE: 600; 310; 30; 20 INJECTION, SOLUTION INTRAVENOUS at 12:52

## 2023-10-04 RX ADMIN — PHENYLEPHRINE HYDROCHLORIDE 100 MCG: 10 INJECTION INTRAVENOUS at 10:22

## 2023-10-04 RX ADMIN — Medication 5 MG: at 08:51

## 2023-10-04 RX ADMIN — ACETAMINOPHEN 975 MG: 325 TABLET ORAL at 17:12

## 2023-10-04 RX ADMIN — Medication 10 MG: at 10:06

## 2023-10-04 RX ADMIN — PIPERACILLIN AND TAZOBACTAM 3.38 G: 3; .375 INJECTION, POWDER, LYOPHILIZED, FOR SOLUTION INTRAVENOUS at 15:52

## 2023-10-04 RX ADMIN — FENTANYL CITRATE 50 MCG: 50 INJECTION, SOLUTION INTRAMUSCULAR; INTRAVENOUS at 09:40

## 2023-10-04 RX ADMIN — METRONIDAZOLE 500 MG: 500 INJECTION, SOLUTION INTRAVENOUS at 12:46

## 2023-10-04 RX ADMIN — SODIUM CHLORIDE: 0.9 INJECTION, SOLUTION INTRAVENOUS at 07:44

## 2023-10-04 RX ADMIN — ROCURONIUM BROMIDE 50 MG: 50 INJECTION, SOLUTION INTRAVENOUS at 07:54

## 2023-10-04 RX ADMIN — THIAMINE HYDROCHLORIDE 200 MG: 100 INJECTION, SOLUTION INTRAMUSCULAR; INTRAVENOUS at 21:55

## 2023-10-04 RX ADMIN — METRONIDAZOLE 500 MG: 500 INJECTION, SOLUTION INTRAVENOUS at 22:02

## 2023-10-04 RX ADMIN — DEXMEDETOMIDINE HYDROCHLORIDE 0.5 MCG/KG/HR: 400 INJECTION INTRAVENOUS at 00:26

## 2023-10-04 RX ADMIN — PHENYLEPHRINE HYDROCHLORIDE 100 MCG: 10 INJECTION INTRAVENOUS at 09:59

## 2023-10-04 RX ADMIN — PANTOPRAZOLE SODIUM 40 MG: 40 INJECTION, POWDER, FOR SOLUTION INTRAVENOUS at 05:19

## 2023-10-04 RX ADMIN — LORAZEPAM 0.5 MG: 2 INJECTION INTRAMUSCULAR; INTRAVENOUS at 17:10

## 2023-10-04 RX ADMIN — ROCURONIUM BROMIDE 50 MG: 50 INJECTION, SOLUTION INTRAVENOUS at 09:08

## 2023-10-04 RX ADMIN — SENNOSIDES AND DOCUSATE SODIUM 1 TABLET: 50; 8.6 TABLET ORAL at 22:03

## 2023-10-04 RX ADMIN — LORAZEPAM 1 MG: 2 INJECTION INTRAMUSCULAR; INTRAVENOUS at 05:19

## 2023-10-04 ASSESSMENT — ACTIVITIES OF DAILY LIVING (ADL)
ADLS_ACUITY_SCORE: 47
ADLS_ACUITY_SCORE: 30
CHANGE_IN_FUNCTIONAL_STATUS_SINCE_ONSET_OF_CURRENT_ILLNESS/INJURY: YES
DOING_ERRANDS_INDEPENDENTLY_DIFFICULTY: NO
ADLS_ACUITY_SCORE: 30
ADLS_ACUITY_SCORE: 30
FALL_HISTORY_WITHIN_LAST_SIX_MONTHS: NO
DRESSING/BATHING_DIFFICULTY: NO
TOILETING_ISSUES: NO
ADLS_ACUITY_SCORE: 30
WALKING_OR_CLIMBING_STAIRS_DIFFICULTY: NO
ADLS_ACUITY_SCORE: 47
CONCENTRATING,_REMEMBERING_OR_MAKING_DECISIONS_DIFFICULTY: NO
WEAR_GLASSES_OR_BLIND: NO
ADLS_ACUITY_SCORE: 47
DIFFICULTY_EATING/SWALLOWING: NO
ADLS_ACUITY_SCORE: 47
ADLS_ACUITY_SCORE: 47
ADLS_ACUITY_SCORE: 30

## 2023-10-04 ASSESSMENT — LIFESTYLE VARIABLES: TOBACCO_USE: 1

## 2023-10-04 NOTE — PROGRESS NOTES
PROVIDER RESTRAINT FOR NON-VIOLENT BEHAVIOR FACE TO FACE EVALUATION    Patient's Immediate Situation:  Patient demonstrated the following behaviors: impulsive; pulls at critical lines and tubes. Does not respond to redirection.     Patient's Reaction to the intervention:  Does patient understand the reason for restraint/seclusion? Unable to express     Medical Condition:  Is there any evidence of compromise of Skin integrity, Respiratory, Cardiovascular, Musculoskeletal, Hydration?   No    Behavioral Condition:  In consultation with the RN, is there a need to continue this restraint or seclusion? Yes    See Restraint Flowsheet for complete restraint documentation and assessment.    Nel Lowry, CNP  [facility] Pulmonary/Critical Care

## 2023-10-04 NOTE — ANESTHESIA PREPROCEDURE EVALUATION
Anesthesia Pre-Procedure Evaluation    Patient: Lidia Nam   MRN: [id number] : 1960        Procedure : Procedure(s):  INCISION AND DRAINAGE, PERINEUM          History reviewed. No pertinent past medical history.   Past Surgical History:   Procedure Laterality Date     EXCISE LESION PERINEAL N/A 10/2/2023    Procedure: vulvectomy;  Surgeon: eKn Howard MD;  Location: [facility] OR     IRRIGATION AND DEBRIDEMENT LOWER EXTREMITY, COMBINED Left 10/2/2023    Procedure: and left thigh;  Surgeon: Ken Howard MD;  Location: [facility] OR     IRRIGATION AND DEBRIDEMENT SACRAL WOUND, COMBINED Left 10/2/2023    Procedure: Broad debridment of left buttock;  Surgeon: Ken Howard MD;  Location: [facility] OR     PICC TRIPLE LUMEN PLACEMENT  10/3/2023     ZZC APPENDECTOMY      Description: Appendectomy;  Recorded: 2008;     ZZC LIGATE FALLOPIAN TUBE      Description: Tubal Ligation;  Recorded: 2008;      Allergies   Allergen Reactions     Erythromycin      Psyllium      Seasonal Allergies      Hay fever - tree pollens, dust, mold, mildew      Social History     Tobacco Use     Smoking status: Every Day     Packs/day: 1.00     Years: 39.00     Pack years: 39.00     Types: Cigarettes     Smokeless tobacco: Never   Substance Use Topics     Alcohol use: Yes      Wt Readings from Last 1 Encounters:   10/04/23 94.2 kg (207 lb 11.2 oz)        Anesthesia Evaluation            ROS/MED HX  ENT/Pulmonary:     (+)                tobacco use, Current use,                      Neurologic:  - neg neurologic ROS     Cardiovascular:  - neg cardiovascular ROS     METS/Exercise Tolerance: >4 METS    Hematologic:  - neg hematologic  ROS     Musculoskeletal:  - neg musculoskeletal ROS     GI/Hepatic:  - neg GI/hepatic ROS     Renal/Genitourinary:  - neg Renal ROS     Endo:  - neg endo ROS   (+)  type II DM,        thyroid problem,     Obesity,       Psychiatric/Substance Use:  - neg  psychiatric ROS     Infectious Disease:  - neg infectious disease ROS     Malignancy:  - neg malignancy ROS     Other:  - neg other ROS             OUTSIDE LABS:  CBC:   Lab Results   Component Value Date    WBC 16.8 (H) 10/04/2023    WBC 19.3 (H) 10/03/2023    HGB 9.0 (L) 10/04/2023    HGB 11.1 (L) 10/03/2023    HCT 27.5 (L) 10/04/2023    HCT 32.8 (L) 10/03/2023     10/04/2023     10/03/2023     BMP:   Lab Results   Component Value Date     10/04/2023     (L) 10/03/2023    POTASSIUM 3.6 10/04/2023    POTASSIUM 4.1 10/03/2023    CHLORIDE 105 10/04/2023    CHLORIDE 102 10/03/2023    CO2 21 (L) 10/04/2023    CO2 17 (L) 10/03/2023    BUN 34.0 (H) 10/04/2023    BUN 37.0 (H) 10/03/2023    CR 0.68 10/04/2023    CR 0.57 10/03/2023     (H) 10/04/2023     (H) 10/04/2023     COAGS:   Lab Results   Component Value Date    PTT 28 10/02/2023    INR 1.26 (H) 10/02/2023    FIBR 1,090 (H) 10/02/2023     POC: No results found for: BGM, HCG, HCGS  HEPATIC:   Lab Results   Component Value Date    ALBUMIN 1.7 (L) 10/03/2023    PROTTOTAL 4.8 (L) 10/03/2023    ALT 25 10/03/2023    AST 24 10/03/2023    ALKPHOS 104 10/03/2023    BILITOTAL 0.5 10/03/2023     OTHER:   Lab Results   Component Value Date    PH 7.40 10/03/2023    LACT 2.3 (H) 10/03/2023    A1C 6.8 (H) 08/01/2023    JW 8.1 (L) 10/04/2023    MAG 2.4 (H) 10/04/2023    TSH 3.68 10/02/2023    T4 0.78 (L) 10/02/2023       Anesthesia Plan    ASA Status:  4       Anesthesia Type: General.     - Airway: ETT         Techniques and Equipment:     - Airway: Video-Laryngoscope       Consents            Postoperative Care    Pain management: IV analgesics, Oral pain medications.   PONV prophylaxis: Ondansetron (or other 5HT-3), Dexamethasone or Solumedrol     Comments:    Other Comments: Vaso, NE available              Gurpreet Miranda DO

## 2023-10-04 NOTE — ANESTHESIA POSTPROCEDURE EVALUATION
Patient: Lidia Nam    Procedure: Procedure(s):  INCISION AND DRAINAGE, PERINEUM AND LEFT LOWER EXTREMITY       Anesthesia Type:  General    Note:  Disposition: ICU            ICU Sign Out: Anesthesiologist/ICU physician sign out WAS performed   Postop Pain Control: Uneventful            Sign Out: Well controlled pain   PONV: No   Neuro/Psych: Uneventful            Sign Out: Acceptable/Baseline neuro status   Airway/Respiratory:             Sign Out: AIRWAY IN SITU/Resp. Support; Acceptable/Baseline resp. status               Airway in situ/Resp. Support: ETT   CV/Hemodynamics: Uneventful            Sign Out: Acceptable CV status; No obvious hypovolemia; No obvious fluid overload   Other NRE:    DID A NON-ROUTINE EVENT OCCUR? No         Last vitals:  Vitals:    10/04/23 1200 10/04/23 1215 10/04/23 1230   BP: 95/56 97/55 99/59   Pulse: 68 67 67   Resp: 20 20 20   Temp: (!) 35.7  C (96.3  F) (!) 35.8  C (96.4  F) (!) 35.9  C (96.6  F)   SpO2: 97% 98% 98%       Electronically Signed By: Gurpreet Miranda DO  October 4, 2023  2:04 PM

## 2023-10-04 NOTE — OP NOTE
General Surgery Operative Note    Date of Surgery: 10/4/2023     Surgeon: Julito Molina MD    Assistant:   Roddy Johns MD - His assistance was required for retraction  Td Silva PA-C - Her assistance was required for retraction    Preoperative Diagnosis: Necrotizing soft tissue infection    Postoperative Diagnosis: Necrotizing soft tissue infection    Procedure:  1.  Incision and drainage of perineal and lower abdominal wall necrotizing soft tissue infection  2.  Incision and drainage of posterior left lower extremity necrotizing soft tissue infection    EBL: 200 cc    Findings: Infection was tracking anteriorly from her previous incision up onto the mons and anterior abdominal wall.  Once in prone position, the infection tracked all the way down to her ankle and required opening of the entire length of the leg.  The anterior wound measured 25 x 19 x 6 cm and the posterior wound measured 60 x 20 x 2 cm.    Indications:  Patient is a 63-year-old woman who presented to the emergency department 36 hours ago with a necrotizing soft tissue infection of her perineum and groin and left leg.  She was initially debrided emergently by my partner and was recovering in the intensive care unit.  She was improving and all of her pressor requirements.  I took her back to the OR today for a second look and further debridement.  I discussed the surgical plans with her  Arjun who consented to the procedure.    Details of operation:  Patient was brought to the operating room and placed on the table in the supine position.  She was already intubated and sedated.  She was placed in lithotomy position.  All of her dressings were removed.  The groin was prepped with Betadine and draped appropriately.  A timeout for safety was performed.    The patient had a small blister of dirty dishwater fluid over her mons at the midline.  I incised the tissue at this area.  It was necrotic immediately beneath.  I was able to pass my  finger superiorly and I opened the wound more using electrocautery.  I continued my dissection through the subcutaneous tissue and I was quickly able to make a tract that connected this superior wound with the groin wound.  This was opened completely.  I continued to debride necrotic tissue sharply with Chiu scissors until I reached bleeding tissue.  I was all the way down to muscle.  Her sartorius had evidence of necrosis.  I cut off some of the tissue and found some healthy muscle beneath.  Hemostasis was achieved.  I used a Pulsavac to clean up the area.  The final measurements of the wound were 25 x 16 x 6 cm anteriorly.  I turned my attention to the posterior left leg and it was clear that this infection tracked inferiorly.  I was able to pass my finger over the fascia with no resistance.  I felt that we needed to footprint of the prone position to really address this.  Consequently we went ahead and packed the anterior portion of the wound with 3 rolls of Betadine soaked Kerlix.    We then placed the patient on ice second bed in the prone position.  She was reprepped with Betadine and draped.  I began at the superior aspect of the wound and cut off all of the clearly necrotic tissue and some flaps of skin that were obviously devitalized.  I continued my debridement superiorly until I had evaluated all the pockets and there were no other clear sources of infection superiorly.  Hemostasis was achieved applied.  I then began going down inferiorly on the posterior aspect of the leg.  I opened up the skin and it was clear that the patient had true necrotizing fasciitis that was tracking down her fascial planes.  This went all the way to the knee.  I spent some time clearing off the posterior thigh all the way down to the muscles.  The infection was in between all the muscles.  When the fascia was stripped the muscle itself did appear to be viable.  At this point I asked my partner Dr. Johns to join me to assist.   Proceeding further inferiorly we are able to pass her finger over the fascia with no resistance.  We continue to open the skin all the way down to the ankle.  We removed some of the devitalized skin on the margins.  We cleaned up the obviously dead fascia.  There was a small portion of the gastrocnemius that was necrotic it was excised.  We ultimately met resistance at the ankle and it appeared viable and healthy at that level.  At this point we felt that we had opened all of the areas of necrosis.  5 rolls of Betadine soaked Kerlix were laid into the wound that ultimately measured 60 x 20 x 2 cm.  ABDs and Kerlix were used to complete the dressing.  Patient was rolled back onto her ICU bed and taken to the ICU.  She remained hemodynamically stable throughout the case.    Julito Molina MD  General Surgeon  [facility]  Surgery [address] [address]  Office: [phone]

## 2023-10-04 NOTE — PROGRESS NOTES
RT PROGRESS NOTE    VENT DAY# Ventilation Day(s): 3    CURRENT SETTINGS:   Vent Mode: CMV/AC  (Continuous Mandatory Ventilation/ Assist Control)  FiO2 (%): 40 %  Resp Rate (Set): 20 breaths/min  Tidal Volume (Set, mL): 400 mL  PEEP (cm H2O): 5 cmH2O  Resp: 20      PATIENT PARAMETERS:  Ventilator - Patient   Patient Resp Rate : 20 breaths/min  Expiratory Vt (ml): 410  Minute Volume (ml): 8.22 L/min  Peak Inspiratory Pressure (cm H2O): 24 cmH2O  Mean Airway Pressure (cm H2O): 10 cmH2O  Plateau Pressure (cm H2O): 19 cmH2O  Dynamic Compliance (mL/cm H2O): 26 mL/cm H2O  Airway Resistance: 21  Auto/ Intrinsic PEEP (cm H2O): 1 cmH2O     SBT completed: No  Secretions:   Breath Sounds:     ETT Cuffed Single;Single Subglottic Suction 7.5 mm-Secured at (cm): 23 cm at teeth/gums  Emergency Ambu bag, mask and peep valve at bedside.     Last Arterial Blood Gas:  pH Arterial   Date Value Ref Range Status   10/04/2023 7.38 7.37 - 7.44 Final     pCO2 Arterial   Date Value Ref Range Status   10/04/2023 34 (L) 35 - 45 mm Hg Final     pO2 Arterial   Date Value Ref Range Status   10/04/2023 90 80 - 90 mm Hg Final     Bicarbonate Arterial   Date Value Ref Range Status   10/04/2023 20 (L) 23 - 29 mmol/L Final     O2 Sat, Arterial   Date Value Ref Range Status   10/04/2023 97.2 (H) 96.0 - 97.0 % Final     Base Excess/Deficit   Date Value Ref Range Status   10/04/2023 -5.0   mmol/L Final       NOTE / SHIFT SUMMARY:     Today's Changes  Pt has remained on the ventilator w/above settings. No weaning today. Pt was brought down to OR and back w/out any incident. Will wean as able.     Skin checked and tube moved Q2, this responsibility is shared between RN and RT. Endotracheal tube cuff assessed and appropriate at the time of the assessment.   RT to continue to assess and monitor cardiopulmonary status while in the ICU. For additional vital checks and ventilator check information please see flowsheets.    Diana Taylor, RT

## 2023-10-04 NOTE — PLAN OF CARE
"[facility] - ICU    RN Progress Note:            Pertinent Assessments:      Please refer to flowsheet rows for full assessment     Remained sedated most of the shift, came down on her sedation medication. Refer E-MAR for detail. Patient grimaces and appears restless during dressing changes. Rate adjusted of sedation meds for dressing changes. Patient was febrile beginning of shift, fever trending down and back to normal. Dressing changed over night a it was saturated.  BP stable on Levophed, patient off the floor for another I& D.           Key Events - This Shift:       See above.                     Barriers to Discharge / Downgrade:     Vent weaning, sedation, BP             Problem: Plan of Care - These are the overarching goals to be used throughout the patient stay.    Goal: Patient-Specific Goal (Individualized)  Description: You can add care plan individualizations to a care plan. Examples of Individualization might be:  \"Parent requests to be called daily at 9am for status\", \"I have a hard time hearing out of my right ear\", or \"Do not touch me to wake me up as it startles me\".  Outcome: Progressing   Goal Outcome Evaluation:      Plan of Care Reviewed With: patient    Overall Patient Progress: improvingOverall Patient Progress: improving           "

## 2023-10-04 NOTE — PHARMACY-VANCOMYCIN DOSING SERVICE
Pharmacy Vancomycin Note  Date of Service 2023  Patient's  1960   63 year old, female    Indication:necrotizing soft tissue infection   Day of Therapy: 3  Current vancomycin regimen:  1250 mg IV q12h  Current vancomycin monitoring method: AUC  Current vancomycin therapeutic monitoring goal: 400-600 mg*h/L    Insight[employer] Prediction of Current Vancomycin Regimen  Regimen: 1250 mg IV every 12 hours.  Start time: 18:51 on 10/04/2023  Exposure target: AUC24 (range)400-600 mg/L.hr   AUC24,ss: 615 mg/L.hr  Probability of AUC24 > 400: 99 %  Ctrough,ss: 19.9 mg/L  Probability of Ctrough,ss > 20: 49 %  Probability of nephrotoxicity (Lodise JOY ): 17 %      Current estimated CrCl = Estimated Creatinine Clearance: 91.7 mL/min (based on SCr of 0.68 mg/dL).    Creatinine for last 3 days  10/2/2023:  6:25 PM Creatinine 0.84 mg/dL;  6:37 PM Creatinine POCT 0.9 mg/dL  10/3/2023: 12:11 AM Creatinine 0.56 mg/dL;  5:21 AM Creatinine 0.57 mg/dL  10/4/2023:  3:57 AM Creatinine 0.68 mg/dL    Recent Vancomycin Levels (past 3 days)  10/4/2023:  3:57 AM Vancomycin 18.7 ug/mL    Vancomycin IV Administrations (past 72 hours)                     vancomycin (VANCOCIN) 1,250 mg in sodium chloride 0.9 % 250 mL intermittent infusion (mg) 1,250 mg New Bag 10/04/23 0651     1,250 mg New Bag 10/03/23 1858     1,250 mg New Bag  0611    vancomycin (VANCOCIN) 2,000 mg in sodium chloride 0.9 % 500 mL intermittent infusion (mg) 2,000 mg New Bag 10/02/23 1921                    Nephrotoxins and other renal medications (From now, onward)      Start     Dose/Rate Route Frequency Ordered Stop    10/05/23 0000  vancomycin (VANCOCIN) 1,500 mg in sodium chloride 0.9 % 250 mL intermittent infusion         1,500 mg  over 90 Minutes Intravenous EVERY 18 HOURS 10/04/23 0733      10/03/23 0500  vasopressin (VASOSTRICT) 20 Units in sodium chloride 0.9 % 100 mL standard conc infusion         1-2.4 Units/hr  5-12 mL/hr  Intravenous CONTINUOUS 10/02/23  "2348      10/03/23 0030  piperacillin-tazobactam (ZOSYN) 3.375 g vial to attach to  mL bag        Note to Pharmacy: For [facility], [facility] and [facility]: For Zosyn-naive patients, use the \"Zosyn initial dose + extended infusion\" order panel.    3.375 g  over 240 Minutes Intravenous EVERY 8 HOURS 10/02/23 2348      10/03/23 0000  norepinephrine (LEVOPHED) 4 mg in  mL infusion PREMIX         0.01-0.6 mcg/kg/min × 91.2 kg  3.4-205.2 mL/hr  Intravenous CONTINUOUS 10/02/23 2348                 Contrast Orders - past 72 hours (72h ago, onward)      Start     Dose/Rate Route Frequency Stop    10/02/23 1900  iopamidol (ISOVUE-370) solution 90 mL         90 mL Intravenous ONCE 10/02/23 1855            Interpretation of levels and current regimen:  Vancomycin level is reflective of AUC greater than 600    Has serum creatinine changed greater than 50% in last 72 hours: No    Renal Function: Stable    Insight[employer] Prediction of Planned New Vancomycin Regimen  Regimen: 1500 mg IV every 18 hours.  Start time: 18:51 on 10/04/2023  Exposure target: AUC24 (range)400-600 mg/L.hr   AUC24,ss: 499 mg/L.hr  Probability of AUC24 > 400: 89 %  Ctrough,ss: 14.3 mg/L  Probability of Ctrough,ss > 20: 11 %  Probability of nephrotoxicity (Lodise JOY 2009): 9 %      Plan:  Change dose to 1500 mg IV q18.  Vancomycin monitoring method: AUC  Vancomycin therapeutic monitoring goal: 400-600 mg*h/L  Pharmacy will check vancomycin levels as appropriate in 1-3 Days.  Serum creatinine levels will be ordered daily for the first week of therapy and at least twice weekly for subsequent weeks.    Bernie Hale [facility]    "

## 2023-10-04 NOTE — PROGRESS NOTES
Patient transported to and from OR w/out incident. Pt placed back onto mechanical ventilator. RT will continue to follow.    Diana Taylor, RT

## 2023-10-04 NOTE — PROGRESS NOTES
Care Management Follow Up    Length of Stay (days): 2    Expected Discharge Date: 10/09/2023     Concerns to be Addressed:   Vent Day#3    Patient plan of care discussed at interdisciplinary rounds: Yes    Anticipated Discharge Disposition:  TBD     Anticipated Discharge Services:    Anticipated Discharge DME:      Patient/family educated on Medicare website which has current facility and service quality ratings:    Education Provided on the Discharge Plan:    Patient/Family in Agreement with the Plan:      Referrals Placed by CM/SW:  none at this time  Private pay costs discussed: Not applicable    Additional Information:  Social history:  Assessed with pt  as pt is intubated. Per pt's  pt lives with him in a house. She has not been taking care of herself, not bathing, laying in bed most days.  says she drinks 6-12 beers/day and smokes, and not eating properly.  helps with all IADLs.     Chart reviewed and plan of care discussed in ICU rounds. Per discussion, pt remains vented and sedated, during rounds she was down in the OR for her second I&D of the perineal area for necrotizing soft tissue infection. According to brief surgery note 10/4 the infection turned out to be more extensive and now the decision needs to be made if the wound can be managed at this hospital or if pt needs to be transferred.    RNCM to follow for medical progression, recommendations, and final discharge plan.      Rona Perez RN

## 2023-10-04 NOTE — PROGRESS NOTES
Admitted 10/3 overnight to ICU with necrotizing fasciitis of left groin, severe sepsis, septic shock.    S/P numerous surgical debridements.  Patient intubated.  Requiring pressor support.  Her care in ICU is managed by intensivist, general surgery, ID.  D/W ICU NP-ICU team will continue managing patient.  [facility] has nothing to offer to POC at this time and will follow peripherally.  Chart reviewed.  Cat Ramsey MD

## 2023-10-04 NOTE — PROGRESS NOTES
RT PROGRESS NOTE    VENT DAY# 3    CURRENT SETTINGS:   Vent Mode: CMV/AC  (Continuous Mandatory Ventilation/ Assist Control)  FiO2 (%): 30 %  Resp Rate (Set): 20 breaths/min  Tidal Volume (Set, mL): 400 mL  PEEP (cm H2O): 5 cmH2O  Resp: 20      PATIENT PARAMETERS:  PIP 21  Pplat:  15  Pmean:  9.2  Compliance: 30.8  Secretions:  scant white thick  02 Sats:  97%  BS: coarse /diminished    ETT SIZE 7.5 Secured at 23 cm at teeth/gums    Respiratory Medications: None     NOTE / SHIFT SUMMARY:   Patient on full vent support all night. No vent changes made. RT will continue to follow.    Candy Palmer, RT

## 2023-10-04 NOTE — PROGRESS NOTES
"Critical Care Progress Note  10/4/2023   2:18 PM    Admit Date: 10/2/2023  ICU Admission Day: 10/3  Code Status: DNR      Problem List:   Principal Problem:    Severe sepsis (H)  Active Problems:    Type 2 diabetes mellitus with hyperglycemia, without long-term current use of insulin (H)    Necrotizing soft tissue infection      Major changes for today:    - goals of care discussion with family.   - returned to OR This morning for debridement  - try to transfer to hospital with burn unit for extensive wound care - [facility] and [facility] are both at capacity.      Plan by System:     Neuro/Psych: sedation for vent sedation and comfort. Hx of alcohol abuse -  reportes \"6-12 beers per day\"    -  RASS 0 to -1    - Precedex and Fentanyl for RASS goal    - Lorazepam added for alcohol withdrawal    - CIWA orders entered: MVI, thiamine, folate, etc.    - not waking up much on very little sedation. May need head CT.     Pulmonary: acute hypoxic respiratory failure; difficult airway due to large goiter.    - Vent Mode: CMV/AC  (Continuous Mandatory Ventilation/ Assist Control)  FiO2 (%): 40 %  Resp Rate (Set): 20 breaths/min  Tidal Volume (Set, mL): 400 mL  PEEP (cm H2O): 5 cmH2O  Resp: 20    - not appropriate for SBT at this time     CV: Septic shock from necrotizing fasciitis; fluid resuscitated with 4L crystalloid.    - MAP goal > 65    - Norepi for MAP goal.    - Add vaso if needed    GI/: no acute issues   - Diet: NPO   - Last BM: PTA    - Bowel meds: prn    - will be difficult to keep wound clean when she starts stooling. Will likely need rectal tube at the least and more likely diverting colostomy down the line.     Renal: No acute issues; lactic acidosis from septic shock resolved.    - Strict I/O    - Adequate UOP at this time.     ID: Septic shock from necrotizing fasciitis. Extensive involvement of the perineum, L groin, and left leg down to the ankle.    - Gram stain from left buttock tissue showing 2+ GPB, 1+ " "GNB, 1+ GPC    - Sputum with GNB    - ID following and directing antibiotics.     * Vanc, Zosyn, Clinda     * Flagyl added today     * ongoing surgical debridement for source control. May require amputation.     Heme/Coag:  no acute issues     - DVT proph: enoxaparin on hold    Endocrine: Hypothryroid on levothyroxine; large goiter; DM  -  levothyroxine   - sliding scale insulin q4h     Family: Extensive conversation with  (Arjun) and son (Joby). They are in agreement that Lidia would not want to \"live on machines\" and are not sure she would be able to tolerate going through the extensive rehab that will be needed should she survive this hospitalization. Dr PAUL Molina laid out what ongoing aggressive care would entail including further surgeries, skin grafts, plastics; the possibility of requiring diverting colostomy. Arjun and Joby do not think she would want this, but want to give a chance to recover from the infection. They stated they would like her to be DNR and if her condition were to rapidly deteriorate, they would likely move to comfort care.      Clinically Significant Risk Factors   # Coagulation Defect: INR = 1.26 (Ref range: 0.85 - 1.15) and/or PTT = 28 Seconds (Ref range: 22 - 38 Seconds), will monitor for bleeding          # DMII: A1C = 6.8 % (Ref range: 0.0 - 5.6 %) within past 6 months, PRESENT ON ADMISSION  # Obesity: Estimated body mass index is 37.99 kg/m  as calculated from the following:    Height as of this encounter: 1.575 m (5' 2\").    Weight as of this encounter: 94.2 kg (207 lb 11.2 oz)., PRESENT ON ADMISSION           Code Status: No CPR- Pre-arrest intubation OK           Dispo: ICU    Nel Lowry, CNP  [facility] Pulmonary/Critical Care    Total critical care time: 60-minutes  I have personally provided 60 minutes of critical care time exclusive of time spent on separately billable procedures. "   _______________________________________________________________    HPI: 63 year old woman with hx of T2DM, obesity, tobacco abuse, alcohol abuse, hypothyroid, goiter. Presented on 10/2/23 with dyspnea, cough, generalized weakness. Additionally, had increased pain, redness, and malodorous drainage from a wound in her left inguinal/vaginal area. CT scan showed necrotizing fasciitis and she was emergently taken to the OR for vulvectomy and broad debridement of the left buttock and left thigh. She returned to the ICU on pressors and was febrile through most of the day. Taken back to OR this morning and required extensive debridement from the left buttock, perineum, down to the left ankle. She required intubation by ENT due to large goiter.     ROS: MANAS - intubated and sedated     Events over last 24-hours: no acute changes overnight     Objective:     Vitals:    10/04/23 1315 10/04/23 1330 10/04/23 1345 10/04/23 1400   BP: 97/57 94/56 96/54 93/52   Pulse: 69 69 70 71   Resp: 20 20 20 20   Temp: 97.5  F (36.4  C) 97.9  F (36.6  C) 98.1  F (36.7  C) 98.4  F (36.9  C)   TempSrc:       SpO2: 99% 99% 99% 99%   Weight:       Height:           Vent:   Day of Intubation: 10/2  Ready to wean? No  Vent Mode: CMV/AC  (Continuous Mandatory Ventilation/ Assist Control)  FiO2 (%): 40 %  Resp Rate (Set): 20 breaths/min  Tidal Volume (Set, mL): 400 mL  PEEP (cm H2O): 5 cmH2O  Resp: 20      Sedative Infusion: precedex and fentanyl  Sedation vacation: No    I/O:   Intake/Output Summary (Last 24 hours) at 10/4/2023 1418  Last data filed at 10/4/2023 1400  Gross per 24 hour   Intake 4896.2 ml   Output 1625 ml   Net 3271.2 ml     Wt Readings from Last 3 Encounters:   10/04/23 94.2 kg (207 lb 11.2 oz)   05/01/23 101 kg (222 lb 11.2 oz)   03/14/23 103.9 kg (229 lb)      Weight change: 3.039 kg (6 lb 11.2 oz)    Physical Exam:  Gen: intubated and sedated.   HEENMT: AT/NC; large goiter.   NEURO: sedated   CARDIOVASCULAR: RRR S1S2 no  murmur  PULMONARY: unlabored on full vent; lungs are clear and equal  GASTROINTESTINAL: soft ntnd  INTEGUMENT: left leg is wrapped in kerlix from groin down.   PSYCH: sedated    Labs:   Recent Labs   Lab 10/04/23  0357 10/03/23  0521    134*   CO2 21* 17*   BUN 34.0* 37.0*   ALKPHOS  --  104   ALT  --  25   AST  --  24       Recent Labs   Lab 10/04/23  0357   WBC 16.8*   HGB 9.0*   HCT 27.5*          Micro:   10/4 Tissue , left leg - pending  10/3 Sputum - 3+ H.flu   10/2 Tissue, left buttock - 2+ Gram positive bacilli, 1+ Gram negative bacilli, 1+ Gram positive cocci   10/2 Tissue, vulva - 3+ Gram positive bacilli, 3+ Gram positive cocci, 2+ Gram negative bacilli     Imaging: all imaging personalized reviewed   10/4 CXR - Endotracheal tube terminates 4.0 cm above the suresh. Right upper extremity PICC in the lower SVC. Enteric decompression tube descends below the diaphragm, but the tip is outside the field-of-view.   Decreased left basilar airspace opacities compared to prior. No pleural effusion or pneumothorax. Normal cardiomediastinal silhouette.    10/3 CXR - ET tube approximately 3.5 cm above the suresh. NG tube passes into the stomach. Right PICC with tip in the distal SVC. There is no pneumothorax. The heart size is normal. There is infiltrate in the left lower lung. The right lung is clear.     10/2 CT Abd/pelvis - 1.  Findings compatible with clinically suspected necrotizing fasciitis of the left groin, perineum, and visualized left lower extremity. No drainable fluid collection to suggest abscess.  2.  Hepatomegaly and hepatic steatosis.        Nel Lowry, CNP  [facility] Pulmonary/Critical Care

## 2023-10-04 NOTE — PLAN OF CARE
[facility] - ICU    RN Progress Note:            Pertinent Assessments:      Please refer to flowsheet rows for full assessment     Rdz patent and draining. Hypothermic this am, adan hugger applied. Adan hugger removed this afternoon. No output from NG.        Key Events - This Shift:       Code status changed this afternoon. Dressing re enforced this evening. Grimacing when turning, no response otherwise.        CHAD SAT (Sedation Awakening Trial): For use ONLY if intubated    SAT Safety Screen Not Applicable   If FAILED why?    SAT Performed Not Applicable   If FAILED why?               Barriers to Discharge / Downgrade:     Pressors, vent         Point of Contact Update YES-OR-NO: Yes  If No, reason:   Name:Liz  Phone Number:  Summary of Conversation: At bedside, discussed plan of care with provider and surgeon.        Goal Outcome Evaluation:

## 2023-10-04 NOTE — PROGRESS NOTES
General Surgery Brief Note    Patient's necrotizing soft tissue infection turned out to be much more extensive than I initially anticipated requiring opening of her entire left leg.  At this point, definitive treatment of this infection and wound cannot be managed at this hospital.  I think she will need to transfer to either [facility] or [facility].  I discussed this with the daughter who is hesitant to proceed with any further interventions right now.  She is thinking that palliative approach would be more in the patient's wishes.  She wants to talk with her father and brother and they can come to the decision as a family about her care.  For now we will keep her here but if they want to pursue aggressive treatment, we make arrangements for transfer.  In the meantime we can keep her dressings in place.  If she is still here in pursuing aggressive cares by tomorrow, we may consider return to the operating room versus a bedside dressing change.    Julito Molina MD  General Surgeon  [facility]  Surgery [address] [address]  Office: [phone]

## 2023-10-04 NOTE — ANESTHESIA CARE TRANSFER NOTE
Patient: Lidia Nam    Procedure: Procedure(s):  INCISION AND DRAINAGE, PERINEUM AND LEFT LOWER EXTREMITY       Diagnosis: Necrotizing soft tissue infection [M79.89]  Diagnosis Additional Information: No value filed.    Anesthesia Type:   General     Note:    Oropharynx: endotracheal tube in place, oral gastic tube in place and ventilatory support  Level of Consciousness: unresponsive      Independent Airway: airway patency not satisfactory and stable  Dentition: dentition unchanged  Vital Signs Stable: post-procedure vital signs reviewed and stable  Report to RN Given: handoff report given  Patient transferred to: ICU  Comments: To ICU with all monitors on. 100% O2 via vent with RT transport. Placed on vent per RT  ICU Handoff: Call for PAUSE to initiate/utilize ICU HANDOFF, Identified Patient, Identified Responsible Provider, Reviewed the Pertinent Medical History, Discussed Surgical Course, Reviewed Intra-OP Anesthesia Management and Issues during Anesthesia, Set Expectations for Post Procedure Period and Allowed Opportunity for Questions and Acknowledgement of Understanding      Vitals:  Vitals Value Taken Time   BP     Temp 35.3  C (95.54  F) 10/04/23 1052   Pulse 71 10/04/23 1052   Resp 20 10/04/23 1052   SpO2 94 % 10/04/23 1052   Vitals shown include unvalidated device data.    Electronically Signed By: PEDRITO Jimenes CRNA  October 4, 2023  10:53 AM

## 2023-10-04 NOTE — PROGRESS NOTES
"INFECTIOUS DISEASE FOLLOW UP NOTE    Date: 10/04/2023   CHIEF COMPLAINT:   Chief Complaint   Patient presents with    Groin Pain    Shortness of Breath    Generalized Weakness        ASSESSMENT:    Necrotizing fasciitis: CT with necrotizing fasciitis L groin, perineum, and visualized LLE now down to ankle. s/p OR 10/2, GS and cultures are polymicrobial. Repeat OR 10/4 with progressive disease.   Severe sepsis: pressors in ICU  DM2  Goiter: ENT intubated in OR.     PLAN:  - vanc, zosyn, clinda  - add flagyl  - needs ongoing source control  - follow cultures-GS polymicrobial, cultures pending  - reviewed operative report  - guarded prognosis, discussed with nursing staff  - note plans to transfer  - will follow    Veronica Wilburn MD  [facility]   On-Call: [phone]     ______________________________________________________________________    SUBJECTIVE / INTERVAL HISTORY: repeat OR today, extensive infection with debridement to ankle. Considering transfer.     ROS: All other systems negative except as listed above.    SH/FH/Habits/PMH reviewed and unchanged.    OBJECTIVE:  BP 95/58   Pulse 73   Temp (!) 96.3  F (35.7  C)   Resp 21   Ht 1.575 m (5' 2\")   Wt 94.2 kg (207 lb 11.2 oz)   LMP  (LMP Unknown)   SpO2 96%   BMI 37.99 kg/m    FiO2 (%): 30 %  Vent Mode: CMV/AC  (Continuous Mandatory Ventilation/ Assist Control)  FiO2 (%): 30 %  Resp Rate (Set): 20 breaths/min  Tidal Volume (Set, mL): 400 mL  PEEP (cm H2O): 5 cmH2O  Resp: 21      Vital Signs  Temp: (!) 96.3  F (35.7  C)  Temp src:  (adan hugger applied)  Resp: 21  Pulse: 73  Pulse Rate Source: Monitor  BP: 95/58  BP Location: Left arm    Temp (24hrs), Av.7  F (38.2  C), Min:96.3  F (35.7  C), Max:102.6  F (39.2  C)      GEN: intubated  RESPIRATORY:  intubated  CARDIOVASCULAR:  tachy  ABDOMEN:  Soft, normal bowel sounds, non-tender,   EXTREMITIES: No edema.  SKIN/HAIR/NAILS:  all lower extremities wraps.   IV: central " oz      Antibiotics:  Zosyn  Clinda  vanc    Pertinent labs:  No results found for: CRP   CBC RESULTS:   Recent Labs   Lab Test 10/04/23  0357   WBC 16.8*   RBC 2.84*   HGB 9.0*   HCT 27.5*   MCV 97   MCH 31.7   MCHC 32.7   RDW 14.6         Last Comprehensive Metabolic Panel:  Sodium   Date Value Ref Range Status   10/04/2023 137 135 - 145 mmol/L Final     Comment:     Reference intervals for this test were updated on 09/26/2023 to more accurately reflect our healthy population. There may be differences in the flagging of prior results with similar values performed with this method. Interpretation of those prior results can be made in the context of the updated reference intervals.      Potassium   Date Value Ref Range Status   10/04/2023 3.6 3.4 - 5.3 mmol/L Final   06/23/2020 4.4 3.5 - 5.0 mmol/L Final     Chloride   Date Value Ref Range Status   10/04/2023 105 98 - 107 mmol/L Final   06/23/2020 98 98 - 107 mmol/L Final     Carbon Dioxide (CO2)   Date Value Ref Range Status   10/04/2023 21 (L) 22 - 29 mmol/L Final   06/23/2020 32 (H) 22 - 31 mmol/L Final     Anion Gap   Date Value Ref Range Status   10/04/2023 11 7 - 15 mmol/L Final   06/23/2020 5 5 - 18 mmol/L Final     Glucose   Date Value Ref Range Status   10/04/2023 197 (H) 70 - 99 mg/dL Final   06/23/2020 115 70 - 125 mg/dL Final     GLUCOSE BY METER POCT   Date Value Ref Range Status   10/04/2023 193 (H) 70 - 99 mg/dL Final     Urea Nitrogen   Date Value Ref Range Status   10/04/2023 34.0 (H) 8.0 - 23.0 mg/dL Final   06/23/2020 8 8 - 22 mg/dL Final     Creatinine   Date Value Ref Range Status   10/04/2023 0.68 0.51 - 0.95 mg/dL Final     GFR Estimate   Date Value Ref Range Status   10/04/2023 >90 >60 mL/min/1.73m2 Final   06/23/2020 >60 >60 mL/min/1.73m2 Final     GFR, ESTIMATED POCT   Date Value Ref Range Status   10/02/2023 >60 >60 mL/min/1.73m2 Final     Calcium   Date Value Ref Range Status   10/04/2023 8.1 (L) 8.8 - 10.2 mg/dL Final         MICROBIOLOGY DATA:  Personally reviewed.  7-Day Micro Results       Collected Updated Procedure Result Status      10/04/2023 1027 10/04/2023 1109 Anaerobic Bacterial Culture Routine [[id number]]   Tissue from Leg, left    In process Component Value   No component results               10/04/2023 1027 10/04/2023 1109 Gram Stain [[id number]]   Tissue from Leg, left    In process Component Value   No component results            10/04/2023 1027 10/04/2023 1109 Tissue Aerobic Bacterial Culture Routine [[id number]]   Tissue from Leg, left    In process Component Value   No component results               10/03/2023 0252 10/04/2023 1006 Respiratory Aerobic Bacterial Culture with Gram Stain [[id number]]   (Abnormal)   Sputum from Endotracheal    Preliminary result Component Value   Culture Culture in progress  [P]    Gram Stain Result >25 PMNs/low power field  [P]     3+ Gram negative bacilli  [P]                10/02/2023 2228 10/04/2023 1046 Anaerobic Bacterial Culture Routine [[id number]]   Tissue from Buttock, Left    Preliminary result Component Value   Culture No anaerobic organisms isolated after 1 day  [P]                10/02/2023 2228 10/03/2023 1135 Gram Stain [[id number]]   (Abnormal)   Tissue from Buttock, Left    Edited Result - FINAL Component Value   Gram Stain Result 2+ Gram positive bacilli  [C]    Gram Stain Result 1+ Gram negative bacilli  [C]    Gram Stain Result 1+ Gram positive cocci  [C]    Gram Stain Result No white blood cells seen  [C]    This is an appended report. These results have been appended to a previously final verified report.            10/02/2023 2228 10/02/2023 2325 Tissue Aerobic Bacterial Culture Routine [[id number]]   Tissue from Buttock, Left    In process Component Value   No component results               10/02/2023 2227 10/04/2023 1102 Anaerobic Bacterial Culture Routine [[id number]]   Tissue from Vulva    Preliminary result Component Value   Culture No  anaerobic organisms isolated after 1 day  [P]                10/02/2023 2227 10/03/2023 1138 Gram Stain [[id number]]   (Abnormal)   Tissue from Vulva    Final result Component Value   Gram Stain Result 3+ Gram positive bacilli   Gram Stain Result 3+ Gram positive cocci   Gram Stain Result 2+ Gram negative bacilli   Gram Stain Result 1+ WBC seen            10/02/2023 2227 10/04/2023 0740 Tissue Aerobic Bacterial Culture Routine [[id number]]   Tissue from Vulva    Preliminary result Component Value   Culture Culture in progress  [P]                10/02/2023 1829 10/02/2023 1910 Symptomatic Influenza A/B, RSV, & SARS-CoV2 PCR (COVID-19) Nasopharyngeal [[id number]]    Swab from Nasopharyngeal    Final result Component Value   Influenza A PCR Negative   Influenza B PCR Negative   RSV PCR Negative   SARS CoV2 PCR Negative   NEGATIVE: SARS-CoV-2 (COVID-19) RNA not detected, presumed negative.            10/02/2023 1825 10/03/2023 2132 Blood Culture Peripheral Blood [[id number]]   Peripheral Blood    Preliminary result Component Value   Culture No growth after 1 day  [P]                10/02/2023 1825 10/03/2023 2132 Blood Culture Peripheral Blood [[id number]]   Peripheral Blood    Preliminary result Component Value   Culture No growth after 1 day  [P]                         RADIOLOGY:  Personally Reviewed.  Recent Results (from the past 24 hour(s))   XR Chest Port 1 View    Narrative    EXAM: XR CHEST PORT 1 VIEW  LOCATION: [facility]  DATE: 10/4/2023    INDICATION: ETT position  COMPARISON: 10/3/2023      Impression    IMPRESSION: Endotracheal tube terminates 4.0 cm above the suresh. Right upper extremity PICC in the lower SVC. Enteric decompression tube descends below the diaphragm, but the tip is outside the field-of-view.    Decreased left basilar airspace opacities compared to prior. No pleural effusion or pneumothorax. Normal cardiomediastinal silhouette.       Principal  Problem:    Severe sepsis (H)  Active Problems:    Type 2 diabetes mellitus with hyperglycemia, without long-term current use of insulin (H)    Necrotizing soft tissue infection

## 2023-10-05 LAB
ANION GAP SERPL CALCULATED.3IONS-SCNC: 9 MMOL/L (ref 7–15)
BACTERIA SPT CULT: ABNORMAL
BLD PROD TYP BPU: NORMAL
BLD PROD TYP BPU: NORMAL
BLOOD COMPONENT TYPE: NORMAL
BLOOD COMPONENT TYPE: NORMAL
BUN SERPL-MCNC: 28.4 MG/DL (ref 8–23)
CALCIUM SERPL-MCNC: 7.4 MG/DL (ref 8.8–10.2)
CHLORIDE SERPL-SCNC: 110 MMOL/L (ref 98–107)
CODING SYSTEM: NORMAL
CODING SYSTEM: NORMAL
CREAT SERPL-MCNC: 0.63 MG/DL (ref 0.51–0.95)
CREAT SERPL-MCNC: 0.68 MG/DL (ref 0.51–0.95)
CROSSMATCH: NORMAL
CROSSMATCH: NORMAL
DEPRECATED HCO3 PLAS-SCNC: 20 MMOL/L (ref 22–29)
EGFRCR SERPLBLD CKD-EPI 2021: >90 ML/MIN/1.73M2
EGFRCR SERPLBLD CKD-EPI 2021: >90 ML/MIN/1.73M2
ERYTHROCYTE [DISTWIDTH] IN BLOOD BY AUTOMATED COUNT: 14.8 % (ref 10–15)
GLUCOSE BLDC GLUCOMTR-MCNC: 190 MG/DL (ref 70–99)
GLUCOSE BLDC GLUCOMTR-MCNC: 191 MG/DL (ref 70–99)
GLUCOSE BLDC GLUCOMTR-MCNC: 197 MG/DL (ref 70–99)
GLUCOSE BLDC GLUCOMTR-MCNC: 200 MG/DL (ref 70–99)
GLUCOSE BLDC GLUCOMTR-MCNC: 216 MG/DL (ref 70–99)
GLUCOSE SERPL-MCNC: 187 MG/DL (ref 70–99)
GRAM STAIN RESULT: ABNORMAL
GRAM STAIN RESULT: ABNORMAL
HCT VFR BLD AUTO: 20.2 % (ref 35–47)
HGB BLD-MCNC: 6.6 G/DL (ref 11.7–15.7)
HGB BLD-MCNC: 9.6 G/DL (ref 11.7–15.7)
ISSUE DATE AND TIME: NORMAL
ISSUE DATE AND TIME: NORMAL
MAGNESIUM SERPL-MCNC: 2 MG/DL (ref 1.7–2.3)
MCH RBC QN AUTO: 32.4 PG (ref 26.5–33)
MCHC RBC AUTO-ENTMCNC: 32.7 G/DL (ref 31.5–36.5)
MCV RBC AUTO: 99 FL (ref 78–100)
PATH REPORT.COMMENTS IMP SPEC: NORMAL
PATH REPORT.FINAL DX SPEC: NORMAL
PATH REPORT.GROSS SPEC: NORMAL
PATH REPORT.MICROSCOPIC SPEC OTHER STN: NORMAL
PATH REPORT.RELEVANT HX SPEC: NORMAL
PHOTO IMAGE: NORMAL
PLATELET # BLD AUTO: 132 10E3/UL (ref 150–450)
POTASSIUM SERPL-SCNC: 4 MMOL/L (ref 3.4–5.3)
POTASSIUM SERPL-SCNC: 4.1 MMOL/L (ref 3.4–5.3)
RBC # BLD AUTO: 2.04 10E6/UL (ref 3.8–5.2)
SODIUM SERPL-SCNC: 139 MMOL/L (ref 135–145)
UNIT ABO/RH: NORMAL
UNIT ABO/RH: NORMAL
UNIT NUMBER: NORMAL
UNIT NUMBER: NORMAL
UNIT STATUS: NORMAL
UNIT STATUS: NORMAL
UNIT TYPE ISBT: 7300
UNIT TYPE ISBT: 7300
WBC # BLD AUTO: 14.5 10E3/UL (ref 4–11)

## 2023-10-05 PROCEDURE — 258N000003 HC RX IP 258 OP 636: Performed by: INTERNAL MEDICINE

## 2023-10-05 PROCEDURE — 250N000013 HC RX MED GY IP 250 OP 250 PS 637: Performed by: SURGERY

## 2023-10-05 PROCEDURE — 999N000287 HC ICU ADULT ROUNDING, EACH 10 MINS

## 2023-10-05 PROCEDURE — C9113 INJ PANTOPRAZOLE SODIUM, VIA: HCPCS | Mod: JZ | Performed by: SURGERY

## 2023-10-05 PROCEDURE — 85018 HEMOGLOBIN: CPT | Performed by: NURSE PRACTITIONER

## 2023-10-05 PROCEDURE — 80048 BASIC METABOLIC PNL TOTAL CA: CPT | Performed by: NURSE PRACTITIONER

## 2023-10-05 PROCEDURE — 250N000013 HC RX MED GY IP 250 OP 250 PS 637: Performed by: NURSE PRACTITIONER

## 2023-10-05 PROCEDURE — 999N000157 HC STATISTIC RCP TIME EA 10 MIN

## 2023-10-05 PROCEDURE — 94003 VENT MGMT INPAT SUBQ DAY: CPT

## 2023-10-05 PROCEDURE — 250N000011 HC RX IP 250 OP 636: Performed by: NURSE PRACTITIONER

## 2023-10-05 PROCEDURE — 250N000009 HC RX 250: Performed by: SURGERY

## 2023-10-05 PROCEDURE — 999N000009 HC STATISTIC AIRWAY CARE

## 2023-10-05 PROCEDURE — 99233 SBSQ HOSP IP/OBS HIGH 50: CPT | Performed by: INTERNAL MEDICINE

## 2023-10-05 PROCEDURE — 250N000011 HC RX IP 250 OP 636: Performed by: SURGERY

## 2023-10-05 PROCEDURE — 999N000248 HC STATISTIC IV INSERT WITH US BY RN

## 2023-10-05 PROCEDURE — 999N000055 HC STATISTIC END TITIAL CO2 MONITORING

## 2023-10-05 PROCEDURE — P9016 RBC LEUKOCYTES REDUCED: HCPCS | Performed by: NURSE PRACTITIONER

## 2023-10-05 PROCEDURE — 82565 ASSAY OF CREATININE: CPT | Performed by: INTERNAL MEDICINE

## 2023-10-05 PROCEDURE — 200N000001 HC R&B ICU

## 2023-10-05 PROCEDURE — 84132 ASSAY OF SERUM POTASSIUM: CPT | Performed by: NURSE PRACTITIONER

## 2023-10-05 PROCEDURE — 258N000003 HC RX IP 258 OP 636: Performed by: NURSE PRACTITIONER

## 2023-10-05 PROCEDURE — 85027 COMPLETE CBC AUTOMATED: CPT | Performed by: NURSE PRACTITIONER

## 2023-10-05 PROCEDURE — 83735 ASSAY OF MAGNESIUM: CPT | Performed by: NURSE PRACTITIONER

## 2023-10-05 PROCEDURE — 250N000011 HC RX IP 250 OP 636: Mod: JZ | Performed by: INTERNAL MEDICINE

## 2023-10-05 PROCEDURE — 99291 CRITICAL CARE FIRST HOUR: CPT | Performed by: NURSE PRACTITIONER

## 2023-10-05 RX ORDER — LEVOTHYROXINE SODIUM 112 UG/1
112 TABLET ORAL DAILY
Status: DISCONTINUED | OUTPATIENT
Start: 2023-10-06 | End: 2023-10-27 | Stop reason: HOSPADM

## 2023-10-05 RX ORDER — GUAIFENESIN 600 MG/1
15 TABLET, EXTENDED RELEASE ORAL DAILY
Status: DISCONTINUED | OUTPATIENT
Start: 2023-10-06 | End: 2023-10-12

## 2023-10-05 RX ORDER — ACETAMINOPHEN 325 MG/10.15ML
975 LIQUID ORAL EVERY 8 HOURS
Status: DISCONTINUED | OUTPATIENT
Start: 2023-10-05 | End: 2023-10-08

## 2023-10-05 RX ORDER — LORAZEPAM 2 MG/ML
0.5 INJECTION INTRAMUSCULAR EVERY 6 HOURS
Status: DISCONTINUED | OUTPATIENT
Start: 2023-10-05 | End: 2023-10-08

## 2023-10-05 RX ORDER — POLYETHYLENE GLYCOL 3350 17 G/17G
17 POWDER, FOR SOLUTION ORAL DAILY
Status: DISCONTINUED | OUTPATIENT
Start: 2023-10-06 | End: 2023-10-27 | Stop reason: HOSPADM

## 2023-10-05 RX ORDER — MAGNESIUM SULFATE HEPTAHYDRATE 40 MG/ML
2 INJECTION, SOLUTION INTRAVENOUS ONCE
Status: COMPLETED | OUTPATIENT
Start: 2023-10-05 | End: 2023-10-05

## 2023-10-05 RX ADMIN — PIPERACILLIN AND TAZOBACTAM 3.38 G: 3; .375 INJECTION, POWDER, LYOPHILIZED, FOR SOLUTION INTRAVENOUS at 08:13

## 2023-10-05 RX ADMIN — PIPERACILLIN AND TAZOBACTAM 3.38 G: 3; .375 INJECTION, POWDER, LYOPHILIZED, FOR SOLUTION INTRAVENOUS at 00:07

## 2023-10-05 RX ADMIN — Medication 0.06 MCG/KG/MIN: at 03:54

## 2023-10-05 RX ADMIN — PANTOPRAZOLE SODIUM 40 MG: 40 INJECTION, POWDER, FOR SOLUTION INTRAVENOUS at 05:30

## 2023-10-05 RX ADMIN — Medication 25 MCG: at 19:45

## 2023-10-05 RX ADMIN — VANCOMYCIN HYDROCHLORIDE 1500 MG: 500 INJECTION, POWDER, LYOPHILIZED, FOR SOLUTION INTRAVENOUS at 00:07

## 2023-10-05 RX ADMIN — ACETAMINOPHEN 975 MG: 325 SOLUTION ORAL at 17:46

## 2023-10-05 RX ADMIN — CLINDAMYCIN PHOSPHATE 900 MG: 900 INJECTION, SOLUTION INTRAVENOUS at 18:16

## 2023-10-05 RX ADMIN — DEXMEDETOMIDINE HYDROCHLORIDE 0.5 MCG/KG/HR: 400 INJECTION INTRAVENOUS at 08:06

## 2023-10-05 RX ADMIN — MAGNESIUM SULFATE HEPTAHYDRATE 2 G: 40 INJECTION, SOLUTION INTRAVENOUS at 05:33

## 2023-10-05 RX ADMIN — DEXMEDETOMIDINE HYDROCHLORIDE 0.6 MCG/KG/HR: 400 INJECTION INTRAVENOUS at 15:07

## 2023-10-05 RX ADMIN — LORAZEPAM 0.5 MG: 2 INJECTION INTRAMUSCULAR; INTRAVENOUS at 08:11

## 2023-10-05 RX ADMIN — ACETAMINOPHEN 975 MG: 325 TABLET ORAL at 01:03

## 2023-10-05 RX ADMIN — CLINDAMYCIN PHOSPHATE 900 MG: 900 INJECTION, SOLUTION INTRAVENOUS at 04:14

## 2023-10-05 RX ADMIN — Medication 1 TABLET: at 08:12

## 2023-10-05 RX ADMIN — FOLIC ACID 1 MG: 5 INJECTION, SOLUTION INTRAMUSCULAR; INTRAVENOUS; SUBCUTANEOUS at 08:12

## 2023-10-05 RX ADMIN — INSULIN ASPART 2 UNITS: 100 INJECTION, SOLUTION INTRAVENOUS; SUBCUTANEOUS at 08:12

## 2023-10-05 RX ADMIN — THIAMINE HYDROCHLORIDE 200 MG: 100 INJECTION, SOLUTION INTRAMUSCULAR; INTRAVENOUS at 10:35

## 2023-10-05 RX ADMIN — PIPERACILLIN AND TAZOBACTAM 3.38 G: 3; .375 INJECTION, POWDER, LYOPHILIZED, FOR SOLUTION INTRAVENOUS at 16:15

## 2023-10-05 RX ADMIN — CLINDAMYCIN PHOSPHATE 900 MG: 900 INJECTION, SOLUTION INTRAVENOUS at 12:08

## 2023-10-05 RX ADMIN — INSULIN ASPART 2 UNITS: 100 INJECTION, SOLUTION INTRAVENOUS; SUBCUTANEOUS at 04:08

## 2023-10-05 RX ADMIN — DEXMEDETOMIDINE HYDROCHLORIDE 0.5 MCG/KG/HR: 400 INJECTION INTRAVENOUS at 00:53

## 2023-10-05 RX ADMIN — SENNOSIDES 5 ML: 8.8 LIQUID ORAL at 20:26

## 2023-10-05 RX ADMIN — METRONIDAZOLE 500 MG: 500 INJECTION, SOLUTION INTRAVENOUS at 05:32

## 2023-10-05 RX ADMIN — LEVOTHYROXINE SODIUM 112 MCG: 0.11 TABLET ORAL at 06:49

## 2023-10-05 RX ADMIN — DEXMEDETOMIDINE HYDROCHLORIDE 0.6 MCG/KG/HR: 400 INJECTION INTRAVENOUS at 22:11

## 2023-10-05 RX ADMIN — DOCUSATE SODIUM 50 MG: 50 LIQUID ORAL at 20:25

## 2023-10-05 RX ADMIN — THIAMINE HYDROCHLORIDE 200 MG: 100 INJECTION, SOLUTION INTRAMUSCULAR; INTRAVENOUS at 20:25

## 2023-10-05 RX ADMIN — LORAZEPAM 0.5 MG: 2 INJECTION INTRAMUSCULAR; INTRAVENOUS at 14:45

## 2023-10-05 RX ADMIN — THIAMINE HYDROCHLORIDE 200 MG: 100 INJECTION, SOLUTION INTRAMUSCULAR; INTRAVENOUS at 14:45

## 2023-10-05 RX ADMIN — LORAZEPAM 0.5 MG: 2 INJECTION INTRAMUSCULAR; INTRAVENOUS at 19:07

## 2023-10-05 RX ADMIN — SODIUM CHLORIDE, POTASSIUM CHLORIDE, SODIUM LACTATE AND CALCIUM CHLORIDE: 600; 310; 30; 20 INJECTION, SOLUTION INTRAVENOUS at 08:51

## 2023-10-05 RX ADMIN — ACETAMINOPHEN 975 MG: 325 TABLET ORAL at 10:36

## 2023-10-05 RX ADMIN — INSULIN ASPART 3 UNITS: 100 INJECTION, SOLUTION INTRAVENOUS; SUBCUTANEOUS at 16:20

## 2023-10-05 RX ADMIN — INSULIN ASPART 4 UNITS: 100 INJECTION, SOLUTION INTRAVENOUS; SUBCUTANEOUS at 20:04

## 2023-10-05 RX ADMIN — INSULIN ASPART 2 UNITS: 100 INJECTION, SOLUTION INTRAVENOUS; SUBCUTANEOUS at 12:08

## 2023-10-05 RX ADMIN — Medication 100 MCG/HR: at 14:45

## 2023-10-05 RX ADMIN — LORAZEPAM 0.5 MG: 2 INJECTION INTRAMUSCULAR; INTRAVENOUS at 05:53

## 2023-10-05 RX ADMIN — LORAZEPAM 0.5 MG: 2 INJECTION INTRAMUSCULAR; INTRAVENOUS at 00:07

## 2023-10-05 RX ADMIN — METRONIDAZOLE 500 MG: 500 INJECTION, SOLUTION INTRAVENOUS at 12:45

## 2023-10-05 RX ADMIN — Medication 0.04 MCG/KG/MIN: at 19:02

## 2023-10-05 RX ADMIN — METRONIDAZOLE 500 MG: 500 INJECTION, SOLUTION INTRAVENOUS at 20:26

## 2023-10-05 RX ADMIN — VANCOMYCIN HYDROCHLORIDE 1500 MG: 500 INJECTION, POWDER, LYOPHILIZED, FOR SOLUTION INTRAVENOUS at 17:50

## 2023-10-05 ASSESSMENT — ACTIVITIES OF DAILY LIVING (ADL)
ADLS_ACUITY_SCORE: 30
ADLS_ACUITY_SCORE: 34
ADLS_ACUITY_SCORE: 34
ADLS_ACUITY_SCORE: 30
ADLS_ACUITY_SCORE: 34
ADLS_ACUITY_SCORE: 30
ADLS_ACUITY_SCORE: 30
ADLS_ACUITY_SCORE: 34
ADLS_ACUITY_SCORE: 30
ADLS_ACUITY_SCORE: 34

## 2023-10-05 NOTE — PROGRESS NOTES
Care Management Follow Up    Length of Stay (days): 3    Expected Discharge Date: 10/09/2023     Concerns to be Addressed:  Vent Day#4, GOC, Hgb, May transfer to Burn unit for wound cares.  Patient plan of care discussed at interdisciplinary rounds: Yes    Anticipated Discharge Disposition:  TBD     Anticipated Discharge Services:    Anticipated Discharge DME:      Patient/family educated on Medicare website which has current facility and service quality ratings:    Education Provided on the Discharge Plan:    Patient/Family in Agreement with the Plan:      Referrals Placed by CM/SW:  none at this time.    Private pay costs discussed: Not applicable    Additional Information:  Social history:  Assessed with pt  as pt is intubated. Per pt's  pt lives with him in a house. She has not been taking care of herself, not bathing, laying in bed most days.  says she drinks 6-12 beers/day and smokes, and not eating properly.  helps with all IADLs.      Chart reviewed and plan of care discussed in ICU rounds. Per discussion, pt remains vented and sedated. Septic shock from necrotizing fascitis, trying to transfer to hosp with burn unit both [facility] and [facility] are both full. GOC conversations with family, No CPR, Pre arrest intubation OK. Family wants her to have a chance to recover from infection. plan to start tube feedings, bedside dressing change with surgery today.    RNCM to follow for medical progression, recommendations, and final discharge plan.          Rona Preez RN

## 2023-10-05 NOTE — CONSULTS
"CLINICAL NUTRITION SERVICES - ASSESSMENT NOTE     Nutrition Prescription    RECOMMENDATIONS FOR MDs/PROVIDERS TO ORDER:  Recommend discontinue/decrease LR once TF initiated     Malnutrition Status:    Unable to assess    Recommendations already ordered by Registered Dietitian (RD):  Following TF Placement and Verification of TF placement:   Initiate TF- Vital High Protein via OG at 15 ml/hr, advance by 10 ml q 8 hrs to goal 60 ml/hr x 22 hrs   Hold x1 hr before and after dosing levothyroxine  FWF: 30 ml q 4 hr or per MD    Future/Additional Recommendations:  Monitor TF start/adv/derek, wt, labs, I/Os, wound healing.      REASON FOR ASSESSMENT  Lidia Nam is a/an 63 year old female assessed by the dietitian for Provider Order - Registered Dietitian to Assess and Order TF per Medical Nutrition Therapy Protocol    HPI: Per MD, Pt w/ hx of hypothyroidism, DM2 and obesity who presented with groin pain to the ER. Pt has been complaining of sob and a wound in her L-inguinal area that has been draining pus and is severely malodorous. She was immediately given abx and had a CT done that showed necrotizing fasciitis. She was emergently taken to the OR where she underwent debridement 10/2.  Arrived to the ICU intubated on pressors    NUTRITION HISTORY  Discussed pt at care rounds this AM. Pt remains intubated in ICU, had an I&D 10/4. Per chart review spouse reports pt consumes \"6-12 beers per day\" LR running at 50 ml/hr, continues on pressors, with necrotizing fascitis. Pt with no BM this admit, per nursing good bowel sounds.   NKFA    Per flowsheets- OG placement confirmed via Xray per nursing documentation.     CURRENT NUTRITION ORDERS  Diet: NPO    LABS  Reviewed  BUN 28.4(H)   Glucose 187-200 last 24 hrs     MEDICATIONS  Reviewed   Continuous precedex, fentanyl, LR 50 ml/hr, levophed, vasopressin   Scheduled folic acid, novolog, levothyroxine (day 3), MVI/M, protonix, zosyn, miralax, senokot, thiamin, vancomycin " "    ANTHROPOMETRICS  Height: 157.5 cm (5' 2\")  Most Recent Weight: 96.7 kg (213 lb 3.2 oz)    IBW: 50 kg  BMI: Obesity Grade II BMI 35-39.9  Weight History: 4.3% wt loss x5 months, not clinically significant- wt loss likely masked by edema   Wt Readings from Last 10 Encounters:   10/05/23 96.7 kg (213 lb 3.2 oz)   05/01/23 101 kg (222 lb 11.2 oz)   03/14/23 103.9 kg (229 lb)   11/15/22 103.9 kg (229 lb)   09/07/22 105.1 kg (231 lb 12 oz)   08/31/22 102.1 kg (225 lb)   08/14/20 109.3 kg (241 lb)   06/23/20 97.5 kg (215 lb)      Dosing Weight: 90.8 kg lowest actual wt this admit for calories, 50 kg IBW for protein, 61.7 kg adjusted for fluid    ASSESSED NUTRITION NEEDS  Estimated Energy Needs: 5578-4594 kcals/day (14 - 17 kcals/kg actual wt)  Justification: Obese and Vented  Estimated Protein Needs: 100+ grams protein/day (>/= 2 grams of pro/kg IBW)  Justification: Increased needs  Estimated Fluid Needs: 6600-5311 mL/day (25 - 30 mL/kg adjusted wt)   Justification: Maintenance    PHYSICAL FINDINGS  See malnutrition section below.  Per Flowsheets:   +UOP   50 ml op OG LIS this AM, clamped   Last BM pta, bowel sounds present per nursing   Trace/Mild/Moderate edema   Obese abdomen   Incision/surgical site- bilateral perineum, buttocks, leg  Necrotizing fascitis     MALNUTRITION:  % Weight Loss:  Weight loss does not meet criteria for malnutrition, likely masked by edema   % Intake:  Decreased intake does not meet criteria for malnutrition, <50% needs for at least 3 days   Subcutaneous Fat Loss:  Unable to assess  Muscle Loss:  Unable to assess  Fluid Retention:  Mild/Moderate     Malnutrition Diagnosis: Unable to determine due to need for NFPE, nutrition hx    NUTRITION DIAGNOSIS  Inadequate oral intake related to intubation, sedation as evidenced by need for EN to meet nutrition needs.       INTERVENTIONS  Implementation  Following TF Placement and Verification of TF placement:   Initiate TF- Vital High Protein at 15 " ml/hr, advance by 10 ml q 8 hrs to goal 60 ml/hr x 22 hrs   Provides: 1320 ml/day, 1320 kcals, 115 gm protein, 147 gm cho, 0 gm fiber, 1104 ml free water, meets 100% estimated nutrition needs  Hold x1 hr before and after dosing levothyroxine  FWF: 30 ml q 4 hr or per MD  TF + FWF: provide 1284 ml free water/day     Goals  Pt to meet nutritional needs   Electrolytes WNL   BG < 180   Pt will tolerate TF  Wound healing      Monitoring/Evaluation  Progress toward goals will be monitored and evaluated per protocol.

## 2023-10-05 NOTE — PROGRESS NOTES
"INFECTIOUS DISEASE FOLLOW UP NOTE    Date: 10/05/2023   CHIEF COMPLAINT:   Chief Complaint   Patient presents with    Groin Pain    Shortness of Breath    Generalized Weakness        ASSESSMENT:    Necrotizing fasciitis: CT with necrotizing fasciitis L groin, perineum, and visualized LLE now down to ankle. s/p OR 10/2, GS and cultures are polymicrobial-bacteroides on culture so far. Repeat OR 10/4 with progressive disease.   Severe sepsis: pressors in ICU  DM2  Goiter: ENT intubated in OR.   Sputum with haemophilus and strep constellatus     PLAN:  - continue vanc, zosyn, clinda, flagyl  - needs ongoing source control  - follow cultures-GS polymicrobial, cultures pending with bacteroides so far  - reviewed operative report  - guarded prognosis, discussed with nursing staff  - note plans to transfer  - will follow    Veronica Wilburn MD  [facility]   On-Call: [phone]     ______________________________________________________________________    SUBJECTIVE / INTERVAL HISTORY: getting blood. Most micro pending-bacteroides so far. Discussed with bedside RN.     ROS: All other systems negative except as listed above.    SH/FH/Habits/PMH reviewed and unchanged.    OBJECTIVE:  /65   Pulse 55   Temp 98.6  F (37  C)   Resp 20   Ht 1.575 m (5' 2\")   Wt 96.7 kg (213 lb 3.2 oz)   LMP  (LMP Unknown)   SpO2 100%   BMI 38.99 kg/m    FiO2 (%): 30 %  Vent Mode: CMV/AC  (Continuous Mandatory Ventilation/ Assist Control)  FiO2 (%): 40 %  Resp Rate (Set): 20 breaths/min  Tidal Volume (Set, mL): 400 mL  PEEP (cm H2O): 5 cmH2O  Resp: 20      Vital Signs  Temp: (!) 96.3  F (35.7  C)  Temp src:  (adan hugger applied)  Resp: 21  Pulse: 73  Pulse Rate Source: Monitor  BP: 95/58  BP Location: Left arm    Temp (24hrs), Av.7  F (38.2  C), Min:96.3  F (35.7  C), Max:102.6  F (39.2  C)      GEN: intubated  RESPIRATORY:  intubated  CARDIOVASCULAR:  tachy  ABDOMEN:  Soft, normal bowel sounds, " non-tender,   EXTREMITIES: No edema.  SKIN/HAIR/NAILS:  all lower extremities wraps.   IV: central oz      Antibiotics:  Zosyn  Clinda  Vanc  flagyl    Pertinent labs:  No results found for: CRP   CBC RESULTS:   Recent Labs   Lab Test 10/04/23  0357   WBC 16.8*   RBC 2.84*   HGB 9.0*   HCT 27.5*   MCV 97   MCH 31.7   MCHC 32.7   RDW 14.6         Last Comprehensive Metabolic Panel:  Sodium   Date Value Ref Range Status   10/05/2023 139 135 - 145 mmol/L Final     Comment:     Reference intervals for this test were updated on 09/26/2023 to more accurately reflect our healthy population. There may be differences in the flagging of prior results with similar values performed with this method. Interpretation of those prior results can be made in the context of the updated reference intervals.      Potassium   Date Value Ref Range Status   10/05/2023 4.0 3.4 - 5.3 mmol/L Final   10/05/2023 4.1 3.4 - 5.3 mmol/L Final   06/23/2020 4.4 3.5 - 5.0 mmol/L Final     Chloride   Date Value Ref Range Status   10/05/2023 110 (H) 98 - 107 mmol/L Final   06/23/2020 98 98 - 107 mmol/L Final     Carbon Dioxide (CO2)   Date Value Ref Range Status   10/05/2023 20 (L) 22 - 29 mmol/L Final   06/23/2020 32 (H) 22 - 31 mmol/L Final     Anion Gap   Date Value Ref Range Status   10/05/2023 9 7 - 15 mmol/L Final   06/23/2020 5 5 - 18 mmol/L Final     Glucose   Date Value Ref Range Status   06/23/2020 115 70 - 125 mg/dL Final     GLUCOSE BY METER POCT   Date Value Ref Range Status   10/05/2023 197 (H) 70 - 99 mg/dL Final     Urea Nitrogen   Date Value Ref Range Status   10/05/2023 28.4 (H) 8.0 - 23.0 mg/dL Final   06/23/2020 8 8 - 22 mg/dL Final     Creatinine   Date Value Ref Range Status   10/05/2023 0.68 0.51 - 0.95 mg/dL Final   10/05/2023 0.63 0.51 - 0.95 mg/dL Final     GFR Estimate   Date Value Ref Range Status   10/05/2023 >90 >60 mL/min/1.73m2 Final   10/05/2023 >90 >60 mL/min/1.73m2 Final   06/23/2020 >60 >60 mL/min/1.73m2 Final      GFR, ESTIMATED POCT   Date Value Ref Range Status   10/02/2023 >60 >60 mL/min/1.73m2 Final     Calcium   Date Value Ref Range Status   10/05/2023 7.4 (L) 8.8 - 10.2 mg/dL Final        MICROBIOLOGY DATA:  Personally reviewed.  7-Day Micro Results       Collected Updated Procedure Result Status      10/04/2023 1027 10/04/2023 1109 Anaerobic Bacterial Culture Routine [[id number]]   Tissue from Leg, left    In process Component Value   No component results               10/04/2023 1027 10/04/2023 1719 Gram Stain [[id number]]   (Abnormal)   Tissue from Leg, left    Final result Component Value   Gram Stain Result 4+ Gram positive cocci   Gram Stain Result 4+ Gram positive bacilli   Gram Stain Result 3+ Gram negative bacilli   Gram Stain Result 4+ WBC seen   Predominantly PMNs            10/04/2023 1027 10/05/2023 1347 Tissue Aerobic Bacterial Culture Routine [[id number]]   Tissue from Leg, left    Preliminary result Component Value   Culture Culture in progress  [P]                10/03/2023 0252 10/05/2023 1134 Respiratory Aerobic Bacterial Culture with Gram Stain [[id number]]    (Abnormal)   Sputum from Endotracheal    Final result Component Value   Culture 2+ Normal felecia    3+ Haemophilus influenzae    1+ Streptococcus constellatus    Beta hemolytic strain  This organism is susceptible to ampicillin, penicillin, vancomycin and the cephalosporins. If treatment is required and your patient is allergic to penicillin, contact the microbiology lab within 5 days to request susceptibility testing.   Gram Stain Result >25 PMNs/low power field    3+ Gram negative bacilli        Susceptibility        Haemophilus influenzae      PETRA      Nitrocefin Positive   [1]                    [1]  Beta-lactamase positive  Beta-lactamase positive Haemophilus influenzae are resistant to ampicillin and are usually susceptible to amox/clavulanic acid, levofloxacin, and 3rd generation cephalosporins, such as ceftriaxone.                    10/02/2023 2228 10/05/2023 1152 Anaerobic Bacterial Culture Routine [[id number]]   (Abnormal)   Tissue from Buttock, Left    Preliminary result Component Value   Culture Culture in progress  [P]     2+ Bacteroides ovatus/xylanisolvens  [P]     Susceptibilities not routinely done, refer to antibiogram to view typical susceptibility profiles               10/02/2023 2228 10/03/2023 1135 Gram Stain [[id number]]   (Abnormal)   Tissue from Buttock, Left    Edited Result - FINAL Component Value   Gram Stain Result 2+ Gram positive bacilli  [C]    Gram Stain Result 1+ Gram negative bacilli  [C]    Gram Stain Result 1+ Gram positive cocci  [C]    Gram Stain Result No white blood cells seen  [C]    This is an appended report. These results have been appended to a previously final verified report.            10/02/2023 2228 10/05/2023 1323 Tissue Aerobic Bacterial Culture Routine [[id number]]   Tissue from Buttock, Left    Preliminary result Component Value   Culture Culture in progress  [P]                10/02/2023 2227 10/05/2023 1153 Anaerobic Bacterial Culture Routine [[id number]]   (Abnormal)   Tissue from Vulva    Preliminary result Component Value   Culture Culture in progress  [P]     3+ Bacteroides ovatus/xylanisolvens  [P]     Susceptibilities not routinely done, refer to antibiogram to view typical susceptibility profiles               10/02/2023 2227 10/03/2023 1138 Gram Stain [[id number]]   (Abnormal)   Tissue from Vulva    Final result Component Value   Gram Stain Result 3+ Gram positive bacilli   Gram Stain Result 3+ Gram positive cocci   Gram Stain Result 2+ Gram negative bacilli   Gram Stain Result 1+ WBC seen            10/02/2023 2227 10/05/2023 1325 Tissue Aerobic Bacterial Culture Routine [[id number]]   Tissue from Vulva    Preliminary result Component Value   Culture Culture in progress  [P]                10/02/2023 1829 10/02/2023 1910 Symptomatic Influenza A/B, RSV, & SARS-CoV2  PCR (COVID-19) Nasopharyngeal [[id number]]    Swab from Nasopharyngeal    Final result Component Value   Influenza A PCR Negative   Influenza B PCR Negative   RSV PCR Negative   SARS CoV2 PCR Negative   NEGATIVE: SARS-CoV-2 (COVID-19) RNA not detected, presumed negative.            10/02/2023 1825 10/04/2023 2132 Blood Culture Peripheral Blood [[id number]]   Peripheral Blood    Preliminary result Component Value   Culture No growth after 2 days  [P]                10/02/2023 1825 10/04/2023 2132 Blood Culture Peripheral Blood [[id number]]   Peripheral Blood    Preliminary result Component Value   Culture No growth after 2 days  [P]                         RADIOLOGY:  Personally Reviewed.  Recent Results (from the past 24 hour(s))   XR Chest Port 1 View    Narrative    EXAM: XR CHEST PORT 1 VIEW  LOCATION: [facility]  DATE: 10/4/2023    INDICATION: ETT position  COMPARISON: 10/3/2023      Impression    IMPRESSION: Endotracheal tube terminates 4.0 cm above the suresh. Right upper extremity PICC in the lower SVC. Enteric decompression tube descends below the diaphragm, but the tip is outside the field-of-view.    Decreased left basilar airspace opacities compared to prior. No pleural effusion or pneumothorax. Normal cardiomediastinal silhouette.       Principal Problem:    Severe sepsis (H)  Active Problems:    Type 2 diabetes mellitus with hyperglycemia, without long-term current use of insulin (H)    Necrotizing soft tissue infection    Septic shock (H)

## 2023-10-05 NOTE — PROGRESS NOTES
RT PROGRESS NOTE    VENT DAY# Ventilation Day(s): 4    CURRENT SETTINGS:   Vent Mode: CMV/AC  (Continuous Mandatory Ventilation/ Assist Control)  FiO2 (%): 40 %  Resp Rate (Set): 20 breaths/min  Tidal Volume (Set, mL): 400 mL  PEEP (cm H2O): 5 cmH2O  Resp: 20      PATIENT PARAMETERS:  Ventilator - Patient   Patient Resp Rate : 20 breaths/min  Expiratory Vt (ml): 407  Minute Volume (ml): 8.08 L/min  Peak Inspiratory Pressure (cm H2O): 21 cmH2O  Mean Airway Pressure (cm H2O): 9.2 cmH2O  Plateau Pressure (cm H2O): 18 cmH2O  Dynamic Compliance (mL/cm H2O): 29 mL/cm H2O  Airway Resistance: 13.5  Auto/ Intrinsic PEEP (cm H2O): 1.1 cmH2O     SBT completed No   Secretions: small white thin  Breath Sounds: coarse    ETT Cuffed Single;Single Subglottic Suction 7.5 mm-Secured at (cm): 23 cm at teeth/gums  Emergency Ambu bag, mask and peep valve at bedside.     Respiratory Medications: none       Recent Labs   Lab 10/04/23  1128 10/03/23  0812 10/03/23  0520 10/03/23  0011   PH 7.38 7.40 7.39 7.30*   PCO2 34* 33* 32* 42   PO2 90 106* 95* 226*   HCO3 20* 21* 20* 21*        NOTE / SHIFT SUMMARY:     Today's Changes    Continue full vent support.   Skin checked and tube moved Q2, this responsibility is shared between RN and RT. Endotracheal tube cuff assessed and appropriate at the time of the assessment. RT will continue to monitor.  For additional vital checks and ventilator check information please see flowsheets.    Shahrzad Penny, RT

## 2023-10-05 NOTE — PROGRESS NOTES
Provider Attestation:   Lidia was seen and evaluated by me on 10/05/23.  She was in critical condition as the result of extensive necrotizing fasciitis.  Her condition is now Critical.     Significant changes in status since my last evaluation include: Hemoglobin drifting down likely due to ongoing loss from large wound. Transfusing 2-units PRBCs this morning before dressing change. Ongoing discussion with surgery regarding transfer to Burn unit for wound cares. Ongoing discussions with family re: goals of care. They are not sure if she would want more aggressive treatments are struggling to make decisions.        I have reviewed changes in critical data from the last 24 hours, including medications, laboratory results, vital signs, radiograph results, and consultant recommendations.         I discussed the course and plan with the  medical student  and answered all questions to the best of my ability.     Total Critical Care time spent by me, excluding procedures, was  45 minutes    PEDRITO Marley CNP [facility]  Critical Care Progress Note    Summary:   63 year old woman with hx of T2DM, obesity, tobacco abuse, alcohol abuse, hypothyroidism, goiter. Presented on 10/2/23 with dyspnea, cough, generalized weakness. Additionally, had increased pain, redness, and malodorous drainage from a wound in her left inguinal/vaginal area. CT scan showed necrotizing fasciitis and she was emergently taken to the OR for vulvectomy and broad debridement of the left buttock and left thigh. She returned to the ICU on pressors and was febrile through most of the day. Taken back to OR 10/3 and required extensive debridement from the left buttock, perineum, down to the left ankle. She required intubation by ENT due to large goiter.     Major Changes for Today:   -Flagyl added to antibiotic regimen by ID   -Goals of care discussion with family    -Patient now NO CPR - PRE ARREST INTUBATION OK  "  -Plan for bedside dressing change with surgery later today   -[place] get [place] [facility] [place] [facility] for more extensive wound cares. No beds currently available.     Assessment & Plan:     Goals of Care:  No CPR, Pre arrest intubation OK   Extensive conversation with family. Prognosis made clear by Dr. Molina. Family would like to give patient a chance to recover from infection. Expressed they would like her to be DNR and if her condition were to rapidly deteriorate, they would likely move to comfort care.   -[place]ed [place] [facility] [place] [facility] for more extensive wound cares. No beds available.       Neurology, Psychiatry, Sedation, Analgesia:  Currently sedated for ventilation and comfort.  - RASS goal 0 to -1   - Precedex & Fentanyl   - Patient appears to be more responsive than yesterday. Grimacing with turns and opens eyes.    -  reported patient consumes \"6-12 beers per day\"    -CIWA protocol   -Ativan PRN for alcohol withdrawal     Cardiovascular:  Septic Shock secondary to extensive necrotizing fasciitis  - fluid resuscitated with 4L crystalloid   - MAP goal > 65. Requiring low dose Norepi      Respiratory, Airway:  Acute hypoxemic respiratory failure. Difficult airway due to large goiter.  - Vent Mode: CMV/AC   FiO2 (%): 40 %  Resp Rate (Set): 20 breaths/min  Tidal Volume (Set, mL): 400 mL  PEEP (cm H2O): 5 cmH2O  Resp: 20      Gastrointestinal:  No acute issues  -Last BM was prior to admission.  -PRN bowel meds ordered   -Concerned about need to keep wound clean when patient begins stooling. Will plan to place rectal tube. Most likely need diverting colostomy.   -plan to start tube feeds today       Renal, Acid-base, Electrolytes, Volume:  Lactic Acidosis from septic shock - resolved. Preserved renal function.   -Strict I&Os   -UOP adequate       Infectious Disease:  Septic Shock from necrotizing fasciitis. Extensive Involvement of the perineum, L groin, and left leg down " to the ankle.   -Currently on Flagyl, Vancomycin, Clindamycin, Zosyn   -Infectious Disease following, appreciate recs.   -Gram stains from left buttock tissue :  polymicrobial   -cultures pending   -Ongoing surgical debridement for source control. Surgery following.  -Plan for bedside dressing change with surgery team later today      Hematology, Oncology:  No acute issues  - DVT prophylaxis: Enoxaparin on hold  - SCDs in place      Endocrine:  Hypothyroidism on levothyroxine, large goiter  - PTA levothyroxine     Diabetes Mellitus   -sliding scale insulin q4hrs     Checklist:  FEN: TF starting today  VTE ppx: SCDs. Lovenox on hold   GI ppx: PPI  Bowel regimen: PRN, pending rectal tube placement   VAP ppx: Head of bed >30 degrees, daily oral care  Lines/tube-size: PIV x3 (10/2), PICC (10/3), urinary catheter (10/2), OG (10/3)  PT/OT/SLP, early mobility: Not indicated at this time   Code Status: No CPR, pre arrest intubation OK   Communication:  (Arjun), son (Joby)    Physical Exam:  Neurologic: Sedated.  Opens eyes. Grimaces with pain.   HEENT: large goiter  Respiratory: Lungs sounds course bilaterally.   Cardiovascular: Regular rate & rhythm  Normal S1 & S2. No murmurs, rubs. or gallops. Gastrointestinal: Bowel sounds present. Non tender. Non distended   Musculoskeletal: left leg wrapped in kerlix from groin down    All pertinent vital signs, ventilator settings, I&Os, laboratory, microbiology, ECGs, & imaging data has been personally reviewed. Total Critical Care time, excluding procedures, was 50 minutes     Louisa Brown MS4

## 2023-10-05 NOTE — PLAN OF CARE
Goal Outcome Evaluation:      Plan of Care Reviewed With: spouse    Overall Patient Progress: no changeOverall Patient Progress: no change       [facility] - ICU    RN Progress Note:            Pertinent Assessments:      Please refer to flowsheet rows for full assessment     Afebrile.  Will open eyes to pain and withdraw slightly from pain.  Continues on ordered abx.             Key Events - This Shift:       2 units of PRBC transfused for hgb of 6.6. Dressing changed with surgery-see notes for further plans.  Dignicare placed.  TF to begin this evening.          CHAD SAT (Sedation Awakening Trial): For use ONLY if intubated    SAT Safety Screen Not Applicable   If FAILED why?    SAT Performed Not Applicable   If FAILED why?               Barriers to Discharge / Downgrade:     Intubated/sedated         Point of Contact Update YES-OR-NO: Yes  If No, reason:   Name:Arjun  Phone Number:see chart   Summary of Conversation: updated on patients status/events that happened today.

## 2023-10-05 NOTE — PHARMACY-CONSULT NOTE
Pharmacy Tube Feeding Consult    Medication reviewed for administration by feeding tube and for potential food/drug interactions.    Recommendation: There is a documented interaction between levothyroxine and enteral nutrition. Levothyroxine may bind to enteral feeding tubes and/or may be lost during crushing and transfer. If concomitant use of the two extends beyond 7 days, recommend holding tube feedings 1 hour pre and post levothyroxine dose. Monitor thyroid function weekly.    Pharmacy will continue to follow as new medications are ordered.    Marly Chávez, GeorgeD

## 2023-10-05 NOTE — PROGRESS NOTES
Patent's brother (Gonzalez Stern) called.  Explained that he is not on the patients contact list and I therefore was unable to discuss the patient with him.  Suggested that he contact the patient's spouse for an update on the patients progress.

## 2023-10-05 NOTE — PROGRESS NOTES
ASSESSMENT:  1. Necrotizing soft tissue infection    2. Severe sepsis (H)        Lidia Nam is a 63 year old female with necrotizing fasciitis with s/p I&D of left peritoneum and vulva and buttocks with Dr. Howard 10/2 with repeat I&D of significant necrotizing fasciitis of her posterior left leg the entire posterior leg, further I&D of left perineum and mons and lower abdominal wall with Dr. Molina on 10/4.  Wounds are actually looking much better.    PLAN:  -Continue ICU cares  -We will recheck wound tomorrow.  Really I do not think will need to take her back to the OR but we will evaluate again tomorrow just to make sure.    SUBJECTIVE:   She is currently intubated and sedated.  She does get agitated with movement.  She is on a low-dose pressor.  She is afebrile.      Patient Vitals for the past 24 hrs:   BP Temp Pulse Resp SpO2 Weight   10/05/23 1425 -- 98.6  F (37  C) 60 20 100 % --   10/05/23 1415 117/66 98.4  F (36.9  C) 55 20 100 % --   10/05/23 1400 114/64 98.4  F (36.9  C) 55 20 100 % --   10/05/23 1345 116/65 98.6  F (37  C) 55 20 100 % --   10/05/23 1330 112/64 98.6  F (37  C) 56 20 100 % --   10/05/23 1315 109/63 98.6  F (37  C) 56 20 100 % --   10/05/23 1300 109/59 98.4  F (36.9  C) 59 20 100 % --   10/05/23 1245 111/60 98.4  F (36.9  C) 56 20 100 % --   10/05/23 1230 105/59 98.4  F (36.9  C) 58 20 100 % --   10/05/23 1215 117/61 98.4  F (36.9  C) 58 20 100 % --   10/05/23 1200 120/63 98.4  F (36.9  C) 58 20 100 % --   10/05/23 1145 123/69 98.4  F (36.9  C) 61 20 100 % --   10/05/23 1130 116/65 98.4  F (36.9  C) 61 20 100 % --   10/05/23 1115 117/64 98.4  F (36.9  C) 60 20 100 % --   10/05/23 1113 -- 98.2  F (36.8  C) 59 20 100 % --   10/05/23 1100 115/59 98.2  F (36.8  C) 59 20 100 % --   10/05/23 1045 116/60 98.1  F (36.7  C) 61 20 100 % --   10/05/23 1030 112/56 98.4  F (36.9  C) 60 20 100 % --   10/05/23 1015 114/57 98.6  F (37  C) 60 20 100 % --   10/05/23 1000 112/59 98.6  F (37  C) 61  20 100 % --   10/05/23 0945 119/57 98.6  F (37  C) 63 20 100 % --   10/05/23 0930 110/59 98.6  F (37  C) 61 20 100 % --   10/05/23 0915 115/61 98.6  F (37  C) 61 20 100 % --   10/05/23 0900 111/56 98.6  F (37  C) 61 20 100 % --   10/05/23 0845 111/58 98.4  F (36.9  C) 61 20 100 % --   10/05/23 0830 106/58 98.6  F (37  C) 62 20 100 % --   10/05/23 0815 107/56 98.6  F (37  C) 62 20 100 % --   10/05/23 0800 108/55 98.6  F (37  C) 62 20 100 % --   10/05/23 0745 110/59 98.6  F (37  C) 62 20 100 % --   10/05/23 0730 108/55 98.6  F (37  C) 61 20 100 % --   10/05/23 0715 107/55 98.4  F (36.9  C) 60 20 100 % --   10/05/23 0700 105/55 98.2  F (36.8  C) 61 20 100 % --   10/05/23 0600 -- -- -- -- -- 96.7 kg (213 lb 3.2 oz)   10/05/23 0432 -- -- -- -- 99 % --   10/05/23 0215 104/55 99.7  F (37.6  C) 67 20 100 % --   10/05/23 0200 108/57 99.7  F (37.6  C) 67 20 100 % --   10/05/23 0145 105/57 99.7  F (37.6  C) 66 20 100 % --   10/05/23 0130 104/57 99.7  F (37.6  C) 67 20 100 % --   10/05/23 0115 103/57 99.7  F (37.6  C) 67 20 100 % --   10/05/23 0100 108/55 99.9  F (37.7  C) 68 20 100 % --   10/05/23 0045 109/58 99.7  F (37.6  C) 69 20 100 % --   10/05/23 0030 102/57 100  F (37.8  C) 70 20 100 % --   10/05/23 0015 103/56 100.2  F (37.9  C) 69 20 100 % --   10/05/23 0000 102/53 100.2  F (37.9  C) 70 20 100 % --   10/04/23 2115 108/56 100.2  F (37.9  C) 69 20 100 % --   10/04/23 2100 105/55 100.2  F (37.9  C) 69 20 100 % --   10/04/23 2045 105/55 100.2  F (37.9  C) 69 20 100 % --   10/04/23 2030 104/58 100.2  F (37.9  C) 69 20 100 % --   10/04/23 2015 106/55 100.2  F (37.9  C) 71 20 100 % --   10/04/23 2000 107/56 100.2  F (37.9  C) 71 20 100 % --   10/04/23 1945 105/56 100.2  F (37.9  C) 72 20 100 % --   10/04/23 1930 100/55 100.2  F (37.9  C) 72 21 99 % --   10/04/23 1915 104/57 100.2  F (37.9  C) 71 20 100 % --   10/04/23 1905 -- -- -- -- 100 % --   10/04/23 1900 105/58 100  F (37.8  C) 70 20 100 % --   10/04/23 1845 98/56 100.2   F (37.9  C) 72 21 99 % --   10/04/23 1830 98/53 100.2  F (37.9  C) 72 22 99 % --   10/04/23 1815 96/55 100.4  F (38  C) 74 21 99 % --   10/04/23 1803 90/51 -- 78 22 99 % --   10/04/23 1800 90/51 100.4  F (38  C) 78 22 98 % --   10/04/23 1745 94/55 100.4  F (38  C) 83 20 99 % --   10/04/23 1700 99/57 100.2  F (37.9  C) 74 21 98 % --   10/04/23 1645 99/54 100  F (37.8  C) 75 21 98 % --   10/04/23 1630 100/56 100  F (37.8  C) 75 21 99 % --   10/04/23 1615 97/56 99.9  F (37.7  C) 75 22 99 % --   10/04/23 1600 95/51 99.9  F (37.7  C) 80 22 99 % --   10/04/23 1545 97/53 99.7  F (37.6  C) 75 22 99 % --   10/04/23 1536 99/54 99.7  F (37.6  C) 75 20 99 % --   10/04/23 1530 99/54 99.7  F (37.6  C) 75 21 99 % --         PHYSICAL EXAM:  Her wounds are all fairly clean.  There is just a tiny bit of ashy appearance of her fascia that is left on her lower calf.  Other than that there is really no necrosis left.  Her skin all looks viable.      Output by Drain (mL) 10/03/23 0700 - 10/03/23 1459 10/03/23 1500 - 10/03/23 2259 10/03/23 2300 - 10/04/23 0659 10/04/23 0700 - 10/04/23 1459 10/04/23 1500 - 10/04/23 2259 10/04/23 2300 - 10/05/23 0659 10/05/23 0700 - 10/05/23 1459 10/05/23 1500 - 10/05/23 1524   Requested LDAs do not have output data documented.      EXT:No cyanosis, edema or obvious abnormalities    10/04 1500 - 10/05 1459  In: 4168.7 [I.V.:3114.95]  Out: 1245 [Urine:1195]    No results displayed because visit has over 200 results.   Recent Results (from the past 24 hour(s))   Glucose by meter    Collection Time: 10/04/23  3:52 PM   Result Value Ref Range    GLUCOSE BY METER POCT 190 (H) 70 - 99 mg/dL   Glucose by meter    Collection Time: 10/04/23  8:47 PM   Result Value Ref Range    GLUCOSE BY METER POCT 203 (H) 70 - 99 mg/dL   Glucose by meter    Collection Time: 10/04/23 11:52 PM   Result Value Ref Range    GLUCOSE BY METER POCT 193 (H) 70 - 99 mg/dL   Creatinine    Collection Time: 10/05/23  4:04 AM   Result Value Ref  Range    Creatinine 0.68 0.51 - 0.95 mg/dL    GFR Estimate >90 >60 mL/min/1.73m2   Magnesium    Collection Time: 10/05/23  4:04 AM   Result Value Ref Range    Magnesium 2.0 1.7 - 2.3 mg/dL   Potassium    Collection Time: 10/05/23  4:04 AM   Result Value Ref Range    Potassium 4.0 3.4 - 5.3 mmol/L   Basic metabolic panel    Collection Time: 10/05/23  4:04 AM   Result Value Ref Range    Sodium 139 135 - 145 mmol/L    Potassium 4.1 3.4 - 5.3 mmol/L    Chloride 110 (H) 98 - 107 mmol/L    Carbon Dioxide (CO2) 20 (L) 22 - 29 mmol/L    Anion Gap 9 7 - 15 mmol/L    Urea Nitrogen 28.4 (H) 8.0 - 23.0 mg/dL    Creatinine 0.63 0.51 - 0.95 mg/dL    GFR Estimate >90 >60 mL/min/1.73m2    Calcium 7.4 (L) 8.8 - 10.2 mg/dL    Glucose 187 (H) 70 - 99 mg/dL   Glucose by meter    Collection Time: 10/05/23  4:08 AM   Result Value Ref Range    GLUCOSE BY METER POCT 191 (H) 70 - 99 mg/dL   Glucose by meter    Collection Time: 10/05/23  7:43 AM   Result Value Ref Range    GLUCOSE BY METER POCT 200 (H) 70 - 99 mg/dL   CBC with platelets    Collection Time: 10/05/23  8:57 AM   Result Value Ref Range    WBC Count 14.5 (H) 4.0 - 11.0 10e3/uL    RBC Count 2.04 (L) 3.80 - 5.20 10e6/uL    Hemoglobin 6.6 (LL) 11.7 - 15.7 g/dL    Hematocrit 20.2 (L) 35.0 - 47.0 %    MCV 99 78 - 100 fL    MCH 32.4 26.5 - 33.0 pg    MCHC 32.7 31.5 - 36.5 g/dL    RDW 14.8 10.0 - 15.0 %    Platelet Count 132 (L) 150 - 450 10e3/uL   Prepare red blood cells (unit)    Collection Time: 10/05/23  9:54 AM   Result Value Ref Range    Blood Component Type Red Blood Cells     Product Code T3560F05     Unit Status Transfused     Unit Number [id number]     CROSSMATCH Compatible     CODING SYSTEM ETXQ218     ISSUE DATE AND TIME 85715942348068     UNIT ABO/RH B+     UNIT TYPE ISBT 7300    Prepare red blood cells (unit)    Collection Time: 10/05/23  9:54 AM   Result Value Ref Range    Blood Component Type Red Blood Cells     Product Code P1425O05     Unit Status Transfused      Unit Number [id number]     CROSSMATCH Compatible     CODING SYSTEM [id number]     ISSUE DATE AND TIME [id number]     UNIT ABO/RH B+     UNIT TYPE ISBT 7300    Glucose by meter    Collection Time: 10/05/23 11:45 AM   Result Value Ref Range    GLUCOSE BY METER POCT 197 (H) 70 - 99 mg/dL               JOSE Mckeon  [facility]  [address] [place] [id number]  Phone- [phone]  Fax- [phone]

## 2023-10-05 NOTE — PROGRESS NOTES
PROVIDER RESTRAINT FOR NON-VIOLENT BEHAVIOR FACE TO FACE EVALUATION    Patient's Immediate Situation:  Patient demonstrated the following behaviors: impulsive; pulls at critical lines and tubes. Does not respond to redirection.     Patient's Reaction to the intervention:  Does patient understand the reason for restraint/seclusion? Unable to express     Medical Condition:  Is there any evidence of compromise of Skin integrity, Respiratory, Cardiovascular, Musculoskeletal, Hydration?   No    Behavioral Condition:  In consultation with the RN, is there a need to continue this restraint or seclusion? Yes    See Restraint Flowsheet for complete restraint documentation and assessment.    Nel Lowry, CNP  [facility] Pulmonary/Critical [facility]

## 2023-10-05 NOTE — PLAN OF CARE
Goal Outcome Evaluation:             [facility] - ICU    RN Progress Note:            Pertinent Assessments:      Please refer to flowsheet rows for full assessment     Titrated Levo to meet map goal.            Key Events - This Shift:   Pt grimacing with turns.  Gave Fentanyl PRN.  Not following commands.        SJN SAT (Sedation Awakening Trial): For use ONLY if intubated    SAT Safety Screen Not Applicable   If FAILED why?    SAT Performed Not Applicable   If FAILED why?               Barriers to Discharge / Downgrade:     Extensive Wounds, Pressors         Point of Contact Update YES-OR-NO: No   If No, reason: updated on prior shift     Name:    Phone Number:    Summary of Conversation:

## 2023-10-06 LAB
ANION GAP SERPL CALCULATED.3IONS-SCNC: 7 MMOL/L (ref 7–15)
BACTERIA TISS BX CULT: ABNORMAL
BUN SERPL-MCNC: 24.8 MG/DL (ref 8–23)
CALCIUM SERPL-MCNC: 7.5 MG/DL (ref 8.8–10.2)
CHLORIDE SERPL-SCNC: 114 MMOL/L (ref 98–107)
CREAT SERPL-MCNC: 0.59 MG/DL (ref 0.51–0.95)
DEPRECATED HCO3 PLAS-SCNC: 21 MMOL/L (ref 22–29)
EGFRCR SERPLBLD CKD-EPI 2021: >90 ML/MIN/1.73M2
ERYTHROCYTE [DISTWIDTH] IN BLOOD BY AUTOMATED COUNT: 17.2 % (ref 10–15)
GLUCOSE BLDC GLUCOMTR-MCNC: 237 MG/DL (ref 70–99)
GLUCOSE BLDC GLUCOMTR-MCNC: 250 MG/DL (ref 70–99)
GLUCOSE BLDC GLUCOMTR-MCNC: 273 MG/DL (ref 70–99)
GLUCOSE BLDC GLUCOMTR-MCNC: 278 MG/DL (ref 70–99)
GLUCOSE BLDC GLUCOMTR-MCNC: 282 MG/DL (ref 70–99)
GLUCOSE BLDC GLUCOMTR-MCNC: 283 MG/DL (ref 70–99)
GLUCOSE SERPL-MCNC: 262 MG/DL (ref 70–99)
HCT VFR BLD AUTO: 29.3 % (ref 35–47)
HGB BLD-MCNC: 9.6 G/DL (ref 11.7–15.7)
MAGNESIUM SERPL-MCNC: 2.1 MG/DL (ref 1.7–2.3)
MCH RBC QN AUTO: 30.7 PG (ref 26.5–33)
MCHC RBC AUTO-ENTMCNC: 32.8 G/DL (ref 31.5–36.5)
MCV RBC AUTO: 94 FL (ref 78–100)
PLATELET # BLD AUTO: 115 10E3/UL (ref 150–450)
POTASSIUM SERPL-SCNC: 3.6 MMOL/L (ref 3.4–5.3)
RBC # BLD AUTO: 3.13 10E6/UL (ref 3.8–5.2)
SODIUM SERPL-SCNC: 142 MMOL/L (ref 135–145)
WBC # BLD AUTO: 12 10E3/UL (ref 4–11)

## 2023-10-06 PROCEDURE — 250N000013 HC RX MED GY IP 250 OP 250 PS 637: Performed by: NURSE PRACTITIONER

## 2023-10-06 PROCEDURE — 94003 VENT MGMT INPAT SUBQ DAY: CPT

## 2023-10-06 PROCEDURE — 99418 PROLNG IP/OBS E/M EA 15 MIN: CPT | Performed by: FAMILY MEDICINE

## 2023-10-06 PROCEDURE — 999N000157 HC STATISTIC RCP TIME EA 10 MIN

## 2023-10-06 PROCEDURE — 80048 BASIC METABOLIC PNL TOTAL CA: CPT | Performed by: NURSE PRACTITIONER

## 2023-10-06 PROCEDURE — 99291 CRITICAL CARE FIRST HOUR: CPT | Performed by: NURSE PRACTITIONER

## 2023-10-06 PROCEDURE — 250N000011 HC RX IP 250 OP 636: Performed by: NURSE PRACTITIONER

## 2023-10-06 PROCEDURE — 99223 1ST HOSP IP/OBS HIGH 75: CPT | Performed by: FAMILY MEDICINE

## 2023-10-06 PROCEDURE — C9113 INJ PANTOPRAZOLE SODIUM, VIA: HCPCS | Mod: JZ | Performed by: SURGERY

## 2023-10-06 PROCEDURE — 200N000001 HC R&B ICU

## 2023-10-06 PROCEDURE — 250N000009 HC RX 250: Performed by: SURGERY

## 2023-10-06 PROCEDURE — 250N000011 HC RX IP 250 OP 636: Performed by: SURGERY

## 2023-10-06 PROCEDURE — 85027 COMPLETE CBC AUTOMATED: CPT | Performed by: NURSE PRACTITIONER

## 2023-10-06 PROCEDURE — 999N000287 HC ICU ADULT ROUNDING, EACH 10 MINS

## 2023-10-06 PROCEDURE — 99233 SBSQ HOSP IP/OBS HIGH 50: CPT | Performed by: INTERNAL MEDICINE

## 2023-10-06 PROCEDURE — 250N000011 HC RX IP 250 OP 636: Performed by: INTERNAL MEDICINE

## 2023-10-06 PROCEDURE — 99231 SBSQ HOSP IP/OBS SF/LOW 25: CPT | Performed by: SURGERY

## 2023-10-06 PROCEDURE — 999N000009 HC STATISTIC AIRWAY CARE

## 2023-10-06 PROCEDURE — 250N000013 HC RX MED GY IP 250 OP 250 PS 637: Performed by: INTERNAL MEDICINE

## 2023-10-06 PROCEDURE — 258N000003 HC RX IP 258 OP 636: Performed by: INTERNAL MEDICINE

## 2023-10-06 PROCEDURE — 83735 ASSAY OF MAGNESIUM: CPT | Performed by: NURSE PRACTITIONER

## 2023-10-06 PROCEDURE — 250N000012 HC RX MED GY IP 250 OP 636 PS 637: Performed by: NURSE PRACTITIONER

## 2023-10-06 RX ORDER — AMOXICILLIN 250 MG
1 CAPSULE ORAL 2 TIMES DAILY PRN
Status: DISCONTINUED | OUTPATIENT
Start: 2023-10-06 | End: 2023-10-27 | Stop reason: HOSPADM

## 2023-10-06 RX ORDER — POTASSIUM CHLORIDE 20MEQ/15ML
20 LIQUID (ML) ORAL ONCE
Status: COMPLETED | OUTPATIENT
Start: 2023-10-06 | End: 2023-10-06

## 2023-10-06 RX ADMIN — LORAZEPAM 0.5 MG: 2 INJECTION INTRAMUSCULAR; INTRAVENOUS at 20:41

## 2023-10-06 RX ADMIN — PIPERACILLIN AND TAZOBACTAM 3.38 G: 3; .375 INJECTION, POWDER, LYOPHILIZED, FOR SOLUTION INTRAVENOUS at 08:08

## 2023-10-06 RX ADMIN — DEXMEDETOMIDINE HYDROCHLORIDE 0.6 MCG/KG/HR: 400 INJECTION INTRAVENOUS at 20:22

## 2023-10-06 RX ADMIN — VANCOMYCIN HYDROCHLORIDE 1500 MG: 500 INJECTION, POWDER, LYOPHILIZED, FOR SOLUTION INTRAVENOUS at 12:06

## 2023-10-06 RX ADMIN — THIAMINE HCL TAB 100 MG 100 MG: 100 TAB at 13:55

## 2023-10-06 RX ADMIN — Medication 25 MCG: at 04:55

## 2023-10-06 RX ADMIN — THIAMINE HCL TAB 100 MG 100 MG: 100 TAB at 20:42

## 2023-10-06 RX ADMIN — POLYETHYLENE GLYCOL 3350 17 G: 17 POWDER, FOR SOLUTION ORAL at 08:06

## 2023-10-06 RX ADMIN — INSULIN GLARGINE 5 UNITS: 100 INJECTION, SOLUTION SUBCUTANEOUS at 11:48

## 2023-10-06 RX ADMIN — FOLIC ACID 1 MG: 5 INJECTION, SOLUTION INTRAMUSCULAR; INTRAVENOUS; SUBCUTANEOUS at 08:07

## 2023-10-06 RX ADMIN — INSULIN ASPART 4 UNITS: 100 INJECTION, SOLUTION INTRAVENOUS; SUBCUTANEOUS at 03:53

## 2023-10-06 RX ADMIN — LEVOTHYROXINE SODIUM 112 MCG: 0.11 TABLET ORAL at 05:44

## 2023-10-06 RX ADMIN — ACETAMINOPHEN 975 MG: 325 SOLUTION ORAL at 18:09

## 2023-10-06 RX ADMIN — Medication 100 MCG/HR: at 12:32

## 2023-10-06 RX ADMIN — LORAZEPAM 0.5 MG: 2 INJECTION INTRAMUSCULAR; INTRAVENOUS at 08:07

## 2023-10-06 RX ADMIN — CLINDAMYCIN PHOSPHATE 900 MG: 900 INJECTION, SOLUTION INTRAVENOUS at 18:10

## 2023-10-06 RX ADMIN — INSULIN ASPART 6 UNITS: 100 INJECTION, SOLUTION INTRAVENOUS; SUBCUTANEOUS at 16:32

## 2023-10-06 RX ADMIN — POTASSIUM CHLORIDE 20 MEQ: 20 SOLUTION ORAL at 08:06

## 2023-10-06 RX ADMIN — CLINDAMYCIN PHOSPHATE 900 MG: 900 INJECTION, SOLUTION INTRAVENOUS at 03:48

## 2023-10-06 RX ADMIN — METRONIDAZOLE 500 MG: 500 INJECTION, SOLUTION INTRAVENOUS at 04:26

## 2023-10-06 RX ADMIN — DEXMEDETOMIDINE HYDROCHLORIDE 0.6 MCG/KG/HR: 400 INJECTION INTRAVENOUS at 05:41

## 2023-10-06 RX ADMIN — INSULIN ASPART 6 UNITS: 100 INJECTION, SOLUTION INTRAVENOUS; SUBCUTANEOUS at 08:14

## 2023-10-06 RX ADMIN — THIAMINE HYDROCHLORIDE 200 MG: 100 INJECTION, SOLUTION INTRAMUSCULAR; INTRAVENOUS at 10:54

## 2023-10-06 RX ADMIN — LORAZEPAM 0.5 MG: 2 INJECTION INTRAMUSCULAR; INTRAVENOUS at 01:24

## 2023-10-06 RX ADMIN — SENNOSIDES 5 ML: 8.8 LIQUID ORAL at 08:07

## 2023-10-06 RX ADMIN — Medication 0.08 MCG/KG/MIN: at 13:18

## 2023-10-06 RX ADMIN — INSULIN ASPART 6 UNITS: 100 INJECTION, SOLUTION INTRAVENOUS; SUBCUTANEOUS at 12:10

## 2023-10-06 RX ADMIN — DOCUSATE SODIUM 50 MG: 50 LIQUID ORAL at 10:37

## 2023-10-06 RX ADMIN — INSULIN ASPART 6 UNITS: 100 INJECTION, SOLUTION INTRAVENOUS; SUBCUTANEOUS at 20:48

## 2023-10-06 RX ADMIN — PIPERACILLIN AND TAZOBACTAM 3.38 G: 3; .375 INJECTION, POWDER, LYOPHILIZED, FOR SOLUTION INTRAVENOUS at 16:35

## 2023-10-06 RX ADMIN — INSULIN GLARGINE 5 UNITS: 100 INJECTION, SOLUTION SUBCUTANEOUS at 20:49

## 2023-10-06 RX ADMIN — METRONIDAZOLE 500 MG: 500 INJECTION, SOLUTION INTRAVENOUS at 13:18

## 2023-10-06 RX ADMIN — CLINDAMYCIN PHOSPHATE 900 MG: 900 INJECTION, SOLUTION INTRAVENOUS at 11:48

## 2023-10-06 RX ADMIN — PANTOPRAZOLE SODIUM 40 MG: 40 INJECTION, POWDER, FOR SOLUTION INTRAVENOUS at 05:44

## 2023-10-06 RX ADMIN — ACETAMINOPHEN 975 MG: 325 SOLUTION ORAL at 10:36

## 2023-10-06 RX ADMIN — ACETAMINOPHEN 975 MG: 325 SOLUTION ORAL at 01:23

## 2023-10-06 RX ADMIN — INSULIN ASPART 5 UNITS: 100 INJECTION, SOLUTION INTRAVENOUS; SUBCUTANEOUS at 00:46

## 2023-10-06 RX ADMIN — DEXMEDETOMIDINE HYDROCHLORIDE 0.6 MCG/KG/HR: 400 INJECTION INTRAVENOUS at 13:18

## 2023-10-06 RX ADMIN — Medication 15 ML: at 08:06

## 2023-10-06 RX ADMIN — LORAZEPAM 0.5 MG: 2 INJECTION INTRAMUSCULAR; INTRAVENOUS at 13:54

## 2023-10-06 RX ADMIN — Medication 0.1 MCG/KG/MIN: at 20:51

## 2023-10-06 RX ADMIN — METRONIDAZOLE 500 MG: 500 INJECTION, SOLUTION INTRAVENOUS at 20:54

## 2023-10-06 RX ADMIN — PIPERACILLIN AND TAZOBACTAM 3.38 G: 3; .375 INJECTION, POWDER, LYOPHILIZED, FOR SOLUTION INTRAVENOUS at 00:52

## 2023-10-06 ASSESSMENT — ACTIVITIES OF DAILY LIVING (ADL)
ADLS_ACUITY_SCORE: 34

## 2023-10-06 NOTE — PROGRESS NOTES
"Vent Mode: Other (see comments) (VC AC)  FiO2 (%): 30 %  Resp Rate (Set): 20 breaths/min  Tidal Volume (Set, mL): 400 mL  PEEP (cm H2O): 5 cmH2O  Resp: (!) 0    RR 21    /54   Pulse 53   Temp 98.4  F (36.9  C)   Resp (!) 0   Ht 1.575 m (5' 2\")   Wt 99.9 kg (220 lb 3.2 oz)   LMP  (LMP Unknown)   SpO2 98%   BMI 40.28 kg/m      Continue on mechanical ventilation, RT to monitor.  "

## 2023-10-06 NOTE — PROGRESS NOTES
"INFECTIOUS DISEASE FOLLOW UP NOTE    Date: 10/06/2023   CHIEF COMPLAINT:   Chief Complaint   Patient presents with    Groin Pain    Shortness of Breath    Generalized Weakness        ASSESSMENT:    Necrotizing fasciitis: CT with necrotizing fasciitis L groin, perineum, and visualized LLE now down to ankle. s/p OR 10/2, GS and cultures are polymicrobial-bacteroides and actino on culture so far. Repeat OR 10/4 with progressive disease. Active issue.   Severe sepsis: pressors in ICU  DM2  Goiter: ENT intubated in OR.   Sputum with haemophilus and strep constellatus     PLAN:  - continue vanc, zosyn, clinda, flagyl  - can likely stop clinda once no additional OR planned  - likely stop vanc tomorrow assuming nothing new on cultures  - needs ongoing source control-surgery following  - follow cultures-GS polymicrobial, cultures pending with bacteroides and actinomyces so far  - reviewed operative reports  - guarded prognosis, discussed with nursing staff  - note plans to transfer  - will follow    Veronica Wilburn MD  [facility]   On-Call: [phone]     ______________________________________________________________________    SUBJECTIVE / INTERVAL HISTORY: pressors, intubated.     ROS: All other systems negative except as listed above.    SH/FH/Habits/PMH reviewed and unchanged.    OBJECTIVE:  BP 95/50   Pulse 51   Temp 97.3  F (36.3  C)   Resp 20   Ht 1.575 m (5' 2\")   Wt 99.9 kg (220 lb 3.2 oz)   LMP  (LMP Unknown)   SpO2 97%   BMI 40.28 kg/m    FiO2 (%): 30 %  Vent Mode: CMV/AC  (Continuous Mandatory Ventilation/ Assist Control)  FiO2 (%): 30 %  Resp Rate (Set): 20 breaths/min  Tidal Volume (Set, mL): 400 mL  PEEP (cm H2O): 5 cmH2O  Resp: 20      Vital Signs  Temp: (!) 96.3  F (35.7  C)  Temp src:  (adan hugger applied)  Resp: 21  Pulse: 73  Pulse Rate Source: Monitor  BP: 95/58  BP Location: Left arm    Temp (24hrs), Av.7  F (38.2  C), Min:96.3  F (35.7  C), Max:102.6  F (39.2 "  C)      GEN: intubated  RESPIRATORY:  intubated  CARDIOVASCULAR:  tachy  ABDOMEN:  Soft, normal bowel sounds, non-tender,   EXTREMITIES: No edema.  SKIN/HAIR/NAILS:  all lower extremities wraps.   IV: central lines      Antibiotics:  Zosyn  Clinda  Vanc  flagyl    Pertinent labs:  No results found for: CRP   CBC RESULTS:   Recent Labs   Lab Test 10/04/23  0357   WBC 16.8*   RBC 2.84*   HGB 9.0*   HCT 27.5*   MCV 97   MCH 31.7   MCHC 32.7   RDW 14.6         Last Comprehensive Metabolic Panel:  Sodium   Date Value Ref Range Status   10/06/2023 142 135 - 145 mmol/L Final     Comment:     Reference intervals for this test were updated on 09/26/2023 to more accurately reflect our healthy population. There may be differences in the flagging of prior results with similar values performed with this method. Interpretation of those prior results can be made in the context of the updated reference intervals.      Potassium   Date Value Ref Range Status   10/06/2023 3.6 3.4 - 5.3 mmol/L Final   06/23/2020 4.4 3.5 - 5.0 mmol/L Final     Chloride   Date Value Ref Range Status   10/06/2023 114 (H) 98 - 107 mmol/L Final   06/23/2020 98 98 - 107 mmol/L Final     Carbon Dioxide (CO2)   Date Value Ref Range Status   10/06/2023 21 (L) 22 - 29 mmol/L Final   06/23/2020 32 (H) 22 - 31 mmol/L Final     Anion Gap   Date Value Ref Range Status   10/06/2023 7 7 - 15 mmol/L Final   06/23/2020 5 5 - 18 mmol/L Final     Glucose   Date Value Ref Range Status   10/06/2023 262 (H) 70 - 99 mg/dL Final   06/23/2020 115 70 - 125 mg/dL Final     GLUCOSE BY METER POCT   Date Value Ref Range Status   10/06/2023 282 (H) 70 - 99 mg/dL Final     Urea Nitrogen   Date Value Ref Range Status   10/06/2023 24.8 (H) 8.0 - 23.0 mg/dL Final   06/23/2020 8 8 - 22 mg/dL Final     Creatinine   Date Value Ref Range Status   10/06/2023 0.59 0.51 - 0.95 mg/dL Final     GFR Estimate   Date Value Ref Range Status   10/06/2023 >90 >60 mL/min/1.73m2 Final    06/23/2020 >60 >60 mL/min/1.73m2 Final     GFR, ESTIMATED POCT   Date Value Ref Range Status   10/02/2023 >60 >60 mL/min/1.73m2 Final     Calcium   Date Value Ref Range Status   10/06/2023 7.5 (L) 8.8 - 10.2 mg/dL Final        MICROBIOLOGY DATA:  Personally reviewed.  7-Day Micro Results       Collected Updated Procedure Result Status      10/04/2023 1027 10/05/2023 1631 Anaerobic Bacterial Culture Routine [[id number]]   Tissue from Leg, left    Preliminary result Component Value   Culture No anaerobic organisms isolated after 1 day  [P]                10/04/2023 1027 10/04/2023 1719 Gram Stain [[id number]]   (Abnormal)   Tissue from Leg, left    Final result Component Value   Gram Stain Result 4+ Gram positive cocci   Gram Stain Result 4+ Gram positive bacilli   Gram Stain Result 3+ Gram negative bacilli   Gram Stain Result 4+ WBC seen   Predominantly PMNs            10/04/2023 1027 10/06/2023 1006 Tissue Aerobic Bacterial Culture Routine [[id number]]   (Abnormal)   Tissue from Leg, left    Preliminary result Component Value   Culture Culture in progress  [P]     2+ Actinomyces species  [P]     Identification obtained by MALDI-TOF mass spectrometry research use only database. Test characteristics determined and verified by the [facility].Susceptibilities not routinely done, refer to antibiogram to view typica  l susceptibility profiles               10/03/2023 0252 10/05/2023 1134 Respiratory Aerobic Bacterial Culture with Gram Stain [[id number]]    (Abnormal)   Sputum from Endotracheal    Final result Component Value   Culture 2+ Normal felecia    3+ Haemophilus influenzae    1+ Streptococcus constellatus    Beta hemolytic strain  This organism is susceptible to ampicillin, penicillin, vancomycin and the cephalosporins. If treatment is required and your patient is allergic to penicillin, contact the microbiology lab within 5 days to request susceptibility testing.   Gram  Stain Result >25 PMNs/low power field    3+ Gram negative bacilli        Susceptibility        Haemophilus influenzae      PETRA      Nitrocefin Positive   [1]                    [1]  Beta-lactamase positive  Beta-lactamase positive Haemophilus influenzae are resistant to ampicillin and are usually susceptible to amox/clavulanic acid, levofloxacin, and 3rd generation cephalosporins, such as ceftriaxone.                   10/02/2023 2228 10/05/2023 1152 Anaerobic Bacterial Culture Routine [[id number]]   (Abnormal)   Tissue from Buttock, Left    Preliminary result Component Value   Culture Culture in progress  [P]     2+ Bacteroides ovatus/xylanisolvens  [P]     Susceptibilities not routinely done, refer to antibiogram to view typical susceptibility profiles               10/02/2023 2228 10/03/2023 1135 Gram Stain [[id number]]   (Abnormal)   Tissue from Buttock, Left    Edited Result - FINAL Component Value   Gram Stain Result 2+ Gram positive bacilli  [C]    Gram Stain Result 1+ Gram negative bacilli  [C]    Gram Stain Result 1+ Gram positive cocci  [C]    Gram Stain Result No white blood cells seen  [C]    This is an appended report. These results have been appended to a previously final verified report.            10/02/2023 2228 10/06/2023 1037 Tissue Aerobic Bacterial Culture Routine [[id number]]   (Abnormal)   Tissue from Buttock, Left    Final result Component Value   Culture 2+ Actinomyces species    Identification obtained by MALDI-TOF mass spectrometry research use only database. Test characteristics determined and verified by the [facility].Susceptibilities not routinely done, refer to antibiogram to view typica  l susceptibility profiles    Isolated in broth only Mixed Anaerobic Organisms Present    On day 2 of incubationSee anaerobic report for identification               10/02/2023 2227 10/05/2023 1153 Anaerobic Bacterial Culture Routine [[id number]]   (Abnormal)    Tissue from Vulva    Preliminary result Component Value   Culture Culture in progress  [P]     3+ Bacteroides ovatus/xylanisolvens  [P]     Susceptibilities not routinely done, refer to antibiogram to view typical susceptibility profiles               10/02/2023 2227 10/03/2023 1138 Gram Stain [[id number]]   (Abnormal)   Tissue from Vulva    Final result Component Value   Gram Stain Result 3+ Gram positive bacilli   Gram Stain Result 3+ Gram positive cocci   Gram Stain Result 2+ Gram negative bacilli   Gram Stain Result 1+ WBC seen            10/02/2023 2227 10/06/2023 1035 Tissue Aerobic Bacterial Culture Routine [[id number]]   Tissue from Vulva    Preliminary result Component Value   Culture Culture in progress  [P]     Isolated in broth only Mixed Anaerobic Organisms Present  [P]     On day 2 of incubationSee anaerobic report for identification               10/02/2023 1829 10/02/2023 1910 Symptomatic Influenza A/B, RSV, & SARS-CoV2 PCR (COVID-19) Nasopharyngeal [[id number]]    Swab from Nasopharyngeal    Final result Component Value   Influenza A PCR Negative   Influenza B PCR Negative   RSV PCR Negative   SARS CoV2 PCR Negative   NEGATIVE: SARS-CoV-2 (COVID-19) RNA not detected, presumed negative.            10/02/2023 1825 10/05/2023 2132 Blood Culture Peripheral Blood [[id number]]   Peripheral Blood    Preliminary result Component Value   Culture No growth after 3 days  [P]                10/02/2023 1825 10/05/2023 2132 Blood Culture Peripheral Blood [[id number]]   Peripheral Blood    Preliminary result Component Value   Culture No growth after 3 days  [P]                         RADIOLOGY:  Personally Reviewed.  Recent Results (from the past 24 hour(s))   XR Chest Port 1 View    Narrative    EXAM: XR CHEST PORT 1 VIEW  LOCATION: [facility]  DATE: 10/4/2023    INDICATION: ETT position  COMPARISON: 10/3/2023      Impression    IMPRESSION: Endotracheal tube terminates 4.0 cm  above the suresh. Right upper extremity PICC in the lower SVC. Enteric decompression tube descends below the diaphragm, but the tip is outside the field-of-view.    Decreased left basilar airspace opacities compared to prior. No pleural effusion or pneumothorax. Normal cardiomediastinal silhouette.       Principal Problem:    Severe sepsis (H)  Active Problems:    Type 2 diabetes mellitus with hyperglycemia, without long-term current use of insulin (H)    Necrotizing soft tissue infection    Septic shock (H)

## 2023-10-06 NOTE — PROGRESS NOTES
RT PROGRESS NOTE    VENT DAY# 5    CURRENT SETTINGS:  Vent Mode: CMV/AC  (Continuous Mandatory Ventilation/ Assist Control)  FiO2 (%): 40 %  Resp Rate (Set): 20 breaths/min  Tidal Volume (Set, mL): 400 mL  PEEP (cm H2O): 5 cmH2O  Resp: 20    PATIENT PARAMETERS:  PIP 22  Pplat:  16  Pmean:  9.4  Compliance: 28.7  SBT: No  Secretions:  small to moderate thin white from the ETT   02 Sats:  99%  BS: clear    ETT SIZE 7.5 Secured at 23 cm at teeth/gums    Respiratory Medications: none      NOTE / SHIFT SUMMARY:   patient continue on full ventilator support overnight. No changes were made to vent settings this shift. RT to follow.     Ken Mar, RT

## 2023-10-06 NOTE — PROGRESS NOTES
Right wrist and Left wrist restraints continued 10/6/2023    Clinical Justification: Pulling lines, pulling tubes, and pulling equipment  Less Restrictive Alternative: Pain management, De-escalation, Reorientation  Attending Physician Notified: MD ordered restraint, Attending Physician's Name: Nilsa ROBERSON   New orders placed Yes  Length of Order: 1 Day      Kelsi Ash, APRN CNP

## 2023-10-06 NOTE — PLAN OF CARE
Goal Outcome Evaluation:     [facility] - ICU    RN Progress Note:            Pertinent Assessments:      Please refer to flowsheet rows for full assessment     Precedex titrated to keep RASS -2 to -3. Levophed to keep MAP > 65. Rdz draining ken urine. Multiple antibiotics.            Key Events - This Shift:     Dressing changed at 1500. Palliative care consulted. Family to decide if they want to transfer to [facility]. Needs additional washout and debridement tomorrow.         CHAD SAT (Sedation Awakening Trial): For use ONLY if intubated    SAT Safety Screen Not Applicable   If FAILED why?    SAT Performed Not Applicable   If FAILED why?               Barriers to Discharge / Downgrade:     Unable to wean, titrating sedation. Vasopressors.          Point of Contact Update YES-OR-NO: Yes  , Arjun    Summary of Conversation: Plan of care. Care goals. Care conference set up for Monday at 10am.

## 2023-10-06 NOTE — PLAN OF CARE
Goal Outcome Evaluation:         [facility] - ICU    RN Progress Note:            Pertinent Assessments:      Please refer to flowsheet rows for full assessment     Afebrile, patient remains on pressors and precedex for sedation. Patient was Bradycardic ON. Received Fentanyl for pain and open eyes spontaneously with turns. Wound dressing changed d/t foul smelling drainage.           Key Events - This Shift:       Refer to note above.               Barriers to Discharge / Downgrade:

## 2023-10-06 NOTE — PROGRESS NOTES
RT PROGRESS NOTE    VENT DAY# 2    CURRENT SETTINGS:   Vent Mode: Other (see comments) (VC AC)  FiO2 (%): 30 %  Resp Rate (Set): 20 breaths/min  Tidal Volume (Set, mL): 400 mL  PEEP (cm H2O): 5 cmH2O  Resp: 20      PATIENT PARAMETERS:  PIP 20  Pplat:  19  Pmean:  9  Compliance: 31  SBT: no      Secretions:  small thick tan  02 Sats:  96%  BS: rhonchi Rt.    ETT SIZE 7.5 Secured at 23 cm at teeth/gums    Respiratory Medications: none         NOTE / SHIFT SUMMARY:   Continue on mechanical ventilation current settings, O2 was titrated to 30 % today. RT to monitor    Esteban Barclay, RT

## 2023-10-06 NOTE — PROGRESS NOTES
"General Surgery Progress Note    No acute events overnight.  More hypotensive later in the day.  Family discussion with palliative care today.    BP (!) 81/50   Pulse 53   Temp 99  F (37.2  C) (Esophageal)   Resp (!) 0   Ht 1.575 m (5' 2\")   Wt 99.9 kg (220 lb 3.2 oz)   LMP  (LMP Unknown)   SpO2 99%   BMI 40.28 kg/m      Intubated and sedated  Norepinephrine up to 0.12 from 0.06  Dressings down.  Her mons and lower abdomen appear clean.  Her upper buttocks also appears fairly clean.  On her lower leg the fascial planes are now showing evidence of continued infection.  I am able to easily push through some of the fascial planes and counter more necrotic tissue.    Patient is a 63-year-old woman with a very severe case of necrotizing soft tissue infection of her lower leg who is now several days out from her initial debridement and secondary debridement.  Based off her exam today, she needs another debridement if we are going to be aggressive, probably even tonight.  Additionally, we had a conversation with some of the physicians at [facility] and they would be willing to take this patient in transfer.  However, after discussion with the family of the findings and the patient's condition, they have decided to move away from aggressive cares to more comfort measures.  They do not want to be transferred.  Realistically, I think this is the best option for the patient.      -Replace dressings with Vashe soaked Kerlix  -Moving towards comfort measures  -No further debridements at this time  -We will follow peripherally.     Julito Molina MD  General Surgeon  [facility]  [facility] - [address]  [address]  [place] [address]  Office: [phone]    "

## 2023-10-06 NOTE — CONSULTS
"Palliative Care Consultation Note  [facility]      Patient: Lidia Nam  Date of Admission:  10/2/2023    Requesting Clinician / Team: Kelsi MAJOR CNP-Intensive Care   Reason for consult: \"Goals of care, family struggling with the long-term complications and rehab patient would require.  Deciding if they would even want patient to go to higher level of care for hyperbaric treatment.\"  Goals of care  Patient and family support       Recommendations & Counseling     GOALS OF CARE:   Life prolonging, with limits (DNR, prearrest intubation ok)   Arjun explicitly and repeatedly states awareness/understanding that Lidia would not want to continue life prolonging treatments if her best case outcome would be residing in SNF facility, if she couldn't smoke and drink beer and regain abilities she previously had.    Will plan for family meeting Monday 10/9/23 at 10am to revisit goals of care.  If further decline in clinical status, recommend notify  Arjun before addition additional therapies to review whether transition to comfort care desired.      ADVANCE CARE PLANNING:  No health care directive on file. Per  informed consent policy, next of kin should be involved if patient becomes unable.    There is no POLST form on file, recommend to complete prior to DC.  Code status: No CPR- Pre-arrest intubation OK    MEDICAL MANAGEMENT:   We are not actively managing symptoms at this time.    PSYCHOSOCIAL/SPIRITUAL SUPPORT:  Family  (42 yrs) to Arjun, son Joby, daughter Radha.  No grandchildren.  [place] community: [place] .  Raised Yarsanism, attended Protestant grade schools.  Hasn't attended Jainism services in years,  not aware if she has active prayer life.  Will request spiritual care for support.    Palliative Care will continue to follow.  Recommend and will plan on family meeting Monday 10/9/23 at 10am with Arjun, Joby, and Radha, to follow up Lidia's " response to present therapies and any desired c hanges to goals of treatment.  I advised Arjun that children Joby and Radha should be present, at minimum by phone but ideally in person.  Thank you for the consult and allowing us to aid in the care of Lidia Nam.    These recommendations have been discussed with Kelsi MAJOR/CNP.    Evelyn Chaudhary MD  Securely message with [employer] (more info)  Text page via [facility] Paging/Directory       Assessment      Lidia Nam is a 63 year old female with a past medical history of hypothyroidism with goiter, tobacco abuse, DM2, obesity, AUD who presented on 10/2/23 with groin pain and septic shock.  CT showed necrotizing fasciitis and she was taken emergently to the OR and had debridement to treat/manage necrotizing fasciitis including L side of perineum, L buttock, L leg.    On 10/2/23 with Dr Howard she had L vulvectomy, debridement of L buttock, L posterior thigh to L knee.  She remained intubated and on pressors and returned to ICU; the following day she had further mecrosis into the L side of mons pubis and skin flaps; on 10/4/23 Dr Molina completed further debridement of the L mons pubis, as well as L gluteal/posterior thigh and extending down to the L ankle.  Lidia remains on pressors, remains intubated (large goiter, required ENT assistance for intubation at time of surgery), and is sedated.      Today, the patient was seen for:  Initial palliative care consultation, introduction to palliative care for  Arjun, support, and goals of care conversation.    Palliative Care Summary:   Met with Lidia who is intubated, sedated and on pressors and unable to engage in conversation.  Had 60+ minute telephone call with Lidia's  Arjun (10:40-11:50).     I introduced our role as an extra layer of support and how we help patients and families dealing with serious, potentially life-limiting illnesses. I explained the composition of the  palliative care team.  Palliative care helps patients and families navigate their care while focusing on the whole person; providing emotional, social and spiritual support  Palliative care often assists with symptom management, information sharing about what to expect from the illness, available treatment options and what effect those options may have on the disease course, and provide effective communication and caring support.    Arjun notes Lidia had not wanted to come to the hospital or emergency department for several days prior to admission.  She had complained of and inflamed area on her buttock that was draining.  Family members repeatedly asked her if she wanted to go to the emergency department and each time she refused.  She was found in the morning on the day of admission on the floor, naked and confused at the top of the stairs, by her  who had gone upstairs to check on her.    Lidia has avoided much contact with the medical system.  She is dealt with a goiter for 10 years at least, he notes they have unable to go out to dinner because she has difficulty flexing her neck and eating at a restaurant and was self-conscious.  Lidia reportedly informed her  that surgery would be elective, she minimized symptoms and opted not to pursue surgery, and was having difficulties with breathing and positioning for eating due to her goiter.  Arjun notes Lidia habitually would minimize symptoms and avoid going to the doctor, hoping and expecting that symptoms would get better.    Lidia has had declining functional status over the past 10 years.  She quit working in , was having more difficulties with movement, walking and work activities.  She worked at a skilled nursing facility and had told her  multiple times she would never want to live in a skilled nursing facility if she got ill enough.  Her father  from complications of lung cancer, her mother  six months later from  "heart disease.  Her parents had a contentious relationship with her father doing most of the work around the house/managing things, and this pattern has repeated/emerged in Arjun and Lidia's marriage as well.    Arjun notes Lidia typically will not ask for help.  She had been struggling with personal hygiene, did not want help with showering or bathing or with cutting her nails and toenails.  Hadn't had a haircut in years.  Was fiercely independent and finds ita in watching baseball, football, she used to be a voracious reader.  She used to enjoy spending time when they would rent a cabin.  Most of the time, Lidia has spent her days alone in her bedroom, drinking beer, smoking and watching television or YouTube.  Arjun was doing grocery shopping, cooking, cleaning, managing bills for [employer] past several years.    Arjun is aware that Lidia's skin infection is life-threatening and extensive.  He is aware that she has had skin and muscle and tissue removed from her buttocks, from her perineum, in the back of her legs.  He is aware that she may lose her left leg.  He is worried that she could have the extension spreading up into the torso and further over the abdomen.    Arjun notes Lidia would not want to be on a ventilator long-term, would not want a tracheostomy.  He also notes she would not want to be in a skilled nursing facility.  Arjun is worried that present medical treatments are much more invasive than what Lidia would typically accept and that if she recovers sufficiently, \"she is going to be really angry about where she is at medically.\"  He also notes their home has stairs and that he would be unable to care for he at home.    He and their children Joby and Radha have had conversations about surrogate decision making for Lidia.  Arjun notes Joby is struggling with not wanting to lose his mom and wanting to continue all life-prolonging treatments to try to support to recovery.  Arjun and Radha " have been worried that Lidia's chance that a meaningful recovery is very small and that she would never find her quality of life acceptable if it meant remaining in medically intense settings and if she could not walk.    Prognosis, Goals, & Planning:    Functional Status just prior to this current hospitalization:  Was able to ambulate short distances, was not able to prepare meals for herself.    Prognosis, Goals, and/or Advance Care Planning:  We discussed general treatment options (full/restorative, selective/conservatives, and comfort only/hospice). We then discussed how these specifically apply to Lidia's situation with necrotizing fasciitis.  Based on this discussion, Arjun has decided to continue current level of care.  He plans to have further conversation with his children over the weekend about possible transition to comfort focused goals of care /comfort care.  He would want to be notified if there was further clinical decline before adding additional treatments.  Discussed what continuing restorative/life-prolonging care entails, including continued (re)admissions to the hospital, continuing with preventative and primary care, seeing disease/organ specific specialty consultations for medical treatments in hopes to prolong life for as long as possible.    Education provided on transition to comfort-focused goals of care would be including discontinuation of IV fluids, cardiac monitoring, labs, tube feeding, TPN and other interventions that do not directly promote comfort.  Anticipatory guidance was given regarding feeding, hunger, fluids at end of life. We discussed utilization of medications to ease air hunger, agitation and restlessness at the time that ventilator is compassionately withdrawn.  Discussed that this process is very purposeful in terms of ensuring patient is as comfortable as possible and that family wishes are honored.    Code Status was addressed today:   Yes, We discussed potential  risks and rationale of attempting cardiac resuscitation, intubation, and mechanical ventilation.  We also discussed probability of survival as well as quality of life implications.  Based on this discussion, patient or surrogate response/decision: Remain DNR with prearrest intubation acceptable      Patient's decision making preferences: unable to assess        Patient has decision-making capacity today for complex decisions: No, Lidia does not have decisional capacity today            Coping, Meaning, & Spirituality:   Mood, coping, and/or meaning in the context of serious illness were addressed today: Yes    Social:   Living situation:lives with significant other/spouse  Important relationships/caregivers: , Arjun, son Joby, daughter Radha  Occupation: Quit work, was employed at a skilled nursing facility  Contributing stressors (financial, substance abuse, relationship concerns, etc.)  substance abuse, alcohol use disorder and tobacco abuse and strained relationship with spouse    Medications:  I have reviewed this patient's medication profile and medications from this hospitalization.   Notable medications:  Current Facility-Administered Medications   Medication    acetaminophen (TYLENOL) solution 975 mg    acetaminophen (TYLENOL) tablet 650 mg    bisacodyl (DULCOLAX) suppository 10 mg    clindamycin (CLEOCIN) 900 mg in 50 mL NS intermittent infusion    dexmedeTOMIDine (PRECEDEX) 4 mcg/mL in NS infusion    glucose gel 15-30 g    Or    dextrose 50 % injection 25-50 mL    Or    glucagon injection 1 mg    sennosides (SENOKOT) syrup 5 mL    And    docusate (COLACE) 50 MG/5ML liquid 50 mg    [Held by provider] enoxaparin ANTICOAGULANT (LOVENOX) injection 40 mg    fentaNYL (SUBLIMAZE) 50 mcg/mL bolus from pump    fentaNYL (SUBLIMAZE) infusion    flumazenil (ROMAZICON) injection 0.2 mg    [START ON 10/7/2023] folic acid (FOLVITE) tablet 1 mg    haloperidol lactate (HALDOL) injection 5 mg    HYDROmorphone  (DILAUDID) injection 0.2 mg    Or    HYDROmorphone (PF) (DILAUDID) injection 0.5 mg    hydrOXYzine (ATARAX) tablet 25 mg    insulin aspart (NovoLOG) injection (RAPID ACTING)    insulin glargine (LANTUS PEN) injection 5 Units    ipratropium - albuterol 0.5 mg/2.5 mg/3 mL (DUONEB) neb solution 3 mL    lactated ringers infusion    levothyroxine (SYNTHROID/LEVOTHROID) tablet 112 mcg    lidocaine (LMX4) cream    lidocaine 1 % 0.1-1 mL    LORazepam (ATIVAN) injection 0.5 mg    magnesium hydroxide (MILK OF MAGNESIA) suspension 30 mL    metoprolol (LOPRESSOR) injection 5 mg    metroNIDAZOLE (FLAGYL) infusion 500 mg    midazolam (VERSED) injection 1 mg    multivitamins w/minerals liquid 15 mL    naloxone (NARCAN) injection 0.2 mg    Or    naloxone (NARCAN) injection 0.4 mg    Or    naloxone (NARCAN) injection 0.2 mg    Or    naloxone (NARCAN) injection 0.4 mg    norepinephrine (LEVOPHED) 4 mg in  mL infusion PREMIX    ondansetron (ZOFRAN ODT) ODT tab 4 mg    Or    ondansetron (ZOFRAN) injection 4 mg    oxyCODONE (ROXICODONE) tablet 5 mg    Or    oxyCODONE (ROXICODONE) tablet 10 mg    pantoprazole (PROTONIX) IV push injection 40 mg    piperacillin-tazobactam (ZOSYN) 3.375 g vial to attach to  mL bag    polyethylene glycol (MIRALAX) Packet 17 g    prochlorperazine (COMPAZINE) injection 10 mg    Or    prochlorperazine (COMPAZINE) tablet 10 mg    senna-docusate (SENOKOT-S/PERICOLACE) 8.6-50 MG per tablet 1 tablet    sodium chloride (PF) 0.9% PF flush 10-40 mL    sodium chloride (PF) 0.9% PF flush 3 mL    sodium chloride (PF) 0.9% PF flush 3 mL    sodium chloride 0.9% BOLUS 1-250 mL    thiamine (B-1) tablet 100 mg    [START ON 10/11/2023] thiamine (B-1) tablet 100 mg    vancomycin (VANCOCIN) 1,500 mg in sodium chloride 0.9 % 250 mL intermittent infusion    vasopressin (VASOSTRICT) 20 Units in sodium chloride 0.9 % 100 mL standard conc infusion          ROS:  Comprehensive ROS is reviewed and is negative except as here  & per HPI:     Physical Exam   Vital Signs with Ranges  Temp:  [96.3  F (35.7  C)-98.6  F (37  C)] 96.8  F (36  C)  Pulse:  [49-64] 49  Resp:  [20-30] 20  BP: ()/(38-86) 64/38  FiO2 (%):  [40 %] 40 %  SpO2:  [97 %-100 %] 99 %  220 lbs 3.2 oz      Intake/Output Summary (Last 24 hours) at 10/6/2023 1456  Last data filed at 10/6/2023 1300  Gross per 24 hour   Intake 4142.44 ml   Output 1025 ml   Net 3117.44 ml     Body mass index is 40.28 kg/m .    PHYSICAL EXAM:  Intubated, sedated 63-year-old woman who appears older.  Scattered rhonchi noted on auscultation of lungs, on vent.  Heart S1-S2 without murmur.  Abdomen is obese.  Surgical dressings over lower abdomen and perineum, catheter with ken urine.  Additional dressings over buttock, posterior L thigh and into L lower leg.  Edematous upper and lower extremities.    Data reviewed:  Last Comprehensive Metabolic Panel:  Lab Results   Component Value Date     10/06/2023    POTASSIUM 3.6 10/06/2023    CHLORIDE 114 (H) 10/06/2023    CO2 21 (L) 10/06/2023    ANIONGAP 7 10/06/2023     (H) 10/06/2023    BUN 24.8 (H) 10/06/2023    CR 0.59 10/06/2023    GFRESTIMATED >90 10/06/2023    JW 7.5 (L) 10/06/2023       CBC RESULTS:   Recent Labs   Lab Test 10/06/23  0601   WBC 12.0*   RBC 3.13*   HGB 9.6*   HCT 29.3*   MCV 94   MCH 30.7   MCHC 32.8   RDW 17.2*   *     Lab Results   Component Value Date    AST 24 10/03/2023     Lab Results   Component Value Date    ALT 25 10/03/2023     No results found for: BILICONJ   Lab Results   Component Value Date    BILITOTAL 0.5 10/03/2023     Lab Results   Component Value Date    ALBUMIN 1.7 10/03/2023    ALBUMIN 3.1 06/23/2020     Lab Results   Component Value Date    PROTTOTAL 4.8 10/03/2023      Lab Results   Component Value Date    ALKPHOS 104 10/03/2023     Blood cultures negative  Anaerobic culture from L vulva positive for bacteroides ovatus/xylanisolvens, fusobacterium nucleatum and other mixed  aerobic/anaerobes  Actinomyces growing from L buttock and L vulva and L lower leg.  Bacteroides ovatus/xylanisolvens growing from L buttock.    Respiratory culture from endotracheal sputum positive for Haemophilus influenzae and strep constellatus.    90 MINUTES SPENT BY ME on the date of service doing chart review, history, exam, documentation & further activities per the note.      Evelyn Chaudhary MD  [facility], Palliative Care  Securely message with the [employer] Web Console (learn more here) or  Text page via [facility] Paging/Directory

## 2023-10-06 NOTE — PROGRESS NOTES
"Spoke with Dr. Molina this afternoon regarding a transfer to [facility], as there was an accepting physician at this facility.    I spoke with patient's daughter Radha and Patient's  Arjun regarding this.  After there discussions with palliative today, and also, after finding out the continued debridement would occur at this facility or outside facility, they are likely planning to transition to comfort measures in the next few days.    Arjun again stated that Lidia would not have even wanted to come to the hospital.  She worked in nursing homes for many years, and had stated that if she ever required long-term care in such a facility or could not be at home, that she would not want to continue cares.  They are very understanding that if Lidia were able to leave the hospital, she would likely never return to home at her baseline.  It seems over the past several years she has not done much other than \"be alone in her bedroom, drinking, smoking, and watching TV.      Notified surgical team.  Given what were Lidia's wishes, I agree that comfort cares is certainly reasonable. Her family is meeting tonight together for dinner (Arjun, Joby her son, and Radha) to discuss this further.    At this time, will continue current cares.  I assured Arjun that we would notify him of any acute changes.    PEDRITO Johns, CNP  Pulmonary Critical Care  Securely Message Through [employer]oc[employer] Web Console     "

## 2023-10-06 NOTE — PROGRESS NOTES
[facility]  Critical Care Progress Note    I have seen Lidia Nam with Louisa Brown, MS4.  I agree with the assessment and plan as outlined below.  Patient remains in critical condition secondary to shock as a result of extensive necrotizing fascitis.  Dressing changes BID per surgery.  Depending on these and clinical course, we are still looking for a bed on a burn unit.  Also, family is struggling with goals of care for patient.  I have involved Palliative Care Team today, and appreciate their assistance.    I have personally reviewed labs, meds, imaging, and clinical data from this last 24 hours.    PEDRITO Johns, CNP  Pulmonary Critical Care  Securely Message Through Vocera Web Console   45 minutes of critical care time         Summary:   63 year old woman with hx of T2DM, obesity, tobacco abuse, alcohol abuse, hypothyroidism, goiter. Presented on 10/2/23 with dyspnea, cough, generalized weakness. Additionally, had increased pain, redness, and malodorous drainage from a wound in her left inguinal/vaginal area. CT scan showed necrotizing fasciitis and she was emergently taken to the OR for vulvectomy and broad debridement of the left buttock and left thigh. She returned to the ICU on pressors and was febrile through most of the day. Taken back to OR 10/3 and required extensive debridement from the left buttock, perineum, down to the left ankle. She required intubation by ENT due to large goiter.     Major Changes for Today:   -Palliative Care consult    -Still working to get patient transferred to [facility] or [facility] for more extensive wound cares. No beds currently available.   -Dressing change vs. Further I&D with surgery  -Start bowel regimen. Rectal tube in place.      Assessment & Plan:     Goals of Care:  No CPR, Pre arrest intubation OK   Extensive conversation with family. Prognosis made clear by Dr. Molina. Family would like to give patient a chance to recover from  "infection. Expressed they would like her to be DNR and if her condition were to rapidly deteriorate, they would likely move to comfort care.   -Discussed transfer to [facility] or [facility] for more extensive wound cares. No beds available.       Neurology, Psychiatry, Sedation, Analgesia:  Currently sedated for ventilation and comfort.  - RASS goal, liberate to -1 to -2.    - Precedex & Fentanyl   - Grimacing with pain and opens eyes spontaneously with turns   - Can attempt to wean sedation. Difficult with dressing changes.   -  reported patient consumes \"6-12 beers per day\"    -CIWA protocol   -Ativan PRN for alcohol withdrawal   -Folate, Thiamine     Cardiovascular:  Septic Shock secondary to extensive necrotizing fasciitis  - fluid resuscitated with 4L crystalloid   - MAP goal > 65. Continues to require low dose Norepi for pressor support.   - Difficulty located pedal pulses. Doppler needed.       Respiratory, Airway:  Acute hypoxemic respiratory failure. Difficult airway due to large goiter.  - Vent Mode: CMV/AC   FiO2 (%): 40 %  Resp Rate (Set): 20 breaths/min  Tidal Volume (Set, mL): 400 mL  PEEP (cm H2O): 5 cmH2O  Resp: 20  - Sputum culture (10/3) grew 3+ Haemophilus influenzae, 1+ Streptococcus constellatus. Covered with current abx regimen.       Gastrointestinal:  No acute issues  -Last BM was prior to admission.  -Concerned about need to keep wound clean when patient begins stooling. Rectal tube placed 10/5. Most likely will eventually need diverting colostomy.   -Will start bowel regimen today.   -Tube feeds started yesterday      Renal, Acid-base, Electrolytes, Volume:  Lactic Acidosis from septic shock - resolved. Preserved renal function.   -Strict I&Os   -UOP adequate       Infectious Disease:  Septic Shock from necrotizing fasciitis. Extensive Involvement of the perineum, L groin, and left leg down to the ankle.   -Currently on Flagyl, Vancomycin, Clindamycin, Zosyn   -Infectious Disease following, " appreciate recs.   -Gram stains from left buttock tissue : polymicrobial   -cultures pending   - Sputum culture (10/3) grew 3+ Haemophilus influenzae, 1+ Streptococcus constellatus. Covered with current abx regimen.   -Ongoing surgical debridement for source control. Surgery following.  -Additional dressing change performed overnight due to increased foul smelling drainage.   -Plan for bedside dressing change vs. I&D today      Hematology, Oncology:  Normocytic anemia   -Hgb (10/5) 6.6 s/p 2 units pRBC transfusion. Hgb 9.6 this AM   - DVT prophylaxis: Enoxaparin on hold due to surgery. Can restart later today if no plan to go to OR.   - SCDs in place      Endocrine:  Hypothyroidism on levothyroxine, large goiter  - PTA levothyroxine     Diabetes Mellitus   Elevated blood sugars   -sliding scale insulin q4hrs   -add Lantus to diabetes management regimen  (will start with 5 times one today, and then 5 units Q HS )    Checklist:  FEN: TF   VTE ppx: SCDs. Lovenox on hold   GI ppx: PPI  Bowel regimen: PRN, rectal tube in place   VAP ppx: Head of bed >30 degrees, daily oral care  Lines/tube-size: PIV x3 (10/2), PICC (10/3), urinary catheter (10/2), OG (10/3)  PT/OT/SLP, early mobility: Not indicated at this time   Code Status: No CPR, pre arrest intubation OK   Communication:  (Arjun), son (Joby)    Physical Exam:  Neurologic: Sedated.  Opens eyes. Grimaces with pain.   HEENT: large goiter  Respiratory: Lungs sounds course bilaterally.   Cardiovascular: Regular rate & rhythm  Normal S1 & S2. No murmurs, rubs. or gallops. Gastrointestinal: Bowel sounds present. Non tender. Non distended   Musculoskeletal: left leg wrapped in kerlix from groin down    All pertinent vital signs, ventilator settings, I&Os, laboratory, microbiology, ECGs, & imaging data has been personally reviewed. Total Critical Care time, excluding procedures, was 50 minutes     CYNTHIA Bhatt

## 2023-10-06 NOTE — PROGRESS NOTES
Care Management Follow Up    Length of Stay (days): 4    Expected Discharge Date: To be determined.      Concerns to be Addressed:  ICU level of care, due to alteration in respiratory status, requiring oral intubation/mechanical ventilation and drips.  Care post incision and drainage of Necrotizing soft tissue infection (Necrotic tissue with gray purulent infection built up in the subcutaneous and fascial planes of the vulva on the left side along the left lower half of the buttock and along the left posterior thigh extending down to just above the knee.) on 10/2/3 and 10/4. Possible transfer to Burn unit or other facility for hyperbaric. ID following; Vancomycin and Zosyn.   Patient plan of care discussed at interdisciplinary rounds: Yes  Follow up from rounds/notes:    Currently has dignicare but might need diverting colostomy in the future. MD considering palliative care consult; transfer [place] [facility] [place] [facility].    Anticipated Discharge Disposition:  To be determined by destination, patient/family preferences, medical needs and mobility status closer to the time of discharge.  Anticipated Discharge Services:  To be determined.   Anticipated Discharge DME:  To be determined.     Patient/family educated on Medicare website which has current facility and service quality ratings:  NA  Education Provided on the Discharge Plan:  Per team  Patient/Family in Agreement with the Plan:  yes    Referrals Placed by CM/SW:  none at this time.    Private pay costs discussed: Not applicable at this time    Additional Information:  Patient's spouse Arjun reported that patient lives with him in a house. He stated that she has not been taking care of herself, not bathing, and laying in bed most days. Arjun also stated that patient drinks 6-12 beers/day, smokes and not was not eating properly.  helps with all instrumental activities of daily living (IADLs).   Patient will likely need a higher level of care, such as LTACH, upon  discharge. CM will continue to monitor progression of care, review team recommendations and provide discharge planning assist as needed.      Sindhu Dubon RN

## 2023-10-06 NOTE — PLAN OF CARE
Problem: Enteral Nutrition  Goal: Absence of Aspiration Signs and Symptoms  Outcome: Progressing  Goal: Safe, Effective Therapy Delivery  Outcome: Progressing  Goal: Feeding Tolerance  Outcome: Progressing   Goal Outcome Evaluation:    Patient tolerating tube advancement.  TF is high protein formula appropriate for wound healing.    Patient has dignicare placed to protect surgical wounds.  No BM this admission.   Plan:  TF continues to advance to goal rate.  Increase free water flush at this time as sodium gradually increasin ml every 4 hrs.  Monitor hydration.

## 2023-10-07 LAB
ANION GAP SERPL CALCULATED.3IONS-SCNC: 7 MMOL/L (ref 7–15)
BACTERIA BLD CULT: NO GROWTH
BACTERIA BLD CULT: NO GROWTH
BACTERIA TISS BX CULT: ABNORMAL
BACTERIA TISS BX CULT: ABNORMAL
BACTERIA TISS BX CULT: NORMAL
BUN SERPL-MCNC: 20 MG/DL (ref 8–23)
CALCIUM SERPL-MCNC: 8.1 MG/DL (ref 8.8–10.2)
CHLORIDE SERPL-SCNC: 116 MMOL/L (ref 98–107)
CREAT SERPL-MCNC: 0.54 MG/DL (ref 0.51–0.95)
DEPRECATED HCO3 PLAS-SCNC: 20 MMOL/L (ref 22–29)
EGFRCR SERPLBLD CKD-EPI 2021: >90 ML/MIN/1.73M2
ERYTHROCYTE [DISTWIDTH] IN BLOOD BY AUTOMATED COUNT: 17.1 % (ref 10–15)
GLUCOSE BLDC GLUCOMTR-MCNC: 221 MG/DL (ref 70–99)
GLUCOSE BLDC GLUCOMTR-MCNC: 229 MG/DL (ref 70–99)
GLUCOSE BLDC GLUCOMTR-MCNC: 267 MG/DL (ref 70–99)
GLUCOSE BLDC GLUCOMTR-MCNC: 275 MG/DL (ref 70–99)
GLUCOSE BLDC GLUCOMTR-MCNC: 292 MG/DL (ref 70–99)
GLUCOSE BLDC GLUCOMTR-MCNC: 294 MG/DL (ref 70–99)
GLUCOSE SERPL-MCNC: 286 MG/DL (ref 70–99)
HCT VFR BLD AUTO: 29.1 % (ref 35–47)
HGB BLD-MCNC: 9.2 G/DL (ref 11.7–15.7)
MAGNESIUM SERPL-MCNC: 1.8 MG/DL (ref 1.7–2.3)
MCH RBC QN AUTO: 30.5 PG (ref 26.5–33)
MCHC RBC AUTO-ENTMCNC: 31.6 G/DL (ref 31.5–36.5)
MCV RBC AUTO: 96 FL (ref 78–100)
PLATELET # BLD AUTO: 170 10E3/UL (ref 150–450)
POTASSIUM SERPL-SCNC: 3.7 MMOL/L (ref 3.4–5.3)
RBC # BLD AUTO: 3.02 10E6/UL (ref 3.8–5.2)
SODIUM SERPL-SCNC: 143 MMOL/L (ref 135–145)
WBC # BLD AUTO: 13.9 10E3/UL (ref 4–11)

## 2023-10-07 PROCEDURE — 999N000157 HC STATISTIC RCP TIME EA 10 MIN

## 2023-10-07 PROCEDURE — 200N000001 HC R&B ICU

## 2023-10-07 PROCEDURE — 85027 COMPLETE CBC AUTOMATED: CPT | Performed by: NURSE PRACTITIONER

## 2023-10-07 PROCEDURE — 999N000287 HC ICU ADULT ROUNDING, EACH 10 MINS

## 2023-10-07 PROCEDURE — 99233 SBSQ HOSP IP/OBS HIGH 50: CPT | Performed by: INTERNAL MEDICINE

## 2023-10-07 PROCEDURE — 999N000253 HC STATISTIC WEANING TRIALS

## 2023-10-07 PROCEDURE — 999N000009 HC STATISTIC AIRWAY CARE

## 2023-10-07 PROCEDURE — 99291 CRITICAL CARE FIRST HOUR: CPT | Performed by: NURSE PRACTITIONER

## 2023-10-07 PROCEDURE — 250N000011 HC RX IP 250 OP 636: Performed by: SURGERY

## 2023-10-07 PROCEDURE — 258N000003 HC RX IP 258 OP 636: Performed by: INTERNAL MEDICINE

## 2023-10-07 PROCEDURE — C9113 INJ PANTOPRAZOLE SODIUM, VIA: HCPCS | Mod: JZ | Performed by: SURGERY

## 2023-10-07 PROCEDURE — 250N000013 HC RX MED GY IP 250 OP 250 PS 637: Performed by: NURSE PRACTITIONER

## 2023-10-07 PROCEDURE — 94003 VENT MGMT INPAT SUBQ DAY: CPT

## 2023-10-07 PROCEDURE — 250N000011 HC RX IP 250 OP 636: Mod: JZ | Performed by: SURGERY

## 2023-10-07 PROCEDURE — 258N000003 HC RX IP 258 OP 636: Performed by: NURSE PRACTITIONER

## 2023-10-07 PROCEDURE — 250N000011 HC RX IP 250 OP 636: Mod: JZ | Performed by: INTERNAL MEDICINE

## 2023-10-07 PROCEDURE — 250N000009 HC RX 250: Performed by: SURGERY

## 2023-10-07 PROCEDURE — 83735 ASSAY OF MAGNESIUM: CPT | Performed by: INTERNAL MEDICINE

## 2023-10-07 PROCEDURE — 250N000011 HC RX IP 250 OP 636: Mod: JZ | Performed by: NURSE PRACTITIONER

## 2023-10-07 PROCEDURE — 250N000011 HC RX IP 250 OP 636: Performed by: NURSE PRACTITIONER

## 2023-10-07 PROCEDURE — 80048 BASIC METABOLIC PNL TOTAL CA: CPT | Performed by: NURSE PRACTITIONER

## 2023-10-07 RX ORDER — POTASSIUM CHLORIDE 29.8 MG/ML
20 INJECTION INTRAVENOUS ONCE
Status: COMPLETED | OUTPATIENT
Start: 2023-10-07 | End: 2023-10-07

## 2023-10-07 RX ORDER — MAGNESIUM SULFATE HEPTAHYDRATE 40 MG/ML
2 INJECTION, SOLUTION INTRAVENOUS ONCE
Status: COMPLETED | OUTPATIENT
Start: 2023-10-07 | End: 2023-10-07

## 2023-10-07 RX ADMIN — ENOXAPARIN SODIUM 40 MG: 40 INJECTION SUBCUTANEOUS at 09:55

## 2023-10-07 RX ADMIN — METRONIDAZOLE 500 MG: 500 INJECTION, SOLUTION INTRAVENOUS at 05:30

## 2023-10-07 RX ADMIN — METRONIDAZOLE 500 MG: 500 INJECTION, SOLUTION INTRAVENOUS at 13:27

## 2023-10-07 RX ADMIN — PIPERACILLIN AND TAZOBACTAM 3.38 G: 3; .375 INJECTION, POWDER, LYOPHILIZED, FOR SOLUTION INTRAVENOUS at 08:23

## 2023-10-07 RX ADMIN — FOLIC ACID 1 MG: 1 TABLET ORAL at 08:31

## 2023-10-07 RX ADMIN — PANTOPRAZOLE SODIUM 40 MG: 40 INJECTION, POWDER, FOR SOLUTION INTRAVENOUS at 05:41

## 2023-10-07 RX ADMIN — THIAMINE HCL TAB 100 MG 100 MG: 100 TAB at 08:31

## 2023-10-07 RX ADMIN — CLINDAMYCIN PHOSPHATE 900 MG: 900 INJECTION, SOLUTION INTRAVENOUS at 11:30

## 2023-10-07 RX ADMIN — METRONIDAZOLE 500 MG: 500 INJECTION, SOLUTION INTRAVENOUS at 20:57

## 2023-10-07 RX ADMIN — DEXMEDETOMIDINE HYDROCHLORIDE 0.6 MCG/KG/HR: 400 INJECTION INTRAVENOUS at 23:03

## 2023-10-07 RX ADMIN — THIAMINE HCL TAB 100 MG 100 MG: 100 TAB at 13:27

## 2023-10-07 RX ADMIN — INSULIN ASPART 7 UNITS: 100 INJECTION, SOLUTION INTRAVENOUS; SUBCUTANEOUS at 20:38

## 2023-10-07 RX ADMIN — POTASSIUM CHLORIDE 20 MEQ: 29.8 INJECTION, SOLUTION INTRAVENOUS at 08:17

## 2023-10-07 RX ADMIN — DOCUSATE SODIUM 50 MG: 50 LIQUID ORAL at 08:36

## 2023-10-07 RX ADMIN — DEXMEDETOMIDINE HYDROCHLORIDE 0.6 MCG/KG/HR: 400 INJECTION INTRAVENOUS at 16:51

## 2023-10-07 RX ADMIN — Medication 15 ML: at 08:31

## 2023-10-07 RX ADMIN — Medication 25 MCG: at 01:55

## 2023-10-07 RX ADMIN — VANCOMYCIN HYDROCHLORIDE 1500 MG: 500 INJECTION, POWDER, LYOPHILIZED, FOR SOLUTION INTRAVENOUS at 05:50

## 2023-10-07 RX ADMIN — Medication 0.07 MCG/KG/MIN: at 14:57

## 2023-10-07 RX ADMIN — SENNOSIDES 5 ML: 8.8 LIQUID ORAL at 20:54

## 2023-10-07 RX ADMIN — ACETAMINOPHEN 975 MG: 325 SOLUTION ORAL at 09:01

## 2023-10-07 RX ADMIN — THIAMINE HCL TAB 100 MG 100 MG: 100 TAB at 20:57

## 2023-10-07 RX ADMIN — INSULIN ASPART 6 UNITS: 100 INJECTION, SOLUTION INTRAVENOUS; SUBCUTANEOUS at 05:18

## 2023-10-07 RX ADMIN — PIPERACILLIN AND TAZOBACTAM 3.38 G: 3; .375 INJECTION, POWDER, LYOPHILIZED, FOR SOLUTION INTRAVENOUS at 17:05

## 2023-10-07 RX ADMIN — INSULIN ASPART 4 UNITS: 100 INJECTION, SOLUTION INTRAVENOUS; SUBCUTANEOUS at 09:01

## 2023-10-07 RX ADMIN — INSULIN ASPART 4 UNITS: 100 INJECTION, SOLUTION INTRAVENOUS; SUBCUTANEOUS at 16:55

## 2023-10-07 RX ADMIN — DOCUSATE SODIUM 50 MG: 50 LIQUID ORAL at 20:56

## 2023-10-07 RX ADMIN — Medication 0.08 MCG/KG/MIN: at 05:40

## 2023-10-07 RX ADMIN — INSULIN ASPART 6 UNITS: 100 INJECTION, SOLUTION INTRAVENOUS; SUBCUTANEOUS at 13:16

## 2023-10-07 RX ADMIN — LORAZEPAM 0.5 MG: 2 INJECTION INTRAMUSCULAR; INTRAVENOUS at 13:24

## 2023-10-07 RX ADMIN — LORAZEPAM 0.5 MG: 2 INJECTION INTRAMUSCULAR; INTRAVENOUS at 20:56

## 2023-10-07 RX ADMIN — LORAZEPAM 0.5 MG: 2 INJECTION INTRAMUSCULAR; INTRAVENOUS at 02:07

## 2023-10-07 RX ADMIN — SODIUM CHLORIDE, POTASSIUM CHLORIDE, SODIUM LACTATE AND CALCIUM CHLORIDE: 600; 310; 30; 20 INJECTION, SOLUTION INTRAVENOUS at 02:18

## 2023-10-07 RX ADMIN — DEXMEDETOMIDINE HYDROCHLORIDE 0.6 MCG/KG/HR: 400 INJECTION INTRAVENOUS at 10:03

## 2023-10-07 RX ADMIN — SODIUM CHLORIDE, POTASSIUM CHLORIDE, SODIUM LACTATE AND CALCIUM CHLORIDE: 600; 310; 30; 20 INJECTION, SOLUTION INTRAVENOUS at 23:04

## 2023-10-07 RX ADMIN — LORAZEPAM 0.5 MG: 2 INJECTION INTRAMUSCULAR; INTRAVENOUS at 08:23

## 2023-10-07 RX ADMIN — INSULIN ASPART 7 UNITS: 100 INJECTION, SOLUTION INTRAVENOUS; SUBCUTANEOUS at 00:58

## 2023-10-07 RX ADMIN — POLYETHYLENE GLYCOL 3350 17 G: 17 POWDER, FOR SOLUTION ORAL at 08:31

## 2023-10-07 RX ADMIN — SENNOSIDES 5 ML: 8.8 LIQUID ORAL at 08:36

## 2023-10-07 RX ADMIN — LEVOTHYROXINE SODIUM 112 MCG: 0.11 TABLET ORAL at 05:40

## 2023-10-07 RX ADMIN — DEXMEDETOMIDINE HYDROCHLORIDE 0.6 MCG/KG/HR: 400 INJECTION INTRAVENOUS at 03:00

## 2023-10-07 RX ADMIN — PIPERACILLIN AND TAZOBACTAM 3.38 G: 3; .375 INJECTION, POWDER, LYOPHILIZED, FOR SOLUTION INTRAVENOUS at 00:55

## 2023-10-07 RX ADMIN — ACETAMINOPHEN 975 MG: 325 SOLUTION ORAL at 17:01

## 2023-10-07 RX ADMIN — CLINDAMYCIN PHOSPHATE 900 MG: 900 INJECTION, SOLUTION INTRAVENOUS at 03:05

## 2023-10-07 RX ADMIN — MAGNESIUM SULFATE HEPTAHYDRATE 2 G: 40 INJECTION, SOLUTION INTRAVENOUS at 08:19

## 2023-10-07 RX ADMIN — Medication 25 MCG: at 04:00

## 2023-10-07 RX ADMIN — Medication 100 MCG/HR: at 11:29

## 2023-10-07 RX ADMIN — Medication 0.08 MCG/KG/MIN: at 23:04

## 2023-10-07 RX ADMIN — ACETAMINOPHEN 975 MG: 325 SOLUTION ORAL at 02:07

## 2023-10-07 RX ADMIN — CLINDAMYCIN PHOSPHATE 900 MG: 900 INJECTION, SOLUTION INTRAVENOUS at 18:01

## 2023-10-07 ASSESSMENT — ACTIVITIES OF DAILY LIVING (ADL)
ADLS_ACUITY_SCORE: 34
ADLS_ACUITY_SCORE: 30
ADLS_ACUITY_SCORE: 30
ADLS_ACUITY_SCORE: 34

## 2023-10-07 NOTE — PROGRESS NOTES
RT PROGRESS NOTE    VENT DAY# 6    CURRENT SETTINGS:   Vent Mode: CMV/AC  (Continuous Mandatory Ventilation/ Assist Control)  FiO2 (%): 30 %  Resp Rate (Set): 20 breaths/min  Tidal Volume (Set, mL): 400 mL  PEEP (cm H2O): 5 cmH2O  Resp: 20      ETT SIZE 7.5 Secured at 23 cm at teeth/gums    NOTE / SHIFT SUMMARY:   RT will continue to monitor.     Daniel Maldonado, RT

## 2023-10-07 NOTE — PROGRESS NOTES
"Continues on mechanical ventilator CPAP/PS 5/5 .30 x 61 minutes, Spontaneous RR 25, Vt 347, Ve 9.3, RSBI 72. Will continue on SBT as tolerated. RT to monitor    .Vent Mode: CPAP/PS  (Continuous positive airway pressure with Pressure Support)  FiO2 (%): 30 %  Resp Rate (Set): 20 breaths/min  Tidal Volume (Set, mL): 400 mL  PEEP (cm H2O): 5 cmH2O  Pressure Support (cm H2O): 5 cmH2O  Resp: 11    /65   Pulse 73   Temp 98.4  F (36.9  C)   Resp 11   Ht 1.575 m (5' 2\")   Wt 98 kg (216 lb 1.6 oz)   LMP  (LMP Unknown)   SpO2 98%   BMI 39.53 kg/m      "

## 2023-10-07 NOTE — PROGRESS NOTES
ICU PROGRESS NOTE:        Assessment/Plan:     Changes for Today:      -refer to my note from yesterday.  Family did not want transfer to another facility for further surgeries and advanced wound care.  More leaning towards comfort measures.  Daughter at bedside today, still struggling of course.  Patient's  and son will be here late afternoon.  I did make them all aware that if they do decided to continue, patient would need a debridement again today.     Neuro:  Sedation for comfort while on ventilator  Pain secondary to wounds:  RASS goal -1 to -2  Precedex and Fentanyl infusions  Appears comfortable in between cares, grimacing with dressing changes  CIWA protocol, scheduled ativan as we are unable to adequately score while sedated     Pulmonary:  Acute hypoxemic respiratory failure  Difficult airway due to large goiter:  Minimal vent settings  Sputum + for 3 + Haemophilus influenzae and 1+ Stretococcus constellatus.  Covered with current antibiotic regime.   Has not been weaning due to frequent trips to OR, will attempt depending on [facility] of care decision today    CV:  Septic shock due to extensive necrotizing fasciitis:  MAP goal 65 or greater, continues on low dose NE    GI:  Last BM PTA:  Concerns for stooling given extent of wounds and keeping them clean  A rectal tube was placed 10/5, if we continue with further cares, would likely need a diverting colostomy  Bowel regime in place  Tube feedings    Renal:  No acute issues  Electrolyte replacement protocols prn    ID:  Septic shock 2/2 necrotizing fasciitis  ID Following, appreciate  Flagyl, Vancomycin, Clindamycin, Zosyn  Sputum growth as above  Gram stain from buttocks polymicrobial     Heme: Normocytic anemia-stable    Endocrine:  Hypothyroidism  Large Goiter  Hyperglycemia in setting of T2DM, critical illness  Continue PTA levothyroxine  Sliding scale insulin and Lantus          ICU Prophylaxis:    PPI:  Protonix     Peridex:   YES    Anticoagulation/DVT Prophylaxis:  On-hold Lovenox, no procedures planned for today       Lines/Drains/Tubes:       PICC 10/03/23 Triple Lumen Right Basilic Multiple medications  4 days  Peripheral IV 10/05/23 Anterior;Right Lower forearm  1 day  Urethral Catheter 10/02/23 Straight-tip 16 fr  4 days  NG/OG/NJ Tube Orogastric Center mouth  4 days  Rectal Tube With balloon  1 day  ETT Cuffed Single;Single Subglottic Suction 7.5 mm  4 days    RESTRAINTS:   Non-Violent:  Type of Restraint: Soft limb x2.   Behavior: Pulling at IVand motta catheter tubings.   Root cause of behavior: Critical illness.   Less-restrictive methods that have failed: Redirection, reorientation. 1:1 NA at bedside.   Response to restraint: Not actively pulling atcurrent restraints.   Criteria for release from restraint: Responds to redirection. Leaves medical devices in place.          CODE STATUS: NO CPR, pre-arrest intubation ok       SUBJECTIVE:    Ccx:  Unable to access as patient is intubated and sedated     HPI:  63 year old woman with hx of T2DM, obesity, tobacco abuse, alcohol abuse, hypothyroidism, goiter. Presented on 10/2/23 with dyspnea, cough, generalized weakness. Additionally, had increased pain, redness, and malodorous drainage from a wound in her left inguinal/vaginal area. CT scan showed necrotizing fasciitis and she was emergently taken to the OR for vulvectomy and broad debridement of the left buttock and left thigh. She returned to the ICU on pressors and was febrile through most of the day. Taken back to OR 10/3 and required extensive debridement from the left buttock, perineum, down to the left ankle. She required intubation by ENT due to large goiter.       History reviewed. No pertinent past medical history.  Past Surgical History:   Procedure Laterality Date    EXCISE LESION PERINEAL N/A 10/2/2023    Procedure: vulvectomy;  Surgeon: Ken Howard MD;  Location: [facility] OR    INCISION AND DRAINAGE  LOWER EXTREMITY, COMBINED Left 10/4/2023    Procedure: INCISION AND DRAINAGE, LEFT LEG;  Surgeon: Julito Molina MD;  Location: [facility] OR    INCISION AND DRAINAGE PERINEAL, COMBINED Left 10/4/2023    Procedure: INCISION AND DRAINAGE, PERINEUM AND;  Surgeon: Julito Molina MD;  Location: [facility] OR    IRRIGATION AND DEBRIDEMENT LOWER EXTREMITY, COMBINED Left 10/2/2023    Procedure: and left thigh;  Surgeon: Ken Howard MD;  Location: [facility] OR    IRRIGATION AND DEBRIDEMENT SACRAL WOUND, COMBINED Left 10/2/2023    Procedure: Broad debridment of left buttock;  Surgeon: Ken Howard MD;  Location: [facility] OR    PICC TRIPLE LUMEN PLACEMENT  10/3/2023    ZZC APPENDECTOMY      Description: Appendectomy;  Recorded: 11/21/2008;    ZZC LIGATE FALLOPIAN TUBE      Description: Tubal Ligation;  Recorded: 11/21/2008;     Current Facility-Administered Medications   Medication    acetaminophen (TYLENOL) solution 975 mg    acetaminophen (TYLENOL) tablet 650 mg    bisacodyl (DULCOLAX) suppository 10 mg    clindamycin (CLEOCIN) 900 mg in 50 mL NS intermittent infusion    dexmedeTOMIDine (PRECEDEX) 4 mcg/mL in NS infusion    glucose gel 15-30 g    Or    dextrose 50 % injection 25-50 mL    Or    glucagon injection 1 mg    sennosides (SENOKOT) syrup 5 mL    And    docusate (COLACE) 50 MG/5ML liquid 50 mg    enoxaparin ANTICOAGULANT (LOVENOX) injection 40 mg    fentaNYL (SUBLIMAZE) 50 mcg/mL bolus from pump    fentaNYL (SUBLIMAZE) infusion    flumazenil (ROMAZICON) injection 0.2 mg    folic acid (FOLVITE) tablet 1 mg    haloperidol lactate (HALDOL) injection 5 mg    HYDROmorphone (DILAUDID) injection 0.2 mg    Or    HYDROmorphone (PF) (DILAUDID) injection 0.5 mg    hydrOXYzine (ATARAX) tablet 25 mg    insulin aspart (NovoLOG) injection (RAPID ACTING)    insulin glargine (LANTUS PEN) injection 10 Units    ipratropium - albuterol 0.5 mg/2.5 mg/3 mL (DUONEB) neb solution 3  mL    lactated ringers infusion    levothyroxine (SYNTHROID/LEVOTHROID) tablet 112 mcg    lidocaine (LMX4) cream    lidocaine 1 % 0.1-1 mL    LORazepam (ATIVAN) injection 0.5 mg    magnesium hydroxide (MILK OF MAGNESIA) suspension 30 mL    metoprolol (LOPRESSOR) injection 5 mg    metroNIDAZOLE (FLAGYL) infusion 500 mg    midazolam (VERSED) injection 1 mg    multivitamins w/minerals liquid 15 mL    naloxone (NARCAN) injection 0.2 mg    Or    naloxone (NARCAN) injection 0.4 mg    Or    naloxone (NARCAN) injection 0.2 mg    Or    naloxone (NARCAN) injection 0.4 mg    norepinephrine (LEVOPHED) 4 mg in  mL infusion PREMIX    ondansetron (ZOFRAN ODT) ODT tab 4 mg    Or    ondansetron (ZOFRAN) injection 4 mg    oxyCODONE (ROXICODONE) tablet 5 mg    Or    oxyCODONE (ROXICODONE) tablet 10 mg    pantoprazole (PROTONIX) IV push injection 40 mg    piperacillin-tazobactam (ZOSYN) 3.375 g vial to attach to  mL bag    polyethylene glycol (MIRALAX) Packet 17 g    prochlorperazine (COMPAZINE) injection 10 mg    Or    prochlorperazine (COMPAZINE) tablet 10 mg    senna-docusate (SENOKOT-S/PERICOLACE) 8.6-50 MG per tablet 1 tablet    sodium chloride (PF) 0.9% PF flush 10-40 mL    sodium chloride (PF) 0.9% PF flush 3 mL    sodium chloride (PF) 0.9% PF flush 3 mL    sodium chloride 0.9% BOLUS 1-250 mL    thiamine (B-1) tablet 100 mg    [START ON 10/11/2023] thiamine (B-1) tablet 100 mg    vancomycin (VANCOCIN) 1,500 mg in sodium chloride 0.9 % 250 mL intermittent infusion    vasopressin (VASOSTRICT) 20 Units in sodium chloride 0.9 % 100 mL standard conc infusion        Allergies   Allergen Reactions    Erythromycin     Psyllium     Seasonal Allergies      Hay fever - tree pollens, dust, mold, mildew     History reviewed. No pertinent family history.      PHYSICAL ASSESSMENT:  General: Sedated, intubated, grimaces to pain   Lungs:  Diminished in bases bilaterally   Heart: RRR, S1 and S2   Abdomen: Soft, non-tender.  Bowel  sounds present X 4 quadrants.  Skin: extensive groin wounds, buttocks wounds, left leg wounds.  Dressing in place,  Oozing serous.    Extremities:  generalized edema   Pulses:  +2 BUE and, weak BLE  Neuro:  Unable to adequately assess, patient is intubated and sedated     Temp: 98.2  F (36.8  C) Temp src: Esophageal BP: 101/58 Pulse: 54   Resp: 18 SpO2: 98 % O2 Device: Mechanical Ventilator          Intake/Output Summary (Last 24 hours) at 10/7/2023 1117  Last data filed at 10/7/2023 1000  Gross per 24 hour   Intake 4043.74 ml   Output 1550 ml   Net 2493.74 ml     Temp: 98.2  F (36.8  C) Temp src: Esophageal BP: 101/58 Pulse: 54   Resp: 18 SpO2: 98 % O2 Device: Mechanical Ventilator        Lab Results   Component Value Date    WBC 13.9 10/07/2023     Lab Results   Component Value Date    RBC 3.02 10/07/2023     Lab Results   Component Value Date    HGB 9.2 10/07/2023     Lab Results   Component Value Date    HCT 29.1 10/07/2023     No components found for: MCT  Lab Results   Component Value Date    MCV 96 10/07/2023     Lab Results   Component Value Date    MCH 30.5 10/07/2023     Lab Results   Component Value Date    MCHC 31.6 10/07/2023     Lab Results   Component Value Date    RDW 17.1 10/07/2023     Lab Results   Component Value Date     10/07/2023       Last Comprehensive Metabolic Panel:  Sodium   Date Value Ref Range Status   10/07/2023 143 135 - 145 mmol/L Final     Comment:     Reference intervals for this test were updated on 09/26/2023 to more accurately reflect our healthy population. There may be differences in the flagging of prior results with similar values performed with this method. Interpretation of those prior results can be made in the context of the updated reference intervals.      Potassium   Date Value Ref Range Status   10/07/2023 3.7 3.4 - 5.3 mmol/L Final   06/23/2020 4.4 3.5 - 5.0 mmol/L Final     Chloride   Date Value Ref Range Status   10/07/2023 116 (H) 98 - 107 mmol/L Final    06/23/2020 98 98 - 107 mmol/L Final     Carbon Dioxide (CO2)   Date Value Ref Range Status   10/07/2023 20 (L) 22 - 29 mmol/L Final   06/23/2020 32 (H) 22 - 31 mmol/L Final     Anion Gap   Date Value Ref Range Status   10/07/2023 7 7 - 15 mmol/L Final   06/23/2020 5 5 - 18 mmol/L Final     Glucose   Date Value Ref Range Status   10/07/2023 286 (H) 70 - 99 mg/dL Final   06/23/2020 115 70 - 125 mg/dL Final     GLUCOSE BY METER POCT   Date Value Ref Range Status   10/07/2023 229 (H) 70 - 99 mg/dL Final     Urea Nitrogen   Date Value Ref Range Status   10/07/2023 20.0 8.0 - 23.0 mg/dL Final   06/23/2020 8 8 - 22 mg/dL Final     Creatinine   Date Value Ref Range Status   10/07/2023 0.54 0.51 - 0.95 mg/dL Final     GFR Estimate   Date Value Ref Range Status   10/07/2023 >90 >60 mL/min/1.73m2 Final   06/23/2020 >60 >60 mL/min/1.73m2 Final     GFR, ESTIMATED POCT   Date Value Ref Range Status   10/02/2023 >60 >60 mL/min/1.73m2 Final     Calcium   Date Value Ref Range Status   10/07/2023 8.1 (L) 8.8 - 10.2 mg/dL Final       Last Arterial Blood Gas:  pH Arterial   Date Value Ref Range Status   10/04/2023 7.38 7.37 - 7.44 Final     pCO2 Arterial   Date Value Ref Range Status   10/04/2023 34 (L) 35 - 45 mm Hg Final     pO2 Arterial   Date Value Ref Range Status   10/04/2023 90 80 - 90 mm Hg Final     Bicarbonate Arterial   Date Value Ref Range Status   10/04/2023 20 (L) 23 - 29 mmol/L Final     O2 Sat, Arterial   Date Value Ref Range Status   10/04/2023 97.2 (H) 96.0 - 97.0 % Final     Base Excess/Deficit   Date Value Ref Range Status   10/04/2023 -5.0   mmol/L Final           Kelsi Ash, APRN, CNP  Pulmonary Critical Care  Please contact through Work in Field Secure Application     Time Billed:  50 minutes  of critical care time.    Patient requiring ICU level of care: Patient remains on sedation, mechanical ventilator, and pressors for shock.  High risk for further decompensation

## 2023-10-07 NOTE — PROGRESS NOTES
"Start CPAP/PS SBT 5/5, .30. Pt awake and responsive. RT to monitor    /55   Pulse 70   Temp 98.4  F (36.9  C)   Resp 11   Ht 1.575 m (5' 2\")   Wt 98 kg (216 lb 1.6 oz)   LMP  (LMP Unknown)   SpO2 99%   BMI 39.53 kg/m        "

## 2023-10-07 NOTE — PLAN OF CARE
[facility] - ICU    RN Progress Note:            Pertinent Assessments:      Please refer to flowsheet rows for full assessment     CARDIOVASCULAR:  Sinus bradycardia; occasional PVC without influence on bp or map.   Lowest levo overnight was 0.07.     PULMONARY:  Large oral and inline secretions; frequent suctioning.    ENDOCRINE & GASTROINTESTINAL:  Tolerating tube feeds. Elevated glucose. Recommend increasing novolog or glargine.    INTEGUMENTARY:  Wound cares performed overnight. Tunneling deep into groin and under muscle.  Bolus fentanyl and temporarily increased fentanyl rate during wound cares as CPOT bhanu during that time.    NEUROLOGICAL:  RASS -2; cpot goal achieved            Mobility Level:     1         Key Events - This Shift:     [facility] SAT / SBT Record: Delete if N/A    SAT Safety Screen FAILED   If FAILED why?    SAT Performed Not Applicable   If FAILED why?    SBT Safety Screen Not Applicable   If FAILED why?       Unstable hemodynamics               Plan:              Point of Contact Update YES-OR-NO: No  If No, reason:   No overnight events thus far occurred to require calling/updating point of contact.  Name:  Phone Number:  Summary of Conversation:

## 2023-10-07 NOTE — PROGRESS NOTES
"1838 CPAP/PS trial 5/5 .30 x 150 minutes, tolerated well. Discussed with MD, placed back on VC AC previous settings per MD. Pt is resting comfortably and is arousable. Plan is to let pt rest overnight and assess tomorrow for additional weaning. RT to follow    Vent Mode: CPAP/PS  (Continuous positive airway pressure with Pressure Support)  FiO2 (%): 30 %  Resp Rate (Set): 20 breaths/min  Tidal Volume (Set, mL): 400 mL  PEEP (cm H2O): 5 cmH2O  Pressure Support (cm H2O): 5 cmH2O  Resp: 13    /65   Pulse 73   Temp 98.4  F (36.9  C)   Resp 13   Ht 1.575 m (5' 2\")   Wt 98 kg (216 lb 1.6 oz)   LMP  (LMP Unknown)   SpO2 98%   BMI 39.53 kg/m        "

## 2023-10-07 NOTE — PROGRESS NOTES
"Vent Mode: Other (see comments) (VC AC)  FiO2 (%): 30 %  Resp Rate (Set): 20 breaths/min  Tidal Volume (Set, mL): 400 mL  PEEP (cm H2O): 5 cmH2O  Resp: 18    Continues on mechanical ventilation, suctioned inline large normal viscosity tan secretions. RT to monitor    /58   Pulse 54   Temp 98.2  F (36.8  C)   Resp 18   Ht 1.575 m (5' 2\")   Wt 98 kg (216 lb 1.6 oz)   LMP  (LMP Unknown)   SpO2 98%   BMI 39.53 kg/m      "

## 2023-10-07 NOTE — PROGRESS NOTES
"INFECTIOUS DISEASE FOLLOW UP NOTE    Date: 10/06/2023   CHIEF COMPLAINT:   Chief Complaint   Patient presents with    Groin Pain    Shortness of Breath    Generalized Weakness        ASSESSMENT:    Necrotizing fasciitis: CT with necrotizing fasciitis L groin, perineum, and visualized LLE now down to ankle. s/p OR 10/2, GS and cultures are polymicrobial-bacteroides and actino on culture so far. Repeat OR 10/4 with progressive disease. Active issue.   Severe sepsis: pressors in ICU  DM2  Goiter: ENT intubated in OR.   Sputum with haemophilus and strep constellatus     PLAN:  - continue zosyn, clinda, metronidazole  - discontinue vancomycin   - can likely stop clinda once no additional OR planned  - needs ongoing source control-surgery following - goals of care discussion ongoing regarding further surgery    Serge Marcelino MD  [facility]  Direct messaging: [employer]   On-Call ID provider: [phone], option: 9       ______________________________________________________________________    SUBJECTIVE / INTERVAL HISTORY:   First visit by me. Recommendation [place] [facility] [place]. However, family reluctant to proceed. Goals of care discussions due to poor prognosis. Weaning sedation this afternoon.      OBJECTIVE:  BP 95/50   Pulse 51   Temp 97.3  F (36.3  C)   Resp 20   Ht 1.575 m (5' 2\")   Wt 99.9 kg (220 lb 3.2 oz)   LMP  (LMP Unknown)   SpO2 97%   BMI 40.28 kg/m      Vent Mode: CMV/AC  (Continuous Mandatory Ventilation/ Assist Control)  FiO2 (%): 30 %  Resp Rate (Set): 20 breaths/min  Tidal Volume (Set, mL): 400 mL  PEEP (cm H2O): 5 cmH2O  Resp: 20    GEN: intubated  RESPIRATORY:  intubated  CARDIOVASCULAR:  regular, no murmur   ABDOMEN:  Soft, normal bowel sounds, non-tender,   EXTREMITIES: No edema.  SKIN/HAIR/NAILS:  left lower extremities wrapped    NEURO: Eyes open, tracking   IV: central " lines      Antibiotics:  Zosyn  Clinda  Vanc  flagyl    Pertinent labs:  No results found for: CRP   CBC RESULTS:   Recent Labs   Lab Test 10/04/23  0357   WBC 16.8*   RBC 2.84*   HGB 9.0*   HCT 27.5*   MCV 97   MCH 31.7   MCHC 32.7   RDW 14.6         Last Comprehensive Metabolic Panel:  Sodium   Date Value Ref Range Status   10/06/2023 142 135 - 145 mmol/L Final     Comment:     Reference intervals for this test were updated on 09/26/2023 to more accurately reflect our healthy population. There may be differences in the flagging of prior results with similar values performed with this method. Interpretation of those prior results can be made in the context of the updated reference intervals.      Potassium   Date Value Ref Range Status   10/06/2023 3.6 3.4 - 5.3 mmol/L Final   06/23/2020 4.4 3.5 - 5.0 mmol/L Final     Chloride   Date Value Ref Range Status   10/06/2023 114 (H) 98 - 107 mmol/L Final   06/23/2020 98 98 - 107 mmol/L Final     Carbon Dioxide (CO2)   Date Value Ref Range Status   10/06/2023 21 (L) 22 - 29 mmol/L Final   06/23/2020 32 (H) 22 - 31 mmol/L Final     Anion Gap   Date Value Ref Range Status   10/06/2023 7 7 - 15 mmol/L Final   06/23/2020 5 5 - 18 mmol/L Final     Glucose   Date Value Ref Range Status   10/06/2023 262 (H) 70 - 99 mg/dL Final   06/23/2020 115 70 - 125 mg/dL Final     GLUCOSE BY METER POCT   Date Value Ref Range Status   10/06/2023 282 (H) 70 - 99 mg/dL Final     Urea Nitrogen   Date Value Ref Range Status   10/06/2023 24.8 (H) 8.0 - 23.0 mg/dL Final   06/23/2020 8 8 - 22 mg/dL Final     Creatinine   Date Value Ref Range Status   10/06/2023 0.59 0.51 - 0.95 mg/dL Final     GFR Estimate   Date Value Ref Range Status   10/06/2023 >90 >60 mL/min/1.73m2 Final   06/23/2020 >60 >60 mL/min/1.73m2 Final     GFR, ESTIMATED POCT   Date Value Ref Range Status   10/02/2023 >60 >60 mL/min/1.73m2 Final     Calcium   Date Value Ref Range Status   10/06/2023 7.5 (L) 8.8 - 10.2 mg/dL  Final        MICROBIOLOGY DATA:  Personally reviewed.  7-Day Micro Results       Collected Updated Procedure Result Status      10/04/2023 1027 10/05/2023 1631 Anaerobic Bacterial Culture Routine [[id number]]   Tissue from Leg, left    Preliminary result Component Value   Culture No anaerobic organisms isolated after 1 day  [P]                10/04/2023 1027 10/04/2023 1719 Gram Stain [[id number]]   (Abnormal)   Tissue from Leg, left    Final result Component Value   Gram Stain Result 4+ Gram positive cocci   Gram Stain Result 4+ Gram positive bacilli   Gram Stain Result 3+ Gram negative bacilli   Gram Stain Result 4+ WBC seen   Predominantly PMNs            10/04/2023 1027 10/06/2023 1006 Tissue Aerobic Bacterial Culture Routine [[id number]]   (Abnormal)   Tissue from Leg, left    Preliminary result Component Value   Culture Culture in progress  [P]     2+ Actinomyces species  [P]     Identification obtained by MALDI-TOF mass spectrometry research use only database. Test characteristics determined and verified by the [facility].Susceptibilities not routinely done, refer to antibiogram to view typica  l susceptibility profiles               10/03/2023 0252 10/05/2023 1134 Respiratory Aerobic Bacterial Culture with Gram Stain [[id number]]    (Abnormal)   Sputum from Endotracheal    Final result Component Value   Culture 2+ Normal felecia    3+ Haemophilus influenzae    1+ Streptococcus constellatus    Beta hemolytic strain  This organism is susceptible to ampicillin, penicillin, vancomycin and the cephalosporins. If treatment is required and your patient is allergic to penicillin, contact the microbiology lab within 5 days to request susceptibility testing.   Gram Stain Result >25 PMNs/low power field    3+ Gram negative bacilli        Susceptibility        Haemophilus influenzae      PETRA      Nitrocefin Positive   [1]                    [1]  Beta-lactamase  positive  Beta-lactamase positive Haemophilus influenzae are resistant to ampicillin and are usually susceptible to amox/clavulanic acid, levofloxacin, and 3rd generation cephalosporins, such as ceftriaxone.                   10/02/2023 2228 10/05/2023 1152 Anaerobic Bacterial Culture Routine [[id number]]   (Abnormal)   Tissue from Buttock, Left    Preliminary result Component Value   Culture Culture in progress  [P]     2+ Bacteroides ovatus/xylanisolvens  [P]     Susceptibilities not routinely done, refer to antibiogram to view typical susceptibility profiles               10/02/2023 2228 10/03/2023 1135 Gram Stain [[id number]]   (Abnormal)   Tissue from Buttock, Left    Edited Result - FINAL Component Value   Gram Stain Result 2+ Gram positive bacilli  [C]    Gram Stain Result 1+ Gram negative bacilli  [C]    Gram Stain Result 1+ Gram positive cocci  [C]    Gram Stain Result No white blood cells seen  [C]    This is an appended report. These results have been appended to a previously final verified report.            10/02/2023 2228 10/06/2023 1037 Tissue Aerobic Bacterial Culture Routine [[id number]]   (Abnormal)   Tissue from Buttock, Left    Final result Component Value   Culture 2+ Actinomyces species    Identification obtained by MALDI-TOF mass spectrometry research use only database. Test characteristics determined and verified by the [facility].Susceptibilities not routinely done, refer to antibiogram to view typica  l susceptibility profiles    Isolated in broth only Mixed Anaerobic Organisms Present    On day 2 of incubationSee anaerobic report for identification               10/02/2023 2227 10/05/2023 1153 Anaerobic Bacterial Culture Routine [[id number]]   (Abnormal)   Tissue from Vulva    Preliminary result Component Value   Culture Culture in progress  [P]     3+ Bacteroides ovatus/xylanisolvens  [P]     Susceptibilities not routinely done, refer to antibiogram  to view typical susceptibility profiles               10/02/2023 2227 10/03/2023 1138 Gram Stain [[id number]]   (Abnormal)   Tissue from Vulva    Final result Component Value   Gram Stain Result 3+ Gram positive bacilli   Gram Stain Result 3+ Gram positive cocci   Gram Stain Result 2+ Gram negative bacilli   Gram Stain Result 1+ WBC seen            10/02/2023 2227 10/06/2023 1035 Tissue Aerobic Bacterial Culture Routine [[id number]]   Tissue from Vulva    Preliminary result Component Value   Culture Culture in progress  [P]     Isolated in broth only Mixed Anaerobic Organisms Present  [P]     On day 2 of incubationSee anaerobic report for identification               10/02/2023 1829 10/02/2023 1910 Symptomatic Influenza A/B, RSV, & SARS-CoV2 PCR (COVID-19) Nasopharyngeal [[id number]]    Swab from Nasopharyngeal    Final result Component Value   Influenza A PCR Negative   Influenza B PCR Negative   RSV PCR Negative   SARS CoV2 PCR Negative   NEGATIVE: SARS-CoV-2 (COVID-19) RNA not detected, presumed negative.            10/02/2023 1825 10/05/2023 2132 Blood Culture Peripheral Blood [[id number]]   Peripheral Blood    Preliminary result Component Value   Culture No growth after 3 days  [P]                10/02/2023 1825 10/05/2023 2132 Blood Culture Peripheral Blood [[id number]]   Peripheral Blood    Preliminary result Component Value   Culture No growth after 3 days  [P]                         RADIOLOGY:  Personally Reviewed.  Recent Results (from the past 24 hour(s))   XR Chest Port 1 View    Narrative    EXAM: XR CHEST PORT 1 VIEW  LOCATION: [facility]  DATE: 10/4/2023    INDICATION: ETT position  COMPARISON: 10/3/2023      Impression    IMPRESSION: Endotracheal tube terminates 4.0 cm above the suresh. Right upper extremity PICC in the lower SVC. Enteric decompression tube descends below the diaphragm, but the tip is outside the field-of-view.    Decreased left basilar airspace  opacities compared to prior. No pleural effusion or pneumothorax. Normal cardiomediastinal silhouette.       Principal Problem:    Severe sepsis (H)  Active Problems:    Type 2 diabetes mellitus with hyperglycemia, without long-term current use of insulin (H)    Necrotizing soft tissue infection    Septic shock (H)

## 2023-10-07 NOTE — PROGRESS NOTES
RT PROGRESS NOTE    VENT DAY# 6    CURRENT SETTINGS:   Vent Mode: Other (see comments) (VC AC)  FiO2 (%): 30 %  Resp Rate (Set): 20 breaths/min  Tidal Volume (Set, mL): 400 mL  PEEP (cm H2O): 5 cmH2O  Resp: (!) 5  RR 23    PATIENT PARAMETERS:  PIP 23  Pplat:  21  Pmean:  13  Compliance: 22  SBT: no      Secretions:  large thin white   02 Sats:  95 %  BS: coarse bilat.    ETT SIZE 7.5 Secured at 23 cm at teeth/gums    Respiratory Medications: none     NOTE / SHIFT SUMMARY:   Continue on mechanical ventilation, wean as tolerated. RT to monitor    Esteban Barclay, RT

## 2023-10-08 LAB
ANION GAP SERPL CALCULATED.3IONS-SCNC: 6 MMOL/L (ref 7–15)
BACTERIA TISS BX CULT: ABNORMAL
BUN SERPL-MCNC: 16.2 MG/DL (ref 8–23)
CALCIUM SERPL-MCNC: 7.7 MG/DL (ref 8.8–10.2)
CHLORIDE SERPL-SCNC: 115 MMOL/L (ref 98–107)
CREAT SERPL-MCNC: 0.46 MG/DL (ref 0.51–0.95)
DEPRECATED HCO3 PLAS-SCNC: 22 MMOL/L (ref 22–29)
EGFRCR SERPLBLD CKD-EPI 2021: >90 ML/MIN/1.73M2
ERYTHROCYTE [DISTWIDTH] IN BLOOD BY AUTOMATED COUNT: 17.2 % (ref 10–15)
GLUCOSE BLDC GLUCOMTR-MCNC: 226 MG/DL (ref 70–99)
GLUCOSE BLDC GLUCOMTR-MCNC: 236 MG/DL (ref 70–99)
GLUCOSE BLDC GLUCOMTR-MCNC: 245 MG/DL (ref 70–99)
GLUCOSE BLDC GLUCOMTR-MCNC: 256 MG/DL (ref 70–99)
GLUCOSE BLDC GLUCOMTR-MCNC: 261 MG/DL (ref 70–99)
GLUCOSE BLDC GLUCOMTR-MCNC: 262 MG/DL (ref 70–99)
GLUCOSE BLDC GLUCOMTR-MCNC: 300 MG/DL (ref 70–99)
GLUCOSE SERPL-MCNC: 276 MG/DL (ref 70–99)
HCT VFR BLD AUTO: 30 % (ref 35–47)
HGB BLD-MCNC: 9.6 G/DL (ref 11.7–15.7)
MAGNESIUM SERPL-MCNC: 1.8 MG/DL (ref 1.7–2.3)
MCH RBC QN AUTO: 31.2 PG (ref 26.5–33)
MCHC RBC AUTO-ENTMCNC: 32 G/DL (ref 31.5–36.5)
MCV RBC AUTO: 97 FL (ref 78–100)
PLATELET # BLD AUTO: 215 10E3/UL (ref 150–450)
POTASSIUM SERPL-SCNC: 3.3 MMOL/L (ref 3.4–5.3)
POTASSIUM SERPL-SCNC: 3.5 MMOL/L (ref 3.4–5.3)
RBC # BLD AUTO: 3.08 10E6/UL (ref 3.8–5.2)
SODIUM SERPL-SCNC: 143 MMOL/L (ref 135–145)
TRIGL SERPL-MCNC: 187 MG/DL
WBC # BLD AUTO: 12.9 10E3/UL (ref 4–11)

## 2023-10-08 PROCEDURE — 94003 VENT MGMT INPAT SUBQ DAY: CPT

## 2023-10-08 PROCEDURE — C9113 INJ PANTOPRAZOLE SODIUM, VIA: HCPCS | Mod: JZ | Performed by: SURGERY

## 2023-10-08 PROCEDURE — 250N000011 HC RX IP 250 OP 636: Mod: JZ | Performed by: SURGERY

## 2023-10-08 PROCEDURE — 250N000013 HC RX MED GY IP 250 OP 250 PS 637: Performed by: INTERNAL MEDICINE

## 2023-10-08 PROCEDURE — HZ2ZZZZ DETOXIFICATION SERVICES FOR SUBSTANCE ABUSE TREATMENT: ICD-10-PCS | Performed by: STUDENT IN AN ORGANIZED HEALTH CARE EDUCATION/TRAINING PROGRAM

## 2023-10-08 PROCEDURE — 84478 ASSAY OF TRIGLYCERIDES: CPT | Performed by: NURSE PRACTITIONER

## 2023-10-08 PROCEDURE — 999N000157 HC STATISTIC RCP TIME EA 10 MIN

## 2023-10-08 PROCEDURE — 999N000287 HC ICU ADULT ROUNDING, EACH 10 MINS

## 2023-10-08 PROCEDURE — 85027 COMPLETE CBC AUTOMATED: CPT | Performed by: NURSE PRACTITIONER

## 2023-10-08 PROCEDURE — 250N000011 HC RX IP 250 OP 636: Mod: JZ | Performed by: NURSE PRACTITIONER

## 2023-10-08 PROCEDURE — 200N000001 HC R&B ICU

## 2023-10-08 PROCEDURE — 250N000013 HC RX MED GY IP 250 OP 250 PS 637: Performed by: NURSE PRACTITIONER

## 2023-10-08 PROCEDURE — 99291 CRITICAL CARE FIRST HOUR: CPT | Performed by: NURSE PRACTITIONER

## 2023-10-08 PROCEDURE — 250N000011 HC RX IP 250 OP 636: Mod: JZ | Performed by: INTERNAL MEDICINE

## 2023-10-08 PROCEDURE — 84132 ASSAY OF SERUM POTASSIUM: CPT | Performed by: NURSE PRACTITIONER

## 2023-10-08 PROCEDURE — 250N000011 HC RX IP 250 OP 636: Performed by: NURSE PRACTITIONER

## 2023-10-08 PROCEDURE — 999N000253 HC STATISTIC WEANING TRIALS

## 2023-10-08 PROCEDURE — 83735 ASSAY OF MAGNESIUM: CPT | Performed by: NURSE PRACTITIONER

## 2023-10-08 PROCEDURE — 250N000009 HC RX 250: Performed by: SURGERY

## 2023-10-08 PROCEDURE — 80048 BASIC METABOLIC PNL TOTAL CA: CPT | Performed by: NURSE PRACTITIONER

## 2023-10-08 RX ORDER — FUROSEMIDE 10 MG/ML
40 INJECTION INTRAMUSCULAR; INTRAVENOUS DAILY
Status: DISCONTINUED | OUTPATIENT
Start: 2023-10-09 | End: 2023-10-09

## 2023-10-08 RX ORDER — LORAZEPAM 2 MG/ML
0.5 INJECTION INTRAMUSCULAR EVERY 12 HOURS
Status: COMPLETED | OUTPATIENT
Start: 2023-10-08 | End: 2023-10-09

## 2023-10-08 RX ORDER — FUROSEMIDE 10 MG/ML
40 INJECTION INTRAMUSCULAR; INTRAVENOUS ONCE
Status: COMPLETED | OUTPATIENT
Start: 2023-10-08 | End: 2023-10-08

## 2023-10-08 RX ORDER — MAGNESIUM SULFATE HEPTAHYDRATE 40 MG/ML
2 INJECTION, SOLUTION INTRAVENOUS ONCE
Status: COMPLETED | OUTPATIENT
Start: 2023-10-08 | End: 2023-10-08

## 2023-10-08 RX ORDER — POTASSIUM CHLORIDE 29.8 MG/ML
20 INJECTION INTRAVENOUS
Status: COMPLETED | OUTPATIENT
Start: 2023-10-08 | End: 2023-10-08

## 2023-10-08 RX ORDER — CHLORHEXIDINE GLUCONATE ORAL RINSE 1.2 MG/ML
15 SOLUTION DENTAL 2 TIMES DAILY
Status: DISCONTINUED | OUTPATIENT
Start: 2023-10-08 | End: 2023-10-11

## 2023-10-08 RX ORDER — POTASSIUM CHLORIDE 29.8 MG/ML
20 INJECTION INTRAVENOUS ONCE
Status: COMPLETED | OUTPATIENT
Start: 2023-10-08 | End: 2023-10-08

## 2023-10-08 RX ADMIN — FOLIC ACID 1 MG: 1 TABLET ORAL at 08:33

## 2023-10-08 RX ADMIN — DEXMEDETOMIDINE HYDROCHLORIDE 0.6 MCG/KG/HR: 400 INJECTION INTRAVENOUS at 12:39

## 2023-10-08 RX ADMIN — CLINDAMYCIN PHOSPHATE 900 MG: 900 INJECTION, SOLUTION INTRAVENOUS at 18:39

## 2023-10-08 RX ADMIN — SENNOSIDES 5 ML: 8.8 LIQUID ORAL at 08:33

## 2023-10-08 RX ADMIN — INSULIN ASPART 5 UNITS: 100 INJECTION, SOLUTION INTRAVENOUS; SUBCUTANEOUS at 00:10

## 2023-10-08 RX ADMIN — POTASSIUM CHLORIDE 20 MEQ: 29.8 INJECTION, SOLUTION INTRAVENOUS at 05:54

## 2023-10-08 RX ADMIN — FUROSEMIDE 40 MG: 10 INJECTION, SOLUTION INTRAMUSCULAR; INTRAVENOUS at 08:33

## 2023-10-08 RX ADMIN — METRONIDAZOLE 500 MG: 500 INJECTION, SOLUTION INTRAVENOUS at 19:58

## 2023-10-08 RX ADMIN — CLINDAMYCIN PHOSPHATE 900 MG: 900 INJECTION, SOLUTION INTRAVENOUS at 11:41

## 2023-10-08 RX ADMIN — ACETAMINOPHEN 975 MG: 325 SOLUTION ORAL at 01:51

## 2023-10-08 RX ADMIN — Medication 25 MCG: at 01:51

## 2023-10-08 RX ADMIN — METRONIDAZOLE 500 MG: 500 INJECTION, SOLUTION INTRAVENOUS at 04:44

## 2023-10-08 RX ADMIN — ACETAMINOPHEN 975 MG: 325 SOLUTION ORAL at 09:09

## 2023-10-08 RX ADMIN — PIPERACILLIN AND TAZOBACTAM 3.38 G: 3; .375 INJECTION, POWDER, LYOPHILIZED, FOR SOLUTION INTRAVENOUS at 16:12

## 2023-10-08 RX ADMIN — METRONIDAZOLE 500 MG: 500 INJECTION, SOLUTION INTRAVENOUS at 12:41

## 2023-10-08 RX ADMIN — Medication 25 MCG: at 09:19

## 2023-10-08 RX ADMIN — Medication 0.08 MCG/KG/MIN: at 18:52

## 2023-10-08 RX ADMIN — INSULIN ASPART 5 UNITS: 100 INJECTION, SOLUTION INTRAVENOUS; SUBCUTANEOUS at 19:55

## 2023-10-08 RX ADMIN — ALTEPLASE 2 MG: 2.2 INJECTION, POWDER, LYOPHILIZED, FOR SOLUTION INTRAVENOUS at 08:26

## 2023-10-08 RX ADMIN — INSULIN ASPART 5 UNITS: 100 INJECTION, SOLUTION INTRAVENOUS; SUBCUTANEOUS at 04:45

## 2023-10-08 RX ADMIN — MAGNESIUM SULFATE HEPTAHYDRATE 2 G: 40 INJECTION, SOLUTION INTRAVENOUS at 05:57

## 2023-10-08 RX ADMIN — CLINDAMYCIN PHOSPHATE 900 MG: 900 INJECTION, SOLUTION INTRAVENOUS at 03:06

## 2023-10-08 RX ADMIN — THIAMINE HCL TAB 100 MG 100 MG: 100 TAB at 08:33

## 2023-10-08 RX ADMIN — INSULIN ASPART 4 UNITS: 100 INJECTION, SOLUTION INTRAVENOUS; SUBCUTANEOUS at 16:21

## 2023-10-08 RX ADMIN — Medication 15 ML: at 08:33

## 2023-10-08 RX ADMIN — POTASSIUM CHLORIDE 20 MEQ: 29.8 INJECTION, SOLUTION INTRAVENOUS at 06:58

## 2023-10-08 RX ADMIN — LORAZEPAM 0.5 MG: 2 INJECTION INTRAMUSCULAR; INTRAVENOUS at 15:09

## 2023-10-08 RX ADMIN — CHLORHEXIDINE GLUCONATE 15 ML: 1.2 RINSE ORAL at 22:32

## 2023-10-08 RX ADMIN — LORAZEPAM 0.5 MG: 2 INJECTION INTRAMUSCULAR; INTRAVENOUS at 01:51

## 2023-10-08 RX ADMIN — INSULIN ASPART 4 UNITS: 100 INJECTION, SOLUTION INTRAVENOUS; SUBCUTANEOUS at 23:25

## 2023-10-08 RX ADMIN — INSULIN ASPART 5 UNITS: 100 INJECTION, SOLUTION INTRAVENOUS; SUBCUTANEOUS at 08:35

## 2023-10-08 RX ADMIN — POTASSIUM CHLORIDE 20 MEQ: 29.8 INJECTION, SOLUTION INTRAVENOUS at 16:18

## 2023-10-08 RX ADMIN — PIPERACILLIN AND TAZOBACTAM 3.38 G: 3; .375 INJECTION, POWDER, LYOPHILIZED, FOR SOLUTION INTRAVENOUS at 23:27

## 2023-10-08 RX ADMIN — Medication 25 MCG: at 07:10

## 2023-10-08 RX ADMIN — Medication 25 MCG: at 11:45

## 2023-10-08 RX ADMIN — Medication 25 MCG: at 02:51

## 2023-10-08 RX ADMIN — Medication 0.07 MCG/KG/MIN: at 09:55

## 2023-10-08 RX ADMIN — DOCUSATE SODIUM 50 MG: 50 LIQUID ORAL at 20:02

## 2023-10-08 RX ADMIN — ENOXAPARIN SODIUM 40 MG: 40 INJECTION SUBCUTANEOUS at 09:09

## 2023-10-08 RX ADMIN — Medication 200 MCG/HR: at 17:18

## 2023-10-08 RX ADMIN — DEXMEDETOMIDINE HYDROCHLORIDE 0.6 MCG/KG/HR: 400 INJECTION INTRAVENOUS at 18:52

## 2023-10-08 RX ADMIN — Medication 25 MCG: at 16:00

## 2023-10-08 RX ADMIN — DEXMEDETOMIDINE HYDROCHLORIDE 0.6 MCG/KG/HR: 400 INJECTION INTRAVENOUS at 05:03

## 2023-10-08 RX ADMIN — Medication 25 MCG: at 17:00

## 2023-10-08 RX ADMIN — CHLORHEXIDINE GLUCONATE 15 ML: 1.2 RINSE ORAL at 08:37

## 2023-10-08 RX ADMIN — PIPERACILLIN AND TAZOBACTAM 3.38 G: 3; .375 INJECTION, POWDER, LYOPHILIZED, FOR SOLUTION INTRAVENOUS at 00:05

## 2023-10-08 RX ADMIN — INSULIN ASPART 7 UNITS: 100 INJECTION, SOLUTION INTRAVENOUS; SUBCUTANEOUS at 11:47

## 2023-10-08 RX ADMIN — THIAMINE HCL TAB 100 MG 100 MG: 100 TAB at 19:59

## 2023-10-08 RX ADMIN — THIAMINE HCL TAB 100 MG 100 MG: 100 TAB at 15:09

## 2023-10-08 RX ADMIN — Medication 50 MCG: at 17:30

## 2023-10-08 RX ADMIN — DOCUSATE SODIUM 50 MG: 50 LIQUID ORAL at 08:51

## 2023-10-08 RX ADMIN — Medication 50 MCG: at 17:15

## 2023-10-08 RX ADMIN — LEVOTHYROXINE SODIUM 112 MCG: 0.11 TABLET ORAL at 05:03

## 2023-10-08 RX ADMIN — POLYETHYLENE GLYCOL 3350 17 G: 17 POWDER, FOR SOLUTION ORAL at 08:33

## 2023-10-08 RX ADMIN — SENNOSIDES 5 ML: 8.8 LIQUID ORAL at 19:58

## 2023-10-08 RX ADMIN — Medication 25 MCG: at 22:30

## 2023-10-08 RX ADMIN — PIPERACILLIN AND TAZOBACTAM 3.38 G: 3; .375 INJECTION, POWDER, LYOPHILIZED, FOR SOLUTION INTRAVENOUS at 08:33

## 2023-10-08 RX ADMIN — PANTOPRAZOLE SODIUM 40 MG: 40 INJECTION, POWDER, FOR SOLUTION INTRAVENOUS at 05:03

## 2023-10-08 ASSESSMENT — ACTIVITIES OF DAILY LIVING (ADL)
ADLS_ACUITY_SCORE: 30
ADLS_ACUITY_SCORE: 32
ADLS_ACUITY_SCORE: 30
ADLS_ACUITY_SCORE: 32
ADLS_ACUITY_SCORE: 32
ADLS_ACUITY_SCORE: 30

## 2023-10-08 NOTE — PROGRESS NOTES
ICU PROGRESS NOTE:        Assessment/Plan:     Changes for Today:      -continue SBTs.  I will reach out to patient's  again today.  Ideally, would get Lidia extubated so she can participate in goals of care discussions.  However, if we do so, would want to have a plan regarding if we would re-intubate is she fails.      Neuro:  Sedation for comfort while on ventilator  Pain secondary to wounds:  RASS goal liberated to 0 to -1  Precedex and Fentanyl infusions   Appears comfortable in between cares, grimacing with dressing changes.  Today is tracking and somewhat interactive with reduced sedation.   CIWA protocol, scheduled ativan as we are unable to adequately score while sedated ( I have also gone down on this today, to Q 12 hours)      Pulmonary:  Acute hypoxemic respiratory failure  Difficult airway due to large goiter:  Minimal vent settings  Sputum + for 3 + Haemophilus influenzae and 1+ Stretococcus constellatus.  Covered with current antibiotic regime.   Weaned yesterday for about 2 hours.  Continue trials today.  Plan for possible extubation as outlined above.      CV:  Septic shock due to extensive necrotizing fasciitis:  MAP goal 65 or greater, continues on low dose NE  At this point, this may likely be a combination of sedation vasoplegia and SIRS  Very fluid up since admission.  Has been challenging to start diuresis given continued pressors needs.  Renal function stable.  Will give 40 mg IV lasix times one and see how she responds.  K replacement in process before dose was given.      GI:  Last BM PTA:  Concerns for stooling given extent of wounds and keeping them clean  A rectal tube was placed 10/5, if we continue with further cares, would likely need a diverting colostomy  Bowel regime in place  Tube feedings     Renal:  No acute issues  Electrolyte replacement protocols prn     ID:  Septic shock 2/2 necrotizing fasciitis  ID Following, appreciate  Flagyl, Clindamycin, Zosyn--> Vancomycin  "discontinued by ID on 10/7   Sputum growth as above  Gram stain from buttocks polymicrobial      Heme: Normocytic anemia-stable     Endocrine:  Hypothyroidism  Large Goiter  Hyperglycemia in setting of T2DM, critical illness  Continue PTA levothyroxine  Sliding scale insulin and Lantus (Lantus increased today from 10 units BID to 15 units BID)      Clinically Significant Risk Factors        # Hypokalemia: Lowest K = 3.3 mmol/L in last 2 days, will replace as needed       # Hypoalbuminemia: Lowest albumin = 1.7 g/dL at 10/3/2023  5:21 AM, will monitor as appropriate           # DMII: A1C = 6.8 % (Ref range: 0.0 - 5.6 %) within past 6 months   # Severe Obesity: Estimated body mass index is 40.04 kg/m  as calculated from the following:    Height as of this encounter: 1.575 m (5' 2\").    Weight as of this encounter: 99.3 kg (218 lb 14.4 oz).                     ICU Prophylaxis:     PPI:  Protonix      Peridex:  YES     Anticoagulation/DVT Prophylaxis:  Lovenox, daily , re-initiated on 10/7         Lines/Drains/Tubes:        PICC 10/03/23 Triple Lumen Right Basilic Multiple medications    Peripheral IV 10/05/23 Anterior;Right Lower forearm    Urethral Catheter 10/02/23 Straight-tip 16 fr    NG/OG/NJ Tube Orogastric Center mouth    Rectal Tube With balloon    ETT Cuffed Single;Single Subglottic Suction 7.5 mm        CODE STATUS: NO CPR, Pre-arrest intubation ok       SUBJECTIVE:    Ccx:  Unable to access as patient is intubated and sedated     HPI:  63 year old woman with hx of T2DM, obesity, tobacco abuse, alcohol abuse, hypothyroidism, goiter. Presented on 10/2/23 with dyspnea, cough, generalized weakness. Additionally, had increased pain, redness, and malodorous drainage from a wound in her left inguinal/vaginal area. CT scan showed necrotizing fasciitis and she was emergently taken to the OR for vulvectomy and broad debridement of the left buttock and left thigh. She returned to the ICU on pressors and was febrile " through most of the day. Taken back to OR 10/3 and required extensive debridement from the left buttock, perineum, down to the left ankle. She required intubation by ENT due to large goiter.          History reviewed. No pertinent past medical history.  Past Surgical History:   Procedure Laterality Date    EXCISE LESION PERINEAL N/A 10/2/2023    Procedure: vulvectomy;  Surgeon: Ken Howard MD;  [place] [facility] [place]    INCISION AND DRAINAGE LOWER EXTREMITY, COMBINED Left 10/4/2023    Procedure: INCISION AND DRAINAGE, LEFT LEG;  Surgeon: Julito Molina MD;  [place] [facility]    INCISION AND DRAINAGE PERINEAL, COMBINED Left 10/4/2023    Procedure: INCISION AND DRAINAGE, PERINEUM AND;  Surgeon: Julito Molina MD;  [place] [facility]    IRRIGATION AND DEBRIDEMENT LOWER EXTREMITY, COMBINED Left 10/2/2023    Procedure: and left thigh;  Surgeon: Ken Howard MD;  [place] [facility] [place]    IRRIGATION AND DEBRIDEMENT SACRAL WOUND, COMBINED Left 10/2/2023    Procedure: Broad debridment of left buttock;  Surgeon: Ken Howard MD;  [place] [facility]    PICC TRIPLE LUMEN PLACEMENT  10/3/2023    Z APPENDECTOMY      Description: Appendectomy;  Recorded: 11/21/2008;    C LIGATE FALLOPIAN TUBE      Description: Tubal Ligation;  Recorded: 11/21/2008;     Current Facility-Administered Medications   Medication    acetaminophen (TYLENOL) solution 975 mg    acetaminophen (TYLENOL) tablet 650 mg    bisacodyl (DULCOLAX) suppository 10 mg    chlorhexidine (PERIDEX) 0.12 % solution 15 mL    clindamycin (CLEOCIN) 900 mg in 50 mL NS intermittent infusion    dexmedeTOMIDine (PRECEDEX) 4 mcg/mL in NS infusion    glucose gel 15-30 g    Or    dextrose 50 % injection 25-50 mL    Or    glucagon injection 1 mg    sennosides (SENOKOT) syrup 5 mL    And    docusate (COLACE) 50 MG/5ML liquid 50 mg    enoxaparin ANTICOAGULANT (LOVENOX) injection 40 mg    fentaNYL  (SUBLIMAZE) 50 mcg/mL bolus from pump    fentaNYL (SUBLIMAZE) infusion    flumazenil (ROMAZICON) injection 0.2 mg    folic acid (FOLVITE) tablet 1 mg    haloperidol lactate (HALDOL) injection 5 mg    HYDROmorphone (DILAUDID) injection 0.2 mg    Or    HYDROmorphone (PF) (DILAUDID) injection 0.5 mg    hydrOXYzine (ATARAX) tablet 25 mg    insulin aspart (NovoLOG) injection (RAPID ACTING)    insulin glargine (LANTUS PEN) injection 15 Units    ipratropium - albuterol 0.5 mg/2.5 mg/3 mL (DUONEB) neb solution 3 mL    lactated ringers infusion    levothyroxine (SYNTHROID/LEVOTHROID) tablet 112 mcg    lidocaine (LMX4) cream    lidocaine 1 % 0.1-1 mL    LORazepam (ATIVAN) injection 0.5 mg    magnesium hydroxide (MILK OF MAGNESIA) suspension 30 mL    metoprolol (LOPRESSOR) injection 5 mg    metroNIDAZOLE (FLAGYL) infusion 500 mg    midazolam (VERSED) injection 1 mg    multivitamins w/minerals liquid 15 mL    naloxone (NARCAN) injection 0.2 mg    Or    naloxone (NARCAN) injection 0.4 mg    Or    naloxone (NARCAN) injection 0.2 mg    Or    naloxone (NARCAN) injection 0.4 mg    norepinephrine (LEVOPHED) 4 mg in  mL infusion PREMIX    ondansetron (ZOFRAN ODT) ODT tab 4 mg    Or    ondansetron (ZOFRAN) injection 4 mg    oxyCODONE (ROXICODONE) tablet 5 mg    Or    oxyCODONE (ROXICODONE) tablet 10 mg    pantoprazole (PROTONIX) IV push injection 40 mg    piperacillin-tazobactam (ZOSYN) 3.375 g vial to attach to  mL bag    polyethylene glycol (MIRALAX) Packet 17 g    prochlorperazine (COMPAZINE) injection 10 mg    Or    prochlorperazine (COMPAZINE) tablet 10 mg    senna-docusate (SENOKOT-S/PERICOLACE) 8.6-50 MG per tablet 1 tablet    sodium chloride (PF) 0.9% PF flush 10-40 mL    sodium chloride (PF) 0.9% PF flush 3 mL    sodium chloride (PF) 0.9% PF flush 3 mL    sodium chloride 0.9% BOLUS 1-250 mL    thiamine (B-1) tablet 100 mg    [START ON 10/11/2023] thiamine (B-1) tablet 100 mg    vasopressin (VASOSTRICT) 20 Units in  sodium chloride 0.9 % 100 mL standard conc infusion        Allergies   Allergen Reactions    Erythromycin     Psyllium     Seasonal Allergies      Hay fever - tree pollens, dust, mold, mildew     History reviewed. No pertinent family history.      PHYSICAL ASSESSMENT:  General: PETTIT, tracking.   Lungs:  Diminished in bases bilaterally, fine crackles in bases   Heart: RRR, S1 and S2   Abdomen: Soft, non-tender.  Bowel sounds present X 4 quadrants.  Skin: skin color normal, texture normal, no rashes or lesions.   Extremities:  generalized edema of BUE and BLE   Pulses:  +2 BUE and +1 BLE  Neuro:  Unable to fully access as patient is intubated and sedated.     Temp: 97.2  F (36.2  C) Temp src: Esophageal BP: 116/58 Pulse: 72   Resp: 26 SpO2: 96 % O2 Device: Mechanical Ventilator          Intake/Output Summary (Last 24 hours) at 10/8/2023 0920  Last data filed at 10/8/2023 0825  Gross per 24 hour   Intake 7076.22 ml   Output 1610 ml   Net 5466.22 ml     Temp: 97.2  F (36.2  C) Temp src: Esophageal BP: 116/58 Pulse: 72   Resp: 26 SpO2: 96 % O2 Device: Mechanical Ventilator        Lab Results   Component Value Date    WBC 12.9 10/08/2023     Lab Results   Component Value Date    RBC 3.08 10/08/2023     Lab Results   Component Value Date    HGB 9.6 10/08/2023     Lab Results   Component Value Date    HCT 30.0 10/08/2023     No components found for: MCT  Lab Results   Component Value Date    MCV 97 10/08/2023     Lab Results   Component Value Date    MCH 31.2 10/08/2023     Lab Results   Component Value Date    MCHC 32.0 10/08/2023     Lab Results   Component Value Date    RDW 17.2 10/08/2023     Lab Results   Component Value Date     10/08/2023       Last Comprehensive Metabolic Panel:  Sodium   Date Value Ref Range Status   10/08/2023 143 135 - 145 mmol/L Final     Comment:     Reference intervals for this test were updated on 09/26/2023 to more accurately reflect our healthy population. There may be differences in  the flagging of prior results with similar values performed with this method. Interpretation of those prior results can be made in the context of the updated reference intervals.      Potassium   Date Value Ref Range Status   10/08/2023 3.3 (L) 3.4 - 5.3 mmol/L Final   06/23/2020 4.4 3.5 - 5.0 mmol/L Final     Chloride   Date Value Ref Range Status   10/08/2023 115 (H) 98 - 107 mmol/L Final   06/23/2020 98 98 - 107 mmol/L Final     Carbon Dioxide (CO2)   Date Value Ref Range Status   10/08/2023 22 22 - 29 mmol/L Final   06/23/2020 32 (H) 22 - 31 mmol/L Final     Anion Gap   Date Value Ref Range Status   10/08/2023 6 (L) 7 - 15 mmol/L Final   06/23/2020 5 5 - 18 mmol/L Final     Glucose   Date Value Ref Range Status   10/08/2023 276 (H) 70 - 99 mg/dL Final   06/23/2020 115 70 - 125 mg/dL Final     GLUCOSE BY METER POCT   Date Value Ref Range Status   10/08/2023 256 (H) 70 - 99 mg/dL Final     Urea Nitrogen   Date Value Ref Range Status   10/08/2023 16.2 8.0 - 23.0 mg/dL Final   06/23/2020 8 8 - 22 mg/dL Final     Creatinine   Date Value Ref Range Status   10/08/2023 0.46 (L) 0.51 - 0.95 mg/dL Final     GFR Estimate   Date Value Ref Range Status   10/08/2023 >90 >60 mL/min/1.73m2 Final   06/23/2020 >60 >60 mL/min/1.73m2 Final     GFR, ESTIMATED POCT   Date Value Ref Range Status   10/02/2023 >60 >60 mL/min/1.73m2 Final     Calcium   Date Value Ref Range Status   10/08/2023 7.7 (L) 8.8 - 10.2 mg/dL Final       Last Arterial Blood Gas:  pH Arterial   Date Value Ref Range Status   10/04/2023 7.38 7.37 - 7.44 Final     pCO2 Arterial   Date Value Ref Range Status   10/04/2023 34 (L) 35 - 45 mm Hg Final     pO2 Arterial   Date Value Ref Range Status   10/04/2023 90 80 - 90 mm Hg Final     Bicarbonate Arterial   Date Value Ref Range Status   10/04/2023 20 (L) 23 - 29 mmol/L Final     O2 Sat, Arterial   Date Value Ref Range Status   10/04/2023 97.2 (H) 96.0 - 97.0 % Final     Base Excess/Deficit   Date Value Ref Range Status    10/04/2023 -5.0   mmol/L Final         Kelsi sAh, APRN, CNP  Pulmonary Critical Care  Please contact through [employer] Application     Time Billed: 45 minutes of critical care time.    Patient requiring ICU level of care: Patient remains on sedation, mechanical ventilator, and pressors for shock. High risk for further decompensation

## 2023-10-08 NOTE — PROGRESS NOTES
ICU UPDATE:    Lidia did very well with SBT today.  She is more alert on 50 mcg fentany/hr, and titration of precedex infusion.  I thought we might be in a good window to extubate, but did want her family present in case she failed.  Spoke with her  Arjun, who stated he might be able to come in around 1 pm this afternoon.    Received a call back from Arjun, and he indicated that he was feeling pressure to get in today.  He wants to keep the meeting tomorrow with team and Palliative Care.  I sense he was confused on why we had to turn sedation down for us to have a successful extubation.  I again explained the weaning process.  I assured Arjun that he was not under pressure to come in, just that I saw this as a window to possibly be successful with extubation so that Lidia could possibly indicate to us her wishes.  Given that family is not able to come in, we will go back up on sedation (particularly fentanyl infusion during dressing change) and go back on prior ventilator settings.    We will plan to continue SBT tomorrow, await family meeting, and decide what our plans would be if Lidia fails extubation.      PEDRITO Johns, CNP  Pulmonary Critical Care  Securely Message Through Vocera Web Console

## 2023-10-08 NOTE — PROGRESS NOTES
Right wrist and Left wrist restraints continued 10/8/2023    Clinical Justification: Pulling lines, pulling tubes, and pulling equipment  Less Restrictive Alternative: Repositioning, Re-evaluate equipment, Disguise equipment, Pain management, Alarm, Reorientation  Attending Physician Notified: MD ordered restraint, Attending Physician's Name: olivia   New orders placed Yes  Length of Order: 1 Day      Kelsi Ash, APRN CNP

## 2023-10-08 NOTE — PROGRESS NOTES
Continues on mechanical ventilation, sedated but arousable.     Vent Mode: Other (see comments) (VC AC)  FiO2 (%): 30 %  Resp Rate (Set): 20 breaths/min  Tidal Volume (Set, mL): 400 mL  PEEP (cm H2O): 5 cmH2O  Pressure Support (cm H2O): 5 cmH2O  Resp: 22    RT to monitor.

## 2023-10-08 NOTE — PROGRESS NOTES
Brief ID Follow-up Note    Chart reviewed. Vent weaning continues. Palliative care meeting tomorrow    No change in recommendations    Will follow.    Serge Marcelino MD  [facility]  Direct messaging: [facility] Paging   On-Call ID provider: [phone], option: 9

## 2023-10-08 NOTE — PROGRESS NOTES
"Vent Mode: CPAP/PS  (Continuous positive airway pressure with Pressure Support)  FiO2 (%): 30 %  Resp Rate (Set): 20 breaths/min  Tidal Volume (Set, mL): 400 mL  PEEP (cm H2O): 5 cmH2O  Pressure Support (cm H2O): 5 cmH2O  Resp: 24    BP 95/55   Pulse 69   Temp 97.2  F (36.2  C)   Resp 24   Ht 1.575 m (5' 2\")   Wt 99.3 kg (218 lb 14.4 oz)   LMP  (LMP Unknown)   SpO2 98%   BMI 40.04 kg/m      CPAP/PS SBT 5/5 .30 x 90 minutes, tolerating well, will continue as tolerated, RT to monitor.    1144 changed back to VC AC mode previous settings per CNP request. CPAP/PS SBT x 159 \". RT to monitor.  "

## 2023-10-08 NOTE — PROGRESS NOTES
Start CPAP/PS trial 5/5 .30 per CNP request. RT to monitor    Vent Mode: (S) CPAP/PS  (Continuous positive airway pressure with Pressure Support) (start)  FiO2 (%): 30 %  Resp Rate (Set): 20 breaths/min  Tidal Volume (Set, mL): 400 mL  PEEP (cm H2O): 5 cmH2O  Pressure Support (cm H2O): (S) 5 cmH2O  Resp: 24

## 2023-10-08 NOTE — PROGRESS NOTES
RT PROGRESS NOTE    VENT DAY# 7    CURRENT SETTINGS:   Vent Mode: Other (see comments) (VC AC)  FiO2 (%): 30 %  Resp Rate (Set): 20 breaths/min  Tidal Volume (Set, mL): 400 mL  PEEP (cm H2O): 5 cmH2O  Pressure Support (cm H2O): 5 cmH2O  Resp: 24      PATIENT PARAMETERS:  PIP 19  Pplat:  15  Pmean:  9  Compliance: 34  SBT: yes      Secretions:  large thin white  02 Sats:  98%  BS: coarse bilat    ETT SIZE 7.5 Secured at 23 cm at teeth/gums    Respiratory Medications: none     NOTE / SHIFT SUMMARY:   continues on mechanical ventilation,  wean as tolerated, RT to monitor    Esteban Barclay, RT

## 2023-10-08 NOTE — PROGRESS NOTES
RT PROGRESS NOTE    VENT DAY# 7    CURRENT SETTINGS:   Vent Mode: CMV/AC  (Continuous Mandatory Ventilation/ Assist Control)  FiO2 (%): 30 %  Resp Rate (Set): 20 breaths/min  Tidal Volume (Set, mL): 400 mL  PEEP (cm H2O): 5 cmH2O  Pressure Support (cm H2O): 5 cmH2O  Resp: 20    BS: course, Sxn moderate tan thick.     ETT SIZE 7.5 Secured at 23 cm at teeth/gums    NOTE / SHIFT SUMMARY:   patient had uneventful night from respiratory standpoint, tolerating above vent settings.     Fatemeh Gutierrez, RT

## 2023-10-08 NOTE — PROGRESS NOTES
Care Management Follow Up    Length of Stay (days): 6    Expected Discharge Date: 10/13/2023     Concerns to be Addressed:     Care Progression  Patient plan of care discussed at interdisciplinary rounds: Yes    Anticipated Discharge Disposition:  TBD     Anticipated Discharge Services:  NA  Anticipated Discharge DME:  [facility]    Patient/family educated on Medicare website which has current facility and service quality ratings:  NA  Education Provided on the Discharge Plan:  NA  Patient/Family in Agreement with the Plan:  NA    Referrals Placed by CM/SW:  [facility]  Private pay costs discussed: Not applicable    Additional Information:  Discussed patient in ICU rounds. Patient remains intubated. GOC discussions ongoing with NP and family. Palliative care meeting tomorrow.    Social HX: Pt lives with  in a house. Patient has not been taking care of herself, not bathing, laying in bed most days.  says she drinks 6-12 beers/day and smokes, and not eating properly.  helps with all IADLs.     CM will continue to follow care progression and aide in discharge planning as needed.     Olena Santizo RN

## 2023-10-08 NOTE — PROGRESS NOTES
Lidia Nam is left groin and left lower extremity wound debridement for necrotizing soft tissue infection.  Remains intubated but following commands        Vitals:    10/08/23 1015 10/08/23 1030 10/08/23 1035 10/08/23 1045   BP: 105/51  90/54 95/55   BP Location:       Pulse: 74 66 61 69   Resp: 24 25  24   Temp: 97.5  F (36.4  C) 97.2  F (36.2  C)  97.2  F (36.2  C)   TempSrc:       SpO2: 98% 99% 98% 98%   Weight:       Height:           PHYSICAL EXAM:  GEN: No acute distress, comfortable, intubated  ABD: Lower abdomen dressing taken down which does not demonstrate any evidence of continuing necrotizing soft tissue infection, no crepitance no purulence, left lower extremity dressing remains intact and wrapped with Kerlix, lateral aspect of dressings does not demonstrate any evidence of worsening NTSI  EXT:No cyanosis, edema or obvious abnormalities        Recent Labs   Lab 10/08/23  0442   WBC 12.9*   HGB 9.6*   HCT 30.0*          Recent Labs   Lab 10/08/23  0442 10/04/23  0357 10/03/23  0521      < > 134*   CO2 22   < > 17*   BUN 16.2   < > 37.0*   ALBUMIN  --   --  1.7*   ALKPHOS  --   --  104   ALT  --   --  25   AST  --   --  24    < > = values in this interval not displayed.         A/P:  Lidia Nam is s/p debridement of left lower extremity wound  -Continue supportive cares at this point.  Daily dressing changes as previously instructed.  -At some point we will need more involved wound care which would include negative pressure wound therapy with possible skin grafting in the future.  -We will continue to follow    Dung Carranza DO  FACS  [phone]  [facility] Department of Surgery

## 2023-10-09 LAB
ANION GAP SERPL CALCULATED.3IONS-SCNC: 6 MMOL/L (ref 7–15)
BUN SERPL-MCNC: 13.7 MG/DL (ref 8–23)
CALCIUM SERPL-MCNC: 8.5 MG/DL (ref 8.8–10.2)
CALCIUM, IONIZED MEASURED: 1.18 MMOL/L (ref 1.11–1.3)
CHLORIDE SERPL-SCNC: 116 MMOL/L (ref 98–107)
CREAT SERPL-MCNC: 0.45 MG/DL (ref 0.51–0.95)
DEPRECATED HCO3 PLAS-SCNC: 23 MMOL/L (ref 22–29)
EGFRCR SERPLBLD CKD-EPI 2021: >90 ML/MIN/1.73M2
ERYTHROCYTE [DISTWIDTH] IN BLOOD BY AUTOMATED COUNT: 17.6 % (ref 10–15)
GLUCOSE BLDC GLUCOMTR-MCNC: 152 MG/DL (ref 70–99)
GLUCOSE BLDC GLUCOMTR-MCNC: 165 MG/DL (ref 70–99)
GLUCOSE BLDC GLUCOMTR-MCNC: 185 MG/DL (ref 70–99)
GLUCOSE BLDC GLUCOMTR-MCNC: 188 MG/DL (ref 70–99)
GLUCOSE BLDC GLUCOMTR-MCNC: 227 MG/DL (ref 70–99)
GLUCOSE SERPL-MCNC: 163 MG/DL (ref 70–99)
HCT VFR BLD AUTO: 30.3 % (ref 35–47)
HGB BLD-MCNC: 9.7 G/DL (ref 11.7–15.7)
ION CA PH 7.4: 1.17 MMOL/L (ref 1.11–1.3)
MAGNESIUM SERPL-MCNC: 1.8 MG/DL (ref 1.7–2.3)
MCH RBC QN AUTO: 31.2 PG (ref 26.5–33)
MCHC RBC AUTO-ENTMCNC: 32 G/DL (ref 31.5–36.5)
MCV RBC AUTO: 97 FL (ref 78–100)
PH: 7.38 (ref 7.35–7.45)
PLATELET # BLD AUTO: 313 10E3/UL (ref 150–450)
POTASSIUM SERPL-SCNC: 3.4 MMOL/L (ref 3.4–5.3)
POTASSIUM SERPL-SCNC: 3.6 MMOL/L (ref 3.4–5.3)
RBC # BLD AUTO: 3.11 10E6/UL (ref 3.8–5.2)
SODIUM SERPL-SCNC: 145 MMOL/L (ref 135–145)
WBC # BLD AUTO: 15.9 10E3/UL (ref 4–11)

## 2023-10-09 PROCEDURE — 250N000011 HC RX IP 250 OP 636: Mod: JZ | Performed by: SURGERY

## 2023-10-09 PROCEDURE — 999N000253 HC STATISTIC WEANING TRIALS

## 2023-10-09 PROCEDURE — 99291 CRITICAL CARE FIRST HOUR: CPT | Performed by: INTERNAL MEDICINE

## 2023-10-09 PROCEDURE — 94003 VENT MGMT INPAT SUBQ DAY: CPT

## 2023-10-09 PROCEDURE — 999N000287 HC ICU ADULT ROUNDING, EACH 10 MINS

## 2023-10-09 PROCEDURE — 250N000011 HC RX IP 250 OP 636: Mod: JZ | Performed by: INTERNAL MEDICINE

## 2023-10-09 PROCEDURE — 250N000013 HC RX MED GY IP 250 OP 250 PS 637: Performed by: NURSE PRACTITIONER

## 2023-10-09 PROCEDURE — 80048 BASIC METABOLIC PNL TOTAL CA: CPT | Performed by: NURSE PRACTITIONER

## 2023-10-09 PROCEDURE — 99233 SBSQ HOSP IP/OBS HIGH 50: CPT | Performed by: FAMILY MEDICINE

## 2023-10-09 PROCEDURE — 250N000011 HC RX IP 250 OP 636: Performed by: NURSE PRACTITIONER

## 2023-10-09 PROCEDURE — 999N000157 HC STATISTIC RCP TIME EA 10 MIN

## 2023-10-09 PROCEDURE — 250N000011 HC RX IP 250 OP 636: Performed by: INTERNAL MEDICINE

## 2023-10-09 PROCEDURE — 85027 COMPLETE CBC AUTOMATED: CPT | Performed by: NURSE PRACTITIONER

## 2023-10-09 PROCEDURE — P9047 ALBUMIN (HUMAN), 25%, 50ML: HCPCS | Performed by: INTERNAL MEDICINE

## 2023-10-09 PROCEDURE — 250N000013 HC RX MED GY IP 250 OP 250 PS 637: Performed by: INTERNAL MEDICINE

## 2023-10-09 PROCEDURE — 250N000013 HC RX MED GY IP 250 OP 250 PS 637: Performed by: SURGERY

## 2023-10-09 PROCEDURE — 99418 PROLNG IP/OBS E/M EA 15 MIN: CPT | Performed by: FAMILY MEDICINE

## 2023-10-09 PROCEDURE — 99232 SBSQ HOSP IP/OBS MODERATE 35: CPT | Performed by: INTERNAL MEDICINE

## 2023-10-09 PROCEDURE — 200N000001 HC R&B ICU

## 2023-10-09 PROCEDURE — 999N000055 HC STATISTIC END TITIAL CO2 MONITORING

## 2023-10-09 PROCEDURE — 83735 ASSAY OF MAGNESIUM: CPT | Performed by: NURSE PRACTITIONER

## 2023-10-09 PROCEDURE — 250N000009 HC RX 250: Performed by: SURGERY

## 2023-10-09 PROCEDURE — 999N000009 HC STATISTIC AIRWAY CARE

## 2023-10-09 PROCEDURE — 84132 ASSAY OF SERUM POTASSIUM: CPT | Performed by: INTERNAL MEDICINE

## 2023-10-09 PROCEDURE — C9113 INJ PANTOPRAZOLE SODIUM, VIA: HCPCS | Mod: JZ | Performed by: SURGERY

## 2023-10-09 PROCEDURE — 82330 ASSAY OF CALCIUM: CPT | Performed by: INTERNAL MEDICINE

## 2023-10-09 RX ORDER — POTASSIUM CHLORIDE 20MEQ/15ML
20 LIQUID (ML) ORAL ONCE
Status: COMPLETED | OUTPATIENT
Start: 2023-10-09 | End: 2023-10-09

## 2023-10-09 RX ORDER — POTASSIUM CHLORIDE 20MEQ/15ML
40 LIQUID (ML) ORAL ONCE
Status: COMPLETED | OUTPATIENT
Start: 2023-10-09 | End: 2023-10-09

## 2023-10-09 RX ORDER — FUROSEMIDE 10 MG/ML
20 INJECTION INTRAMUSCULAR; INTRAVENOUS EVERY 8 HOURS
Status: DISCONTINUED | OUTPATIENT
Start: 2023-10-09 | End: 2023-10-11

## 2023-10-09 RX ORDER — ALBUMIN (HUMAN) 12.5 G/50ML
25 SOLUTION INTRAVENOUS EVERY 12 HOURS
Status: COMPLETED | OUTPATIENT
Start: 2023-10-09 | End: 2023-10-10

## 2023-10-09 RX ORDER — OXYCODONE HCL 20 MG/ML
5 CONCENTRATE, ORAL ORAL EVERY 4 HOURS
Status: DISCONTINUED | OUTPATIENT
Start: 2023-10-09 | End: 2023-10-11

## 2023-10-09 RX ORDER — MAGNESIUM SULFATE HEPTAHYDRATE 40 MG/ML
2 INJECTION, SOLUTION INTRAVENOUS ONCE
Status: COMPLETED | OUTPATIENT
Start: 2023-10-09 | End: 2023-10-09

## 2023-10-09 RX ORDER — METOLAZONE 2.5 MG/1
2.5 TABLET ORAL DAILY
Status: DISCONTINUED | OUTPATIENT
Start: 2023-10-09 | End: 2023-10-10

## 2023-10-09 RX ADMIN — OXYCODONE HYDROCHLORIDE 5 MG: 100 SOLUTION ORAL at 10:42

## 2023-10-09 RX ADMIN — Medication 0.09 MCG/KG/MIN: at 18:03

## 2023-10-09 RX ADMIN — LORAZEPAM 0.5 MG: 2 INJECTION INTRAMUSCULAR; INTRAVENOUS at 01:36

## 2023-10-09 RX ADMIN — DEXMEDETOMIDINE HYDROCHLORIDE 0.6 MCG/KG/HR: 400 INJECTION INTRAVENOUS at 07:03

## 2023-10-09 RX ADMIN — HYDROMORPHONE HYDROCHLORIDE 0.5 MG: 1 INJECTION, SOLUTION INTRAMUSCULAR; INTRAVENOUS; SUBCUTANEOUS at 12:16

## 2023-10-09 RX ADMIN — Medication 25 MCG: at 21:19

## 2023-10-09 RX ADMIN — THIAMINE HCL TAB 100 MG 100 MG: 100 TAB at 08:41

## 2023-10-09 RX ADMIN — OXYCODONE HYDROCHLORIDE 5 MG: 100 SOLUTION ORAL at 14:30

## 2023-10-09 RX ADMIN — INSULIN ASPART 2 UNITS: 100 INJECTION, SOLUTION INTRAVENOUS; SUBCUTANEOUS at 11:54

## 2023-10-09 RX ADMIN — INSULIN ASPART 2 UNITS: 100 INJECTION, SOLUTION INTRAVENOUS; SUBCUTANEOUS at 04:50

## 2023-10-09 RX ADMIN — POTASSIUM CHLORIDE 40 MEQ: 1.5 SOLUTION ORAL at 05:49

## 2023-10-09 RX ADMIN — CHLORHEXIDINE GLUCONATE 15 ML: 1.2 RINSE ORAL at 20:02

## 2023-10-09 RX ADMIN — DEXMEDETOMIDINE HYDROCHLORIDE 0.6 MCG/KG/HR: 400 INJECTION INTRAVENOUS at 18:23

## 2023-10-09 RX ADMIN — Medication 0.1 MCG/KG/MIN: at 02:51

## 2023-10-09 RX ADMIN — PIPERACILLIN AND TAZOBACTAM 3.38 G: 3; .375 INJECTION, POWDER, LYOPHILIZED, FOR SOLUTION INTRAVENOUS at 16:09

## 2023-10-09 RX ADMIN — INSULIN ASPART 4 UNITS: 100 INJECTION, SOLUTION INTRAVENOUS; SUBCUTANEOUS at 20:06

## 2023-10-09 RX ADMIN — Medication 25 MCG: at 12:52

## 2023-10-09 RX ADMIN — ENOXAPARIN SODIUM 40 MG: 40 INJECTION SUBCUTANEOUS at 09:03

## 2023-10-09 RX ADMIN — SENNOSIDES 5 ML: 8.8 LIQUID ORAL at 19:23

## 2023-10-09 RX ADMIN — Medication 25 MCG: at 11:45

## 2023-10-09 RX ADMIN — METRONIDAZOLE 500 MG: 500 INJECTION, SOLUTION INTRAVENOUS at 11:46

## 2023-10-09 RX ADMIN — ALBUMIN HUMAN 25 G: 0.25 SOLUTION INTRAVENOUS at 21:14

## 2023-10-09 RX ADMIN — MAGNESIUM SULFATE HEPTAHYDRATE 2 G: 40 INJECTION, SOLUTION INTRAVENOUS at 05:49

## 2023-10-09 RX ADMIN — INSULIN ASPART 2 UNITS: 100 INJECTION, SOLUTION INTRAVENOUS; SUBCUTANEOUS at 16:19

## 2023-10-09 RX ADMIN — DEXMEDETOMIDINE HYDROCHLORIDE 0.7 MCG/KG/HR: 400 INJECTION INTRAVENOUS at 01:12

## 2023-10-09 RX ADMIN — INSULIN ASPART 1 UNITS: 100 INJECTION, SOLUTION INTRAVENOUS; SUBCUTANEOUS at 08:39

## 2023-10-09 RX ADMIN — OXYCODONE HYDROCHLORIDE 5 MG: 100 SOLUTION ORAL at 18:03

## 2023-10-09 RX ADMIN — ALBUMIN HUMAN 25 G: 0.25 SOLUTION INTRAVENOUS at 10:50

## 2023-10-09 RX ADMIN — PIPERACILLIN AND TAZOBACTAM 3.38 G: 3; .375 INJECTION, POWDER, LYOPHILIZED, FOR SOLUTION INTRAVENOUS at 23:58

## 2023-10-09 RX ADMIN — FUROSEMIDE 20 MG: 10 INJECTION, SOLUTION INTRAMUSCULAR; INTRAVENOUS at 16:08

## 2023-10-09 RX ADMIN — CLINDAMYCIN PHOSPHATE 900 MG: 900 INJECTION, SOLUTION INTRAVENOUS at 02:54

## 2023-10-09 RX ADMIN — DOCUSATE SODIUM 50 MG: 50 LIQUID ORAL at 19:23

## 2023-10-09 RX ADMIN — DEXMEDETOMIDINE HYDROCHLORIDE 0.8 MCG/KG/HR: 400 INJECTION INTRAVENOUS at 23:57

## 2023-10-09 RX ADMIN — OXYCODONE HYDROCHLORIDE 10 MG: 5 TABLET ORAL at 12:16

## 2023-10-09 RX ADMIN — LEVOTHYROXINE SODIUM 112 MCG: 0.11 TABLET ORAL at 05:49

## 2023-10-09 RX ADMIN — METRONIDAZOLE 500 MG: 500 INJECTION, SOLUTION INTRAVENOUS at 04:52

## 2023-10-09 RX ADMIN — CLINDAMYCIN PHOSPHATE 900 MG: 900 INJECTION, SOLUTION INTRAVENOUS at 10:23

## 2023-10-09 RX ADMIN — PANTOPRAZOLE SODIUM 40 MG: 40 INJECTION, POWDER, FOR SOLUTION INTRAVENOUS at 05:49

## 2023-10-09 RX ADMIN — THIAMINE HCL TAB 100 MG 100 MG: 100 TAB at 19:23

## 2023-10-09 RX ADMIN — CHLORHEXIDINE GLUCONATE 15 ML: 1.2 RINSE ORAL at 08:42

## 2023-10-09 RX ADMIN — OXYCODONE HYDROCHLORIDE 5 MG: 100 SOLUTION ORAL at 22:03

## 2023-10-09 RX ADMIN — FUROSEMIDE 20 MG: 10 INJECTION, SOLUTION INTRAMUSCULAR; INTRAVENOUS at 23:57

## 2023-10-09 RX ADMIN — SENNOSIDES 5 ML: 8.8 LIQUID ORAL at 08:52

## 2023-10-09 RX ADMIN — METOLAZONE 2.5 MG: 2.5 TABLET ORAL at 10:42

## 2023-10-09 RX ADMIN — Medication 75 MCG: at 14:12

## 2023-10-09 RX ADMIN — Medication 100 MCG/HR: at 11:48

## 2023-10-09 RX ADMIN — FOLIC ACID 1 MG: 1 TABLET ORAL at 08:41

## 2023-10-09 RX ADMIN — POTASSIUM CHLORIDE 20 MEQ: 1.5 SOLUTION ORAL at 14:12

## 2023-10-09 RX ADMIN — POLYETHYLENE GLYCOL 3350 17 G: 17 POWDER, FOR SOLUTION ORAL at 08:40

## 2023-10-09 RX ADMIN — Medication 0.09 MCG/KG/MIN: at 11:06

## 2023-10-09 RX ADMIN — Medication 25 MCG: at 08:57

## 2023-10-09 RX ADMIN — FUROSEMIDE 20 MG: 10 INJECTION, SOLUTION INTRAMUSCULAR; INTRAVENOUS at 08:37

## 2023-10-09 RX ADMIN — THIAMINE HCL TAB 100 MG 100 MG: 100 TAB at 14:12

## 2023-10-09 RX ADMIN — CLINDAMYCIN PHOSPHATE 900 MG: 900 INJECTION, SOLUTION INTRAVENOUS at 18:09

## 2023-10-09 RX ADMIN — Medication 15 ML: at 08:41

## 2023-10-09 RX ADMIN — PIPERACILLIN AND TAZOBACTAM 3.38 G: 3; .375 INJECTION, POWDER, LYOPHILIZED, FOR SOLUTION INTRAVENOUS at 09:04

## 2023-10-09 RX ADMIN — METRONIDAZOLE 500 MG: 500 INJECTION, SOLUTION INTRAVENOUS at 19:24

## 2023-10-09 ASSESSMENT — ACTIVITIES OF DAILY LIVING (ADL)
ADLS_ACUITY_SCORE: 34
ADLS_ACUITY_SCORE: 34
ADLS_ACUITY_SCORE: 32
ADLS_ACUITY_SCORE: 32
ADLS_ACUITY_SCORE: 34
ADLS_ACUITY_SCORE: 32
ADLS_ACUITY_SCORE: 34
ADLS_ACUITY_SCORE: 32
ADLS_ACUITY_SCORE: 34

## 2023-10-09 NOTE — PROGRESS NOTES
"Critical Care Progress Note      10/09/2023    Name: Lidia Nam MRN#: [id number]   Age: 63 year old YOB: 1960     Hsptl Day# 7  ICU DAY #    MV DAY #             Problem List:   Principal Problem:    Severe sepsis (H)  Active Problems:    Type 2 diabetes mellitus with hyperglycemia, without long-term current use of insulin (H)    Necrotizing soft tissue infection    Septic shock (H)    Clinically Significant Risk Factors        # Hypokalemia: Lowest K = 3.3 mmol/L in last 2 days, will replace as needed       # Hypoalbuminemia: Lowest albumin = 1.7 g/dL at 10/3/2023  5:21 AM, will monitor as appropriate           # DMII: A1C = 6.8 % (Ref range: 0.0 - 5.6 %) within past 6 months   # Severe Obesity: Estimated body mass index is 40.04 kg/m  as calculated from the following:    Height as of this encounter: 1.575 m (5' 2\").    Weight as of this encounter: 99.3 kg (218 lb 14.4 oz).            Code Status: No CPR- Pre-arrest intubation OK                    Summary/Hospital Course:     51-year-old female with diabetes mellitus and obesity who presented with necrotizing fasciitis requiring incision and debridement from left groin area all the way down to her heel.    I have personally reviewed the daily labs, imaging studies, cultures and discussed the case with referring physician and consulting physicians.     My assessment and plan by system for this patient is as follows:    Neurology/Psychiatry:   Wakes up and follows commands.  Sedated with Precedex and fentanyl  Added scheduled oxycodone      Cardiovascular:   Septic shock secondary to necrotizing fasciitis.  On low-dose norepinephrine  20 L positive.  Start albumin 25% twice a day and Lasix 20 mg IV every 8 hours      Pulmonary/Ventilator Management:   Hypoxic respiratory failure secondary to septic shock  SBT trials daily  Concern is significant goiter that is compressing her airway    Ongoing discussions with family regarding goals of " care      GI and Nutrition :   Tolerating tube feeds    Renal/Fluids/Electrolytes:   Volume overloaded  Started Lasix albumin and low-dose metolazone since her sodium is on the upper limit of normal    - monitor function and electrolytes as needed with replacement per ICU protocols.  - generally avoid nephrotoxic agents such as NSAID, IV contrast unless specifically required  - adjust medications as needed for renal clearance  - follow I/O's as appropriate.    Infectious Disease:   Necrotizing fasciitis with polymicrobial infection, Bacteroides and actinomyces  Clindamycin, Flagyl and Zosyn    Would probably need diverting ostomy to maintain clean wound but family does not want to escalate cares      Endocrine:   Continue insulin, sliding scale/Lantus  Continue levothyroxine  Very large goiter  Plan  - ICU insulin protocol, goal sugar <180        ICU Prophylaxis:   1. DVT: LMWH  2. VAP: HOB 30 degrees, chlorhexidine rinse  3. Stress Ulcer: PPI  4. Restraints: Nonviolent soft two point restraints required and necessary for patient safety and continued cares and good effect as patient continues to pull at necessary lines, tubes despite education and distraction. Will readdress daily.     Category: Non-violent   Type of Restraint: Soft limb x2.   Behavior: Pulling at IVand motta catheter tubings.   Root cause of behavior: Critical illness.   Less-restrictive methods that have failed: Redirection, reorientation. 1:1 NA at bedside.   Response to restraint: Not actively pulling atcurrent restraints.   Criteria for release from restraint: Responds to redirection. Leaves medical devices in place.          Key Medications:      albumin human  25 g Intravenous Q12H    chlorhexidine  15 mL Swish & Spit BID    clindamycin  900 mg Intravenous Q8H    sennosides  5 mL Oral or Feeding Tube BID    And    docusate  50 mg Oral BID    enoxaparin ANTICOAGULANT  40 mg Subcutaneous Q24H    folic acid  1 mg Oral or Feeding Tube Daily     furosemide  20 mg Intravenous Q8H    [START ON 10/10/2023] influenza quadrivalent (PF) vacc  0.5 mL Intramuscular Prior to discharge    insulin aspart  1-12 Units Subcutaneous Q4H    insulin glargine  20 Units Subcutaneous BID    levothyroxine  112 mcg Oral or Feeding Tube Daily    metolazone  2.5 mg Oral or Feeding Tube Daily    metroNIDAZOLE  500 mg Intravenous Q8H    multivitamins w/minerals  15 mL Oral or Feeding Tube Daily    oxyCODONE  5 mg Oral Q4H    pantoprazole  40 mg Intravenous Q24H    piperacillin-tazobactam  3.375 g Intravenous Q8H    polyethylene glycol  17 g Oral or Feeding Tube Daily    sodium chloride (PF)  3 mL Intracatheter Q8H    thiamine  100 mg Oral or Feeding Tube TID    [START ON 10/11/2023] thiamine  100 mg Oral or Feeding Tube Daily      dexmedeTOMIDine 0.9 mcg/kg/hr (10/09/23 1215)    fentaNYL 100 mcg/hr (10/09/23 1200)    lactated ringers 10 mL/hr at 10/09/23 1200    norepinephrine 0.09 mcg/kg/min (10/09/23 1200)    vasopressin Stopped (10/03/23 1830)               Physical Examination:   Temp:  [95.4  F (35.2  C)-100.2  F (37.9  C)] 98.4  F (36.9  C)  Pulse:  [50-79] 56  Resp:  [0-26] 22  BP: ()/(43-63) 117/59  FiO2 (%):  [30 %] 30 %  SpO2:  [89 %-100 %] 98 %    Intake/Output Summary (Last 24 hours) at 10/9/2023 1314  Last data filed at 10/9/2023 1200  Gross per 24 hour   Intake 4133.9 ml   Output 3950 ml   Net 183.9 ml     Wt Readings from Last 4 Encounters:   10/08/23 99.3 kg (218 lb 14.4 oz)   05/01/23 101 kg (222 lb 11.2 oz)   03/14/23 103.9 kg (229 lb)   11/15/22 103.9 kg (229 lb)     BP - Mean:  [56-88] 82  Vent Mode: CMV/AC  (Continuous Mandatory Ventilation/ Assist Control)  FiO2 (%): 30 %  Resp Rate (Set): (S) 16 breaths/min  Tidal Volume (Set, mL): 400 mL  PEEP (cm H2O): 5 cmH2O  Pressure Support (cm H2O): 5 cmH2O  Resp: 22    Recent Labs   Lab 10/09/23  0446 10/04/23  1128 10/03/23  0812 10/03/23  0520 10/03/23  0011   PH 7.38 7.38 7.40 7.39 7.30*   PCO2  --  34* 33*  32* 42   PO2  --  90 106* 95* 226*   HCO3  --  20* 21* 20* 21*       GEN: no acute distress   HEENT: head ncat, sclera anicteric, OP patent, trachea midline   PULM: unlabored synchronous with vent, clear anteriorly    CV/COR: RRR S1S2 no gallop,  No rub, no murmur  ABD: soft nontender, hypoactive bowel sounds, no mass  EXT: Peripheral edema on all extremities  NEURO: Wakes up and follows commands  SKIN: Groin and left lower extremity wrapped.  Incision is open, please refer to the pictures in chart  LINES: clean, dry intact         Data:   All data and imaging reviewed     ROUTINE ICU LABS (Last four results)  CMP  Recent Labs   Lab 10/09/23  1129 10/09/23  1041 10/09/23  0748 10/09/23  0449 10/09/23  0446 10/08/23  1620 10/08/23  1241 10/08/23  0444 10/08/23  0442 10/07/23  0518 10/07/23  0512 10/06/23  0814 10/06/23  0601 10/03/23  0744 10/03/23  0521 10/02/23  1837 10/02/23  1825   NA  --   --   --   --  145  --   --   --  143  --  143  --  142   < > 134*   < > 128*   POTASSIUM  --  3.6  --   --  3.4  --  3.5  --  3.3*  --  3.7  --  3.6   < > 4.1   < > 3.6   CHLORIDE  --   --   --   --  116*  --   --   --  115*  --  116*  --  114*   < > 102   < > 92*   CO2  --   --   --   --  23  --   --   --  22  --  20*  --  21*   < > 17*   < > 18*   ANIONGAP  --   --   --   --  6*  --   --   --  6*  --  7  --  7   < > 15   < > 18*   *  --  152* 165* 163*   < >  --    < > 276*   < > 286*   < > 262*   < > 203*   < > 199*   BUN  --   --   --   --  13.7  --   --   --  16.2  --  20.0  --  24.8*   < > 37.0*   < > 48.9*   CR  --   --   --   --  0.45*  --   --   --  0.46*  --  0.54  --  0.59   < > 0.57   < > 0.84   GFRESTIMATED  --   --   --   --  >90  --   --   --  >90  --  >90  --  >90   < > >90   < > 78   JW  --   --   --   --  8.5*  --   --   --  7.7*  --  8.1*  --  7.5*   < > 7.9*   < > 9.5   MAG  --   --   --   --  1.8  --   --   --  1.8  --  1.8  --  2.1   < >  --    < >  --    PROTTOTAL  --   --   --   --   --   --    --   --   --   --   --   --   --   --  4.8*  --  6.5   ALBUMIN  --   --   --   --   --   --   --   --   --   --   --   --   --   --  1.7*  --  2.2*   BILITOTAL  --   --   --   --   --   --   --   --   --   --   --   --   --   --  0.5  --  0.7   ALKPHOS  --   --   --   --   --   --   --   --   --   --   --   --   --   --  104  --  171*   AST  --   --   --   --   --   --   --   --   --   --   --   --   --   --  24  --  25   ALT  --   --   --   --   --   --   --   --   --   --   --   --   --   --  25  --  42    < > = values in this interval not displayed.     CBC  Recent Labs   Lab 10/09/23  0446 10/08/23  0442 10/07/23  0512 10/06/23  0601   WBC 15.9* 12.9* 13.9* 12.0*   RBC 3.11* 3.08* 3.02* 3.13*   HGB 9.7* 9.6* 9.2* 9.6*   HCT 30.3* 30.0* 29.1* 29.3*   MCV 97 97 96 94   MCH 31.2 31.2 30.5 30.7   MCHC 32.0 32.0 31.6 32.8   RDW 17.6* 17.2* 17.1* 17.2*    215 170 115*     INR  Recent Labs   Lab 10/02/23  1825   INR 1.26*     Arterial Blood Gas  Recent Labs   Lab 10/09/23  0446 10/04/23  1128 10/03/23  0812 10/03/23  0520 10/03/23  0011   PH 7.38 7.38 7.40 7.39 7.30*   PCO2  --  34* 33* 32* 42   PO2  --  90 106* 95* 226*   HCO3  --  20* 21* 20* 21*       All cultures:  No results for input(s): CULT in the last 168 hours.  No results found for this or any previous visit (from the past 24 hour(s)).      Billing: This patient is critically ill: Yes. Total critical care time today 40 min exclusive of procedures or teaching.  Managing septic shock, respiratory failure, pressors and mechanical ventilation in a patient with necrotizing fasciitis

## 2023-10-09 NOTE — PLAN OF CARE
[facility] - ICU    RN Progress Note:            Pertinent Assessments:      Please refer to flowsheet rows for full assessment     About 0900 pt alert, followed commands, in pain, gave prn iv fentanyl bolus at 0919 and 1145, then gave few more boluses during dressing change which took 1+ hour. Lung sounds coarse, SpO2 96%+, suctioned small to moderate about of thin, white & tan secretions. Family conference with palliative for tomorrow on 10/09 to discuss future goals off care.    Telemetry reads Sinus Bradycardia.    Protocols:  K+: 3.5, replacement given, am recheck 10/09.  Magnesium: 1.8, am recheck, 10/09.           Key Events - This Shift:       Weaned from 0905 to 1144.  1600 to 1730 wound change, requiring 3-4 people.  1830 PICC changed           Barriers to Discharge / Downgrade:     Vasopressor/levophed, intubated         Point of Contact Update YES-OR-NO: Yes  If No, reason:   Name:  Phone Number:  Summary of Conversation:   family conference with palliative for tomorrow on 10/09 to discuss future goals off care.          Problem: Sepsis/Septic Shock  Goal: Blood Glucose Level Within Targeted Range  Outcome: Not Progressing     Problem: Sepsis/Septic Shock  Goal: Optimal Coping  Outcome: Progressing  Goal: Absence of Bleeding  Outcome: Progressing  Goal: Absence of Infection Signs and Symptoms  Outcome: Progressing  Intervention: Promote Recovery  Recent Flowsheet Documentation  Taken 10/8/2023 1600 by Delma Brandt, RN  Activity Management: bedrest  Taken 10/8/2023 1200 by Delma Brandt, RN  Activity Management: bedrest  Taken 10/8/2023 0800 by Delma Brandt, RN  Activity Management: bedrest  Goal: Optimal Nutrition Intake  Outcome: Progressing     Problem: Mechanical Ventilation Invasive  Goal: Effective Communication  Outcome: Progressing  Intervention: Ensure Effective Communication  Recent Flowsheet Documentation  Taken 10/8/2023 1600 by Delma Brandt, CLAUDIA  Trust  Relationship/Rapport:   care explained   reassurance provided  Taken 10/8/2023 1200 by Delma Brandt RN  Trust Relationship/Rapport:   care explained   reassurance provided  Taken 10/8/2023 0800 by Delma Brandt RN  Trust Relationship/Rapport:   care explained   reassurance provided  Goal: Optimal Device Function  Outcome: Progressing  Intervention: Optimize Device Care and Function  Recent Flowsheet Documentation  Taken 10/8/2023 1600 by Delma Brandt RN  Airway Safety Measures: all equipment/monitors on and audible  Taken 10/8/2023 1200 by Delma Brandt RN  Airway Safety Measures: all equipment/monitors on and audible  Taken 10/8/2023 0900 by Delma Brandt RN  Oral Care:   lip/mouth moisturizer applied   swabbed with antiseptic solution   teeth brushed   tongue brushed  Taken 10/8/2023 0800 by Delma Brandt RN  Airway Safety Measures: all equipment/monitors on and audible  Goal: Mechanical Ventilation Liberation  Outcome: Progressing  Intervention: Promote Extubation and Mechanical Ventilation Liberation  Recent Flowsheet Documentation  Taken 10/8/2023 1600 by Delma Brandt RN  Medication Review/Management:   medications reviewed   high-risk medications identified   infusion titrated  Taken 10/8/2023 1200 by Delma Brandt RN  Medication Review/Management:   medications reviewed   high-risk medications identified   infusion titrated  Taken 10/8/2023 0800 by Delma Brandt RN  Medication Review/Management:   medications reviewed   high-risk medications identified   infusion titrated  Goal: Optimal Nutrition Delivery  Outcome: Progressing  Goal: Absence of Device-Related Skin and Tissue Injury  Outcome: Progressing  Goal: Absence of Ventilator-Induced Lung Injury  Outcome: Progressing  Intervention: Prevent Ventilator-Associated Pneumonia  Recent Flowsheet Documentation  Taken 10/8/2023 1800 by Delma Brandt RN  Head of Bed (HOB) Positioning: HOB at 30 degrees  Taken 10/8/2023 1600 by  Delma Brandt RN  Head of Bed (HOB) Positioning: HOB at 30 degrees  Taken 10/8/2023 1400 by Delma Brandt RN  Head of Bed (HOB) Positioning: HOB at 30 degrees  Taken 10/8/2023 1200 by Delma Brandt RN  Head of Bed (HOB) Positioning: HOB at 30 degrees  Taken 10/8/2023 1000 by Delma Brandt RN  Head of Bed (HOB) Positioning: HOB at 30 degrees  Taken 10/8/2023 0900 by Delma Brandt RN  Oral Care:   lip/mouth moisturizer applied   swabbed with antiseptic solution   teeth brushed   tongue brushed  Taken 10/8/2023 0800 by Delma Brandt RN  Head of Bed (HOB) Positioning: HOB at 30 degrees     Problem: Mechanical Ventilation Invasive  Goal: Effective Communication  Outcome: Progressing  Intervention: Ensure Effective Communication  Recent Flowsheet Documentation  Taken 10/8/2023 1600 by Delma Brandt RN  Trust Relationship/Rapport:   care explained   reassurance provided  Taken 10/8/2023 1200 by Delma Brandt RN  Trust Relationship/Rapport:   care explained   reassurance provided  Taken 10/8/2023 0800 by Delma Brandt RN  Trust Relationship/Rapport:   care explained   reassurance provided

## 2023-10-09 NOTE — PROGRESS NOTES
Palliative Care Initial Social Work Note  Location: [facility]    Patient Info:  Lidia Nam is a 63 year old female with hx of T2DM, obesity, tobacco abuse, alcohol abuse, hypothyroidism, goiter. Presented on 10/2/23 with dyspnea, cough, generalized weakness. Additionally, had increased pain, redness, and malodorous drainage from a wound in her left inguinal/vaginal area. CT scan showed necrotizing fasciitis and she was emergently taken to the OR for vulvectomy and broad debridement of the left buttock and left thigh. She returned to the ICU on pressors and was febrile through most of the day. Taken back to OR 10/3 and required extensive debridement from the left buttock, perineum, down to the left ankle. She required intubation by ENT due to large goiter.       Brief summary of visit: Family meeting today with Dr. Chaudhary -met with Pts spouse Arujn, dtr Radha and son Joby. Talked with them about current medical status and next steps. Pt has shown some small improvement in the past few days and is alert and trying to communicate. Discussed timeline for possible extubation in the next few days and hopes that we can talk with Pt about her wishes for care moving forward.   Family shared a lot of concern about Pts overall prognosis and concern that she never would have wanted any of this to happen. They described Pt as someone who did not like medical interventions and wanted to be left alone. There is worry that she is currently getting care that she would not want. Spouse feels that Pt would want hospice. Acknowledged their feelings and knowledge of Pts preferences, offered listening, re-framing and support. Family is agreeable to next steps of getting fluid off, working towards extubation and seeing if Pt is able to share her wishes. Made plan for another family meeting on 10/12/23 at 10am.     Visited with Pt, who was awake and alert. She was able to nod yes and no to certain questions. She was stressed by not  being able to talk and was having pain.   Educated family that this is a slow process and that we will continue to have ongoing conversation with her as she is able.    Spouse tearful at bedside.       Date of Admission: 10/2/2023    Reason for consult: Goals of care  Patient and family support    Sources of information: Patient  Family member spouse Arjun, dtr Radha, krishna Hemphill    Recommendations & Plan:  PCSW continues to follow for support.        Symptoms & Concerns Addressed Today:  Emotional coping,     Strengths Identified:    Family support    Clinical Social Work Interventions:   Assessment of palliative specific issues    Introduction of Palliative clinical social work interventions  Facilitation of processing of thoughts/feelings  Goals of care discussion/facilitation  Life review facilitation  Re-framing      Elke Malloy [facility]  [facility], Palliative Care  Securely message with the [employer] Web Console (learn more here) or  Text page via [employer] Paging/Directory

## 2023-10-09 NOTE — PLAN OF CARE
[facility] - ICU    RN Progress Note:            Pertinent Assessments:      Please refer to flowsheet rows for full assessment     Pt current RASS 0 to -1 (goal), during dressing change -3 with premedicating. Gave prn po oxycodone 10 mg and prn iv dilaudid 0.5 prior to wound change. Small to moderate stool leakage from rectal tube. New scheduled po oxycodone 5 mg q 4 hours. New schedule albumin 25% IV 25g q 12 hrs.     Telemetry reads Sinus Bradycardia    Protocols:  K+: 3.4, replacement, 3.6, replacement given, am recheck, 10/10.  Magnesium: 1.8, replacement given, am recheck, 10/10.           Key Events - This Shift:       1000 Palliative care conference with family. Decision made to have her extubated by Thursday and have patient make her own decision on goals and plan of care.  1245 to 1330 wound change  Weaned for 1 hour, taken off for wound change. Re-started to wean at 1434.             Barriers to Discharge / Downgrade:     Intubated, enormous wound, vasopressor.         Point of Contact Update YES-OR-NO: Yes  If No, reason:   Name: Family  Phone Number:  Summary of Conversation:  Discussed and informed family in room about pt wound, the amount of time to change wound, importance of reducing her pain.

## 2023-10-09 NOTE — PROGRESS NOTES
RT PROGRESS NOTE    VENT DAY# Ventilation Day(s): 7    CURRENT SETTINGS:  Vent Mode: CPAP/PS  (Continuous positive airway pressure with Pressure Support)  FiO2 (%): 30 %  Resp Rate (Set): 16 breaths/min  Tidal Volume (Set, mL): 400 mL  PEEP (cm H2O): 5 cmH2O  Pressure Support (cm H2O): 5 cmH2O  Resp: (!) 0      PATIENT PARAMETERS:  Ventilator - Patient   Patient Resp Rate : 20 breaths/min  Expiratory Vt (ml): 436  Minute Volume (ml): 7.34 L/min  Peak Inspiratory Pressure (cm H2O): 25 cmH2O  Mean Airway Pressure (cm H2O): 10 cmH2O  Plateau Pressure (cm H2O): 18 cmH2O  Dynamic Compliance (mL/cm H2O): 33.5 mL/cm H2O  Airway Resistance: 21  Auto/ Intrinsic PEEP (cm H2O): 0.8 cmH2O     SBT completed Yes , Total Time 95 mins in the morning and currently on SBT 5/5 30%, RSBI 92.23  Secretions: small white  Breath Sounds: coarse/diminished    ETT Cuffed Single;Single Subglottic Suction 7.5 mm-Secured at (cm): 23 cm at teeth/gums  Emergency Ambu bag, mask and peep valve at bedside.       NOTE / SHIFT SUMMARY:     Today's Changes  Patient is currently on above ventilator settings of PS 5 PEEP 5 30%. She was weaning in the afternoon for 95 minutes and placed back on full support due to dressing change. BS coarse with small white secretion. ETT reposistion Q2, this responsibility is shared between RN and RT for skin integrity. RT following.    Bhavana Maldonado RT

## 2023-10-09 NOTE — PLAN OF CARE
Goal Outcome Evaluation:               [facility] - ICU    RN Progress Note:            Pertinent Assessments:      Please refer to flowsheet rows for full assessment     Pain and agitation around 2am. Increased pain meds and precedex. Dressing change completed. Small amount of bypass from Rectal tube. Patient should maybe be on a Dolphin Bed.           Key Events - This Shift:       Uneventful.  Reducing sedation for day shift.             Barriers to Discharge / Downgrade:     Major open wound with painful dressing changes.         Point of Contact Update YES-OR-NO: No

## 2023-10-09 NOTE — PROGRESS NOTES
"INFECTIOUS DISEASE FOLLOW UP NOTE    Date: 10/06/2023   CHIEF COMPLAINT:   Chief Complaint   Patient presents with    Groin Pain    Shortness of Breath    Generalized Weakness        ASSESSMENT:    Necrotizing fasciitis: CT with necrotizing fasciitis L groin, perineum, and visualized LLE now down to ankle. s/p OR 10/2, GS and cultures are polymicrobial-bacteroides and actino on culture so far. Repeat OR 10/4 with progressive disease. Active issue.   Severe sepsis: pressors in ICU  DM2  Goiter: ENT intubated in OR.   Sputum with haemophilus and strep constellatus     PLAN:  - continue zosyn, clinda, metronidazole  - can likely stop clinda once no additional OR planned  - needs ongoing source control-surgery following - goals of care discussion ongoing regarding further surgery    Veronica Wilburn MD  [facility]  Direct messaging: emoteShare Paging   On-Call ID provider: [phone], option: 9       ______________________________________________________________________    SUBJECTIVE / INTERVAL HISTORY:   Palliative care meeting today. Opens eyes.       OBJECTIVE:  BP 95/50   Pulse 51   Temp 97.3  F (36.3  C)   Resp 20   Ht 1.575 m (5' 2\")   Wt 99.9 kg (220 lb 3.2 oz)   LMP  (LMP Unknown)   SpO2 97%   BMI 40.28 kg/m      Vent Mode: CMV/AC  (Continuous Mandatory Ventilation/ Assist Control)  FiO2 (%): 30 %  Resp Rate (Set): 20 breaths/min  Tidal Volume (Set, mL): 400 mL  PEEP (cm H2O): 5 cmH2O  Resp: 20    GEN: intubated  RESPIRATORY:  intubated  CARDIOVASCULAR:  regular, no murmur   ABDOMEN:  Soft, normal bowel sounds, non-tender,   EXTREMITIES: No edema.  SKIN/HAIR/NAILS:  left lower extremities wrapped    NEURO: Eyes open, tracking   IV: central lines      Antibiotics:  Zosyn  Clinda  flagyl    Pertinent labs:  No results found for: CRP   CBC RESULTS:   Recent Labs   Lab Test 10/04/23  0357   WBC 16.8*   RBC 2.84*   HGB 9.0*   HCT 27.5*   MCV 97   MCH 31.7   MCHC 32.7   RDW 14.6   PLT " 155      Last Comprehensive Metabolic Panel:  Sodium   Date Value Ref Range Status   10/06/2023 142 135 - 145 mmol/L Final     Comment:     Reference intervals for this test were updated on 09/26/2023 to more accurately reflect our healthy population. There may be differences in the flagging of prior results with similar values performed with this method. Interpretation of those prior results can be made in the context of the updated reference intervals.      Potassium   Date Value Ref Range Status   10/06/2023 3.6 3.4 - 5.3 mmol/L Final   06/23/2020 4.4 3.5 - 5.0 mmol/L Final     Chloride   Date Value Ref Range Status   10/06/2023 114 (H) 98 - 107 mmol/L Final   06/23/2020 98 98 - 107 mmol/L Final     Carbon Dioxide (CO2)   Date Value Ref Range Status   10/06/2023 21 (L) 22 - 29 mmol/L Final   06/23/2020 32 (H) 22 - 31 mmol/L Final     Anion Gap   Date Value Ref Range Status   10/06/2023 7 7 - 15 mmol/L Final   06/23/2020 5 5 - 18 mmol/L Final     Glucose   Date Value Ref Range Status   10/06/2023 262 (H) 70 - 99 mg/dL Final   06/23/2020 115 70 - 125 mg/dL Final     GLUCOSE BY METER POCT   Date Value Ref Range Status   10/06/2023 282 (H) 70 - 99 mg/dL Final     Urea Nitrogen   Date Value Ref Range Status   10/06/2023 24.8 (H) 8.0 - 23.0 mg/dL Final   06/23/2020 8 8 - 22 mg/dL Final     Creatinine   Date Value Ref Range Status   10/06/2023 0.59 0.51 - 0.95 mg/dL Final     GFR Estimate   Date Value Ref Range Status   10/06/2023 >90 >60 mL/min/1.73m2 Final   06/23/2020 >60 >60 mL/min/1.73m2 Final     GFR, ESTIMATED POCT   Date Value Ref Range Status   10/02/2023 >60 >60 mL/min/1.73m2 Final     Calcium   Date Value Ref Range Status   10/06/2023 7.5 (L) 8.8 - 10.2 mg/dL Final        MICROBIOLOGY DATA:  Personally reviewed.  7-Day Micro Results       Collected Updated Procedure Result Status      10/04/2023 1027 10/05/2023 1631 Anaerobic Bacterial Culture Routine [[id number]]   Tissue from Leg, left    Preliminary  result Component Value   Culture No anaerobic organisms isolated after 1 day  [P]                10/04/2023 1027 10/04/2023 1719 Gram Stain [[id number]]   (Abnormal)   Tissue from Leg, left    Final result Component Value   Gram Stain Result 4+ Gram positive cocci   Gram Stain Result 4+ Gram positive bacilli   Gram Stain Result 3+ Gram negative bacilli   Gram Stain Result 4+ WBC seen   Predominantly PMNs            10/04/2023 1027 10/06/2023 1006 Tissue Aerobic Bacterial Culture Routine [[id number]]   (Abnormal)   Tissue from Leg, left    Preliminary result Component Value   Culture Culture in progress  [P]     2+ Actinomyces species  [P]     Identification obtained by MALDI-TOF mass spectrometry research use only database. Test characteristics determined and verified by the [facility].Susceptibilities not routinely done, refer to antibiogram to view typica  l susceptibility profiles               10/03/2023 0252 10/05/2023 1134 Respiratory Aerobic Bacterial Culture with Gram Stain [[id number]]    (Abnormal)   Sputum from Endotracheal    Final result Component Value   Culture 2+ Normal felecia    3+ Haemophilus influenzae    1+ Streptococcus constellatus    Beta hemolytic strain  This organism is susceptible to ampicillin, penicillin, vancomycin and the cephalosporins. If treatment is required and your patient is allergic to penicillin, contact the microbiology lab within 5 days to request susceptibility testing.   Gram Stain Result >25 PMNs/low power field    3+ Gram negative bacilli        Susceptibility        Haemophilus influenzae      PETRA      Nitrocefin Positive   [1]                    [1]  Beta-lactamase positive  Beta-lactamase positive Haemophilus influenzae are resistant to ampicillin and are usually susceptible to amox/clavulanic acid, levofloxacin, and 3rd generation cephalosporins, such as ceftriaxone.                   10/02/2023 2228 10/05/2023 1152 Anaerobic  Bacterial Culture Routine [[id number]]   (Abnormal)   Tissue from Buttock, Left    Preliminary result Component Value   Culture Culture in progress  [P]     2+ Bacteroides ovatus/xylanisolvens  [P]     Susceptibilities not routinely done, refer to antibiogram to view typical susceptibility profiles               10/02/2023 2228 10/03/2023 1135 Gram Stain [[id number]]   (Abnormal)   Tissue from Buttock, Left    Edited Result - FINAL Component Value   Gram Stain Result 2+ Gram positive bacilli  [C]    Gram Stain Result 1+ Gram negative bacilli  [C]    Gram Stain Result 1+ Gram positive cocci  [C]    Gram Stain Result No white blood cells seen  [C]    This is an appended report. These results have been appended to a previously final verified report.            10/02/2023 2228 10/06/2023 1037 Tissue Aerobic Bacterial Culture Routine [[id number]]   (Abnormal)   Tissue from Buttock, Left    Final result Component Value   Culture 2+ Actinomyces species    Identification obtained by MALDI-TOF mass spectrometry research use only database. Test characteristics determined and verified by the [facility].Susceptibilities not routinely done, refer to antibiogram to view typica  l susceptibility profiles    Isolated in broth only Mixed Anaerobic Organisms Present    On day 2 of incubationSee anaerobic report for identification               10/02/2023 2227 10/05/2023 1153 Anaerobic Bacterial Culture Routine [[id number]]   (Abnormal)   Tissue from Vulva    Preliminary result Component Value   Culture Culture in progress  [P]     3+ Bacteroides ovatus/xylanisolvens  [P]     Susceptibilities not routinely done, refer to antibiogram to view typical susceptibility profiles               10/02/2023 2227 10/03/2023 1138 Gram Stain [[id number]]   (Abnormal)   Tissue from Vulva    Final result Component Value   Gram Stain Result 3+ Gram positive bacilli   Gram Stain Result 3+ Gram positive cocci    Gram Stain Result 2+ Gram negative bacilli   Gram Stain Result 1+ WBC seen            10/02/2023 2227 10/06/2023 1035 Tissue Aerobic Bacterial Culture Routine [[id number]]   Tissue from Vulva    Preliminary result Component Value   Culture Culture in progress  [P]     Isolated in broth only Mixed Anaerobic Organisms Present  [P]     On day 2 of incubationSee anaerobic report for identification               10/02/2023 1829 10/02/2023 1910 Symptomatic Influenza A/B, RSV, & SARS-CoV2 PCR (COVID-19) Nasopharyngeal [[id number]]    Swab from Nasopharyngeal    Final result Component Value   Influenza A PCR Negative   Influenza B PCR Negative   RSV PCR Negative   SARS CoV2 PCR Negative   NEGATIVE: SARS-CoV-2 (COVID-19) RNA not detected, presumed negative.            10/02/2023 1825 10/05/2023 2132 Blood Culture Peripheral Blood [[id number]]   Peripheral Blood    Preliminary result Component Value   Culture No growth after 3 days  [P]                10/02/2023 1825 10/05/2023 2132 Blood Culture Peripheral Blood [[id number]]   Peripheral Blood    Preliminary result Component Value   Culture No growth after 3 days  [P]                         RADIOLOGY:  Personally Reviewed.  Recent Results (from the past 24 hour(s))   XR Chest Port 1 View    Narrative    EXAM: XR CHEST PORT 1 VIEW  LOCATION: [facility]  DATE: 10/4/2023    INDICATION: ETT position  COMPARISON: 10/3/2023      Impression    IMPRESSION: Endotracheal tube terminates 4.0 cm above the suresh. Right upper extremity PICC in the lower SVC. Enteric decompression tube descends below the diaphragm, but the tip is outside the field-of-view.    Decreased left basilar airspace opacities compared to prior. No pleural effusion or pneumothorax. Normal cardiomediastinal silhouette.       Principal Problem:    Severe sepsis (H)  Active Problems:    Type 2 diabetes mellitus with hyperglycemia, without long-term current use of insulin (H)     Necrotizing soft tissue infection    Septic shock (H)

## 2023-10-09 NOTE — PROGRESS NOTES
"PALLIATIVE CARE PROGRESS NOTE  [facility]     Patient Name: Lidia Nam  Date of Admission: 10/2/2023   Today the patient was seen for: family meeting, goals of care, support to family.     Recommendations & Counseling       GOALS OF CARE:   Life prolonging, with limits (DNR, prearrest intubation ok), no escalation of care if clinical decline occurs.   Arjun, daughter Radha and son Forrest are in agreement they are concerned  that Lidia would not want to continue life prolonging treatments if her best case outcome would be residing in SNF facility, if she couldn't smoke and drink beer and regain abilities she previously had.       ADVANCE CARE PLANNING:  No health care directive on file. Per  informed consent policy, next of kin should be involved if patient becomes unable.    There is no POLST form on file, recommend to complete prior to DC.  Code status: No CPR- Pre-arrest intubation OK    MEDICAL MANAGEMENT:   We are not actively managing symptoms at this time.    PSYCHOSOCIAL/SPIRITUAL:  Family  (42 yrs) to Arjun, son Joby, daughter Radha.  No grandchildren.  [place] community: [employer] .  Raised [employer], attended [employer] grade schools.  Hasn't attended Latter-day services in years,  not aware if she has active prayer life.  Will request spiritual care for support.    Palliative Care will continue to follow. Thank you for the consult and allowing us to aid in the care of Lidia Nam.    These recommendations have been discussed with Dr Nguyen.    Evelyn Chaudhary MD  Securely message with [employer] (more info)  Text page via [email or web address] Paging/Directory          Interval History:     Multidisciplinary collaboration:  Chart reviewed, discussed with bedside RN and also Dr Nguyen and ICU team    Patient/family narrative  Lidia is intubated and alert.  She shakes head \"no\" to question of having pain.  She shakes head \"yes\" when I ask if she is anxious.  She " is able to follow brief commands.    [facility]    Date/time:  10/9/2023, 1010 to 1100    Location:  ICU conference room, latter portion at bedside     Patient present:  for last portion of meeting.    Reason for Meeting:  goals of care, informational update.    People Present:  Evelyn Chaudhary MD, Elke CRUZ-Palliative, Medical student from ICU team, Radha Mcgill, Joby Nam.    Meeting Led by:  Evelyn Chaudhary MD    Meeting Summary:    Update provided on Lidia's medical situation.  Joby Díaz and Radha all note concern Lidia would not have wanted to go through such invasive procedures.  Reviewed that her bacteremia has cleared and her antibiotic treatments continue, she is weaning from ventilator, and ICU team recommends diuresis before pursuing extubation.  Family would want to see if Lidia is able to express her wishes on goals of treatment, and willing to pursue continued life prolonging treamtent with goal of extubation and assessing her capacity and wishes.      Decision making capacity:  Lidia lacks decisional capacity.    Estimated length of life:  uncertain    Understanding/Coping:  Radha Hemphill and Arjun voice understanding of Lidia's present situation, some overwhelm with her medical complexity and protracted nature of her recovery should she continue to pursue life prolonging treatments.  Radha  notes openness to be a PCA for her mother if needed.      Meeting Action Items & Recommendations:   1.)  continue all present cares with life prolonging goals w limits.  2.)  if clinical decline, family wish to refrain from escalation of care and would want to transition to comfort focused goals.  3.)  family hope is that if Lidia can be extubated she could provide guidance on goals.  Specifically noted medical team will assess decisional capacity and that family may need to support Lidia in decision.  They are aware  diuresis is necessary before extubation; once extubated they would not want Lidia reintubated as they do not believe she would welcome tracheostomy/prolonged ventilator support.    Next Meeting date/time:  TBD, likely Thursday          Palliative Summary/HPI          Lidia Nam is a 63 year old female with a past medical history of hypothyroidism with goiter, tobacco abuse, DM2, obesity, AUD who presented on 10/2/23 with groin pain and septic shock.  CT showed necrotizing fasciitis and she was taken emergently to the OR and had debridement to treat/manage necrotizing fasciitis including L side of perineum, L buttock, L leg.    On 10/2/23 with Dr Howard she had L vulvectomy, debridement of L buttock, L posterior thigh to L knee.  She remained intubated and on pressors and returned to ICU; the following day she had further mecrosis into the L side of mons pubis and skin flaps; on 10/4/23 Dr Molina completed further debridement of the L mons pubis, as well as L gluteal/posterior thigh and extending down to the L ankle.  Lidia remains on pressors (weaning), remains intubated (large goiter, required ENT assistance for intubation at time of surgery), and is weaning on sedation.               Physical Exam:   Temp:  [95.4  F (35.2  C)-100.2  F (37.9  C)] 98.1  F (36.7  C)  Pulse:  [50-79] 51  Resp:  [0-27] 15  BP: ()/(43-63) 117/59  FiO2 (%):  [30 %] 30 %  SpO2:  [89 %-100 %] 99 %  218 lbs 14.4 oz      Intake/Output Summary (Last 24 hours) at 10/9/2023 1559  Last data filed at 10/9/2023 1430  Gross per 24 hour   Intake 4278.75 ml   Output 3025 ml   Net 1253.75 ml       Physical Exam  Alert 62 yo woman, nods yes/shakes head no to some questions.  Follows brief commands.  Generalized edema in arms hands legs etc.  Dressings over low abdomen/perineum, L leg from buttock to ankles.  Lungs CTA without wheezes  Heart s1s2 no m/g/r  Abdomen obese.             Current Problem List:   Principal Problem:     Severe sepsis (H)  Active Problems:    Type 2 diabetes mellitus with hyperglycemia, without long-term current use of insulin (H)    Necrotizing soft tissue infection    Septic shock (H)      Allergies   Allergen Reactions    Erythromycin     Psyllium     Seasonal Allergies      Hay fever - tree pollens, dust, mold, mildew            Data Reviewed:     Last Comprehensive Metabolic Panel:  Lab Results   Component Value Date     10/09/2023    POTASSIUM 3.6 10/09/2023    CHLORIDE 116 (H) 10/09/2023    CO2 23 10/09/2023    ANIONGAP 6 (L) 10/09/2023     (H) 10/09/2023    BUN 13.7 10/09/2023    CR 0.45 (L) 10/09/2023    GFRESTIMATED >90 10/09/2023    JW 8.5 (L) 10/09/2023       Lab Results   Component Value Date    WBC 15.9 10/09/2023     Lab Results   Component Value Date    RBC 3.11 10/09/2023     Lab Results   Component Value Date    HGB 9.7 10/09/2023     Lab Results   Component Value Date    HCT 30.3 10/09/2023     No components found for: MCT  Lab Results   Component Value Date    MCV 97 10/09/2023     Lab Results   Component Value Date    MCH 31.2 10/09/2023     Lab Results   Component Value Date    MCHC 32.0 10/09/2023     Lab Results   Component Value Date    RDW 17.6 10/09/2023     Lab Results   Component Value Date     10/09/2023     Lab Results   Component Value Date    AST 24 10/03/2023     Lab Results   Component Value Date    ALT 25 10/03/2023     No results found for: BILICONJ   Lab Results   Component Value Date    BILITOTAL 0.5 10/03/2023     Lab Results   Component Value Date    ALBUMIN 1.7 10/03/2023    ALBUMIN 3.1 06/23/2020     Lab Results   Component Value Date    PROTTOTAL 4.8 10/03/2023      Lab Results   Component Value Date    ALKPHOS 104 10/03/2023     CXR 10/4/23:    IMPRESSION: Endotracheal tube terminates 4.0 cm above the suresh. Right upper extremity PICC in the lower SVC. Enteric decompression tube descends below the diaphragm, but the tip is outside the  field-of-view.    Decreased left basilar airspace opacities compared to prior. No pleural effusion or pneumothorax. Normal cardiomediastinal silhouette.     65 minutes spent on the date of the encounter doing chart review, history and exam, documentation and further activities per the note.    Evelyn Chaudhary MD  [facility], Palliative Care  Securely message with the [employer] Web Console (learn more here) or  Text page via [facility] Paging/Directory

## 2023-10-10 LAB
ANION GAP SERPL CALCULATED.3IONS-SCNC: 8 MMOL/L (ref 7–15)
BUN SERPL-MCNC: 12.4 MG/DL (ref 8–23)
CALCIUM SERPL-MCNC: 8.1 MG/DL (ref 8.8–10.2)
CHLORIDE SERPL-SCNC: 104 MMOL/L (ref 98–107)
CREAT SERPL-MCNC: 0.44 MG/DL (ref 0.51–0.95)
DEPRECATED HCO3 PLAS-SCNC: 26 MMOL/L (ref 22–29)
EGFRCR SERPLBLD CKD-EPI 2021: >90 ML/MIN/1.73M2
ERYTHROCYTE [DISTWIDTH] IN BLOOD BY AUTOMATED COUNT: 17 % (ref 10–15)
GLUCOSE BLDC GLUCOMTR-MCNC: 141 MG/DL (ref 70–99)
GLUCOSE BLDC GLUCOMTR-MCNC: 153 MG/DL (ref 70–99)
GLUCOSE BLDC GLUCOMTR-MCNC: 183 MG/DL (ref 70–99)
GLUCOSE BLDC GLUCOMTR-MCNC: 207 MG/DL (ref 70–99)
GLUCOSE BLDC GLUCOMTR-MCNC: 207 MG/DL (ref 70–99)
GLUCOSE BLDC GLUCOMTR-MCNC: 291 MG/DL (ref 70–99)
GLUCOSE SERPL-MCNC: 151 MG/DL (ref 70–99)
HCT VFR BLD AUTO: 25.9 % (ref 35–47)
HGB BLD-MCNC: 8.2 G/DL (ref 11.7–15.7)
MAGNESIUM SERPL-MCNC: 1.6 MG/DL (ref 1.7–2.3)
MCH RBC QN AUTO: 30.8 PG (ref 26.5–33)
MCHC RBC AUTO-ENTMCNC: 31.7 G/DL (ref 31.5–36.5)
MCV RBC AUTO: 97 FL (ref 78–100)
PLATELET # BLD AUTO: 340 10E3/UL (ref 150–450)
POTASSIUM SERPL-SCNC: 2.9 MMOL/L (ref 3.4–5.3)
POTASSIUM SERPL-SCNC: 3.9 MMOL/L (ref 3.4–5.3)
RBC # BLD AUTO: 2.66 10E6/UL (ref 3.8–5.2)
SODIUM SERPL-SCNC: 138 MMOL/L (ref 135–145)
WBC # BLD AUTO: 12.9 10E3/UL (ref 4–11)

## 2023-10-10 PROCEDURE — 84132 ASSAY OF SERUM POTASSIUM: CPT | Performed by: INTERNAL MEDICINE

## 2023-10-10 PROCEDURE — 250N000011 HC RX IP 250 OP 636: Mod: JZ | Performed by: SURGERY

## 2023-10-10 PROCEDURE — 99231 SBSQ HOSP IP/OBS SF/LOW 25: CPT | Performed by: SURGERY

## 2023-10-10 PROCEDURE — 250N000013 HC RX MED GY IP 250 OP 250 PS 637: Performed by: SURGERY

## 2023-10-10 PROCEDURE — 250N000013 HC RX MED GY IP 250 OP 250 PS 637: Performed by: INTERNAL MEDICINE

## 2023-10-10 PROCEDURE — 250N000013 HC RX MED GY IP 250 OP 250 PS 637: Performed by: NURSE PRACTITIONER

## 2023-10-10 PROCEDURE — 200N000001 HC R&B ICU

## 2023-10-10 PROCEDURE — 80048 BASIC METABOLIC PNL TOTAL CA: CPT | Performed by: NURSE PRACTITIONER

## 2023-10-10 PROCEDURE — 250N000011 HC RX IP 250 OP 636: Performed by: INTERNAL MEDICINE

## 2023-10-10 PROCEDURE — 999N000157 HC STATISTIC RCP TIME EA 10 MIN

## 2023-10-10 PROCEDURE — 250N000011 HC RX IP 250 OP 636: Performed by: SURGERY

## 2023-10-10 PROCEDURE — 258N000003 HC RX IP 258 OP 636: Performed by: NURSE PRACTITIONER

## 2023-10-10 PROCEDURE — P9047 ALBUMIN (HUMAN), 25%, 50ML: HCPCS | Performed by: INTERNAL MEDICINE

## 2023-10-10 PROCEDURE — 999N000287 HC ICU ADULT ROUNDING, EACH 10 MINS

## 2023-10-10 PROCEDURE — 85014 HEMATOCRIT: CPT | Performed by: NURSE PRACTITIONER

## 2023-10-10 PROCEDURE — 250N000009 HC RX 250: Performed by: SURGERY

## 2023-10-10 PROCEDURE — 94003 VENT MGMT INPAT SUBQ DAY: CPT

## 2023-10-10 PROCEDURE — 250N000011 HC RX IP 250 OP 636: Mod: JZ | Performed by: INTERNAL MEDICINE

## 2023-10-10 PROCEDURE — 83735 ASSAY OF MAGNESIUM: CPT | Performed by: INTERNAL MEDICINE

## 2023-10-10 PROCEDURE — 999N000253 HC STATISTIC WEANING TRIALS

## 2023-10-10 PROCEDURE — C9113 INJ PANTOPRAZOLE SODIUM, VIA: HCPCS | Mod: JZ | Performed by: SURGERY

## 2023-10-10 PROCEDURE — 99291 CRITICAL CARE FIRST HOUR: CPT | Performed by: INTERNAL MEDICINE

## 2023-10-10 PROCEDURE — 999N000009 HC STATISTIC AIRWAY CARE

## 2023-10-10 PROCEDURE — 99232 SBSQ HOSP IP/OBS MODERATE 35: CPT | Performed by: INTERNAL MEDICINE

## 2023-10-10 RX ORDER — DEXAMETHASONE SODIUM PHOSPHATE 4 MG/ML
4 INJECTION, SOLUTION INTRA-ARTICULAR; INTRALESIONAL; INTRAMUSCULAR; INTRAVENOUS; SOFT TISSUE EVERY 6 HOURS
Status: COMPLETED | OUTPATIENT
Start: 2023-10-10 | End: 2023-10-11

## 2023-10-10 RX ORDER — POTASSIUM CHLORIDE 29.8 MG/ML
20 INJECTION INTRAVENOUS
Status: COMPLETED | OUTPATIENT
Start: 2023-10-10 | End: 2023-10-10

## 2023-10-10 RX ORDER — MAGNESIUM SULFATE HEPTAHYDRATE 40 MG/ML
2 INJECTION, SOLUTION INTRAVENOUS ONCE
Status: COMPLETED | OUTPATIENT
Start: 2023-10-10 | End: 2023-10-10

## 2023-10-10 RX ORDER — DEXAMETHASONE SODIUM PHOSPHATE 4 MG/ML
4 INJECTION, SOLUTION INTRA-ARTICULAR; INTRALESIONAL; INTRAMUSCULAR; INTRAVENOUS; SOFT TISSUE EVERY 6 HOURS
Status: DISCONTINUED | OUTPATIENT
Start: 2023-10-10 | End: 2023-10-10

## 2023-10-10 RX ADMIN — OXYCODONE HYDROCHLORIDE 5 MG: 100 SOLUTION ORAL at 05:33

## 2023-10-10 RX ADMIN — POLYETHYLENE GLYCOL 3350 17 G: 17 POWDER, FOR SOLUTION ORAL at 08:05

## 2023-10-10 RX ADMIN — SODIUM CHLORIDE, POTASSIUM CHLORIDE, SODIUM LACTATE AND CALCIUM CHLORIDE: 600; 310; 30; 20 INJECTION, SOLUTION INTRAVENOUS at 10:24

## 2023-10-10 RX ADMIN — MAGNESIUM SULFATE HEPTAHYDRATE 2 G: 40 INJECTION, SOLUTION INTRAVENOUS at 05:33

## 2023-10-10 RX ADMIN — ACETAMINOPHEN 650 MG: 325 TABLET ORAL at 14:18

## 2023-10-10 RX ADMIN — INSULIN ASPART 2 UNITS: 100 INJECTION, SOLUTION INTRAVENOUS; SUBCUTANEOUS at 00:06

## 2023-10-10 RX ADMIN — Medication 100 MCG/HR: at 08:32

## 2023-10-10 RX ADMIN — SENNOSIDES 5 ML: 8.8 LIQUID ORAL at 08:07

## 2023-10-10 RX ADMIN — SENNOSIDES 5 ML: 8.8 LIQUID ORAL at 20:14

## 2023-10-10 RX ADMIN — DOCUSATE SODIUM 50 MG: 50 LIQUID ORAL at 20:14

## 2023-10-10 RX ADMIN — Medication 25 MCG: at 00:00

## 2023-10-10 RX ADMIN — THIAMINE HCL TAB 100 MG 100 MG: 100 TAB at 14:13

## 2023-10-10 RX ADMIN — INSULIN ASPART 3 UNITS: 100 INJECTION, SOLUTION INTRAVENOUS; SUBCUTANEOUS at 08:08

## 2023-10-10 RX ADMIN — OXYCODONE HYDROCHLORIDE 5 MG: 100 SOLUTION ORAL at 17:33

## 2023-10-10 RX ADMIN — OXYCODONE HYDROCHLORIDE 5 MG: 100 SOLUTION ORAL at 14:13

## 2023-10-10 RX ADMIN — ENOXAPARIN SODIUM 40 MG: 40 INJECTION SUBCUTANEOUS at 10:15

## 2023-10-10 RX ADMIN — CLINDAMYCIN PHOSPHATE 900 MG: 900 INJECTION, SOLUTION INTRAVENOUS at 10:15

## 2023-10-10 RX ADMIN — HYDROMORPHONE HYDROCHLORIDE 0.5 MG: 1 INJECTION, SOLUTION INTRAMUSCULAR; INTRAVENOUS; SUBCUTANEOUS at 15:53

## 2023-10-10 RX ADMIN — METRONIDAZOLE 500 MG: 500 INJECTION, SOLUTION INTRAVENOUS at 20:14

## 2023-10-10 RX ADMIN — CLINDAMYCIN PHOSPHATE 900 MG: 900 INJECTION, SOLUTION INTRAVENOUS at 03:24

## 2023-10-10 RX ADMIN — METRONIDAZOLE 500 MG: 500 INJECTION, SOLUTION INTRAVENOUS at 11:01

## 2023-10-10 RX ADMIN — INSULIN ASPART 1 UNITS: 100 INJECTION, SOLUTION INTRAVENOUS; SUBCUTANEOUS at 11:04

## 2023-10-10 RX ADMIN — Medication 0.07 MCG/KG/MIN: at 03:34

## 2023-10-10 RX ADMIN — INSULIN ASPART 3 UNITS: 100 INJECTION, SOLUTION INTRAVENOUS; SUBCUTANEOUS at 15:44

## 2023-10-10 RX ADMIN — CHLORHEXIDINE GLUCONATE 15 ML: 1.2 RINSE ORAL at 20:14

## 2023-10-10 RX ADMIN — PIPERACILLIN AND TAZOBACTAM 3.38 G: 3; .375 INJECTION, POWDER, LYOPHILIZED, FOR SOLUTION INTRAVENOUS at 15:21

## 2023-10-10 RX ADMIN — THIAMINE HCL TAB 100 MG 100 MG: 100 TAB at 08:07

## 2023-10-10 RX ADMIN — PIPERACILLIN AND TAZOBACTAM 3.38 G: 3; .375 INJECTION, POWDER, LYOPHILIZED, FOR SOLUTION INTRAVENOUS at 08:01

## 2023-10-10 RX ADMIN — FUROSEMIDE 20 MG: 10 INJECTION, SOLUTION INTRAMUSCULAR; INTRAVENOUS at 08:07

## 2023-10-10 RX ADMIN — DEXMEDETOMIDINE HYDROCHLORIDE 0.8 MCG/KG/HR: 400 INJECTION INTRAVENOUS at 05:33

## 2023-10-10 RX ADMIN — POTASSIUM CHLORIDE 20 MEQ: 29.8 INJECTION, SOLUTION INTRAVENOUS at 05:41

## 2023-10-10 RX ADMIN — INSULIN ASPART 6 UNITS: 100 INJECTION, SOLUTION INTRAVENOUS; SUBCUTANEOUS at 23:54

## 2023-10-10 RX ADMIN — METRONIDAZOLE 500 MG: 500 INJECTION, SOLUTION INTRAVENOUS at 03:24

## 2023-10-10 RX ADMIN — LEVOTHYROXINE SODIUM 112 MCG: 0.11 TABLET ORAL at 05:41

## 2023-10-10 RX ADMIN — CHLORHEXIDINE GLUCONATE 15 ML: 1.2 RINSE ORAL at 08:05

## 2023-10-10 RX ADMIN — OXYCODONE HYDROCHLORIDE 5 MG: 100 SOLUTION ORAL at 10:16

## 2023-10-10 RX ADMIN — POTASSIUM CHLORIDE 20 MEQ: 29.8 INJECTION, SOLUTION INTRAVENOUS at 08:07

## 2023-10-10 RX ADMIN — OXYCODONE HYDROCHLORIDE 5 MG: 100 SOLUTION ORAL at 22:17

## 2023-10-10 RX ADMIN — ALBUMIN HUMAN 25 G: 0.25 SOLUTION INTRAVENOUS at 21:24

## 2023-10-10 RX ADMIN — FOLIC ACID 1 MG: 1 TABLET ORAL at 08:07

## 2023-10-10 RX ADMIN — INSULIN ASPART 7 UNITS: 100 INJECTION, SOLUTION INTRAVENOUS; SUBCUTANEOUS at 20:13

## 2023-10-10 RX ADMIN — PIPERACILLIN AND TAZOBACTAM 3.38 G: 3; .375 INJECTION, POWDER, LYOPHILIZED, FOR SOLUTION INTRAVENOUS at 23:59

## 2023-10-10 RX ADMIN — POTASSIUM CHLORIDE 20 MEQ: 29.8 INJECTION, SOLUTION INTRAVENOUS at 06:53

## 2023-10-10 RX ADMIN — Medication 25 MCG: at 03:30

## 2023-10-10 RX ADMIN — Medication 15 ML: at 08:05

## 2023-10-10 RX ADMIN — Medication 0.06 MCG/KG/MIN: at 14:18

## 2023-10-10 RX ADMIN — DEXMEDETOMIDINE HYDROCHLORIDE 0.8 MCG/KG/HR: 400 INJECTION INTRAVENOUS at 10:23

## 2023-10-10 RX ADMIN — DEXAMETHASONE SODIUM PHOSPHATE 4 MG: 4 INJECTION, SOLUTION INTRA-ARTICULAR; INTRALESIONAL; INTRAMUSCULAR; INTRAVENOUS; SOFT TISSUE at 20:15

## 2023-10-10 RX ADMIN — ALBUMIN HUMAN 25 G: 0.25 SOLUTION INTRAVENOUS at 10:15

## 2023-10-10 RX ADMIN — OXYCODONE HYDROCHLORIDE 10 MG: 5 TABLET ORAL at 03:14

## 2023-10-10 RX ADMIN — PANTOPRAZOLE SODIUM 40 MG: 40 INJECTION, POWDER, FOR SOLUTION INTRAVENOUS at 05:32

## 2023-10-10 RX ADMIN — Medication 25 MCG: at 17:26

## 2023-10-10 RX ADMIN — DEXAMETHASONE SODIUM PHOSPHATE 4 MG: 4 INJECTION, SOLUTION INTRA-ARTICULAR; INTRALESIONAL; INTRAMUSCULAR; INTRAVENOUS; SOFT TISSUE at 15:00

## 2023-10-10 RX ADMIN — INSULIN ASPART 1 UNITS: 100 INJECTION, SOLUTION INTRAVENOUS; SUBCUTANEOUS at 04:38

## 2023-10-10 RX ADMIN — THIAMINE HCL TAB 100 MG 100 MG: 100 TAB at 20:15

## 2023-10-10 RX ADMIN — OXYCODONE HYDROCHLORIDE 5 MG: 100 SOLUTION ORAL at 01:38

## 2023-10-10 RX ADMIN — FUROSEMIDE 20 MG: 10 INJECTION, SOLUTION INTRAMUSCULAR; INTRAVENOUS at 15:00

## 2023-10-10 RX ADMIN — DEXMEDETOMIDINE HYDROCHLORIDE 0.6 MCG/KG/HR: 400 INJECTION INTRAVENOUS at 16:28

## 2023-10-10 RX ADMIN — Medication 25 MCG: at 02:45

## 2023-10-10 RX ADMIN — DEXMEDETOMIDINE HYDROCHLORIDE 0.6 MCG/KG/HR: 400 INJECTION INTRAVENOUS at 23:57

## 2023-10-10 RX ADMIN — FUROSEMIDE 20 MG: 10 INJECTION, SOLUTION INTRAMUSCULAR; INTRAVENOUS at 23:58

## 2023-10-10 RX ADMIN — OXYCODONE HYDROCHLORIDE 5 MG: 5 TABLET ORAL at 08:06

## 2023-10-10 ASSESSMENT — ACTIVITIES OF DAILY LIVING (ADL)
ADLS_ACUITY_SCORE: 34

## 2023-10-10 NOTE — PLAN OF CARE
Problem: Plan of Care - These are the overarching goals to be used throughout the patient stay.    Goal: Plan of Care Review  Description: The Plan of Care Review/Shift note should be completed every shift.  The Outcome Evaluation is a brief statement about your assessment that the patient is improving, declining, or no change.  This information will be displayed automatically on your shift note.  Outcome: Progressing     Problem: Restraint, Nonviolent  Goal: Absence of Harm or Injury  Outcome: Progressing  Intervention: Protect Dignity, Rights and Personal Wellbeing  Recent Flowsheet Documentation  Taken 10/9/2023 2000 by Trell Hackett RN  Trust Relationship/Rapport:   care explained   reassurance provided  Taken 10/9/2023 1600 by Trell Hackett RN  Trust Relationship/Rapport:   care explained   reassurance provided  Intervention: Protect Skin and Joint Integrity  Recent Flowsheet Documentation  Taken 10/9/2023 2000 by Trell Hackett RN  Body Position:   turned   right  Taken 10/9/2023 1823 by Trell Hackett RN  Body Position:   turned   supine  Taken 10/9/2023 1700 by Trell Hackett RN  Body Position:   turned   left     Problem: Sepsis/Septic Shock  Goal: Optimal Coping  Outcome: Progressing   Goal Outcome Evaluation:             [facility] - ICU    RN Progress Note:            Pertinent Assessments:      Please refer to flowsheet rows for full assessment     Sedated with mechanical assist. Vitals stable. RASS goal 0-1 Patient restless during cares precedex titrated up to 0.8. and Fentnyl bolus given .Turned every two hours tolerated well. Wound dressing CDI.            Key Events - This Shift:       Levo titrated per order.         CHAD SAT (Sedation Awakening Trial): For use ONLY if intubated    SAT Safety Screen PASSED   If FAILED why?    SAT Performed PASSED   If FAILED why?               Barriers to Discharge / Downgrade:     Pressors  and vent.

## 2023-10-10 NOTE — PROGRESS NOTES
"INFECTIOUS DISEASE FOLLOW UP NOTE    Date: 10/10/2023   CHIEF COMPLAINT:   Chief Complaint   Patient presents with    Groin Pain    Shortness of Breath    Generalized Weakness        ASSESSMENT:    Necrotizing fasciitis: CT with necrotizing fasciitis L groin, perineum, and visualized LLE now down to ankle. s/p OR 10/2, GS and cultures are polymicrobial-bacteroides and actino on culture so far. Repeat OR 10/4 with progressive disease. Active issue.   Severe sepsis: pressors in ICU  DM2  Goiter: ENT intubated in OR.   Sputum with haemophilus and strep constellatus     PLAN:  - continue zosyn, metronidazole  - stop clinda as no plans for repeat OR  - ongoing goals of care discussion.   - with such large wounds, high risk for recurrent infections  - ID will follow intermittently, please call with questions.     Veronica Wilburn MD  [facility]  Direct messaging: "[email or web address] Paging   On-Call ID provider: [phone], option: 9       ______________________________________________________________________    SUBJECTIVE / INTERVAL HISTORY:   More alert, diuresing.       OBJECTIVE:  BP 91/53   Pulse 50   Temp 98.1  F (36.7  C)   Resp 16   Ht 1.575 m (5' 2\")   Wt 99.3 kg (218 lb 14.4 oz)   LMP  (LMP Unknown)   SpO2 97%   BMI 40.04 kg/m      Vent Mode: CMV/AC  (Continuous Mandatory Ventilation/ Assist Control)  FiO2 (%): 30 %  Resp Rate (Set): 16 breaths/min  Tidal Volume (Set, mL): 400 mL  PEEP (cm H2O): 5 cmH2O  Pressure Support (cm H2O): 5 cmH2O  Resp: 16    GEN: intubated  RESPIRATORY:  intubated  CARDIOVASCULAR:  regular, no murmur   ABDOMEN:  Soft, normal bowel sounds, non-tender,   EXTREMITIES: No edema.  SKIN/HAIR/NAILS:  left lower extremities wrapped    NEURO: Eyes open, tracking   IV: central lines      Antibiotics:  Zosyn  Clinda  flagyl    Pertinent labs:  No results found for: CRP   CBC RESULTS:   Recent Labs   Lab Test 10/04/23  0357   WBC 16.8*   RBC 2.84*   HGB 9.0*   HCT 27.5* "   MCV 97   MCH 31.7   MCHC 32.7   RDW 14.6         Last Comprehensive Metabolic Panel:  Sodium   Date Value Ref Range Status   10/10/2023 138 135 - 145 mmol/L Final     Comment:     Reference intervals for this test were updated on 09/26/2023 to more accurately reflect our healthy population. There may be differences in the flagging of prior results with similar values performed with this method. Interpretation of those prior results can be made in the context of the updated reference intervals.      Potassium   Date Value Ref Range Status   10/10/2023 2.9 (L) 3.4 - 5.3 mmol/L Final   06/23/2020 4.4 3.5 - 5.0 mmol/L Final     Chloride   Date Value Ref Range Status   10/10/2023 104 98 - 107 mmol/L Final   06/23/2020 98 98 - 107 mmol/L Final     Carbon Dioxide (CO2)   Date Value Ref Range Status   10/10/2023 26 22 - 29 mmol/L Final   06/23/2020 32 (H) 22 - 31 mmol/L Final     Anion Gap   Date Value Ref Range Status   10/10/2023 8 7 - 15 mmol/L Final   06/23/2020 5 5 - 18 mmol/L Final     Glucose   Date Value Ref Range Status   10/10/2023 151 (H) 70 - 99 mg/dL Final   06/23/2020 115 70 - 125 mg/dL Final     GLUCOSE BY METER POCT   Date Value Ref Range Status   10/10/2023 207 (H) 70 - 99 mg/dL Final     Urea Nitrogen   Date Value Ref Range Status   10/10/2023 12.4 8.0 - 23.0 mg/dL Final   06/23/2020 8 8 - 22 mg/dL Final     Creatinine   Date Value Ref Range Status   10/10/2023 0.44 (L) 0.51 - 0.95 mg/dL Final     GFR Estimate   Date Value Ref Range Status   10/10/2023 >90 >60 mL/min/1.73m2 Final   06/23/2020 >60 >60 mL/min/1.73m2 Final     GFR, ESTIMATED POCT   Date Value Ref Range Status   10/02/2023 >60 >60 mL/min/1.73m2 Final     Calcium   Date Value Ref Range Status   10/10/2023 8.1 (L) 8.8 - 10.2 mg/dL Final        MICROBIOLOGY DATA:  Personally reviewed.  7-Day Micro Results       Collected Updated Procedure Result Status      10/04/2023 1027 10/07/2023 1239 Anaerobic Bacterial Culture Routine  [[id number]]   Tissue from Leg, left    Final result Component Value   Culture 4+ Mixed Anaerobic Organisms Present    No predominant organism               10/04/2023 1027 10/04/2023 1719 Gram Stain [[id number]]   (Abnormal)   Tissue from Leg, left    Final result Component Value   Gram Stain Result 4+ Gram positive cocci   Gram Stain Result 4+ Gram positive bacilli   Gram Stain Result 3+ Gram negative bacilli   Gram Stain Result 4+ WBC seen   Predominantly PMNs            10/04/2023 1027 10/07/2023 1243 Tissue Aerobic Bacterial Culture Routine [[id number]]   (Abnormal)   Tissue from Leg, left    Final result Component Value   Culture 2+ Actinomyces species    Identification obtained by MALDI-TOF mass spectrometry research use only database. Test characteristics determined and verified by the [facility].Susceptibilities not routinely done, refer to antibiogram to view typica  l susceptibility profiles    Isolated in broth only Mixed Aerobic and Anaerobic felecia    On day 2 of incubation                        RADIOLOGY:  Personally Reviewed.  Recent Results (from the past 24 hour(s))   XR Chest Port 1 View    Narrative    EXAM: XR CHEST PORT 1 VIEW  LOCATION: [facility]  DATE: 10/4/2023    INDICATION: ETT position  COMPARISON: 10/3/2023      Impression    IMPRESSION: Endotracheal tube terminates 4.0 cm above the suresh. Right upper extremity PICC in the lower SVC. Enteric decompression tube descends below the diaphragm, but the tip is outside the field-of-view.    Decreased left basilar airspace opacities compared to prior. No pleural effusion or pneumothorax. Normal cardiomediastinal silhouette.       Principal Problem:    Severe sepsis (H)  Active Problems:    Type 2 diabetes mellitus with hyperglycemia, without long-term current use of insulin (H)    Necrotizing soft tissue infection    Septic shock (H)

## 2023-10-10 NOTE — PROGRESS NOTES
Care Management Follow Up    Length of Stay (days): 8    Expected Discharge Date: 10/13/2023     Concerns to be Addressed:       Patient plan of care discussed at interdisciplinary rounds: Yes    Anticipated Discharge Disposition:  TBD     Anticipated Discharge Services:    Anticipated Discharge DME:      Patient/family educated on Medicare website which has current facility and service quality ratings:    Education Provided on the Discharge Plan:    Patient/Family in Agreement with the Plan:      Referrals Placed by CM/SW:    Private pay costs discussed: Not applicable    Additional Information:  Chart reviewed. Discussed in rounds. Patient intubated. Palliative care following. Planning another care conference Thursday. CM will follow     Sindhu Silva RN

## 2023-10-10 NOTE — PROGRESS NOTES
PALLIATIVE CARE SOCIAL WORK Progress Note   Location: [facility]      Brief check in with Pt and spouse Arjun. There was some discussion about extubation today, but per ICU team, will wait to do this tomorrow. Arjun is coping ok, worried. Provided support.     Plan: PCSW continues to follow for support.     Clinical Social Work Interventions:   Other: general support      Elke Malloy [facility]  [facility], Palliative Care  Securely message with the [employer] Web Console (learn more here) or  Text page via [email or web address] Paging/Directory

## 2023-10-10 NOTE — PROGRESS NOTES
"Critical Care Progress Note      10/10/2023    Name: Lidia Nam MRN#: [id number]   Age: 63 year old YOB: 1960     Hsptl Day# 8  ICU DAY #    MV DAY #             Problem List:   Principal Problem:    Severe sepsis (H)  Active Problems:    Type 2 diabetes mellitus with hyperglycemia, without long-term current use of insulin (H)    Necrotizing soft tissue infection    Septic shock (H)    Clinically Significant Risk Factors        # Hypokalemia: Lowest K = 2.9 mmol/L in last 2 days, will replace as needed     # Hypomagnesemia: Lowest Mg = 1.6 mg/dL in last 2 days, will replace as needed   # Hypoalbuminemia: Lowest albumin = 1.7 g/dL at 10/3/2023  5:21 AM, will monitor as appropriate           # DMII: A1C = 6.8 % (Ref range: 0.0 - 5.6 %) within past 6 months     # Severe Obesity: Estimated body mass index is 40.04 kg/m  as calculated from the following:    Height as of this encounter: 1.575 m (5' 2\").    Weight as of this encounter: 99.3 kg (218 lb 14.4 oz).            Code Status: No CPR- Pre-arrest intubation OK                    Summary/Hospital Course:     51-year-old female with diabetes mellitus and obesity who presented with necrotizing fasciitis requiring incision and debridement from left groin area all the way down to her heel.    I have personally reviewed the daily labs, imaging studies, cultures and discussed the case with referring physician and consulting physicians.     My assessment and plan by system for this patient is as follows:    She is currently on pressure support 5/5.  Tube feeds on hold.  Awaiting family to arrive and then will try to extubate her.    Neurology/Psychiatry:   Wakes up and follows commands.  Sedated with Precedex and fentanyl  Added scheduled oxycodone      Cardiovascular:   Septic shock secondary to necrotizing fasciitis.  On low-dose norepinephrine  18 L positive.  I wonder how much she has lost through weeping from her lower extremity wound.  Continue " albumin 25% twice a day and Lasix 20 mg IV every 8 hours      Pulmonary/Ventilator Management:   Hypoxic respiratory failure secondary to septic shock  SBT trials daily  Concern is significant goiter that is compressing her airway    Ongoing discussions with family regarding goals of care      GI and Nutrition :   Tolerating tube feeds    Renal/Fluids/Electrolytes:   Volume overloaded  Good urine output with Lasix.  Continue  - monitor function and electrolytes as needed with replacement per ICU protocols.  - generally avoid nephrotoxic agents such as NSAID, IV contrast unless specifically required  - adjust medications as needed for renal clearance  - follow I/O's as appropriate.    Infectious Disease:   Necrotizing fasciitis with polymicrobial infection, Bacteroides and actinomyces  Clindamycin, Flagyl and Zosyn    Would probably need diverting ostomy to maintain clean wound but family does not want to escalate cares      Endocrine:   Continue insulin, sliding scale/Lantus  Continue levothyroxine  Very large goiter  Plan  - ICU insulin protocol, goal sugar <180        ICU Prophylaxis:   1. DVT: LMWH  2. VAP: HOB 30 degrees, chlorhexidine rinse  3. Stress Ulcer: PPI  4. Restraints: Nonviolent soft two point restraints required and necessary for patient safety and continued cares and good effect as patient continues to pull at necessary lines, tubes despite education and distraction. Will readdress daily.     Category: Non-violent   Type of Restraint: Soft limb x2.   Behavior: Pulling at IVand motta catheter tubings.   Root cause of behavior: Critical illness.   Less-restrictive methods that have failed: Redirection, reorientation. 1:1 NA at bedside.   Response to restraint: Not actively pulling atcurrent restraints.   Criteria for release from restraint: Responds to redirection. Leaves medical devices in place.          Key Medications:      albumin human  25 g Intravenous Q12H    chlorhexidine  15 mL Swish & Spit  BID    sennosides  5 mL Oral or Feeding Tube BID    And    docusate  50 mg Oral BID    enoxaparin ANTICOAGULANT  40 mg Subcutaneous Q24H    folic acid  1 mg Oral or Feeding Tube Daily    furosemide  20 mg Intravenous Q8H    [START ON 10/12/2023] influenza quadrivalent (PF) vacc  0.5 mL Intramuscular Prior to discharge    insulin aspart  1-12 Units Subcutaneous Q4H    insulin glargine  20 Units Subcutaneous BID    levothyroxine  112 mcg Oral or Feeding Tube Daily    metroNIDAZOLE  500 mg Intravenous Q8H    multivitamins w/minerals  15 mL Oral or Feeding Tube Daily    oxyCODONE  5 mg Oral Q4H    pantoprazole  40 mg Intravenous Q24H    piperacillin-tazobactam  3.375 g Intravenous Q8H    polyethylene glycol  17 g Oral or Feeding Tube Daily    sodium chloride (PF)  3 mL Intracatheter Q8H    thiamine  100 mg Oral or Feeding Tube TID    [START ON 10/11/2023] thiamine  100 mg Oral or Feeding Tube Daily      dexmedeTOMIDine 0.8 mcg/kg/hr (10/10/23 1023)    fentaNYL 75 mcg/hr (10/10/23 0900)    lactated ringers 10 mL/hr at 10/10/23 1024    norepinephrine 0.06 mcg/kg/min (10/10/23 0900)               Physical Examination:   Temp:  [97.5  F (36.4  C)-99.7  F (37.6  C)] 98.8  F (37.1  C)  Pulse:  [48-90] 60  Resp:  [0-30] 24  BP: ()/(45-72) 89/53  FiO2 (%):  [30 %] 30 %  SpO2:  [88 %-99 %] 92 %    Intake/Output Summary (Last 24 hours) at 10/9/2023 1314  Last data filed at 10/9/2023 1200  Gross per 24 hour   Intake 4133.9 ml   Output 3950 ml   Net 183.9 ml     Wt Readings from Last 4 Encounters:   10/08/23 99.3 kg (218 lb 14.4 oz)   05/01/23 101 kg (222 lb 11.2 oz)   03/14/23 103.9 kg (229 lb)   11/15/22 103.9 kg (229 lb)     BP - Mean:  [] 67  Vent Mode: CPAP/PS  (Continuous positive airway pressure with Pressure Support)  FiO2 (%): 30 %  Resp Rate (Set): 16 breaths/min  Tidal Volume (Set, mL): 400 mL  PEEP (cm H2O): 5 cmH2O  Pressure Support (cm H2O): 5 cmH2O  Resp: 24    Recent Labs   Lab 10/09/23  0446  10/04/23  1128   PH 7.38 7.38   PCO2  --  34*   PO2  --  90   HCO3  --  20*       GEN: no acute distress   HEENT: head ncat, sclera anicteric, OP patent, trachea midline   PULM: unlabored synchronous with vent, clear anteriorly    CV/COR: RRR S1S2 no gallop,  No rub, no murmur  ABD: soft nontender, hypoactive bowel sounds, no mass  EXT: Peripheral edema on all extremities  NEURO: Wakes up and follows commands  SKIN: Groin and left lower extremity wrapped.  Incision is open, please refer to the pictures in chart  LINES: clean, dry intact         Data:   All data and imaging reviewed     ROUTINE ICU LABS (Last four results)  CMP  Recent Labs   Lab 10/10/23  1103 10/10/23  0815 10/10/23  0437 10/09/23  1129 10/09/23  1041 10/09/23  0449 10/09/23  0446 10/08/23  1620 10/08/23  1241 10/08/23  0444 10/08/23  0442 10/07/23  0518 10/07/23  0512   NA  --   --  138  --   --   --  145  --   --   --  143  --  143   POTASSIUM  --   --  2.9*  --  3.6  --  3.4  --  3.5  --  3.3*  --  3.7   CHLORIDE  --   --  104  --   --   --  116*  --   --   --  115*  --  116*   CO2  --   --  26  --   --   --  23  --   --   --  22  --  20*   ANIONGAP  --   --  8  --   --   --  6*  --   --   --  6*  --  7   * 207* 153*  151*   < >  --    < > 163*   < >  --    < > 276*   < > 286*   BUN  --   --  12.4  --   --   --  13.7  --   --   --  16.2  --  20.0   CR  --   --  0.44*  --   --   --  0.45*  --   --   --  0.46*  --  0.54   GFRESTIMATED  --   --  >90  --   --   --  >90  --   --   --  >90  --  >90   JW  --   --  8.1*  --   --   --  8.5*  --   --   --  7.7*  --  8.1*   MAG  --   --  1.6*  --   --   --  1.8  --   --   --  1.8  --  1.8    < > = values in this interval not displayed.     CBC  Recent Labs   Lab 10/10/23  0437 10/09/23  0446 10/08/23  0442 10/07/23  0512   WBC 12.9* 15.9* 12.9* 13.9*   RBC 2.66* 3.11* 3.08* 3.02*   HGB 8.2* 9.7* 9.6* 9.2*   HCT 25.9* 30.3* 30.0* 29.1*   MCV 97 97 97 96   MCH 30.8 31.2 31.2 30.5   MCHC 31.7 32.0  32.0 31.6   RDW 17.0* 17.6* 17.2* 17.1*    313 215 170     INR  No lab results found in last 7 days.    Arterial Blood Gas  Recent Labs   Lab 10/09/23  0446 10/04/23  1128   PH 7.38 7.38   PCO2  --  34*   PO2  --  90   HCO3  --  20*       All cultures:  No results for input(s): CULT in the last 168 hours.  No results found for this or any previous visit (from the past 24 hour(s)).      Billing: This patient is critically ill: Yes. Total critical care time today 36min exclusive of procedures or teaching.  Managing septic shock, respiratory failure, pressors and mechanical ventilation in a patient with necrotizing fasciitis

## 2023-10-10 NOTE — PLAN OF CARE
[facility] - ICU    RN Progress Note:            Pertinent Assessments:      Please refer to flowsheet rows for full assessment     RASS of -1 or +1. Nodds yes/no. Follows commands at times.        Key Events - This Shift:     - Remains on levophed to keep MAP >65.  - Dressing changed on left leg. PRN fentanyl + PRN oxycodone given.  - Fi02 of 30%. PEEP of 5. 02 sats >90's.           Barriers to Discharge / Downgrade:     - intubated/on pressors.

## 2023-10-10 NOTE — PROGRESS NOTES
Respiratory Care    Placed on SBT 5/5 30%. Pts parameters: RR 15, , MV 3.6, RSBI 39, NIF -16. Will continue for now.      Drew Nick, RT

## 2023-10-10 NOTE — PROGRESS NOTES
Provider Restraint for Non-violent Behavior Face to Face Evaluation     Patient's Immediate Situation:  Patient demonstrated the following behaviors: Pulling at lines & tubes putting safety at risk     Patient's Reaction to the intervention:  Does patient understand the reason for restraint/seclusion? No, the patient is unable to express     Medical Condition:  Is there any evidence of compromise of Skin integrity, Respiratory, Cardiovascular, Musculoskeletal, Hydration?   No    Behavioral Condition:  In consultation with the RN, is there a need to continue this restraint or seclusion? No    See Restraint Flowsheet for complete restraint documentation and assessment.    KELSEY Cook MD  Critical Care Medicine

## 2023-10-10 NOTE — PROGRESS NOTES
RT PROGRESS NOTE    VENT DAY# 8     CURRENT SETTINGS:   Vent Mode: CMV/AC  (Continuous Mandatory Ventilation/ Assist Control)  FiO2 (%): 30 %  Resp Rate (Set): 16 breaths/min  Tidal Volume (Set, mL): 400 mL  PEEP (cm H2O): 5 cmH2O  Pressure Support (cm H2O): 5 cmH2O  Resp: 16      PATIENT PARAMETERS:  PIP 21  Pplat:  15  Pmean:  8.6  Compliance: 33  SBT: Yes  Wean time: 310   RSBI 92   Failed wean due to: end of designated trial  Secretions:  small white thin   02 Sats:  96%  BS: coarse    ETT SIZE 7.5 Secured at 23 cm at teeth/gums    Respiratory Medications: none     NOTE / SHIFT SUMMARY:   Patient on full vent support overnight. No vent changes made. RT will continue to follow.    Candy Palmer, RT

## 2023-10-10 NOTE — PROGRESS NOTES
[facility] - ICU    RN Progress Note:            Pertinent Assessments:      Please refer to flowsheet rows for full assessment     - Perecedex and Fentanyl gtt to keep RASS 0 to -1.  - Levophed to maintain MAP > 65.   - Pain controled with PRN Oxycodone and schedule Oxycodone.   - Dressing change completed.   - Plan for 24 hr steroid and reevaluation for possible extubation on 10/11.            Key Events - This Shift:   - SBT trial 5/5 maintaining sat.         CHAD SAT (Sedation Awakening Trial): For use ONLY if intubated    SAT Safety Screen {PASSED/FAILED/NA   If FAILED why?    SAT Performed {PASSED/FAILED/NA:   If FAILED why? NO air passing around the tube, pt started on IV Steroid.               Barriers to Discharge / Downgrade:              Point of Contact Update YES-OR-NO: Yes  If No, reason:   Name:Mr Nam   Phone Number: see chart  Summary of Conversation: Mr Nam was called and informed possibility of extubation today and offered to be present at the time of extubation.

## 2023-10-10 NOTE — PROGRESS NOTES
Respiratory Care    Vent day 8    Vent Mode: CMV/AC  (Continuous Mandatory Ventilation/ Assist Control)  FiO2 (%): 30 %  Resp Rate (Set): 16 breaths/min  Tidal Volume (Set, mL): 400 mL  PEEP (cm H2O): 5 cmH2O  Pressure Support (cm H2O): 5 cmH2O  Resp: 16    PIP 16  Pplat 14  Secretions: small clear    Notes/plan: pt weaned 5/5 for about 6 hrs. Placed back on full vent to rest. Will reassess tomorrow.      Drew Nick, RT

## 2023-10-11 LAB
ANION GAP SERPL CALCULATED.3IONS-SCNC: 8 MMOL/L (ref 7–15)
BUN SERPL-MCNC: 15.2 MG/DL (ref 8–23)
CALCIUM SERPL-MCNC: 9.3 MG/DL (ref 8.8–10.2)
CHLORIDE SERPL-SCNC: 98 MMOL/L (ref 98–107)
CREAT SERPL-MCNC: 0.43 MG/DL (ref 0.51–0.95)
DEPRECATED HCO3 PLAS-SCNC: 28 MMOL/L (ref 22–29)
EGFRCR SERPLBLD CKD-EPI 2021: >90 ML/MIN/1.73M2
ERYTHROCYTE [DISTWIDTH] IN BLOOD BY AUTOMATED COUNT: 16.9 % (ref 10–15)
GLUCOSE BLDC GLUCOMTR-MCNC: 156 MG/DL (ref 70–99)
GLUCOSE BLDC GLUCOMTR-MCNC: 191 MG/DL (ref 70–99)
GLUCOSE BLDC GLUCOMTR-MCNC: 284 MG/DL (ref 70–99)
GLUCOSE BLDC GLUCOMTR-MCNC: 288 MG/DL (ref 70–99)
GLUCOSE BLDC GLUCOMTR-MCNC: 296 MG/DL (ref 70–99)
GLUCOSE BLDC GLUCOMTR-MCNC: 302 MG/DL (ref 70–99)
GLUCOSE SERPL-MCNC: 310 MG/DL (ref 70–99)
HCT VFR BLD AUTO: 28 % (ref 35–47)
HGB BLD-MCNC: 9.2 G/DL (ref 11.7–15.7)
MAGNESIUM SERPL-MCNC: 1.8 MG/DL (ref 1.7–2.3)
MCH RBC QN AUTO: 31.6 PG (ref 26.5–33)
MCHC RBC AUTO-ENTMCNC: 32.9 G/DL (ref 31.5–36.5)
MCV RBC AUTO: 96 FL (ref 78–100)
PLATELET # BLD AUTO: 420 10E3/UL (ref 150–450)
POTASSIUM SERPL-SCNC: 3.8 MMOL/L (ref 3.4–5.3)
RBC # BLD AUTO: 2.91 10E6/UL (ref 3.8–5.2)
SODIUM SERPL-SCNC: 134 MMOL/L (ref 135–145)
WBC # BLD AUTO: 12.5 10E3/UL (ref 4–11)

## 2023-10-11 PROCEDURE — 999N000253 HC STATISTIC WEANING TRIALS

## 2023-10-11 PROCEDURE — 250N000013 HC RX MED GY IP 250 OP 250 PS 637: Performed by: INTERNAL MEDICINE

## 2023-10-11 PROCEDURE — 999N000157 HC STATISTIC RCP TIME EA 10 MIN

## 2023-10-11 PROCEDURE — 99231 SBSQ HOSP IP/OBS SF/LOW 25: CPT | Performed by: PHYSICIAN ASSISTANT

## 2023-10-11 PROCEDURE — 250N000011 HC RX IP 250 OP 636: Mod: JZ | Performed by: SURGERY

## 2023-10-11 PROCEDURE — 250N000011 HC RX IP 250 OP 636: Performed by: INTERNAL MEDICINE

## 2023-10-11 PROCEDURE — 250N000013 HC RX MED GY IP 250 OP 250 PS 637: Performed by: SURGERY

## 2023-10-11 PROCEDURE — 250N000009 HC RX 250: Performed by: SURGERY

## 2023-10-11 PROCEDURE — 250N000013 HC RX MED GY IP 250 OP 250 PS 637: Performed by: NURSE PRACTITIONER

## 2023-10-11 PROCEDURE — 999N000259 HC STATISTIC EXTUBATION

## 2023-10-11 PROCEDURE — 250N000011 HC RX IP 250 OP 636: Mod: JZ | Performed by: INTERNAL MEDICINE

## 2023-10-11 PROCEDURE — C9113 INJ PANTOPRAZOLE SODIUM, VIA: HCPCS | Mod: JZ | Performed by: SURGERY

## 2023-10-11 PROCEDURE — 83735 ASSAY OF MAGNESIUM: CPT | Performed by: INTERNAL MEDICINE

## 2023-10-11 PROCEDURE — 82310 ASSAY OF CALCIUM: CPT | Performed by: NURSE PRACTITIONER

## 2023-10-11 PROCEDURE — 999N000009 HC STATISTIC AIRWAY CARE

## 2023-10-11 PROCEDURE — 250N000012 HC RX MED GY IP 250 OP 636 PS 637: Performed by: NURSE PRACTITIONER

## 2023-10-11 PROCEDURE — 999N000055 HC STATISTIC END TITIAL CO2 MONITORING

## 2023-10-11 PROCEDURE — 85027 COMPLETE CBC AUTOMATED: CPT | Performed by: NURSE PRACTITIONER

## 2023-10-11 PROCEDURE — 99231 SBSQ HOSP IP/OBS SF/LOW 25: CPT | Performed by: FAMILY MEDICINE

## 2023-10-11 PROCEDURE — 94003 VENT MGMT INPAT SUBQ DAY: CPT

## 2023-10-11 PROCEDURE — 200N000001 HC R&B ICU

## 2023-10-11 PROCEDURE — 99291 CRITICAL CARE FIRST HOUR: CPT | Performed by: INTERNAL MEDICINE

## 2023-10-11 PROCEDURE — 999N000287 HC ICU ADULT ROUNDING, EACH 10 MINS

## 2023-10-11 PROCEDURE — G0463 HOSPITAL OUTPT CLINIC VISIT: HCPCS

## 2023-10-11 RX ORDER — POTASSIUM CHLORIDE 20MEQ/15ML
20 LIQUID (ML) ORAL ONCE
Status: COMPLETED | OUTPATIENT
Start: 2023-10-11 | End: 2023-10-11

## 2023-10-11 RX ORDER — MAGNESIUM SULFATE HEPTAHYDRATE 40 MG/ML
2 INJECTION, SOLUTION INTRAVENOUS ONCE
Status: COMPLETED | OUTPATIENT
Start: 2023-10-11 | End: 2023-10-11

## 2023-10-11 RX ORDER — HYDROMORPHONE HYDROCHLORIDE 1 MG/ML
0.5 INJECTION, SOLUTION INTRAMUSCULAR; INTRAVENOUS; SUBCUTANEOUS
Status: DISCONTINUED | OUTPATIENT
Start: 2023-10-11 | End: 2023-10-11

## 2023-10-11 RX ORDER — FUROSEMIDE 10 MG/ML
20 INJECTION INTRAMUSCULAR; INTRAVENOUS EVERY 12 HOURS
Status: DISCONTINUED | OUTPATIENT
Start: 2023-10-11 | End: 2023-10-12

## 2023-10-11 RX ADMIN — OXYCODONE HYDROCHLORIDE 5 MG: 100 SOLUTION ORAL at 06:26

## 2023-10-11 RX ADMIN — POTASSIUM CHLORIDE 20 MEQ: 1.5 SOLUTION ORAL at 06:26

## 2023-10-11 RX ADMIN — METRONIDAZOLE 500 MG: 500 INJECTION, SOLUTION INTRAVENOUS at 03:06

## 2023-10-11 RX ADMIN — INSULIN ASPART 5 UNITS: 100 INJECTION, SOLUTION INTRAVENOUS; SUBCUTANEOUS at 12:19

## 2023-10-11 RX ADMIN — INSULIN ASPART 4 UNITS: 100 INJECTION, SOLUTION INTRAVENOUS; SUBCUTANEOUS at 17:13

## 2023-10-11 RX ADMIN — OXYCODONE HYDROCHLORIDE 10 MG: 5 TABLET ORAL at 08:03

## 2023-10-11 RX ADMIN — Medication 25 MCG: at 10:51

## 2023-10-11 RX ADMIN — Medication 25 MCG: at 10:00

## 2023-10-11 RX ADMIN — DEXMEDETOMIDINE HYDROCHLORIDE 0.6 MCG/KG/HR: 400 INJECTION INTRAVENOUS at 07:03

## 2023-10-11 RX ADMIN — CHLORHEXIDINE GLUCONATE 15 ML: 1.2 RINSE ORAL at 08:03

## 2023-10-11 RX ADMIN — PIPERACILLIN AND TAZOBACTAM 3.38 G: 3; .375 INJECTION, POWDER, LYOPHILIZED, FOR SOLUTION INTRAVENOUS at 08:03

## 2023-10-11 RX ADMIN — HYDROMORPHONE HYDROCHLORIDE 0.5 MG: 1 INJECTION, SOLUTION INTRAMUSCULAR; INTRAVENOUS; SUBCUTANEOUS at 23:43

## 2023-10-11 RX ADMIN — PANTOPRAZOLE SODIUM 40 MG: 40 INJECTION, POWDER, FOR SOLUTION INTRAVENOUS at 06:25

## 2023-10-11 RX ADMIN — DEXAMETHASONE SODIUM PHOSPHATE 4 MG: 4 INJECTION, SOLUTION INTRA-ARTICULAR; INTRALESIONAL; INTRAMUSCULAR; INTRAVENOUS; SOFT TISSUE at 08:04

## 2023-10-11 RX ADMIN — DEXAMETHASONE SODIUM PHOSPHATE 4 MG: 4 INJECTION, SOLUTION INTRA-ARTICULAR; INTRALESIONAL; INTRAMUSCULAR; INTRAVENOUS; SOFT TISSUE at 03:06

## 2023-10-11 RX ADMIN — LEVOTHYROXINE SODIUM 112 MCG: 0.11 TABLET ORAL at 05:00

## 2023-10-11 RX ADMIN — PIPERACILLIN AND TAZOBACTAM 3.38 G: 3; .375 INJECTION, POWDER, LYOPHILIZED, FOR SOLUTION INTRAVENOUS at 23:43

## 2023-10-11 RX ADMIN — FUROSEMIDE 20 MG: 10 INJECTION, SOLUTION INTRAMUSCULAR; INTRAVENOUS at 20:07

## 2023-10-11 RX ADMIN — Medication 25 MCG: at 09:00

## 2023-10-11 RX ADMIN — SENNOSIDES 5 ML: 8.8 LIQUID ORAL at 08:03

## 2023-10-11 RX ADMIN — METRONIDAZOLE 500 MG: 500 INJECTION, SOLUTION INTRAVENOUS at 12:18

## 2023-10-11 RX ADMIN — MAGNESIUM SULFATE HEPTAHYDRATE 2 G: 40 INJECTION, SOLUTION INTRAVENOUS at 06:21

## 2023-10-11 RX ADMIN — THIAMINE HCL TAB 100 MG 100 MG: 100 TAB at 14:44

## 2023-10-11 RX ADMIN — POLYETHYLENE GLYCOL 3350 17 G: 17 POWDER, FOR SOLUTION ORAL at 08:03

## 2023-10-11 RX ADMIN — OXYCODONE HYDROCHLORIDE 5 MG: 100 SOLUTION ORAL at 10:14

## 2023-10-11 RX ADMIN — Medication 25 MCG: at 08:00

## 2023-10-11 RX ADMIN — Medication 100 MCG/HR: at 10:57

## 2023-10-11 RX ADMIN — HYDROMORPHONE HYDROCHLORIDE 0.5 MG: 1 INJECTION, SOLUTION INTRAMUSCULAR; INTRAVENOUS; SUBCUTANEOUS at 18:42

## 2023-10-11 RX ADMIN — ACETAMINOPHEN 650 MG: 325 TABLET ORAL at 20:07

## 2023-10-11 RX ADMIN — Medication 25 MCG: at 04:30

## 2023-10-11 RX ADMIN — INSULIN ASPART 7 UNITS: 100 INJECTION, SOLUTION INTRAVENOUS; SUBCUTANEOUS at 08:24

## 2023-10-11 RX ADMIN — FUROSEMIDE 20 MG: 10 INJECTION, SOLUTION INTRAMUSCULAR; INTRAVENOUS at 08:04

## 2023-10-11 RX ADMIN — THIAMINE HCL TAB 100 MG 100 MG: 100 TAB at 08:03

## 2023-10-11 RX ADMIN — METRONIDAZOLE 500 MG: 500 INJECTION, SOLUTION INTRAVENOUS at 20:45

## 2023-10-11 RX ADMIN — OXYCODONE HYDROCHLORIDE 10 MG: 5 TABLET ORAL at 03:05

## 2023-10-11 RX ADMIN — OXYCODONE HYDROCHLORIDE 10 MG: 5 TABLET ORAL at 20:07

## 2023-10-11 RX ADMIN — Medication 15 ML: at 08:03

## 2023-10-11 RX ADMIN — PIPERACILLIN AND TAZOBACTAM 3.38 G: 3; .375 INJECTION, POWDER, LYOPHILIZED, FOR SOLUTION INTRAVENOUS at 17:12

## 2023-10-11 RX ADMIN — Medication 0.05 MCG/KG/MIN: at 02:58

## 2023-10-11 RX ADMIN — Medication 25 MCG: at 01:09

## 2023-10-11 RX ADMIN — OXYCODONE HYDROCHLORIDE 10 MG: 5 TABLET ORAL at 14:44

## 2023-10-11 RX ADMIN — INSULIN ASPART 1 UNITS: 100 INJECTION, SOLUTION INTRAVENOUS; SUBCUTANEOUS at 20:44

## 2023-10-11 RX ADMIN — FOLIC ACID 1 MG: 1 TABLET ORAL at 08:04

## 2023-10-11 RX ADMIN — OXYCODONE HYDROCHLORIDE 5 MG: 100 SOLUTION ORAL at 01:26

## 2023-10-11 RX ADMIN — ENOXAPARIN SODIUM 40 MG: 40 INJECTION SUBCUTANEOUS at 10:55

## 2023-10-11 RX ADMIN — INSULIN ASPART 3 UNITS: 100 INJECTION, SOLUTION INTRAVENOUS; SUBCUTANEOUS at 04:04

## 2023-10-11 ASSESSMENT — ACTIVITIES OF DAILY LIVING (ADL)
ADLS_ACUITY_SCORE: 34
ADLS_ACUITY_SCORE: 30
ADLS_ACUITY_SCORE: 34

## 2023-10-11 NOTE — PLAN OF CARE
Problem: Plan of Care - These are the overarching goals to be used throughout the patient stay.    Goal: Plan of Care Review  Description: The Plan of Care Review/Shift note should be completed every shift.  The Outcome Evaluation is a brief statement about your assessment that the patient is improving, declining, or no change.  This information will be displayed automatically on your shift note.  Outcome: Progressing     Problem: Restraint, Nonviolent  Goal: Absence of Harm or Injury  Outcome: Progressing  Intervention: Protect Dignity, Rights and Personal Wellbeing  Recent Flowsheet Documentation  Taken 10/10/2023 1600 by Trell Hackett RN  Trust Relationship/Rapport:   care explained   reassurance provided  Intervention: Protect Skin and Joint Integrity  Recent Flowsheet Documentation  Taken 10/10/2023 1600 by Trell Hackett RN  Body Position:   turned   left     Problem: Sepsis/Septic Shock  Goal: Optimal Coping  Outcome: Progressing     Problem: Mechanical Ventilation Invasive  Goal: Effective Communication  Outcome: Progressing  Intervention: Ensure Effective Communication  Recent Flowsheet Documentation  Taken 10/10/2023 1600 by Trell Hackett RN  Trust Relationship/Rapport:   care explained   reassurance provided   Goal Outcome Evaluation:             [facility] - ICU    RN Progress Note:            Pertinent Assessments:      Please refer to flowsheet rows for full assessment     Alert and calm most of the shift . Medicated with PRN dilaudid and fentanyl for pain Santana cardia 40'-50's MD notified. Rdz patent with good out put. Turned every 2 hours tolerated well.              Key Events - This Shift:       Weaned for about 6 hours tolerated well.  Fenanyl at 75 Precedex at 0.6 and Levo at 0.06        SJN SAT (Sedation Awakening Trial): For use ONLY if intubated    SAT Safety Screen PASSED   If FAILED why?    SAT Performed PASSED   If FAILED why?               Barriers to  Discharge / Downgrade:     Pressors and vent.

## 2023-10-11 NOTE — CONSULTS
[facility] Nurse Inpatient Assessment     Consulted for: NEELA    Summary: Met surgical PA at bedside, patient is to be extubated today then will have a discussion about goals of care.     Patient History (according to provider note(s):        63-year-old female with a history of hypothyroidism, diabetes mellitus and obesity who presented with groin pain to the emergency room.  Patient has been complaining of shortness of breath and a wound in her left inguinal area that has been draining pus and is severely malodorous.  She was immediately given antibiotics and had a CT done that showed necrotizing fasciitis.     She was emergently taken to the operating room where she underwent debridement.  Arrives to the ICU intubated on pressors     She was intubated in the OR by ENT due to significant goiter.    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  Wound history/plan of care: Groin infection, debrided entire necrotizing area   Wound base: 50 % slough and adipose tissue, 50 % muscle     Palpation of the wound bed: normal and boggy      Drainage: small     Description of drainage: serosanguinous and purulent     Measurements (length x width x depth, in cm): no exact measurements taken as patient has to be positioned multiple ways to view entire wound     Tunneling: N/A     Undermining: up to 8 cm at lateral thigh pocket, space between thigh and gluteal muscles about 5cm deep  Periwound skin: Erythema- blanchable      Color: pink and red      Temperature: warm  Odor: mild  Pain: moderate and tension to hands, feet and body, shooting  Pain interventions prior to dressing change: slow and gentle cares ; bedside nurse gave multiple pain medication for dressing change  Treatment goal:  awaiting patient being intubated. Patient, family, and surgical team will talk about goals of care. If patient leans toward limb  salvage then a diverting colostomy will be recommended as a wound vac and probable plastics involvement will be needed.   STATUS: initial assessment  Supplies ordered: at bedside      Change to daily dressing changes as BID is difficult for both patient and staff, there is a decrease in drainage and vashe being used as the moist packing solution.    Treatment Plan:     Daily vashe packing, see orders    Orders: Updated    RECOMMEND PRIMARY TEAM ORDER: None, at this time  Education provided: plan of care, Infection prevention , and Moisture management  Discussed plan of care with: Patient, Nurse, and Physicians Assistant  [facility] nurse follow-up plan: weekly - working with surgical team to determine next wound steps  Notify [facility] if wound(s) deteriorate.  Nursing to notify the Provider(s) and re-consult the [facility] Nurse if new skin concern.    DATA:     Current support surface: Standard  Low air loss (ZACK pump, Isolibrium, Pulsate, skin guard, etc)  Containment of urine/stool: Incontinence Protocol  BMI: Body mass index is 36.85 kg/m .   Active diet order: None     Output: I/O last 3 completed shifts:  In: 3085.74 [I.V.:1585.74; NG/GT:710]  Out: 5275 [Urine:5075; Stool:200]     Labs:   Recent Labs   Lab 10/11/23  0511   HGB 9.2*   WBC 12.5*     Pressure injury risk assessment:   Sensory Perception: 2-->very limited  Moisture: 2-->very moist  Activity: 1-->bedfast  Mobility: 2-->very limited  Nutrition: 2-->probably inadequate  Friction and Shear: 2-->potential problem  Mario Score: 11    [facility] RN CWOCN  Pager no longer in use, please contact through [facility] group: [facility]

## 2023-10-11 NOTE — PROGRESS NOTES
"CLINICAL NUTRITION SERVICES - REASSESSMENT NOTE     Nutrition Prescription    RECOMMENDATIONS FOR MDs/PROVIDERS TO ORDER:    Malnutrition Status:    Moderate malnutrition In Context of: Chronic illness or disease    Recommendations already ordered by Registered Dietitian (RD):  Continue TF orders pending extubation. TF held per MD     Future/Additional Recommendations:  Monitor poc, I/Os, labs.   Monitor fluid status/hyponatremia, adjust FWF if pt remains intubated      EVALUATION OF THE PROGRESS TOWARD GOALS   Diet: NPO  Nutrition Support: EN Vital HP at goal 60 ml/hr x 22 hrs holding 1 hr before and after levothyroxine   Intake/Tolerance: TF tolerated at goal rate        NEW FINDINGS   Discussed pt during care rounds this AM. Per MD plan to stop TF today, at goal rate and tolerating this AM. Possible extubation today, pressure support trial per MD.   Novolog and lantus with hyperglycemia. Pt w/ hyponatremia- TF and FWF likely discontinued with extubation.     ANTHROPOMETRICS  Height: 157.5 cm (5' 2\")  Most Recent Weight: 91.4 kg (201 lb 8 oz)    IBW: 50 kg  BMI: Obesity Grade II BMI 35-39.9  Weight History: 12% wt loss x11 months, 9.5% wt loss x 5 months- wt loss likely masked by edema   Date/Time Weight Weight Method   10/11/23 0430 91.4 kg (201 lb 8 oz) Bed scale   10/08/23 0215 99.3 kg (218 lb 14.4 oz) --   10/07/23 0215 98 kg (216 lb 1.6 oz) --   10/06/23 0400 99.9 kg (220 lb 3.2 oz) Bed scale   10/05/23 0600 96.7 kg (213 lb 3.2 oz) Bed scale   10/04/23 0548 94.2 kg (207 lb 11.2 oz) Bed scale   10/03/23 0130 90.8 kg (200 lb 2.8 oz) Bed scale   10/02/23 1807 91.2 kg (201 lb) --     ASSESSED NUTRITION NEEDS  Dosing Weight: 90.8 kg lowest actual wt this admit for calories, 50 kg IBW for protein, 61.7 kg adjusted for fluid  Estimated Energy Needs: 2834-8425 kcals/day (14 - 17 kcals/kg actual wt)  Justification: Obese and Vented  Estimated Protein Needs: 100+ grams protein/day (>/= 2 grams of pro/kg " IBW)  Justification: Increased needs  Estimated Fluid Needs: 4581-1828 mL/day (25 - 30 mL/kg adjusted wt)   Justification: Maintenance    PHYSICAL FINDINGS  See malnutrition section below.  Per Flowhseets:  +UOP 5.7 L 10/10   200 ml op via rectal tube this AM   Mild/Moderate/Severe edema   Obese abdomen   Wounds- L-groin to heel     LABS  Reviewed  Na 134(L), Creat 0.43(L), Glucose 296-310 this AM    MEDICATIONS  Reviewed  Continuous precedex, fentanyl, LR 10 ml/hr, levophed   Scheduled peridex, colace, lovenox, folic acid, lasix, novolog, lantus, synthroid, MVI/M, oxycodone, zosyn, miralax, senokot, thiamin     MALNUTRITION: reassessed 10/11  % Weight Loss:  Up to 10% in 6 months (moderate malnutrition)  % Intake:  </= 50% for >/= 5 days (severe malnutrition)- met with EN  Subcutaneous Fat Loss:  None observed  Muscle Loss:  None observed  Fluid Retention:  Severe/ Moderate/ Mild    Malnutrition Diagnosis: Moderate malnutrition  In Context of:  Chronic illness or disease    NUTRITION DIAGNOSIS  Inadequate oral intake related to intubation, sedation as evidenced by need for EN to meet nutrition needs.     Evaluation: Ongoing, potential extubation today     INTERVENTIONS  Implementation  Continue TF orders pending extubation       Goals  Pt to meet nutritional needs - Met  Electrolytes WNL - Not Met, hyponatremia   BG < 180 - Not Met  Pt will tolerate TF - Met  Wound healing - Progressing, surgery/woc following     Monitoring/Evaluation  Progress toward goals will be monitored and evaluated per protocol.

## 2023-10-11 NOTE — PROGRESS NOTES
Patient remains on full support all night. No vent changes made. ET Tube Patent and secure. RT will continue to follow.    Candy Palmer, RT

## 2023-10-11 NOTE — PROGRESS NOTES
"Critical Care Progress Note      10/11/2023    Name: Lidia Nam MRN#: [id number]   Age: 63 year old YOB: 1960     Hsptl Day# 9  ICU DAY #    MV DAY #             Problem List:   Principal Problem:    Severe sepsis (H)  Active Problems:    Type 2 diabetes mellitus with hyperglycemia, without long-term current use of insulin (H)    Necrotizing soft tissue infection    Septic shock (H)    Clinically Significant Risk Factors        # Hypokalemia: Lowest K = 2.9 mmol/L in last 2 days, will replace as needed     # Hypomagnesemia: Lowest Mg = 1.6 mg/dL in last 2 days, will replace as needed   # Hypoalbuminemia: Lowest albumin = 1.7 g/dL at 10/3/2023  5:21 AM, will monitor as appropriate           # DMII: A1C = 6.8 % (Ref range: 0.0 - 5.6 %) within past 6 months     # Obesity: Estimated body mass index is 36.85 kg/m  as calculated from the following:    Height as of this encounter: 1.575 m (5' 2\").    Weight as of this encounter: 91.4 kg (201 lb 8 oz).            Code Status: No CPR- Pre-arrest intubation OK                    Summary/Hospital Course:     51-year-old female with diabetes mellitus and obesity who presented with necrotizing fasciitis requiring incision and debridement from left groin area all the way down to her heel.    I have personally reviewed the daily labs, imaging studies, cultures and discussed the case with referring physician and consulting physicians.     My assessment and plan by system for this patient is as follows:        Neurology/Psychiatry:   Stop fentanyl and Precedex  As needed oxycodone and Dilaudid    Like to get a baseline and see how much pain she is in before we are scheduling pain meds      Cardiovascular:   Septic shock secondary to necrotizing fasciitis.  On low-dose norepinephrine  17L positive.  I wonder how much she has lost through weeping from her lower extremity wound.  Decrease Lasix to 20 mg IV twice daily    Pulmonary/Ventilator Management:   Hypoxic " respiratory failure secondary to septic shock  No cuff leak present yesterday.  Received steroids.    Sedated today over Cook exchange catheter successfully.  Doing fine.      GI and Nutrition :   Tolerating tube feeds    Renal/Fluids/Electrolytes:   Volume overloaded  Good urine output with Lasix.  Continue  - monitor function and electrolytes as needed with replacement per ICU protocols.  - generally avoid nephrotoxic agents such as NSAID, IV contrast unless specifically required  - adjust medications as needed for renal clearance  - follow I/O's as appropriate.    Infectious Disease:   Necrotizing fasciitis with polymicrobial infection, Bacteroides and actinomyces  Clindamycin, Flagyl and Zosyn    Would probably need diverting ostomy to maintain clean wound but family does not want to escalate cares    H. influenzae and streptococcal pneumonia    Endocrine:   Continue insulin, sliding scale/Lantus  Continue levothyroxine  Very large goiter  Plan  - ICU insulin protocol, goal sugar <180        ICU Prophylaxis:   1. DVT: LMWH  2. VAP: HOB 30 degrees, chlorhexidine rinse  3. Stress Ulcer: PPI  4. Restraints: Nonviolent soft two point restraints required and necessary for patient safety and continued cares and good effect as patient continues to pull at necessary lines, tubes despite education and distraction. Will readdress daily.     Category: Non-violent   Type of Restraint: Soft limb x2.   Behavior: Pulling at IVand motta catheter tubings.   Root cause of behavior: Critical illness.   Less-restrictive methods that have failed: Redirection, reorientation. 1:1 NA at bedside.   Response to restraint: Not actively pulling atcurrent restraints.   Criteria for release from restraint: Responds to redirection. Leaves medical devices in place.          Key Medications:      sennosides  5 mL Oral or Feeding Tube BID    And    docusate  50 mg Oral BID    enoxaparin ANTICOAGULANT  40 mg Subcutaneous Q24H    folic acid  1 mg  Oral or Feeding Tube Daily    furosemide  20 mg Intravenous Q12H    [START ON 10/12/2023] influenza quadrivalent (PF) vacc  0.5 mL Intramuscular Prior to discharge    insulin aspart  1-12 Units Subcutaneous Q4H    insulin glargine  20 Units Subcutaneous BID    levothyroxine  112 mcg Oral or Feeding Tube Daily    metroNIDAZOLE  500 mg Intravenous Q8H    multivitamins w/minerals  15 mL Oral or Feeding Tube Daily    pantoprazole  40 mg Intravenous Q24H    piperacillin-tazobactam  3.375 g Intravenous Q8H    polyethylene glycol  17 g Oral or Feeding Tube Daily    sodium chloride (PF)  3 mL Intracatheter Q8H    thiamine  100 mg Oral or Feeding Tube Daily                   Physical Examination:   Temp:  [95.2  F (35.1  C)-99.3  F (37.4  C)] 97.9  F (36.6  C)  Pulse:  [45-88] 71  Resp:  [11-28] 15  BP: ()/(46-69) 110/52  FiO2 (%):  [30 %] 30 %  SpO2:  [94 %-100 %] 96 %    Intake/Output Summary (Last 24 hours) at 10/9/2023 1314  Last data filed at 10/9/2023 1200  Gross per 24 hour   Intake 4133.9 ml   Output 3950 ml   Net 183.9 ml     Wt Readings from Last 4 Encounters:   10/11/23 91.4 kg (201 lb 8 oz)   05/01/23 101 kg (222 lb 11.2 oz)   03/14/23 103.9 kg (229 lb)   11/15/22 103.9 kg (229 lb)     BP - Mean:  [58-94] 73  Vent Mode: (S) CPAP/PS  (Continuous positive airway pressure with Pressure Support)  FiO2 (%): 30 %  Resp Rate (Set): 16 breaths/min  Tidal Volume (Set, mL): 400 mL  PEEP (cm H2O): 5 cmH2O  Pressure Support (cm H2O): (S) 5 cmH2O  Resp: 15    Recent Labs   Lab 10/09/23  0446   PH 7.38       GEN: no acute distress   HEENT: head ncat, sclera anicteric, OP patent, trachea midline   PULM: unlabored synchronous with vent, clear anteriorly    CV/COR: RRR S1S2 no gallop,  No rub, no murmur  ABD: soft nontender, hypoactive bowel sounds, no mass  EXT: Peripheral edema on all extremities  NEURO: Wakes up and follows commands  SKIN: Groin and left lower extremity wrapped.  Incision is open, please refer to the  "pictures in chart  LINES: clean, dry intact         Data:   All data and imaging reviewed     ROUTINE ICU LABS (Last four results)  CMP  Recent Labs   Lab 10/11/23  1219 10/11/23  0811 10/11/23  0511 10/11/23  0403 10/10/23  1103 10/10/23  1059 10/10/23  0815 10/10/23  0437 10/09/23  1129 10/09/23  1041 10/09/23  0449 10/09/23  0446 10/08/23  0444 10/08/23  0442   NA  --   --  134*  --   --   --   --  138  --   --   --  145  --  143   POTASSIUM  --   --  3.8  --   --  3.9  --  2.9*  --  3.6  --  3.4   < > 3.3*   CHLORIDE  --   --  98  --   --   --   --  104  --   --   --  116*  --  115*   CO2  --   --  28  --   --   --   --  26  --   --   --  23  --  22   ANIONGAP  --   --  8  --   --   --   --  8  --   --   --  6*  --  6*   * 302* 310* 296*   < >  --    < > 153*  151*   < >  --    < > 163*   < > 276*   BUN  --   --  15.2  --   --   --   --  12.4  --   --   --  13.7  --  16.2   CR  --   --  0.43*  --   --   --   --  0.44*  --   --   --  0.45*  --  0.46*   GFRESTIMATED  --   --  >90  --   --   --   --  >90  --   --   --  >90  --  >90   JW  --   --  9.3  --   --   --   --  8.1*  --   --   --  8.5*  --  7.7*   MAG  --   --  1.8  --   --   --   --  1.6*  --   --   --  1.8  --  1.8    < > = values in this interval not displayed.     CBC  Recent Labs   Lab 10/11/23  0511 10/10/23  0437 10/09/23  0446 10/08/23  0442   WBC 12.5* 12.9* 15.9* 12.9*   RBC 2.91* 2.66* 3.11* 3.08*   HGB 9.2* 8.2* 9.7* 9.6*   HCT 28.0* 25.9* 30.3* 30.0*   MCV 96 97 97 97   MCH 31.6 30.8 31.2 31.2   MCHC 32.9 31.7 32.0 32.0   RDW 16.9* 17.0* 17.6* 17.2*    340 313 215     INR  No lab results found in last 7 days.    Arterial Blood Gas  Recent Labs   Lab 10/09/23  0446   PH 7.38       All cultures:  No results for input(s): \"CULT\" in the last 168 hours.  No results found for this or any previous visit (from the past 24 hour(s)).      Billing: This patient is critically ill: Yes. Total critical care time today 33min exclusive of " procedures or teaching.  Managing septic shock, respiratory failure, pressors and mechanical ventilation in a patient with necrotizing fasciitis

## 2023-10-11 NOTE — PROGRESS NOTES
"PALLIATIVE CARE PROGRESS NOTE  [facility]     Patient Name: Lidia Nam  Date of Admission: 10/2/2023   Today the patient was seen for: support to patient,  Arjun     Recommendations & Counseling       GOALS OF CARE:   Life-prolonging with limits  ((DNR, prearrest intubation ok), no escalation of care if clinical decline occurs.)  Lidia assents to continuing present treatments in hopes of recovery.    ADVANCE CARE PLANNING:  No health care directive on file. Per  informed consent policy, next of kin should be involved if patient becomes unable.  Hopefully this can be completed in days ahead if Lidia is decisional and willing/able to name surrogate decision makers.  There is no POLST form on file, recommend to complete prior to DC.  Code status: No CPR- Pre-arrest intubation OK    MEDICAL MANAGEMENT:   We are not actively managing symptoms at this time.    PSYCHOSOCIAL/SPIRITUAL:  Family  (42 yrs) to Arjun, son Joby, daughter Radha.  No grandchildren.  [place] community: Sikhism .  Raised Christian, attended [employer] grade schools.  Hasn't attended Scientologist services in years,  not aware if she has active prayer life.  Will request spiritual care for support.    Palliative Care will continue to follow.      Evelyn Chaudhary MD  Securely message with [employer] (more info)  Text page via [employer] Paging/Directory          Interval History:     Multidisciplinary collaboration:  Chart reviewed, continues to do well with vent weans.  Plan for extubation today.    Patient/family narrative  Lidia notes pain is controlled.  We discussed her illness with necrotizing fasciitis, that multiple surgeries will be needed moving forward; I asked if Lidia is okay with continuing treatments and procedures in hopes of a recovery and she clearly nodded \"yes.\"  She noted if she needed to be reintubated after extubation she would accept reintubation.  She nodded understanding that " she could change her mind on life prolonging treatments if she felt she was not getting the recovery or quality of life she hopes for.  Discussed with , Arjun, who is at bedside.      Palliative Summary/HPI          Lidia Nam is a 63 year old female with a past medical history of hypothyroidism with goiter, tobacco abuse, DM2, obesity, AUD who presented on 10/2/23 with groin pain and septic shock.  CT showed necrotizing fasciitis and she was taken emergently to the OR and had debridement to treat/manage necrotizing fasciitis including L side of perineum, L buttock, L leg.    On 10/2/23 with Dr Howard she had L vulvectomy, debridement of L buttock, L posterior thigh to L knee.  She remained intubated and on pressors and returned to ICU; the following day she had further mecrosis into the L side of mons pubis and skin flaps; on 10/4/23 Dr Molina completed further debridement of the L mons pubis, as well as L gluteal/posterior thigh and extending down to the L ankle.  Lidia remains on pressors (weaning), was intubated (large goiter, required ENT assistance for intubation at time of surgery) several days postop, and is weaning on sedation and successfully extubated 10/11/23.               Physical Exam:   Temp:  [95.2  F (35.1  C)-99  F (37.2  C)] 97.9  F (36.6  C)  Pulse:  [45-88] 71  Resp:  [11-28] 15  BP: ()/(46-69) 110/52  FiO2 (%):  [30 %] 30 %  SpO2:  [94 %-100 %] 96 %  201 lbs 8 oz      Intake/Output Summary (Last 24 hours) at 10/11/2023 1419  Last data filed at 10/11/2023 1200  Gross per 24 hour   Intake 3028.38 ml   Output 4975 ml   Net -1946.62 ml       Physical Exam  Alert 62 yo woman, intubated, face appears relaxed.  Smiles at times.  Makes eye contact, follows commands, and writes brief legible answers to questions on a clip board.  Breathing nonlabored.  Obese  Rdz catheter with yellow urine.  Rectal tube with liquid stool.  Dressings over  L upper, lower leg.              "Current Problem List:   Principal Problem:    Severe sepsis (H)  Active Problems:    Type 2 diabetes mellitus with hyperglycemia, without long-term current use of insulin (H)    Necrotizing soft tissue infection    Septic shock (H)      Allergies   Allergen Reactions    Erythromycin     Psyllium     Seasonal Allergies      Hay fever - tree pollens, dust, mold, mildew            Data Reviewed:     Last Comprehensive Metabolic Panel:  Lab Results   Component Value Date     (L) 10/11/2023    POTASSIUM 3.8 10/11/2023    CHLORIDE 98 10/11/2023    CO2 28 10/11/2023    ANIONGAP 8 10/11/2023     (H) 10/11/2023    BUN 15.2 10/11/2023    CR 0.43 (L) 10/11/2023    GFRESTIMATED >90 10/11/2023    JW 9.3 10/11/2023       CBC RESULTS:   Recent Labs   Lab Test 10/11/23  0511   WBC 12.5*   RBC 2.91*   HGB 9.2*   HCT 28.0*   MCV 96   MCH 31.6   MCHC 32.9   RDW 16.9*        Lab Results   Component Value Date    AST 24 10/03/2023     Lab Results   Component Value Date    ALT 25 10/03/2023     No results found for: \"BILICONJ\"   Lab Results   Component Value Date    BILITOTAL 0.5 10/03/2023     Lab Results   Component Value Date    ALBUMIN 1.7 10/03/2023    ALBUMIN 3.1 06/23/2020     Lab Results   Component Value Date    PROTTOTAL 4.8 10/03/2023      Lab Results   Component Value Date    ALKPHOS 104 10/03/2023     25 minutes spent on the date of the encounter doing chart review, history and exam, documentation and further activities per the note.    Evelyn Chaudhary MD  [facility], Palliative Care  Securely message with the Vocera Web Console (learn more here) or  Text page via [facility] Paging/Directory          "

## 2023-10-11 NOTE — PROGRESS NOTES
General Surgery Progress Note:    Hospital Day # 9    ASSESSMENT:  1. Necrotizing soft tissue infection    2. Severe sepsis (H)        Lidia Nam is a 63 year old female who is s/p left groin and left lower extremity debridement for NSTI on 10/2 and 10/4. Patient intubated but alert and able to answer some yes or no questions. Plan for extubation this afternoon with family present for further discussion regarding goals of care with the patient's input. Wound seen and examined with WOC RN, plan to continue WTD BID dressings for now until further conversation with the patient regarding goals of care. Will need more involved wound care with negative pressure wound therapy with possible skin grafting. May also need to consider diverting colostomy.     PLAN:  - Continue supportive cares and BID WTD dressing changes  - Will need more involved wound care with negative pressure wound therapy and possible skin grafting in the future  - General surgery will continue to follow    SUBJECTIVE:   She is intubated but alert. Able to answer some yes or no questions. Endorses pain. Able to write to communicate and is requesting that the tube come out.       Patient Vitals for the past 24 hrs:   BP Temp Temp src Pulse Resp SpO2 Weight   10/11/23 0845 96/55 -- -- 71 23 99 % --   10/11/23 0830 96/55 -- -- 54 11 99 % --   10/11/23 0815 110/56 -- -- 66 21 98 % --   10/11/23 0805 -- -- -- 63 -- 98 % --   10/11/23 0800 102/58 97.9  F (36.6  C) Oral 53 19 98 % --   10/11/23 0745 99/55 -- -- 53 17 97 % --   10/11/23 0730 106/58 -- -- 58 17 97 % --   10/11/23 0715 104/58 -- -- 53 19 98 % --   10/11/23 0700 104/58 -- -- 54 18 98 % --   10/11/23 0645 103/56 -- -- 56 17 98 % --   10/11/23 0630 126/59 -- -- 57 17 98 % --   10/11/23 0615 127/69 -- -- 60 20 99 % --   10/11/23 0600 120/66 -- -- 55 19 99 % --   10/11/23 0545 121/61 -- -- 59 22 99 % --   10/11/23 0530 120/58 -- -- 51 17 99 % --   10/11/23 0430 -- -- -- -- -- -- 91.4 kg (201 lb 8  oz)   10/11/23 0415 126/61 -- -- 58 11 98 % --   10/11/23 0400 124/60 -- -- 51 16 99 % --   10/11/23 0345 130/61 98.1  F (36.7  C) Oral 59 21 100 % --   10/11/23 0330 120/56 -- -- 55 17 99 % --   10/11/23 0315 (!) 148/65 -- -- 54 17 99 % --   10/11/23 0300 (!) 80/46 -- -- (!) 49 16 98 % --   10/11/23 0245 (!) 86/50 -- -- (!) 47 16 99 % --   10/11/23 0230 98/54 -- -- (!) 45 16 99 % --   10/11/23 0215 111/56 -- -- 58 17 99 % --   10/11/23 0200 99/56 -- -- (!) 47 16 99 % --   10/11/23 0145 103/55 -- -- (!) 48 17 100 % --   10/11/23 0130 104/56 -- -- 55 16 99 % --   10/11/23 0115 101/54 -- -- 57 17 98 % --   10/11/23 0100 105/57 -- -- 59 19 99 % --   10/11/23 0045 105/56 -- -- 52 16 99 % --   10/11/23 0030 109/56 -- -- 54 18 98 % --   10/11/23 0028 -- -- -- -- -- 97 % --   10/11/23 0015 125/62 -- -- 65 19 98 % --   10/11/23 0000 113/55 98.2  F (36.8  C) Oral 59 17 98 % --   10/10/23 2300 111/56 (!) 95.9  F (35.5  C) -- 58 16 99 % --   10/10/23 2245 102/54 (!) 95.9  F (35.5  C) -- 57 18 99 % --   10/10/23 2230 96/51 (!) 96.3  F (35.7  C) Esophageal 59 17 97 % --   10/10/23 2215 107/59 (!) 96.6  F (35.9  C) -- 52 18 99 % --   10/10/23 2200 107/55 96.8  F (36  C) -- 53 17 99 % --   10/10/23 2145 111/57 97.2  F (36.2  C) -- 88 21 97 % --   10/10/23 2130 111/56 97  F (36.1  C) -- 52 17 98 % --   10/10/23 2115 103/53 97  F (36.1  C) -- 59 16 99 % --   10/10/23 2100 106/53 97.2  F (36.2  C) -- 53 16 99 % --   10/10/23 2045 104/52 97.3  F (36.3  C) -- 56 17 99 % --   10/10/23 2030 114/55 97.9  F (36.6  C) -- 64 23 97 % --   10/10/23 2015 107/54 97  F (36.1  C) -- 53 17 98 % --   10/10/23 2000 110/57 97.2  F (36.2  C) Esophageal 50 16 98 % --   10/10/23 1945 99/58 97.3  F (36.3  C) -- 55 17 98 % --   10/10/23 1930 103/65 97.3  F (36.3  C) Esophageal 61 21 98 % --   10/10/23 1920 -- -- -- -- -- 97 % --   10/10/23 1915 112/57 (!) 96.6  F (35.9  C) -- 61 19 99 % --   10/10/23 1900 115/59 (!) 96.4  F (35.8  C) -- 50 16 99 % --    10/10/23 1845 109/57 (!) 96.4  F (35.8  C) -- 53 17 99 % --   10/10/23 1830 116/60 (!) 96.4  F (35.8  C) -- (!) 49 17 98 % --   10/10/23 1815 116/55 (!) 96.6  F (35.9  C) -- 52 17 97 % --   10/10/23 1800 116/60 (!) 96.4  F (35.8  C) -- (!) 45 16 98 % --   10/10/23 1745 111/57 (!) 95.2  F (35.1  C) -- (!) 46 16 98 % --   10/10/23 1730 111/58 (!) 95.9  F (35.5  C) -- (!) 47 16 99 % --   10/10/23 1720 -- -- -- -- -- 99 % --   10/10/23 1715 101/57 (!) 95.9  F (35.5  C) Esophageal 57 18 99 % --   10/10/23 1700 -- (!) 96.4  F (35.8  C) -- 60 13 96 % --   10/10/23 1645 102/57 97  F (36.1  C) -- 61 27 99 % --   10/10/23 1630 102/56 97.2  F (36.2  C) -- 53 16 98 % --   10/10/23 1615 92/51 98.4  F (36.9  C) -- 51 15 98 % --   10/10/23 1600 94/53 97.9  F (36.6  C) Esophageal (!) 47 18 98 % --   10/10/23 1545 -- 98.1  F (36.7  C) -- 51 15 98 % --   10/10/23 1530 98/55 97.9  F (36.6  C) -- (!) 48 18 99 % --   10/10/23 1515 102/55 98.1  F (36.7  C) -- (!) 47 17 98 % --   10/10/23 1500 109/59 98.2  F (36.8  C) -- (!) 49 16 97 % --   10/10/23 1445 104/55 98.4  F (36.9  C) -- (!) 49 17 98 % --   10/10/23 1430 99/55 98.6  F (37  C) -- 54 18 99 % --   10/10/23 1418 -- 98.8  F (37.1  C) -- 56 19 99 % --   10/10/23 1415 (!) 86/51 99  F (37.2  C) -- 58 19 98 % --   10/10/23 1400 -- 99.1  F (37.3  C) -- 60 18 98 % --   10/10/23 1345 102/54 99.1  F (37.3  C) -- 58 15 99 % --   10/10/23 1330 114/61 99.1  F (37.3  C) -- 64 20 -- --   10/10/23 1315 91/54 99.3  F (37.4  C) -- 57 22 98 % --   10/10/23 1300 100/54 99.1  F (37.3  C) -- 52 20 97 % --   10/10/23 1245 104/56 99.1  F (37.3  C) -- 50 22 97 % --   10/10/23 1230 106/58 99.1  F (37.3  C) -- 50 21 97 % --   10/10/23 1215 102/56 99.3  F (37.4  C) -- 51 20 96 % --   10/10/23 1200 (!) 88/49 99.3  F (37.4  C) -- 57 15 97 % --   10/10/23 1145 (!) 86/44 99.3  F (37.4  C) -- 58 23 92 % --   10/10/23 1130 116/57 99.1  F (37.3  C) -- 59 29 96 % --   10/10/23 1115 100/52 99  F (37.2  C) -- 56 22  97 % --   10/10/23 1100 (!) 89/53 98.8  F (37.1  C) -- 60 24 92 % --         PHYSICAL EXAM:  General: patient seen resting in bed, no acute distress. Alert and able to answer yes or no questions.  Resp: intubated  Abdomen: Soft, non-tender  MSK: Left groin and LLE wound dressing removed. Able to palpate along facial plane around LLE wound, with some necrotic tissue noted. Left groin wound without evidence of obvious necrotic tissue.  Extremities: warm and well perfused    10/10 0700 - 10/11 0659  In: 3085.74 [I.V.:1585.74]  Out: 5275 [Urine:5075]    No results displayed because visit has over 200 results.           Radha Patrick PA-C  [facility] General Surgery  [address]  [address]  [place] [address]

## 2023-10-11 NOTE — PLAN OF CARE
[facility] - ICU    RN Progress Note:            Pertinent Assessments:      Please refer to flowsheet rows for full assessment     Patient on levophed gtt to keep MAP >65, afebrile, SB- SR, lowest HR 45, alert, nods appropriately, follows command, fentanyl gtt and precedex gtt for sedation. Extensive wound dressing changed at 0430 with assist of 3-4. Tolerating TF with low residuals. Protocols addressed with ongoing replacement.            Key Events - This Shift:     See above notes.            Barriers to Discharge / Downgrade:     Hopefully can extubate today after few doses of steroids.

## 2023-10-11 NOTE — PROGRESS NOTES
"Care Management Follow Up    Length of Stay (days): 9    Expected Discharge Date: 10/13/2023     Concerns to be Addressed:     Care progression  Pressors and vent, IV zosyn, wound  Patient plan of care discussed at interdisciplinary rounds: Yes    Anticipated Discharge Disposition:  TBD     Anticipated Discharge Services:  TBD  Anticipated Discharge DME:  NA    Patient/family educated on Medicare website which has current facility and service quality ratings:  NA  Education Provided on the Discharge Plan:  Per team  Patient/Family in Agreement with the Plan:  NA    Referrals Placed by CM/SW:  NA  Private pay costs discussed: Not applicable    Additional Information:  Discussed in rounds. A&O. Has motta and PICC. Palliative following.    Social Hx: \"Intubated. Pt lives with spouse, he assists as needed.\"     RNCM to follow for medical progression, recommendations, and final discharge plan.     Ibis Lin RN     "

## 2023-10-11 NOTE — PROGRESS NOTES
RESPIRATORY CARE NOTE    Pt. extubated at 11:50 without complications. BS diminished, no stridor post extubation, RR 18, sats 99% on 3L oxymask .       Yudi Arvizu, RT

## 2023-10-12 LAB
ANION GAP SERPL CALCULATED.3IONS-SCNC: 7 MMOL/L (ref 7–15)
BUN SERPL-MCNC: 12.8 MG/DL (ref 8–23)
CALCIUM SERPL-MCNC: 9.1 MG/DL (ref 8.8–10.2)
CHLORIDE SERPL-SCNC: 101 MMOL/L (ref 98–107)
CREAT SERPL-MCNC: 0.45 MG/DL (ref 0.51–0.95)
DEPRECATED HCO3 PLAS-SCNC: 29 MMOL/L (ref 22–29)
EGFRCR SERPLBLD CKD-EPI 2021: >90 ML/MIN/1.73M2
ERYTHROCYTE [DISTWIDTH] IN BLOOD BY AUTOMATED COUNT: 17 % (ref 10–15)
GLUCOSE BLDC GLUCOMTR-MCNC: 102 MG/DL (ref 70–99)
GLUCOSE BLDC GLUCOMTR-MCNC: 111 MG/DL (ref 70–99)
GLUCOSE BLDC GLUCOMTR-MCNC: 72 MG/DL (ref 70–99)
GLUCOSE BLDC GLUCOMTR-MCNC: 77 MG/DL (ref 70–99)
GLUCOSE BLDC GLUCOMTR-MCNC: 93 MG/DL (ref 70–99)
GLUCOSE SERPL-MCNC: 87 MG/DL (ref 70–99)
HCT VFR BLD AUTO: 30.1 % (ref 35–47)
HGB BLD-MCNC: 9.6 G/DL (ref 11.7–15.7)
MAGNESIUM SERPL-MCNC: 1.9 MG/DL (ref 1.7–2.3)
MCH RBC QN AUTO: 31 PG (ref 26.5–33)
MCHC RBC AUTO-ENTMCNC: 31.9 G/DL (ref 31.5–36.5)
MCV RBC AUTO: 97 FL (ref 78–100)
PLATELET # BLD AUTO: 504 10E3/UL (ref 150–450)
POTASSIUM SERPL-SCNC: 3.1 MMOL/L (ref 3.4–5.3)
POTASSIUM SERPL-SCNC: 3.3 MMOL/L (ref 3.4–5.3)
POTASSIUM SERPL-SCNC: 4 MMOL/L (ref 3.4–5.3)
RBC # BLD AUTO: 3.1 10E6/UL (ref 3.8–5.2)
SODIUM SERPL-SCNC: 137 MMOL/L (ref 135–145)
WBC # BLD AUTO: 16.5 10E3/UL (ref 4–11)

## 2023-10-12 PROCEDURE — 99232 SBSQ HOSP IP/OBS MODERATE 35: CPT | Performed by: INTERNAL MEDICINE

## 2023-10-12 PROCEDURE — 90686 IIV4 VACC NO PRSV 0.5 ML IM: CPT | Performed by: INTERNAL MEDICINE

## 2023-10-12 PROCEDURE — 250N000013 HC RX MED GY IP 250 OP 250 PS 637: Performed by: INTERNAL MEDICINE

## 2023-10-12 PROCEDURE — G0008 ADMIN INFLUENZA VIRUS VAC: HCPCS | Performed by: INTERNAL MEDICINE

## 2023-10-12 PROCEDURE — 250N000011 HC RX IP 250 OP 636: Mod: JZ | Performed by: SURGERY

## 2023-10-12 PROCEDURE — 83735 ASSAY OF MAGNESIUM: CPT | Performed by: INTERNAL MEDICINE

## 2023-10-12 PROCEDURE — 99233 SBSQ HOSP IP/OBS HIGH 50: CPT | Performed by: STUDENT IN AN ORGANIZED HEALTH CARE EDUCATION/TRAINING PROGRAM

## 2023-10-12 PROCEDURE — 200N000001 HC R&B ICU

## 2023-10-12 PROCEDURE — 250N000013 HC RX MED GY IP 250 OP 250 PS 637: Performed by: CLINICAL NURSE SPECIALIST

## 2023-10-12 PROCEDURE — 99233 SBSQ HOSP IP/OBS HIGH 50: CPT | Performed by: INTERNAL MEDICINE

## 2023-10-12 PROCEDURE — 250N000013 HC RX MED GY IP 250 OP 250 PS 637: Performed by: SURGERY

## 2023-10-12 PROCEDURE — 99231 SBSQ HOSP IP/OBS SF/LOW 25: CPT | Performed by: SURGERY

## 2023-10-12 PROCEDURE — 250N000011 HC RX IP 250 OP 636: Mod: JZ | Performed by: INTERNAL MEDICINE

## 2023-10-12 PROCEDURE — 250N000013 HC RX MED GY IP 250 OP 250 PS 637: Performed by: NURSE PRACTITIONER

## 2023-10-12 PROCEDURE — 80048 BASIC METABOLIC PNL TOTAL CA: CPT | Performed by: NURSE PRACTITIONER

## 2023-10-12 PROCEDURE — 250N000011 HC RX IP 250 OP 636: Performed by: SURGERY

## 2023-10-12 PROCEDURE — 84132 ASSAY OF SERUM POTASSIUM: CPT | Performed by: INTERNAL MEDICINE

## 2023-10-12 PROCEDURE — 85027 COMPLETE CBC AUTOMATED: CPT | Performed by: NURSE PRACTITIONER

## 2023-10-12 PROCEDURE — 99222 1ST HOSP IP/OBS MODERATE 55: CPT | Performed by: CLINICAL NURSE SPECIALIST

## 2023-10-12 PROCEDURE — C9113 INJ PANTOPRAZOLE SODIUM, VIA: HCPCS | Mod: JZ | Performed by: SURGERY

## 2023-10-12 RX ORDER — POTASSIUM CHLORIDE 1500 MG/1
40 TABLET, EXTENDED RELEASE ORAL ONCE
Status: COMPLETED | OUTPATIENT
Start: 2023-10-12 | End: 2023-10-12

## 2023-10-12 RX ORDER — MAGNESIUM SULFATE HEPTAHYDRATE 40 MG/ML
2 INJECTION, SOLUTION INTRAVENOUS ONCE
Status: COMPLETED | OUTPATIENT
Start: 2023-10-12 | End: 2023-10-12

## 2023-10-12 RX ORDER — LIDOCAINE 50 MG/G
OINTMENT TOPICAL EVERY 4 HOURS PRN
Status: DISCONTINUED | OUTPATIENT
Start: 2023-10-12 | End: 2023-10-27 | Stop reason: HOSPADM

## 2023-10-12 RX ORDER — FUROSEMIDE 10 MG/ML
20 INJECTION INTRAMUSCULAR; INTRAVENOUS DAILY
Status: DISCONTINUED | OUTPATIENT
Start: 2023-10-12 | End: 2023-10-13

## 2023-10-12 RX ORDER — MAGNESIUM HYDROXIDE/ALUMINUM HYDROXICE/SIMETHICONE 120; 1200; 1200 MG/30ML; MG/30ML; MG/30ML
30 SUSPENSION ORAL EVERY 4 HOURS PRN
Status: DISCONTINUED | OUTPATIENT
Start: 2023-10-12 | End: 2023-10-27 | Stop reason: HOSPADM

## 2023-10-12 RX ORDER — MULTIPLE VITAMINS W/ MINERALS TAB 9MG-400MCG
1 TAB ORAL DAILY
Status: DISCONTINUED | OUTPATIENT
Start: 2023-10-13 | End: 2023-10-27 | Stop reason: HOSPADM

## 2023-10-12 RX ORDER — PREGABALIN 25 MG/1
25 CAPSULE ORAL 3 TIMES DAILY
Status: DISCONTINUED | OUTPATIENT
Start: 2023-10-12 | End: 2023-10-27 | Stop reason: HOSPADM

## 2023-10-12 RX ADMIN — OXYCODONE HYDROCHLORIDE 10 MG: 5 TABLET ORAL at 00:43

## 2023-10-12 RX ADMIN — POTASSIUM CHLORIDE 40 MEQ: 1500 TABLET, EXTENDED RELEASE ORAL at 06:17

## 2023-10-12 RX ADMIN — ACETAMINOPHEN 650 MG: 325 TABLET ORAL at 16:09

## 2023-10-12 RX ADMIN — HYDROMORPHONE HYDROCHLORIDE 0.5 MG: 1 INJECTION, SOLUTION INTRAMUSCULAR; INTRAVENOUS; SUBCUTANEOUS at 14:04

## 2023-10-12 RX ADMIN — ALUMINUM HYDROXIDE, MAGNESIUM HYDROXIDE, AND SIMETHICONE 30 ML: 200; 200; 20 SUSPENSION ORAL at 22:10

## 2023-10-12 RX ADMIN — OXYCODONE HYDROCHLORIDE 10 MG: 5 TABLET ORAL at 20:23

## 2023-10-12 RX ADMIN — METRONIDAZOLE 500 MG: 500 INJECTION, SOLUTION INTRAVENOUS at 04:20

## 2023-10-12 RX ADMIN — FOLIC ACID 1 MG: 1 TABLET ORAL at 08:14

## 2023-10-12 RX ADMIN — HYDROMORPHONE HYDROCHLORIDE 0.5 MG: 1 INJECTION, SOLUTION INTRAMUSCULAR; INTRAVENOUS; SUBCUTANEOUS at 22:09

## 2023-10-12 RX ADMIN — PIPERACILLIN AND TAZOBACTAM 3.38 G: 3; .375 INJECTION, POWDER, LYOPHILIZED, FOR SOLUTION INTRAVENOUS at 16:09

## 2023-10-12 RX ADMIN — ACETAMINOPHEN 650 MG: 325 TABLET ORAL at 10:06

## 2023-10-12 RX ADMIN — MAGNESIUM SULFATE HEPTAHYDRATE 2 G: 40 INJECTION, SOLUTION INTRAVENOUS at 06:17

## 2023-10-12 RX ADMIN — OXYCODONE HYDROCHLORIDE 10 MG: 5 TABLET ORAL at 16:09

## 2023-10-12 RX ADMIN — PANTOPRAZOLE SODIUM 40 MG: 40 INJECTION, POWDER, FOR SOLUTION INTRAVENOUS at 05:06

## 2023-10-12 RX ADMIN — PIPERACILLIN AND TAZOBACTAM 3.38 G: 3; .375 INJECTION, POWDER, LYOPHILIZED, FOR SOLUTION INTRAVENOUS at 08:14

## 2023-10-12 RX ADMIN — INFLUENZA A VIRUS A/VICTORIA/4897/2022 IVR-238 (H1N1) ANTIGEN (FORMALDEHYDE INACTIVATED), INFLUENZA A VIRUS A/DARWIN/9/2021 SAN-010 (H3N2) ANTIGEN (FORMALDEHYDE INACTIVATED), INFLUENZA B VIRUS B/PHUKET/3073/2013 ANTIGEN (FORMALDEHYDE INACTIVATED), AND INFLUENZA B VIRUS B/MICHIGAN/01/2021 ANTIGEN (FORMALDEHYDE INACTIVATED) 0.5 ML: 15; 15; 15; 15 INJECTION, SUSPENSION INTRAMUSCULAR at 10:08

## 2023-10-12 RX ADMIN — METRONIDAZOLE 500 MG: 500 INJECTION, SOLUTION INTRAVENOUS at 20:02

## 2023-10-12 RX ADMIN — PREGABALIN 25 MG: 25 CAPSULE ORAL at 20:02

## 2023-10-12 RX ADMIN — Medication 15 ML: at 08:14

## 2023-10-12 RX ADMIN — ENOXAPARIN SODIUM 40 MG: 40 INJECTION SUBCUTANEOUS at 10:08

## 2023-10-12 RX ADMIN — ACETAMINOPHEN 650 MG: 325 TABLET ORAL at 00:43

## 2023-10-12 RX ADMIN — METRONIDAZOLE 500 MG: 500 INJECTION, SOLUTION INTRAVENOUS at 12:22

## 2023-10-12 RX ADMIN — POTASSIUM CHLORIDE 40 MEQ: 1500 TABLET, EXTENDED RELEASE ORAL at 12:22

## 2023-10-12 RX ADMIN — THIAMINE HCL TAB 100 MG 100 MG: 100 TAB at 08:14

## 2023-10-12 RX ADMIN — ACETAMINOPHEN 650 MG: 325 TABLET ORAL at 05:06

## 2023-10-12 RX ADMIN — LEVOTHYROXINE SODIUM 112 MCG: 0.11 TABLET ORAL at 05:15

## 2023-10-12 RX ADMIN — HYDROMORPHONE HYDROCHLORIDE 0.5 MG: 1 INJECTION, SOLUTION INTRAMUSCULAR; INTRAVENOUS; SUBCUTANEOUS at 04:20

## 2023-10-12 RX ADMIN — OXYCODONE HYDROCHLORIDE 10 MG: 5 TABLET ORAL at 05:06

## 2023-10-12 RX ADMIN — ACETAMINOPHEN 650 MG: 325 TABLET ORAL at 20:24

## 2023-10-12 RX ADMIN — FUROSEMIDE 20 MG: 10 INJECTION, SOLUTION INTRAMUSCULAR; INTRAVENOUS at 08:14

## 2023-10-12 RX ADMIN — OXYCODONE HYDROCHLORIDE 10 MG: 5 TABLET ORAL at 10:07

## 2023-10-12 ASSESSMENT — ACTIVITIES OF DAILY LIVING (ADL)
ADLS_ACUITY_SCORE: 38
ADLS_ACUITY_SCORE: 38
ADLS_ACUITY_SCORE: 36
ADLS_ACUITY_SCORE: 38
ADLS_ACUITY_SCORE: 36
ADLS_ACUITY_SCORE: 38
ADLS_ACUITY_SCORE: 36
ADLS_ACUITY_SCORE: 36
ADLS_ACUITY_SCORE: 38
ADLS_ACUITY_SCORE: 30

## 2023-10-12 NOTE — PROGRESS NOTES
PALLIATIVE CARE SOCIAL WORK Progress Note   Location: [facility]      Joint visit with Palliative MD Harlan Greene. Met with Pt and spouse Arjun. Pt was awake, alert, oriented. She was able to answer questions, at times would wander off topic or have trouble answering deeper questions. She was able to talk about the recommendation for diverting colostomy and she wants to move forward with this. She and Arjun have talked and Arjun says he is in agreement with it if it is what she wants to do and it will help her. Talked with Pt about her goals for care and she said she wants to continue to be able to talk and she wants to get better if she can. She is ok with following recommendations of medical team and pursuing restorative care. Arjun voiced what a long road she has ahead of her and he worries that this is going to be difficult. Pt said sometimes she worries too. Discussed that the road to recovery will be long and that she may have some setbacks along the way. Encouraged them to talk with medical team as they are going through this and affirmed that she is able to adjust her goals over time, depending on how things are going.   Pt is coping ok at this time. She talks about how hard it was to not be able to communicate when she was on the ventilator, and how happy she is to be able to talk now.       Plan: Goals are clear at this time. Palliative team may sign off, but can be re-consulted as needed.      Clinical Social Work Interventions:   Goals of care discussion/facilitation  Other: general emotional support.       Elke Malloy [facility]  [facility], Palliative Care  Securely message with the Vocera Web Console (learn more here) or  Text page via [employer] Paging/Directory

## 2023-10-12 NOTE — PROGRESS NOTES
Lidia Nam is extubated and improved significantly.  Off pressors.        Vitals:    10/12/23 0545 10/12/23 0600 10/12/23 0745 10/12/23 0800   BP:  105/54 92/53 92/50   BP Location:       Pulse: 77 104 76 75   Resp: 18 19 20 14   Temp:    97.4  F (36.3  C)   TempSrc:    Oral   SpO2: 99% 98% 97% 98%   Weight:       Height:           PHYSICAL EXAM:  GEN: No acute distress, comfortable  Posterior leg and gluteal region with evidence of exposed viable adipose tissue and musculature.  EXT:No cyanosis, edema or obvious abnormalities        Recent Labs   Lab 10/12/23  0505   WBC 16.5*   HGB 9.6*   HCT 30.1*   *       Recent Labs   Lab 10/12/23  0505      CO2 29   BUN 12.8         A/P:  Lidia Nam is s/p removal of necrotizing soft tissue infection  -She tolerated extubation well.  She is currently off pressors and doing relatively well.  However, she continues to soil her wound despite having a rectal tube.  She has agreed to a diverting colostomy.  However, she would like to discuss her options with her .  -We will continue to follow    Dung Carranza, DO  FACS  [phone]  [facility] Department of Surgery

## 2023-10-12 NOTE — PROGRESS NOTES
"Critical Care Progress Note      10/12/2023    Name: Liida Nam MRN#: [id number]   Age: 63 year old YOB: 1960     Hsptl Day# 10  ICU DAY #    MV DAY #             Problem List:   Principal Problem:    Severe sepsis (H)  Active Problems:    Type 2 diabetes mellitus with hyperglycemia, without long-term current use of insulin (H)    Necrotizing soft tissue infection    Septic shock (H)    Clinically Significant Risk Factors        # Hypokalemia: Lowest K = 3.1 mmol/L in last 2 days, will replace as needed       # Hypoalbuminemia: Lowest albumin = 1.7 g/dL at 10/3/2023  5:21 AM, will monitor as appropriate           # DMII: A1C = 6.8 % (Ref range: 0.0 - 5.6 %) within past 6 months     # Obesity: Estimated body mass index is 36.21 kg/m  as calculated from the following:    Height as of this encounter: 1.575 m (5' 2\").    Weight as of this encounter: 89.8 kg (198 lb).            Code Status: No CPR- Pre-arrest intubation OK                    Summary/Hospital Course:     51-year-old female with diabetes mellitus and obesity who presented with necrotizing fasciitis requiring incision and debridement from left groin area all the way down to her heel.    I have personally reviewed the daily labs, imaging studies, cultures and discussed the case with referring physician and consulting physicians.     My assessment and plan by system for this patient is as follows:      Patient stated she needs to talk to her  regarding diverting colostomy.  Overall she is doing pretty well.  We will keep in the ICU for now since her blood pressure is borderline low and I think it is mainly secondary to aggressive diuresis over the past few days.    Neurology/Psychiatry:   Consulted pain service  Started Lyrica/acetaminophen  As needed dilaudid/oxycodone      Cardiovascular:   Septic shock secondary to necrotizing fasciitis.  BP is borderline. Will stop lasix. Weight based she is below er admission weight. Swelling " has improved significantly     Pulmonary/Ventilator Management:     Hypoxic resp failure resolved. Off oxygen      GI and Nutrition :   PO diet    Renal/Fluids/Electrolytes:   Well diuresed  Her I/O's in the chart are not accurate. Based on weight she is actually down.       - monitor function and electrolytes as needed with replacement per ICU protocols.  - generally avoid nephrotoxic agents such as NSAID, IV contrast unless specifically required  - adjust medications as needed for renal clearance  - follow I/O's as appropriate.    Infectious Disease:   Necrotizing fasciitis with polymicrobial infection, Bacteroides and actinomyces  Flagyl and Zosyn    Would probably need diverting ostomy to maintain clean wound but family does not want to escalate cares    H. influenzae and streptococcal pneumonia    Endocrine:   Continue insulin, sliding scale/Lantus.  She is occasionally having low blood sugars.  Decrease Lantus to 15 units twice daily and see how she responds.    Continue levothyroxine  Very large goiter  Plan  - ICU insulin protocol, goal sugar <180        ICU Prophylaxis:   1. DVT: LMWH  3. Stress Ulcer: PPI           Key Medications:      sennosides  5 mL Oral or Feeding Tube BID    And    docusate  50 mg Oral BID    enoxaparin ANTICOAGULANT  40 mg Subcutaneous Q24H    folic acid  1 mg Oral or Feeding Tube Daily    [Held by provider] furosemide  20 mg Intravenous Daily    insulin aspart  1-12 Units Subcutaneous 4x Daily AC & HS    insulin glargine  20 Units Subcutaneous BID    levothyroxine  112 mcg Oral or Feeding Tube Daily    metroNIDAZOLE  500 mg Intravenous Q8H    multivitamins w/minerals  15 mL Oral or Feeding Tube Daily    pantoprazole  40 mg Intravenous Q24H    piperacillin-tazobactam  3.375 g Intravenous Q8H    polyethylene glycol  17 g Oral or Feeding Tube Daily    pregabalin  25 mg Oral TID    sodium chloride (PF)  3 mL Intracatheter Q8H    thiamine  100 mg Oral or Feeding Tube Daily                    Physical Examination:   Temp:  [97.4  F (36.3  C)-99.2  F (37.3  C)] 97.7  F (36.5  C)  Pulse:  [] 89  Resp:  [12-32] 21  BP: ()/(50-69) 112/56  SpO2:  [93 %-99 %] 95 %    Intake/Output Summary (Last 24 hours) at 10/9/2023 1314  Last data filed at 10/9/2023 1200  Gross per 24 hour   Intake 4133.9 ml   Output 3950 ml   Net 183.9 ml     Wt Readings from Last 4 Encounters:   10/12/23 89.8 kg (198 lb)   05/01/23 101 kg (222 lb 11.2 oz)   03/14/23 103.9 kg (229 lb)   11/15/22 103.9 kg (229 lb)     BP - Mean:  [66-84] 80  Vent Mode: (S) CPAP/PS  (Continuous positive airway pressure with Pressure Support)  FiO2 (%): 30 %  Resp Rate (Set): 16 breaths/min  Tidal Volume (Set, mL): 400 mL  PEEP (cm H2O): 5 cmH2O  Pressure Support (cm H2O): (S) 5 cmH2O  Resp: 21    Recent Labs   Lab 10/09/23  0446   PH 7.38       GEN: no acute distress   HEENT: head ncat, sclera anicteric, OP patent, trachea midline   PULM: clear anteriorly    CV/COR: RRR S1S2 no gallop,  No rub, no murmur  ABD: soft nontender, hypoactive bowel sounds, no mass  EXT: Peripheral edema on all extremities  NEURO: Awake, answers questions appropriately   SKIN: Groin and left lower extremity wrapped.  Incision is open, please refer to the pictures in chart  LINES: clean, dry intact         Data:   All data and imaging reviewed     ROUTINE ICU LABS (Last four results)  CMP  Recent Labs   Lab 10/12/23  1144 10/12/23  1134 10/12/23  0757 10/12/23  0505 10/12/23  0045 10/11/23  0811 10/11/23  0511 10/10/23  1103 10/10/23  1059 10/10/23  0815 10/10/23  0437 10/09/23  0449 10/09/23  0446   NA  --   --   --  137  --   --  134*  --   --   --  138  --  145   POTASSIUM 3.3*  --   --  3.1*  --   --  3.8  --  3.9  --  2.9*   < > 3.4   CHLORIDE  --   --   --  101  --   --  98  --   --   --  104  --  116*   CO2  --   --   --  29  --   --  28  --   --   --  26  --  23   ANIONGAP  --   --   --  7  --   --  8  --   --   --  8  --  6*   GLC  --  102* 77 87 111*   < >  "310*   < >  --    < > 153*  151*   < > 163*   BUN  --   --   --  12.8  --   --  15.2  --   --   --  12.4  --  13.7   CR  --   --   --  0.45*  --   --  0.43*  --   --   --  0.44*  --  0.45*   GFRESTIMATED  --   --   --  >90  --   --  >90  --   --   --  >90  --  >90   JW  --   --   --  9.1  --   --  9.3  --   --   --  8.1*  --  8.5*   MAG  --   --   --  1.9  --   --  1.8  --   --   --  1.6*  --  1.8    < > = values in this interval not displayed.     CBC  Recent Labs   Lab 10/12/23  0505 10/11/23  0511 10/10/23  0437 10/09/23  0446   WBC 16.5* 12.5* 12.9* 15.9*   RBC 3.10* 2.91* 2.66* 3.11*   HGB 9.6* 9.2* 8.2* 9.7*   HCT 30.1* 28.0* 25.9* 30.3*   MCV 97 96 97 97   MCH 31.0 31.6 30.8 31.2   MCHC 31.9 32.9 31.7 32.0   RDW 17.0* 16.9* 17.0* 17.6*   * 420 340 313     INR  No lab results found in last 7 days.    Arterial Blood Gas  Recent Labs   Lab 10/09/23  0446   PH 7.38       All cultures:  No results for input(s): \"CULT\" in the last 168 hours.  No results found for this or any previous visit (from the past 24 hour(s)).      30min spent with the patient         "

## 2023-10-12 NOTE — PROGRESS NOTES
"INFECTIOUS DISEASE FOLLOW UP NOTE    Date: 10/12/2023   CHIEF COMPLAINT:   Chief Complaint   Patient presents with    Groin Pain    Shortness of Breath    Generalized Weakness        ASSESSMENT:    Necrotizing fasciitis: CT with necrotizing fasciitis L groin, perineum, and visualized LLE now down to ankle. s/p OR 10/2, GS and cultures are polymicrobial-bacteroides and actino on culture so far. Repeat OR 10/4 with progressive disease. Active issue.   Severe sepsis: pressors in ICU  DM2  Goiter: ENT intubated in OR.   Sputum with haemophilus and strep constellatus     PLAN:  - continue zosyn, metronidazole IV for now  - likely PO abx at some point pending clinical course  - ID will follow intermittently, please call with questions.     Veronica Wilburn MD  [facility]  Direct messaging: Qnary Paging   On-Call ID provider: [phone], option: 9       ______________________________________________________________________    SUBJECTIVE / INTERVAL HISTORY:   Extubated. Doing ok. Tolerating antibiotics.       OBJECTIVE:  /56   Pulse 84   Temp 97.7  F (36.5  C) (Oral)   Resp 17   Ht 1.575 m (5' 2\")   Wt 89.8 kg (198 lb)   LMP  (LMP Unknown)   SpO2 95%   BMI 36.21 kg/m      Vent Mode: (S) CPAP/PS  (Continuous positive airway pressure with Pressure Support)  FiO2 (%): 30 %  Resp Rate (Set): 16 breaths/min  Tidal Volume (Set, mL): 400 mL  PEEP (cm H2O): 5 cmH2O  Pressure Support (cm H2O): (S) 5 cmH2O  Resp: 17    GEN: extubated, no distress  RESPIRATORY:  no respiratory distress  CARDIOVASCULAR:  regular, no murmur   ABDOMEN:  Soft, normal bowel sounds, non-tender,   EXTREMITIES: No edema.  SKIN/HAIR/NAILS:  left lower extremities wrapped    NEURO: grossly intact  IV: central lines      Antibiotics:  Zosyn  flagyl    Pertinent labs:  No results found for: \"CRP\"   CBC RESULTS:   Recent Labs   Lab Test 10/04/23  0357   WBC 16.8*   RBC 2.84*   HGB 9.0*   HCT 27.5*   MCV 97   MCH 31.7 "   MCHC 32.7   RDW 14.6         Last Comprehensive Metabolic Panel:  Sodium   Date Value Ref Range Status   10/12/2023 137 135 - 145 mmol/L Final     Comment:     Reference intervals for this test were updated on 09/26/2023 to more accurately reflect our healthy population. There may be differences in the flagging of prior results with similar values performed with this method. Interpretation of those prior results can be made in the context of the updated reference intervals.      Potassium   Date Value Ref Range Status   10/12/2023 3.3 (L) 3.4 - 5.3 mmol/L Final   06/23/2020 4.4 3.5 - 5.0 mmol/L Final     Chloride   Date Value Ref Range Status   10/12/2023 101 98 - 107 mmol/L Final   06/23/2020 98 98 - 107 mmol/L Final     Carbon Dioxide (CO2)   Date Value Ref Range Status   10/12/2023 29 22 - 29 mmol/L Final   06/23/2020 32 (H) 22 - 31 mmol/L Final     Anion Gap   Date Value Ref Range Status   10/12/2023 7 7 - 15 mmol/L Final   06/23/2020 5 5 - 18 mmol/L Final     Glucose   Date Value Ref Range Status   06/23/2020 115 70 - 125 mg/dL Final     GLUCOSE BY METER POCT   Date Value Ref Range Status   10/12/2023 102 (H) 70 - 99 mg/dL Final     Urea Nitrogen   Date Value Ref Range Status   10/12/2023 12.8 8.0 - 23.0 mg/dL Final   06/23/2020 8 8 - 22 mg/dL Final     Creatinine   Date Value Ref Range Status   10/12/2023 0.45 (L) 0.51 - 0.95 mg/dL Final     GFR Estimate   Date Value Ref Range Status   10/12/2023 >90 >60 mL/min/1.73m2 Final   06/23/2020 >60 >60 mL/min/1.73m2 Final     GFR, ESTIMATED POCT   Date Value Ref Range Status   10/02/2023 >60 >60 mL/min/1.73m2 Final     Calcium   Date Value Ref Range Status   10/12/2023 9.1 8.8 - 10.2 mg/dL Final        MICROBIOLOGY DATA:  Personally reviewed.  7-Day Micro Results       No results found for the last 168 hours.             RADIOLOGY:  Personally Reviewed.  Recent Results (from the past 24 hour(s))   XR Chest Port 1 View    Narrative    EXAM: XR CHEST PORT 1  VIEW  LOCATION: [facility]  DATE: 10/4/2023    INDICATION: ETT position  COMPARISON: 10/3/2023      Impression    IMPRESSION: Endotracheal tube terminates 4.0 cm above the suresh. Right upper extremity PICC in the lower SVC. Enteric decompression tube descends below the diaphragm, but the tip is outside the field-of-view.    Decreased left basilar airspace opacities compared to prior. No pleural effusion or pneumothorax. Normal cardiomediastinal silhouette.       Principal Problem:    Severe sepsis (H)  Active Problems:    Type 2 diabetes mellitus with hyperglycemia, without long-term current use of insulin (H)    Necrotizing soft tissue infection    Septic shock (H)

## 2023-10-12 NOTE — CONSULTS
[facility] ACUTE PAIN SERVICE    ([facility], [facility], [facility])   Consult Note    Date of Admission:  10/2/2023  Date of Consult: 10/12/23    Physician requesting consult: Gurpreet Nguyen MD    Reason for consult: extensive surgical wounds     Assessment/Plan:     Lidia Nam is a 63 year old female who was admitted on 10/2/2023.  8 Days Post-Op emergent surgery for removal of dead tissue and debridement due to necrotizing fascitis infection.  Pain Service is asked to see the patient for pain associated with extensive surgical wounds.  Admitted for groin pain, shortness of breath and generalized weakness.  On exam she was found to have necrotizing infection of her left groin.  She was started on IV antibiotics and Surgery was Consulted.    Patient's medical history significant for DM II, thyromegaly, hypothyroidism, nicotine dependence, and morbid obesity.   The patient does smoke, does report alcohol use and denies chemical dependency history.       Over the past 24 hours patient received;  5(10mg) and 3(0.5mg) IV hydromorphone   In addition to fentanyl infusion which was discontinued yesterday.      Patient describes pain in her joints, generalized pain all of the time.  She has increased pain with dressing changes which is pretty intense for her.    Reviewed with Nursing.  Increased pain with movement and dressing changes which she is requiring about every couple hours due to leaking stool.    Patient currently in process of deciding to have colostomy placed which is currently preoccupying her attention. She plans to discuss this with her  and make a decision once they have had a chance to talk.        PLAN:   1) Pain is consistent with necrotizing soft tissue infection, now post op. The patient's home MME was none mg daily, was on fentanyl infusion and boluses while intubated in the ICU, now off fentanyl, should be considered opioid tolerant. Agree with palliative care.    2)Multimodal Medication Therapy  Topicals: defer to WOC for wound care otherwise will add diclofenac alternating with lidocaine for joint pain/musculoskeletal pain  Lidocaine/Voltaren for musculoskeletal and joint pain prn  NSAID'S: consider will discuss with Intensivist  Muscle Relaxants:  Adjuvants: lyrica 25 mg tid   add scheduled APAP 975mg TID  Atarax 25mg q6hprn  Consider adding NSAID   Antidepressants/anxiolytics:  Opioids: oxycodone 5-10mg q4hprn   IV Pain medication: IV Dilaudid 0.5mg q2hprn prior to dressing changes  3)Non-medication interventions  Pharmacy consult- appreciate recommendations   Acupuncture consult- please offer  Integrative consult - called referral to [phone]   4)Constipation Prophylaxis: Senna BIDPRN, miralax and senna Scheduled     5) Follow up   -Opioid prescriber has been none regularly as outpatient  -Discharge Recommendations - We recommend prescribing the following at the time of discharge: TBD          History of Present Illness (HPI):       Lidia Nam is a 63 year old female .  The pain is reported to be acute on chronic, located in the wound (leg/buttock).  Patient with generalized musculoskeletal pain with inactivity contributing, with acute pain from large wounds requiring frequent dressing changes.  Patient otherwise does not have history of chronic pain.    Review of medical record/Summary of labs and care everywhere.  Reviewed clinic note from podiatry in May 2023. This indicated that has struggled with care of feet/legs, DM2. Care everywhere images included wound pictures from wound nurse consult  taken on 10/11/23.     MN  pulled from system on 10/12/23.  No opioid prescriptions over this past year       Medical History  Patient Active Problem List    Diagnosis Date Noted    Septic shock (H) 10/04/2023     Priority: Medium    Hard to intubate 10/02/2023     Priority: Medium    Severe sepsis (H) 10/02/2023     Priority: Medium    Necrotizing soft tissue infection  10/02/2023     Priority: Medium    Type 2 diabetes mellitus with hyperglycemia, without long-term current use of insulin (H) 09/07/2022     Priority: Medium    Morbid obesity (H) 09/07/2022     Priority: Medium    Nicotine dependence 09/06/2022     Priority: Medium     Formatting of this note might be different from the original.  Created by [facility] Annotation: Nov 21 2008 11:22AM Ana Jeter: one   ppd      Thyromegaly 06/25/2020     Priority: Medium    Obesity      Priority: Medium     Created by Conversion        Nicotine Dependence      Priority: Medium     Created by [facility] Annotation: Nov 21 2008 11:22AM - Ana Goetz:   one   ppd        Urticaria      Priority: Medium     Created by [facility] Annotation: Nov 21 2008 11:27AM Ana Jeter:   years ago had an Epi-pen        Allergies      Priority: Medium     Created by [facility] Annotation: Nov 21 2008 11:27AM Ana Jeter:   mild seasonal -            Surgical History  She  has a past surgical history that includes APPENDECTOMY; LIGATE FALLOPIAN TUBE; PICC/Midline Placement (10/3/2023); Irrigation and debridement sacral wound, combined (Left, 10/2/2023); Irrigation and debridement lower extremity, combined (Left, 10/2/2023); Excise lesion perineal (N/A, 10/2/2023); Incision and drainage perineal, combined (Left, 10/4/2023); and Incision and drainage lower extremity, combined (Left, 10/4/2023).     Past Surgical History:   Procedure Laterality Date    EXCISE LESION PERINEAL N/A 10/2/2023    Procedure: vulvectomy;  Surgeon: Ken Howard MD;  Location: [facility] OR    INCISION AND DRAINAGE LOWER EXTREMITY, COMBINED Left 10/4/2023    Procedure: INCISION AND DRAINAGE, LEFT LEG;  Surgeon: Julito Molina MD;  Location: [facility] OR    INCISION AND DRAINAGE PERINEAL, COMBINED Left 10/4/2023    Procedure: INCISION  AND DRAINAGE, PERINEUM AND;  Surgeon: Julito Molina MD;  Location: [facility] [place]    IRRIGATION AND DEBRIDEMENT LOWER EXTREMITY, COMBINED Left 10/2/2023    Procedure: and left thigh;  Surgeon: Ken Howard MD;  Location: [facility]    IRRIGATION AND DEBRIDEMENT SACRAL WOUND, COMBINED Left 10/2/2023    Procedure: Broad debridment of left buttock;  Surgeon: Ken Howard MD;  Location: [facility] [place]    PICC TRIPLE LUMEN PLACEMENT  10/3/2023    ZZC APPENDECTOMY      Description: Appendectomy;  Recorded: 11/21/2008;    ZZC LIGATE FALLOPIAN TUBE      Description: Tubal Ligation;  Recorded: 11/21/2008;       Allergies  Allergies   Allergen Reactions    Erythromycin     Psyllium     Seasonal Allergies      Hay fever - tree pollens, dust, mold, mildew       Prior to Admission Medications   Medications Prior to Admission   Medication Sig Dispense Refill Last Dose    furosemide (LASIX) 20 MG tablet Take 1 tablet (20 mg) by mouth 2 times daily 160 tablet 1 Unknown at ?    levothyroxine (SYNTHROID/LEVOTHROID) 112 MCG tablet Take 1 tablet (112 mcg) by mouth daily 90 tablet 1 Unknown at ?    metFORMIN (GLUCOPHAGE XR) 500 MG 24 hr tablet Take 2 tablets (1,000 mg) by mouth daily (with dinner) 180 tablet 1 Unknown at ?       Social History  Reviewed, and she  reports that she has been smoking cigarettes. She has a 39.00 pack-year smoking history. She has never used smokeless tobacco. She reports current alcohol use.  Social History     Tobacco Use    Smoking status: Every Day     Packs/day: 1.00     Years: 39.00     Additional pack years: 0.00     Total pack years: 39.00     Types: Cigarettes    Smokeless tobacco: Never   Substance Use Topics    Alcohol use: Yes       Family History  Reviewed, and family history is not on file.    Review of Systems  Complete ROS reviewed, unless noted, all other systems reviewed and found to be negative.        Objective:     Physical Exam:  /55    "Pulse 75   Temp 97.4  F (36.3  C) (Oral)   Resp 17   Ht 1.575 m (5' 2\")   Wt 89.8 kg (198 lb)   LMP  (LMP Unknown)   SpO2 95%   BMI 36.21 kg/m    Weight:   Weight change: -1.588 kg (-3 lb 8 oz)  Body mass index is 36.21 kg/m .      General Appearance:  Calm,  cooperative, looking forward to meet with family to discuss decision about colostomy   Head:  Normocephalic, without obvious abnormality, atraumatic   Eyes:  PERRL, conjunctiva/corneas clear,    ENT/Throat: Large goiter,    Lymph/Neck: Supple, large goiter,    Lungs:    respirations unlabored   Chest Wall:  No tenderness or deformity   Cardiovascular/Heart:  Regular rate and rhythm Edema: bilateral trace    Abdomen:   Soft, tender   Musculoskeletal: Dressings covering entire bilateral lower extremities   Skin: Lower extremity wounds covered, WOC photos reviewed   Neurologic:    Alert and oriented X 3,        Psych: Affect is limited range            Imaging Reviewed         Labs Reviewed        MANAGEMENT DISCUSSED with the following over the past 24 hours: RN, Palliative Care MD and Intensivit   NOTE(S)/MEDICAL RECORDS REVIEWED over the past 24 hours: Intensives, Palliative Care, Spiritual Care,Surgery  Medical complexity over the past 24 hours:  - Parenteral (IV) CONTROLLED SUBSTANCES ordered  - Prescription DRUG MANAGEMENT performed        Thank you for this consultation.      Chacha MAJOR, CNS-BC, DNP  Acute Care Pain Management Program  [facility] ([place]ED[place], [place])   Preference if for [employer] Paging - Shawn  Click HERE to page Neha               "

## 2023-10-12 NOTE — PLAN OF CARE
[facility] - ICU    RN Progress Note:            Pertinent Assessments:      Please refer to flowsheet rows for full assessment     Pain team consulted for continued full body pain. Dilaudid given x1 prior to dressing change. Oral oxy and tylenol given x2. Pain improves however not very much. More lethargic as the day went on. Pt reports she's unable to fall asleep, let her rest this afternoon with minimal interruptions.            Key Events - This Shift:       Weaned off oxygen this am. Dressing changed to buttock region x3 this shift from stool leakage. Rdz patent and draining. Borderline low BP's this shift, tolerates laying on left side better than right. Family visited this afternoon. Poor oral intake. Finished breakfast around 1300. Blood glucose on the lower side this evening, pt drank orange juice and ordered dinner.              Barriers to Discharge / Downgrade:     Borderline low BP         Point of Contact Update YES-OR-NO: Yes  If No, reason:   Name:Arjun  Phone Number:  Summary of Conversation: plan of care, how pt is doing.          Goal Outcome Evaluation:

## 2023-10-12 NOTE — PROGRESS NOTES
"Care Management Follow Up    Length of Stay (days): 10    Expected Discharge Date: 10/13/2023     Concerns to be Addressed:     Care progression  Pressors and vent, IV zosyn, wound  Patient plan of care discussed at interdisciplinary rounds: Yes    Anticipated Discharge Disposition:  TBD     Anticipated Discharge Services:  TBD  Anticipated Discharge DME:  CLIFF    Patient/family educated on Medicare website which has current facility and service quality ratings:  NA  Education Provided on the Discharge Plan:  Per team  Patient/Family in Agreement with the Plan:  NA    Referrals Placed by CM/SW:  NA  Private pay costs discussed: Not applicable    Additional Information:  Discussed in rounds. Extubated and off oxygen. Clear liquid diet. Per WOC not appropriate for wound vac. Palliative following.    Social Hx: \"Pt lives with spouse, he assists as needed.\"     RNCM to follow for medical progression, recommendations, and final discharge plan.     Ibis Lin RN     "

## 2023-10-12 NOTE — PROGRESS NOTES
"PALLIATIVE CARE PROGRESS NOTE  [facility]     Patient Name: Lidia Nam  Date of Admission: 10/2/2023   Today the patient was seen for: Symptom assessment and support with goals of care     Recommendations & Counseling     GOALS OF CARE:   Life-prolonging with limits; No CPR but Pre-Arrest Intubation ok, No escalation of care if clinical decline occurs.  Lidia has survived multiple surgeries/debridements this hospital stay for the treatment of necrotizing fasciitis of the perineum.  She was successfully extubated and now off of vasopressors.  Her current goals are to continue with the present treatment plan in hopes of recovery this includes consenting to diverting colostomy as recommended by general surgery.  Lidia and  Arjun would appreciate further discussions around \"where do we go from here?\"  and \"how long will it take to get home?\"     ADVANCE CARE PLANNING:  No health care directive on file. Per  informed consent policy, next of kin should be involved if patient becomes unable.  Hopefully this can be completed in days ahead if Lidia is decisional and willing/able to name surrogate decision makers.  There is no POLST form on file, recommend to complete prior to DC.  Code status: No CPR- Pre-arrest intubation OK     MEDICAL MANAGEMENT:   Palliative is not actively managing symptoms at this time.     PSYCHOSOCIAL/SPIRITUAL:  Family  (42 yrs) to Arjun, son Joby, daughter Radha.  No grandchildren.  [place] community: Yazidi. Raised Hoah[employer]ism, attended [employer] grade [employer]. Hasn't attended Gnosticism services in years,  not aware if she has active prayer life. Will request spiritual care for support.      Thank you for the consult and allowing us to aid in the care of Lidia Nam.  Given her clinical improvement and clear goals of care, palliative will sign off.  Please do not hesitate to contact or reconsult if needs arise.    Leonidas Greene, " DO  Securely message with [employer] (more info)  Text page via [facility] Paging/Directory          Interval History:     Multidisciplinary collaboration:  Independently reviewed notes within EMR.  Spoke directly with primary medical team regarding patient management.    Patient has been receiving PRN doses of PO oxycodone in addition to IV hydromorphone.  Pain team has been consulted for help with symptom management given the expected long rehab course.    Patient/family narrative  Visited Lidia twice today, the second time in early afternoon when  Arjun was present.  They both seem to have an understanding of how well Lidia is doing after such a serious illness and aggressive treatment plan, and yet she has a long road to recovery going forward.  Lidia describes severe body wide pain everywhere, fortunately pain team has been consulted to help with symptom management for now and through her expected long recovery.  She denies feeling overly anxious or worried, and still hopeful that she will continue to improve to the point of leaving in the hospital and eventually returning home.  Arjun has worries about how Lidia will recover and whether or not she will be able to return home, he feels he has limited physical abilities to help with cares and they reportedly have a difficult to navigate home with multiple stairs.    Lidia has been informed of the surgical recommendation of colostomy and she is in agreement with this plan.  At this time her goals clearly remain restorative, although she has said that there may be a time in the future when she makes different decisions based on what choices are available.    All questions answered and concerns addressed.    Palliative Summary/HPI          Lidia Nam is a 63 year old female with a documented PMH of goiter, tobacco use disorder, AUD, DM2 and obesity who was admitted to hospital on 10/2/23 with groin pain and septic shock.  CT imaging confirmed  "necrotizing fasciitis and she was taken emergently to the OR for debridement of the L side of perineum, L buttock, L leg.  She was subsequently taken to the OR 10/3/2023 for L vulvectomy, debridement of L buttock, L posterior thigh to L knee; and for debridement of L side of mons pubis and skin flaps; and finally 10/4/23 further debridement of the L mons pubis, as well as L gluteal/posterior thigh and extending down to the L ankle.  Lidia was successfully extubated 10/11/23 and weaned off of pressors.            Review of Systems:     Besides above, an additional system ROS was reviewed and is unremarkable             Physical Exam:   /56   Pulse 84   Temp 97.7  F (36.5  C) (Oral)   Resp 17   Ht 1.575 m (5' 2\")   Wt 89.8 kg (198 lb)   LMP  (LMP Unknown)   SpO2 95%   BMI 36.21 kg/m    General:  WDWN.  Appears stated age.  NAD.   HENT:  Normocephalic and atraumatic.  Hearing intact.   Eyes:  Conjunctivae are clear.  Sclera is nonicteric.   Cardiovascular:  Without cyanosis or mottling.  Respiratory:  Breathing comfortably without increased work of breathing or signs of respiratory distress.  Able to speak in complete sentences.    Skin:  Without diaphoresis.    Neuro:  Alert, mentation intact, conversational and speech normal.   Psych:  Appropriate mood and affect.  No sign of confusion or delusion.  Patient seems to have insight with regards to medical condition and therapeutic options.    Wt Readings from Last 8 Encounters:   10/12/23 89.8 kg (198 lb)   05/01/23 101 kg (222 lb 11.2 oz)   03/14/23 103.9 kg (229 lb)   11/15/22 103.9 kg (229 lb)   09/07/22 105.1 kg (231 lb 12 oz)   08/31/22 102.1 kg (225 lb)   08/14/20 109.3 kg (241 lb)   06/23/20 97.5 kg (215 lb)              Current Problem List:   Principal Problem:    Severe sepsis (H)  Active Problems:    Type 2 diabetes mellitus with hyperglycemia, without long-term current use of insulin (H)    Necrotizing soft tissue infection    Septic shock " (H)      Allergies   Allergen Reactions    Erythromycin     Psyllium     Seasonal Allergies      Hay fever - tree pollens, dust, mold, mildew              Data Reviewed:     Results for orders placed or performed during the hospital encounter of 10/02/23 (from the past 24 hour(s))   Glucose by meter   Result Value Ref Range    GLUCOSE BY METER POCT 191 (H) 70 - 99 mg/dL   Glucose by meter   Result Value Ref Range    GLUCOSE BY METER POCT 156 (H) 70 - 99 mg/dL   Glucose by meter   Result Value Ref Range    GLUCOSE BY METER POCT 111 (H) 70 - 99 mg/dL   Basic metabolic panel   Result Value Ref Range    Sodium 137 135 - 145 mmol/L    Potassium 3.1 (L) 3.4 - 5.3 mmol/L    Chloride 101 98 - 107 mmol/L    Carbon Dioxide (CO2) 29 22 - 29 mmol/L    Anion Gap 7 7 - 15 mmol/L    Urea Nitrogen 12.8 8.0 - 23.0 mg/dL    Creatinine 0.45 (L) 0.51 - 0.95 mg/dL    GFR Estimate >90 >60 mL/min/1.73m2    Calcium 9.1 8.8 - 10.2 mg/dL    Glucose 87 70 - 99 mg/dL   CBC with platelets   Result Value Ref Range    WBC Count 16.5 (H) 4.0 - 11.0 10e3/uL    RBC Count 3.10 (L) 3.80 - 5.20 10e6/uL    Hemoglobin 9.6 (L) 11.7 - 15.7 g/dL    Hematocrit 30.1 (L) 35.0 - 47.0 %    MCV 97 78 - 100 fL    MCH 31.0 26.5 - 33.0 pg    MCHC 31.9 31.5 - 36.5 g/dL    RDW 17.0 (H) 10.0 - 15.0 %    Platelet Count 504 (H) 150 - 450 10e3/uL   Magnesium   Result Value Ref Range    Magnesium 1.9 1.7 - 2.3 mg/dL   Glucose by meter   Result Value Ref Range    GLUCOSE BY METER POCT 77 70 - 99 mg/dL   Glucose by meter   Result Value Ref Range    GLUCOSE BY METER POCT 102 (H) 70 - 99 mg/dL   Potassium   Result Value Ref Range    Potassium 3.3 (L) 3.4 - 5.3 mmol/L         Medical Decision Making       55 MINUTES SPENT BY ME on the date of service doing chart review, history, exam, documentation & further activities per the note.

## 2023-10-12 NOTE — PROGRESS NOTES
SPIRITUAL HEALTH SERVICES Progress Note  [facility]  359    LOS visit with Lidia.  I introduced myself and explained the role of  for patients.  Lidia told me a little bit about her family and where she lives.  She got repetitive  telling me about her family a couple of times.  No other needs expressed.  I did let her know how to request a  should she want another visit.    Shoshana Goodwin MA  Associate   [phone] - pager  [facility] remains available 24/7 for emergent requests/referrals, either by having the on-call  paged or by entering an ASAP/STAT consult in Epic (this will also page the on-call ). Routine Epic consults receive an initial response within 24 hours.

## 2023-10-12 NOTE — PROGRESS NOTES
"RN Progress Note:          Assessments Summary:      Please refer to flowsheet rows for full assessment      Neurologic  -Alert and oriented to person, time, situation and place  -Often loses train of thought and rambles/doesn't finish sentences during conversations/assessments    Cardiac  - SR     Respiratory  - Nasal cannula weaned to 1-2L.   - Attempted RA overnight; SPO2s <90% prompting supplemental O2 restart     Genitourinary & Gastrointestinal  - Rdz in place with adequate UOP.   - Diuresed approx 500mL with Lasix  - Large amounts of gas and brown liquid stool emptied from [employer].   - Multiple incontinent episodes overnight where stool leaked around tube and into perineal wound/dressing. Troubleshooting unsuccessful with identifying cause (drained WNL and no leakage when RN in room, prompting pt to bear down). Will continue to manage and prevent leakage as able    Wound/Integumentary  - Wound dressing changed after each BM episode. Wound bed pink with moderate to large amounts of slough   - Irrigated and cleansed per WOC orders  - Despite vigilant monitoring, wound bed contaminated with stool multiple times due to incontinence      Musculoskeletal  - Able to assist minimally with turns.   - Able to hold self in position on R side when RN needed to perform giovanna cleaning or wound care     Labs & Vitals  -K+ protocol prompted PO KCL to be given; recheck at 1104 10/12  -Mg+ protocol prompted IV Magnesium to be given; recheck in AM 10/13  - BG better controlled; changed to AC/HS  -MAP >65 during my shift, not requiring pressor use     Medication & gtts  - Pre-medicated wound dressing changes with IV Dilaudid x3  - PRN Tylenol and Oxy given as ordered PRN x3 for c/o \"constant\" wound/incision discomfort          Mobility:      - Bed mobility encouraged          Key Events this Shift:      - Had multiple BM episodes that leaked around rectal tube  - Wound dressings changed x3  - Electrolyte replacement given per " protocol  - Presence of pain required multiple PRN medications (see MAR)             Plan:      - Work with care team, patient, and family to determine goals of complicated healing/recovery care and plan accordingly            Point of Contact Update YES -  Arjun updated at bedside 10/11 at 8pm by RN

## 2023-10-13 LAB
ANION GAP SERPL CALCULATED.3IONS-SCNC: 9 MMOL/L (ref 7–15)
BUN SERPL-MCNC: 8.4 MG/DL (ref 8–23)
CALCIUM SERPL-MCNC: 9.2 MG/DL (ref 8.8–10.2)
CHLORIDE SERPL-SCNC: 102 MMOL/L (ref 98–107)
CREAT SERPL-MCNC: 0.45 MG/DL (ref 0.51–0.95)
DEPRECATED HCO3 PLAS-SCNC: 25 MMOL/L (ref 22–29)
EGFRCR SERPLBLD CKD-EPI 2021: >90 ML/MIN/1.73M2
ERYTHROCYTE [DISTWIDTH] IN BLOOD BY AUTOMATED COUNT: 17.2 % (ref 10–15)
FERRITIN SERPL-MCNC: 665 NG/ML (ref 11–328)
GLUCOSE BLDC GLUCOMTR-MCNC: 104 MG/DL (ref 70–99)
GLUCOSE BLDC GLUCOMTR-MCNC: 215 MG/DL (ref 70–99)
GLUCOSE BLDC GLUCOMTR-MCNC: 70 MG/DL (ref 70–99)
GLUCOSE BLDC GLUCOMTR-MCNC: 87 MG/DL (ref 70–99)
GLUCOSE BLDC GLUCOMTR-MCNC: 91 MG/DL (ref 70–99)
GLUCOSE BLDC GLUCOMTR-MCNC: 97 MG/DL (ref 70–99)
GLUCOSE SERPL-MCNC: 141 MG/DL (ref 70–99)
HBA1C MFR BLD: 6.3 %
HCT VFR BLD AUTO: 30.8 % (ref 35–47)
HGB BLD-MCNC: 9.8 G/DL (ref 11.7–15.7)
HOLD SPECIMEN: NORMAL
IRON BINDING CAPACITY (ROCHE): 132 UG/DL (ref 240–430)
IRON SATN MFR SERPL: 27 % (ref 15–46)
IRON SERPL-MCNC: 36 UG/DL (ref 37–145)
MAGNESIUM SERPL-MCNC: 2.2 MG/DL (ref 1.7–2.3)
MCH RBC QN AUTO: 31.6 PG (ref 26.5–33)
MCHC RBC AUTO-ENTMCNC: 31.8 G/DL (ref 31.5–36.5)
MCV RBC AUTO: 99 FL (ref 78–100)
PLATELET # BLD AUTO: 480 10E3/UL (ref 150–450)
POTASSIUM SERPL-SCNC: 3.9 MMOL/L (ref 3.4–5.3)
RBC # BLD AUTO: 3.1 10E6/UL (ref 3.8–5.2)
RETICS # AUTO: 0.17 10E6/UL (ref 0.01–0.11)
RETICS/RBC NFR AUTO: 5.4 % (ref 0.8–2.7)
SODIUM SERPL-SCNC: 136 MMOL/L (ref 135–145)
T4 FREE SERPL-MCNC: 0.93 NG/DL (ref 0.9–1.7)
WBC # BLD AUTO: 11.6 10E3/UL (ref 4–11)

## 2023-10-13 PROCEDURE — 250N000013 HC RX MED GY IP 250 OP 250 PS 637: Performed by: CLINICAL NURSE SPECIALIST

## 2023-10-13 PROCEDURE — 82728 ASSAY OF FERRITIN: CPT | Performed by: STUDENT IN AN ORGANIZED HEALTH CARE EDUCATION/TRAINING PROGRAM

## 2023-10-13 PROCEDURE — 99232 SBSQ HOSP IP/OBS MODERATE 35: CPT | Performed by: CLINICAL NURSE SPECIALIST

## 2023-10-13 PROCEDURE — 80048 BASIC METABOLIC PNL TOTAL CA: CPT | Performed by: NURSE PRACTITIONER

## 2023-10-13 PROCEDURE — 82607 VITAMIN B-12: CPT | Performed by: STUDENT IN AN ORGANIZED HEALTH CARE EDUCATION/TRAINING PROGRAM

## 2023-10-13 PROCEDURE — 83735 ASSAY OF MAGNESIUM: CPT | Performed by: INTERNAL MEDICINE

## 2023-10-13 PROCEDURE — 85027 COMPLETE CBC AUTOMATED: CPT | Performed by: NURSE PRACTITIONER

## 2023-10-13 PROCEDURE — 120N000001 HC R&B MED SURG/OB

## 2023-10-13 PROCEDURE — 250N000011 HC RX IP 250 OP 636: Performed by: INTERNAL MEDICINE

## 2023-10-13 PROCEDURE — C9113 INJ PANTOPRAZOLE SODIUM, VIA: HCPCS | Mod: JZ | Performed by: SURGERY

## 2023-10-13 PROCEDURE — 250N000013 HC RX MED GY IP 250 OP 250 PS 637: Performed by: SURGERY

## 2023-10-13 PROCEDURE — 250N000013 HC RX MED GY IP 250 OP 250 PS 637: Performed by: NURSE PRACTITIONER

## 2023-10-13 PROCEDURE — 99232 SBSQ HOSP IP/OBS MODERATE 35: CPT | Performed by: INTERNAL MEDICINE

## 2023-10-13 PROCEDURE — 99233 SBSQ HOSP IP/OBS HIGH 50: CPT | Performed by: STUDENT IN AN ORGANIZED HEALTH CARE EDUCATION/TRAINING PROGRAM

## 2023-10-13 PROCEDURE — 250N000011 HC RX IP 250 OP 636: Mod: JZ | Performed by: INTERNAL MEDICINE

## 2023-10-13 PROCEDURE — 84439 ASSAY OF FREE THYROXINE: CPT | Performed by: STUDENT IN AN ORGANIZED HEALTH CARE EDUCATION/TRAINING PROGRAM

## 2023-10-13 PROCEDURE — 250N000011 HC RX IP 250 OP 636: Mod: JZ | Performed by: SURGERY

## 2023-10-13 PROCEDURE — 82746 ASSAY OF FOLIC ACID SERUM: CPT | Performed by: STUDENT IN AN ORGANIZED HEALTH CARE EDUCATION/TRAINING PROGRAM

## 2023-10-13 PROCEDURE — 250N000013 HC RX MED GY IP 250 OP 250 PS 637: Performed by: INTERNAL MEDICINE

## 2023-10-13 PROCEDURE — 83036 HEMOGLOBIN GLYCOSYLATED A1C: CPT | Performed by: STUDENT IN AN ORGANIZED HEALTH CARE EDUCATION/TRAINING PROGRAM

## 2023-10-13 PROCEDURE — 85045 AUTOMATED RETICULOCYTE COUNT: CPT | Performed by: STUDENT IN AN ORGANIZED HEALTH CARE EDUCATION/TRAINING PROGRAM

## 2023-10-13 PROCEDURE — 99232 SBSQ HOSP IP/OBS MODERATE 35: CPT | Performed by: NURSE PRACTITIONER

## 2023-10-13 PROCEDURE — 83550 IRON BINDING TEST: CPT | Performed by: STUDENT IN AN ORGANIZED HEALTH CARE EDUCATION/TRAINING PROGRAM

## 2023-10-13 PROCEDURE — 250N000011 HC RX IP 250 OP 636: Performed by: SURGERY

## 2023-10-13 RX ORDER — PANTOPRAZOLE SODIUM 40 MG/1
40 TABLET, DELAYED RELEASE ORAL
Status: DISCONTINUED | OUTPATIENT
Start: 2023-10-14 | End: 2023-10-20

## 2023-10-13 RX ORDER — AMOXICILLIN 250 MG
1 CAPSULE ORAL 2 TIMES DAILY
Status: DISCONTINUED | OUTPATIENT
Start: 2023-10-13 | End: 2023-10-27 | Stop reason: HOSPADM

## 2023-10-13 RX ADMIN — PREGABALIN 25 MG: 25 CAPSULE ORAL at 13:03

## 2023-10-13 RX ADMIN — PANTOPRAZOLE SODIUM 40 MG: 40 INJECTION, POWDER, FOR SOLUTION INTRAVENOUS at 06:15

## 2023-10-13 RX ADMIN — Medication 60 ML: at 13:03

## 2023-10-13 RX ADMIN — LEVOTHYROXINE SODIUM 112 MCG: 0.11 TABLET ORAL at 06:15

## 2023-10-13 RX ADMIN — FOLIC ACID 1 MG: 1 TABLET ORAL at 09:15

## 2023-10-13 RX ADMIN — THIAMINE HCL TAB 100 MG 100 MG: 100 TAB at 09:15

## 2023-10-13 RX ADMIN — METRONIDAZOLE 500 MG: 500 INJECTION, SOLUTION INTRAVENOUS at 13:03

## 2023-10-13 RX ADMIN — ACETAMINOPHEN 650 MG: 325 TABLET ORAL at 04:52

## 2023-10-13 RX ADMIN — OXYCODONE HYDROCHLORIDE 10 MG: 5 TABLET ORAL at 04:52

## 2023-10-13 RX ADMIN — METRONIDAZOLE 500 MG: 500 INJECTION, SOLUTION INTRAVENOUS at 20:31

## 2023-10-13 RX ADMIN — ENOXAPARIN SODIUM 40 MG: 40 INJECTION SUBCUTANEOUS at 09:16

## 2023-10-13 RX ADMIN — PREGABALIN 25 MG: 25 CAPSULE ORAL at 09:15

## 2023-10-13 RX ADMIN — PIPERACILLIN AND TAZOBACTAM 3.38 G: 3; .375 INJECTION, POWDER, LYOPHILIZED, FOR SOLUTION INTRAVENOUS at 09:14

## 2023-10-13 RX ADMIN — PREGABALIN 25 MG: 25 CAPSULE ORAL at 20:28

## 2023-10-13 RX ADMIN — HYDROMORPHONE HYDROCHLORIDE 0.5 MG: 1 INJECTION, SOLUTION INTRAMUSCULAR; INTRAVENOUS; SUBCUTANEOUS at 11:26

## 2023-10-13 RX ADMIN — PIPERACILLIN AND TAZOBACTAM 3.38 G: 3; .375 INJECTION, POWDER, LYOPHILIZED, FOR SOLUTION INTRAVENOUS at 16:57

## 2023-10-13 RX ADMIN — METRONIDAZOLE 500 MG: 500 INJECTION, SOLUTION INTRAVENOUS at 04:53

## 2023-10-13 RX ADMIN — Medication 1 TABLET: at 09:15

## 2023-10-13 RX ADMIN — PIPERACILLIN AND TAZOBACTAM 3.38 G: 3; .375 INJECTION, POWDER, LYOPHILIZED, FOR SOLUTION INTRAVENOUS at 00:23

## 2023-10-13 ASSESSMENT — ACTIVITIES OF DAILY LIVING (ADL)
ADLS_ACUITY_SCORE: 34
ADLS_ACUITY_SCORE: 38
ADLS_ACUITY_SCORE: 34
ADLS_ACUITY_SCORE: 34
ADLS_ACUITY_SCORE: 38
ADLS_ACUITY_SCORE: 38
ADLS_ACUITY_SCORE: 34
ADLS_ACUITY_SCORE: 38
ADLS_ACUITY_SCORE: 34
ADLS_ACUITY_SCORE: 38
ADLS_ACUITY_SCORE: 38
ADLS_ACUITY_SCORE: 34

## 2023-10-13 NOTE — PROGRESS NOTES
"INFECTIOUS DISEASE FOLLOW UP NOTE    Date: 10/13/2023   CHIEF COMPLAINT:   Chief Complaint   Patient presents with    Groin Pain    Shortness of Breath    Generalized Weakness        ASSESSMENT:    Necrotizing fasciitis: CT with necrotizing fasciitis L groin, perineum, and visualized LLE now down to ankle. s/p OR 10/2, GS and cultures are polymicrobial-bacteroides and actino on culture so far. Repeat OR 10/4 with progressive disease. Active issue.   Severe sepsis: pressors in ICU  DM2  Goiter: ENT intubated in OR.   Sputum with haemophilus and strep constellatus     PLAN:  - continue zosyn, metronidazole IV for now  - likely PO abx at some point pending clinical course  - please call with questions or change in clinical status over the weekend. ID will see again next week     Veronica Wilburn MD  [facility]  Direct messaging: [facility] Paging   On-Call ID provider: [phone], option: [phone]       ______________________________________________________________________    SUBJECTIVE / INTERVAL HISTORY:   Feels ok. Hungry. States  is very nervous about surgery so they are still deciding.       OBJECTIVE:  /54   Pulse 77   Temp 98.5  F (36.9  C) (Oral)   Resp 19   Ht 1.575 m (5' 2\")   Wt 89.9 kg (198 lb 1.6 oz)   LMP  (LMP Unknown)   SpO2 98%   BMI 36.23 kg/m      Resp: 19    GEN: extubated, no distress  RESPIRATORY:  no respiratory distress  CARDIOVASCULAR:  regular, no murmur   ABDOMEN:  Soft, normal bowel sounds, non-tender,   EXTREMITIES: No edema.  SKIN/HAIR/NAILS:  left lower extremities wrapped    NEURO: grossly intact  IV: central lines      Antibiotics:  Zosyn  flagyl    Pertinent labs:  No results found for: \"CRP\"   CBC RESULTS:   Recent Labs   Lab Test 10/04/23  0357   WBC 16.8*   RBC 2.84*   HGB 9.0*   HCT 27.5*   MCV 97   MCH 31.7   MCHC 32.7   RDW 14.6         Last Comprehensive Metabolic Panel:  Sodium   Date Value Ref Range Status   10/13/2023 136 135 - 145 " mmol/L Final     Comment:     Reference intervals for this test were updated on 09/26/2023 to more accurately reflect our healthy population. There may be differences in the flagging of prior results with similar values performed with this method. Interpretation of those prior results can be made in the context of the updated reference intervals.      Potassium   Date Value Ref Range Status   10/13/2023 3.9 3.4 - 5.3 mmol/L Final   06/23/2020 4.4 3.5 - 5.0 mmol/L Final     Chloride   Date Value Ref Range Status   10/13/2023 102 98 - 107 mmol/L Final   06/23/2020 98 98 - 107 mmol/L Final     Carbon Dioxide (CO2)   Date Value Ref Range Status   10/13/2023 25 22 - 29 mmol/L Final   06/23/2020 32 (H) 22 - 31 mmol/L Final     Anion Gap   Date Value Ref Range Status   10/13/2023 9 7 - 15 mmol/L Final   06/23/2020 5 5 - 18 mmol/L Final     Glucose   Date Value Ref Range Status   10/13/2023 141 (H) 70 - 99 mg/dL Final   06/23/2020 115 70 - 125 mg/dL Final     GLUCOSE BY METER POCT   Date Value Ref Range Status   10/13/2023 97 70 - 99 mg/dL Final     Urea Nitrogen   Date Value Ref Range Status   10/13/2023 8.4 8.0 - 23.0 mg/dL Final   06/23/2020 8 8 - 22 mg/dL Final     Creatinine   Date Value Ref Range Status   10/13/2023 0.45 (L) 0.51 - 0.95 mg/dL Final     GFR Estimate   Date Value Ref Range Status   10/13/2023 >90 >60 mL/min/1.73m2 Final   06/23/2020 >60 >60 mL/min/1.73m2 Final     GFR, ESTIMATED POCT   Date Value Ref Range Status   10/02/2023 >60 >60 mL/min/1.73m2 Final     Calcium   Date Value Ref Range Status   10/13/2023 9.2 8.8 - 10.2 mg/dL Final        MICROBIOLOGY DATA:  Personally reviewed.  7-Day Micro Results       No results found for the last 168 hours.             RADIOLOGY:  Personally Reviewed.  Recent Results (from the past 24 hour(s))   XR Chest Port 1 View    Narrative    EXAM: XR CHEST PORT 1 VIEW  LOCATION: [facility]  DATE: 10/4/2023    INDICATION: ETT position  COMPARISON:  10/3/2023      Impression    IMPRESSION: Endotracheal tube terminates 4.0 cm above the suresh. Right upper extremity PICC in the lower SVC. Enteric decompression tube descends below the diaphragm, but the tip is outside the field-of-view.    Decreased left basilar airspace opacities compared to prior. No pleural effusion or pneumothorax. Normal cardiomediastinal silhouette.       Principal Problem:    Severe sepsis (H)  Active Problems:    Type 2 diabetes mellitus with hyperglycemia, without long-term current use of insulin (H)    Necrotizing soft tissue infection    Septic shock (H)

## 2023-10-13 NOTE — PROGRESS NOTES
[facility]    Medicine Progress Note - Hospitalist Service    Date of Admission:  10/2/2023    Assessment & Plan   Assessment:  63 year old woman with hx of T2DM, obesity, tobacco abuse, alcohol abuse, hypothyroid, goiter. Presented on 10/2/23 with dyspnea, cough, generalized weakness. Additionally, had increased pain, redness, and malodorous drainage from a wound in her left inguinal/vaginal area. CT scan showed necrotizing fasciitis and she was emergently taken to the OR for vulvectomy and broad debridement of the left buttock and left thigh. She returned to the ICU on pressors and was febrile. Taken back to OR and required extensive debridement from the left buttock, perineum, down to the left ankle. She required intubation by ENT due to large goiter.  Now off of pressors and extubated currently was seen to be on room air and is going to be downgraded from the ICU to telemetry floor.    Problem list and plan:  Severe sepsis and septic shock  Necrotizing soft tissue infection/necrotizing fasciitis  Acute pain in the wound/surgical site  Lactic acidosis resolved  Hypoxic respiratory failure resolved  Leukocytosis most likely in the setting of above resolving  Left upper lobe pneumonia  Presented on 10/2/2023 with dyspnea, cough, generalized weakness, increased pain, redness and malodorous drainage from a wound in her left inguinal/vaginal area  CT scan showed necrotizing fasciitis and she was emergently taken to the OR for vulvectomy and broad debridement of the left buttock and left thigh  After procedure she had to go on pressors and was febrile so taken back to the OR requiring extensive debridement from the left buttock, perineum and down into the ankle  Extensive involvement of the perineum, left groin, left leg down to the ankle  CT chest showed: Findings suspicious for left upper lobe pneumonia   Cultures with polymicrobial organism: Bacteroids and actinno, Fusobacterium  nucleatum  Sputum cultures growing haemophilus and strep constellatus, GNB  Blood cultures currently showing no growth to date  ID following currently patient on Zosyn and metronidazole  ENT intubated because of large goiter  Was on pressors for septic shock  Continue with IV antibiotics  Pain team and palliative care following  Continue with pain management as per protocol, currently on acetaminophen, Dilaudid, oxycodone  Continue with Lyrica  Now patient off of pressors as septic shock has resolved and extubated 10/11/2023 and currently on room air so being downgraded from ICU to telemetry  Holding home diuretic regimen, as per chart review patient is almost down 10 kg from admission weight, aggressively diuresed this admission, monitoring input and output, currently has adequate urine output  Patient currently appearing euvolemic  Diverting colostomy was recommended to prevent recurrent wound infection, Patient is still uncertain about under going colostomy placement - she is more concerned with how his  is handling this.  Patient currently has rectal tube with soft liquidy stools so bowel regimen has been on hold for now as per discussion with the ICU staff.  Bowel meds as needed  Ongoing surgical debridement for source control, may require amputation as per chart review  Continue with wound care daily  Monitor fever and WBC curve  Surgical pathology report in chart    Electrolyte abnormalities/hypokalemia/hypomagnesemia  Anion gap metabolic acidosis resolved  Resolved    Mild hyponatremia  Resolved    History of alcohol abuse   reports 6-12 beers per day  Currently no signs of withdrawal  Continue to monitor closely    Large goiter  History of hypothyroidism  Currently on room air  No respiratory issues at current time  Continue with flutter valve  Patient s/p extubation without difficulty  Continue with levothyroxine  TSH was within normal limits, T4 was 0.78, repeated T4 levels, follow up  "results, suspecting low secondary to acute illness and would have normalized by now.    Type 2 diabetes mellitus with hyperglycemia without long-term current use of insulin  Continue with Lantus, currently 15 units twice daily  Continue with sliding scales  Continue with fingerstick blood sugar monitoring and hypoglycemia protocol  Continue with diabetic diet  Holding home metformin  Recent hemoglobin A1c 6.8 on 8/1/2023, will repeat hemoglobin A1c  Blood sugars currently running in the stable range    Normocytic anemia  Currently stable and improving  Monitor CBC  Follow-up iron panel.  Uptill now has received 1 unit of blood    DVT PPx  Enoxaparin  Intermittent pneumatic compression    GI PPx  Continue with PPI          Diet: Moderate Consistent Carb (60 g CHO per Meal) Diet  Snacks/Supplements Adult: Expedite Bottle; With Meals    DVT Prophylaxis: Enoxaparin (Lovenox) SQ  Rdz Catheter: PRESENT, indication: Wound Healing  Lines: PRESENT      PICC 10/03/23 Triple Lumen Right Basilic Multiple medications-Site Assessment: WDL      Cardiac Monitoring: None  Code Status: No CPR- Pre-arrest intubation OK      Clinically Significant Risk Factors        # Hypokalemia: Lowest K = 3.1 mmol/L in last 2 days, will replace as needed       # Hypoalbuminemia: Lowest albumin = 1.7 g/dL at 10/3/2023  5:21 AM, will monitor as appropriate           # DMII: A1C = 6.8 % (Ref range: 0.0 - 5.6 %) within past 6 months   # Obesity: Estimated body mass index is 36.23 kg/m  as calculated from the following:    Height as of this encounter: 1.575 m (5' 2\").    Weight as of this encounter: 89.9 kg (198 lb 1.6 oz).             Disposition Plan     Expected Discharge Date: 10/13/2023                    Saad J. Gondal, MD  Hospitalist Service  [facility]  Securely message with [employer] (more info)  Text page via [facility] Paging/Directory   ______________________________________________________________________    Interval " History   Patient was seen and examined at bedside, patient was having dressing change, denied any acute overnight concerns or complaints, further plan was discussed with the patient at bedside including plan to downgrade from ICU.    Physical Exam   Vital Signs: Temp: 97.5  F (36.4  C) Temp src: Oral BP: 105/56 Pulse: 85   Resp: 15 SpO2: 97 % O2 Device: None (Room air) Oxygen Delivery: 1 LPM  Weight: 198 lbs 1.6 oz    GENERAL: Patient was seen and examined at bedside with no acute distress, weakness  HENT:  Head is normocephalic, atraumatic.  Large goiter  EYES:  Eyes have anicteric sclerae without conjunctival injection   LUNGS: Bilateral air entry, clear to auscultation bilaterally  CARDIOVASCULAR:  Regular rate and rhythm with no murmurs, gallops or rubs.  ABDOMEN:  Normal bowel sounds. Soft; nontender   EXT: no edema, wound extending from the groin down into the leg left side  SKIN:  No acute rashes.   NEUROLOGIC:  Grossly nonfocal.      Medical Decision Making       50 MINUTES SPENT BY ME on the date of service doing chart review, history, exam, documentation & further activities per the note.      Data     I have personally reviewed the following data over the past 24 hrs:    11.6 (H)  \   9.8 (L)   / 480 (H)     136 102 8.4 /  97   3.9 25 0.45 (L) \       Imaging results reviewed over the past 24 hrs:   No results found for this or any previous visit (from the past 24 hour(s)).

## 2023-10-13 NOTE — PROGRESS NOTES
[facility] ACUTE PAIN SERVICE    ([facility], [facility], [facility])   Daily PAIN Progress Note    Assessment/Plan:  Lidia Nam is a 63 year old female who was admitted on 10/2/2023.  9 Days Post-Op emergent surgery for removal of dead tissue and debridement due to necrotizing fascitis infection.  Pain Service is asked to see the patient for pain associated with extensive surgical wounds.  Admitted for groin pain, shortness of breath and generalized weakness.  On exam she was found to have necrotizing infection of her left groin.  She was started on IV antibiotics and Surgery was Consulted.    Patient's medical history significant for DM II, thyromegaly, hypothyroidism, nicotine dependence, and morbid obesity.   The patient does smoke, does report alcohol use and denies chemical dependency history.         Over the past 24 hours patient received;  4(10mg) and 2(0.5mg) IV hydromorphone   And one scheduled 25 mg lyrica, started 25 mg tid yesterday.            PLAN:   1) Pain is consistent with necrotizing soft tissue infection, now post op. The patient's home MME was none mg daily, was on fentanyl infusion and boluses while intubated in the ICU, now off fentanyl, should be considered opioid tolerant. Agree with palliative care.   2)Multimodal Medication Therapy  Topicals: defer to WOC for wound care otherwise will add diclofenac alternating with lidocaine for joint pain/musculoskeletal pain  Lidocaine/Voltaren for musculoskeletal and joint pain prn  NSAID'S: consider will discuss with Intensivist  Muscle Relaxants:  Adjuvants: lyrica 25 mg tid   add scheduled APAP 975mg TID  Atarax 25mg q6hprn  Consider adding NSAID   Antidepressants/anxiolytics:  Opioids: oxycodone 5-10mg q4hprn   IV Pain medication: IV Dilaudid 0.5mg q2hprn prior to dressing changes  3)Non-medication interventions  Pharmacy consult- appreciate recommendations   Acupuncture consult- please offer  Integrative consult - called  "referral to [phone]   4)Constipation Prophylaxis: Senna BIDPRN, miralax and senna Scheduled      5) Follow up   -Opioid prescriber has been none regularly as outpatient  -Discharge Recommendations - We recommend prescribing the following at the time of discharge: TBD          Principal Problem:    Severe sepsis (H)  Active Problems:    Type 2 diabetes mellitus with hyperglycemia, without long-term current use of insulin (H)    Necrotizing soft tissue infection    Septic shock (H)     LOS: 11 days       Subjective:  Patient reports pain is mostly on her bottom, not able to reposition so tends to get uncomfortable.  Feels weak, deconditioned.  States she is holding on getting colostomy right now, \"my  just isn't ready for me to go under the knife again\".     sennosides  5 mL Oral or Feeding Tube BID    And    docusate  50 mg Oral BID    enoxaparin ANTICOAGULANT  40 mg Subcutaneous Q24H    folic acid  1 mg Oral or Feeding Tube Daily    [Held by provider] furosemide  20 mg Intravenous Daily    insulin aspart  1-12 Units Subcutaneous 4x Daily AC & HS    insulin glargine  15 Units Subcutaneous BID    levothyroxine  112 mcg Oral or Feeding Tube Daily    metroNIDAZOLE  500 mg Intravenous Q8H    multivitamin w/minerals  1 tablet Oral Daily    pantoprazole  40 mg Intravenous Q24H    piperacillin-tazobactam  3.375 g Intravenous Q8H    polyethylene glycol  17 g Oral or Feeding Tube Daily    pregabalin  25 mg Oral TID    sodium chloride (PF)  3 mL Intracatheter Q8H    thiamine  100 mg Oral or Feeding Tube Daily       Objective:  Vital signs in last 24 hours:  Temp:  [97.4  F (36.3  C)-98.5  F (36.9  C)] 98.5  F (36.9  C)  Pulse:  [64-89] 77  Resp:  [14-30] 19  BP: ()/(50-62) 110/54  SpO2:  [89 %-99 %] 98 %  Weight:   Weight change: 0.045 kg (1.6 oz)  Body mass index is 36.23 kg/m .    Intake/Output last 3 shifts:  I/O last 3 completed shifts:  In: 2036.5 [P.O.:1240; I.V.:796.5]  Out: 3625 [Urine:2975; " Stool:650]  Intake/Output this shift:  No intake/output data recorded.    Review of Systems:   As per subjective, all others negative.    Physical Exam:    General Appearance:  Alert, cooperative, rests in bed   Head:  Normocephalic, without obvious abnormality, atraumatic   Eyes:  PERRL, conjunctiva/corneas clear, EOM's intact   Nose: Nares normal, septum midline, mucosa normal, no drainage   Throat: Lips, mucosa, and tongue normal; teeth and gums normal   Neck: Supple, symmetrical, trachea midline   Back:   Symmetric, no curvature, ROM normal, no CVA tenderness   Lungs:   respirations unlabored   Abdomen:   Rectal tube, abdomen soft   Extremities: Dressing to bilateral lower extremities   Skin: Skin color, texture, turgor normal, no rashes or lesions   Neurologic: Alert and oriented X 3, decreased strength          Imaging:  Personally Reviewed.  XR Chest Port 1 View    Result Date: 10/4/2023  EXAM: XR CHEST PORT 1 VIEW LOCATION: [facility] DATE: 10/4/2023 INDICATION: ETT position COMPARISON: 10/3/2023     IMPRESSION: Endotracheal tube terminates 4.0 cm above the suresh. Right upper extremity PICC in the lower SVC. Enteric decompression tube descends below the diaphragm, but the tip is outside the field-of-view. Decreased left basilar airspace opacities compared to prior. No pleural effusion or pneumothorax. Normal cardiomediastinal silhouette.    XR Chest Port 1 View    Result Date: 10/3/2023  EXAM: XR CHEST PORT 1 VIEW LOCATION: [facility] DATE: 10/3/2023 INDICATION: Endotracheal tube positioning COMPARISON: None. FINDINGS: ET tube approximately 3.5 cm above the suresh. NG tube passes into the stomach. Right PICC with tip in the distal SVC. There is no pneumothorax. The heart size is normal. There is infiltrate in the left lower lung. The right lung is clear.     IMPRESSION: ET tube 3.5 cm above the suresh.    CT Abdomen Pelvis w Contrast    Result Date:  10/2/2023  EXAM: CT ABDOMEN PELVIS W CONTRAST LOCATION: [facility] DATE: 10/2/2023 INDICATION: groin infection concerning for necrotizing infection COMPARISON: Same day CTA chest. TECHNIQUE: CT scan of the abdomen and pelvis was performed following injection of IV contrast. Multiplanar reformats were obtained. Dose reduction techniques were used. CONTRAST: 90ml isovue 370     IMPRESSION: 1.  Findings compatible with clinically suspected necrotizing fasciitis of the left groin, perineum, and visualized left lower extremity. No drainable fluid collection to suggest abscess. 2.  Hepatomegaly and hepatic steatosis. [Critical Result: Necrotizing fasciitis] Finding was identified on 10/2/2023 7:33 PM CDT. Dr. Victor was contacted by me on 10/2/2023 7:44 PM CDT and verbalized understanding of the critical result.     CT Chest Pulmonary Embolism w Contrast    Result Date: 10/2/2023  EXAM: CT CHEST PULMONARY EMBOLISM W CONTRAST LOCATION: [facility] DATE: 10/2/2023 INDICATION: shortness of breath COMPARISON: 06/25/2020 TECHNIQUE: CT chest pulmonary angiogram during arterial phase injection of IV contrast. Multiplanar reformats and MIP reconstructions were performed. Dose reduction techniques were used. CONTRAST: 90ml isovue 370 FINDINGS: ANGIOGRAM CHEST: Pulmonary arteries are normal caliber and negative for pulmonary emboli. Thoracic aorta is negative for dissection. LUNGS AND PLEURA: Patchy airspace consolidation and groundglass attenuation has developed in the inferior lingular segment of the left upper lobe. Mild dependent atelectasis bilaterally. Stable mild extrinsic compression of the trachea by the goiter. MEDIASTINUM/AXILLAE: Severely enlarged goiter. Calcified aortic valve leaflets. CORONARY ARTERY CALCIFICATION: Moderate calcified atherosclerosis left anterior descending and right coronary arteries. UPPER ABDOMEN: Diffuse bilateral adrenal enlargement.  "MUSCULOSKELETAL: Mild degenerative change thoracic spine.     IMPRESSION: 1.  Findings suspicious for left upper lobe pneumonia 2.  Negative for PE      Lab Results:  Personally Reviewed.   Recent Labs   Lab 10/13/23  0449 10/12/23  0505 10/11/23  0511   WBC 11.6* 16.5* 12.5*   HGB 9.8* 9.6* 9.2*   HCT 30.8* 30.1* 28.0*   * 504* 420     Recent Labs   Lab 10/13/23  0449 10/12/23  0505 10/11/23  0511    137 134*   CO2 25 29 28   BUN 8.4 12.8 15.2     No results for input(s): \"INR\" in the last 168 hours.      NOTE(S)/MEDICAL RECORDS REVIEWED over the past 24 hours: Palliative Care, Intensivist, Nursing   Medical complexity over the past 24 hours:  - Prescription DRUG MANAGEMENT performed            Chacha Escobar APRN, CNS-BC, DNP  Acute Care Pain Management Program  [facility] (MILO, Sonny, Nori)   Preference if for Amcom Pagluis - Shawn  Click HERE to page Neha                  "

## 2023-10-13 NOTE — PLAN OF CARE
Problem: Enteral Nutrition  Goal: Absence of Aspiration Signs and Symptoms  Outcome: Met  Goal: Safe, Effective Therapy Delivery  Outcome: Met  Goal: Feeding Tolerance  Outcome: Met   Goal Outcome Evaluation:    Patient extubated 10/11/23.  PO diet of CL started 10/11.  Diet advanced today to Consistent Carb (60g CHO/meal).    Patient with high protein needs for wound healing.      Plan:  Start Expedite wound healing modular daily.  Discussed plan with patient.   Patient does not want Ensure at this time.

## 2023-10-13 NOTE — PLAN OF CARE
[id number]  Patient transferred from ICU. Orientated to room and call light.  Aox4. Vss on RA. Denies chest pain, n/v and sob. Repositioning. Rdz and rectal tube in place. Wound dressings wnl; clean, dry, intact. IV abx.     Goal Outcome Evaluation:  Problem: Plan of Care - These are the overarching goals to be used throughout the patient stay.    Goal: Optimal Comfort and Wellbeing  Outcome: Progressing  Problem: Sepsis/Septic Shock  Goal: Absence of Infection Signs and Symptoms  Intervention: Promote Recovery  Recent Flowsheet Documentation  Taken 10/13/2023 1802 by Carine Cuenca, RN  Activity Management: activity adjusted per tolerance

## 2023-10-13 NOTE — PROGRESS NOTES
"Critical Care Progress Note  10/13/2023     Admit Date: 10/2/2023  ICU Admission Day: 10/3  Code Status: DNR-pre-arrest intubation OK      Problem List:   Principal Problem:    Severe sepsis (H)  Active Problems:    Type 2 diabetes mellitus with hyperglycemia, without long-term current use of insulin (H)    Necrotizing soft tissue infection    Septic shock (H)        Plan by System:     Neuro/Psych: Acute pain r/t wound/surgery. Hx of alcohol abuse -  reportes \"6-12 beers per day\"    -  Appreciate input from Pain Team and palliative care   - Scheduled Lyrica    - Available: acetaminophen, dilaudid, oxycodone.      Pulmonary: No acute issues; extubated 10/11; large goiter present.    - Resp: 19    - Push IS/flutter valve as able.    - patient had no difficulty with extubation    CV: Septic shock from necrotizing fasciitis - resolved   - MAP goal > 65    - off pressors for > 36 hours and doing well    - hold further diuresis - she is down almost 10-kg from admit weight    GI/: no acute issues; needs diverting colostomy    - Diet: diabetic diet   - Last BM: PTA    - Bowel meds: prn    - Patient is still uncertain about under going colostomy placement - she is more concerned with how his  is handling this.     Renal: No acute issues; lactic acidosis from septic shock resolved.    - Strict I/O    - Adequate UOP at this time.    - aggressively diuresed this admission; currently down 10-kg from admission.     ID: Septic shock from necrotizing fasciitis. Extensive involvement of the perineum, L groin, and left leg down to the ankle.    - cultures with polymicrobial: bacterioides and actino   - Sputum with GNB    - ID following and directing antibiotics.     * zosyn and metronidazole    * ongoing surgical debridement for source control. May require amputation.     Heme/Coag:  no acute issues     - DVT proph: enoxaparin on hold    Endocrine: Hypothryroid on levothyroxine; large goiter; DM  -  levothyroxine   - " "sliding scale insulin QID with meals.           Clinically Significant Risk Factors              # DMII: A1C = 6.8 % (Ref range: 0.0 - 5.6 %) within past 6 months     # Obesity: Estimated body mass index is 36.23 kg/m  as calculated from the following:    Height as of this encounter: 1.575 m (5' 2\").    Weight as of this encounter: 89.9 kg (198 lb 1.6 oz).            Code Status: No CPR- Pre-arrest intubation OK           Dispo: Stable for transfer out of ICU.  Sign out given to Dr. GREER Addison at 11:02 AM. Our service will sign off.     eNl Lowry, [facility] Pulmonary/Critical Care     _______________________________________________________________    HPI: 63 year old woman with hx of T2DM, obesity, tobacco abuse, alcohol abuse, hypothyroid, goiter. Presented on 10/2/23 with dyspnea, cough, generalized weakness. Additionally, had increased pain, redness, and malodorous drainage from a wound in her left inguinal/vaginal area. CT scan showed necrotizing fasciitis and she was emergently taken to the OR for vulvectomy and broad debridement of the left buttock and left thigh. She returned to the ICU on pressors and was febrile through most of the day. Taken back to OR this morning and required extensive debridement from the left buttock, perineum, down to the left ankle. She required intubation by ENT due to large goiter.     ROS: Pain well controlled currently - mostly with movement. No shortness of breath. Nauseous and wants to eat.     Events over last 24-hours: no acute changes overnight     Objective:     Vitals:    10/13/23 0400 10/13/23 0500 10/13/23 0600 10/13/23 0700   BP: 98/54 107/53 103/54 110/54   BP Location: Left arm      Pulse: 83 88 69 77   Resp: 19 21 18 19   Temp: 98.5  F (36.9  C)      TempSrc: Oral      SpO2: 96% 98% 97% 98%   Weight:       Height:            I/O:   Intake/Output Summary (Last 24 hours) at 10/4/2023 1418  Last data filed at 10/4/2023 1400  Gross per 24 hour   Intake " 4896.2 ml   Output 1625 ml   Net 3271.2 ml     Wt Readings from Last 3 Encounters:   10/13/23 89.9 kg (198 lb 1.6 oz)   05/01/23 101 kg (222 lb 11.2 oz)   03/14/23 103.9 kg (229 lb)      Weight change: 0.045 kg (1.6 oz)    Physical Exam:  Gen: awake and alert, no distress.   HEENMT: AT/NC; large goiter.   NEURO: grossly nonfocal, weak.   CARDIOVASCULAR: RRR S1S2 no murmur  PULMONARY: unlabored on room air. Lungs clear and equal.   GASTROINTESTINAL: soft ntnd  INTEGUMENT: left leg is wrapped in kerlix from groin down.   PSYCH: calm     Labs:   Recent Labs   Lab 10/13/23  0449      CO2 25   BUN 8.4       Recent Labs   Lab 10/13/23  0449   WBC 11.6*   HGB 9.8*   HCT 30.8*   *       Micro:    No new growth     Imaging: all imaging personalized reviewed   10/4 CXR - Endotracheal tube terminates 4.0 cm above the suresh. Right upper extremity PICC in the lower SVC. Enteric decompression tube descends below the diaphragm, but the tip is outside the field-of-view.   Decreased left basilar airspace opacities compared to prior. No pleural effusion or pneumothorax. Normal cardiomediastinal silhouette.    10/3 CXR - ET tube approximately 3.5 cm above the suresh. NG tube passes into the stomach. Right PICC with tip in the distal SVC. There is no pneumothorax. The heart size is normal. There is infiltrate in the left lower lung. The right lung is clear.     10/2 CT Abd/pelvis - 1.  Findings compatible with clinically suspected necrotizing fasciitis of the left groin, perineum, and visualized left lower extremity. No drainable fluid collection to suggest abscess.  2.  Hepatomegaly and hepatic steatosis.        Nel Lowry, CNP  [facility] Pulmonary/Critical Care

## 2023-10-13 NOTE — PROGRESS NOTES
"Care Management Follow Up    Length of Stay (days): 11    Expected Discharge Date: 10/13/2023     Concerns to be Addressed:     Care progression  Pressors and vent, IV zosyn, wound  Patient plan of care discussed at interdisciplinary rounds: Yes    Anticipated Discharge Disposition:  TBD     Anticipated Discharge Services:  TBD  Anticipated Discharge DME:  NA    Patient/family educated on Medicare website which has current facility and service quality ratings:  NA  Education Provided on the Discharge Plan:  Per team  Patient/Family in Agreement with the Plan:  NA    Referrals Placed by CM/SW:  NA  Private pay costs discussed: Not applicable    Additional Information:  Discussed patient in ICU rounds. Patient plan to transfer to med surge, step down.      Social Hx: \"Pt lives with spouse, he assists as needed.\"     RNCM to follow for medical progression, recommendations, and final discharge plan.     Ibis Lin RN     "

## 2023-10-13 NOTE — PLAN OF CARE
[facility] - ICU    RN Progress Note:            Pertinent Assessments:      Please refer to flowsheet rows for full assessment     Alert and oriented,  blood pressures stable throughout the night.  Pain managed with oxycodone/tylenol and one PRN dose of dilaudid for dressing changes. Calls appropriately.            Key Events - This Shift:       Stable throughout the night, able to get a good amount of sleep.          SJN SAT (Sedation Awakening Trial): For use ONLY if intubated    SAT Safety Screen Not Applicable   If FAILED why?    SAT Performed Not Applicable   If FAILED why?               Barriers to Discharge / Downgrade:     May be appropriate for downgrade today.

## 2023-10-14 LAB
ALBUMIN SERPL BCG-MCNC: 2.4 G/DL (ref 3.5–5.2)
ALP SERPL-CCNC: 103 U/L (ref 35–104)
ALT SERPL W P-5'-P-CCNC: 14 U/L (ref 0–50)
ANION GAP SERPL CALCULATED.3IONS-SCNC: 6 MMOL/L (ref 7–15)
AST SERPL W P-5'-P-CCNC: 18 U/L (ref 0–45)
BILIRUB DIRECT SERPL-MCNC: <0.2 MG/DL (ref 0–0.3)
BILIRUB SERPL-MCNC: 0.4 MG/DL
BUN SERPL-MCNC: 8.5 MG/DL (ref 8–23)
CALCIUM SERPL-MCNC: 8.2 MG/DL (ref 8.8–10.2)
CHLORIDE SERPL-SCNC: 107 MMOL/L (ref 98–107)
CREAT SERPL-MCNC: 0.42 MG/DL (ref 0.51–0.95)
DEPRECATED HCO3 PLAS-SCNC: 25 MMOL/L (ref 22–29)
EGFRCR SERPLBLD CKD-EPI 2021: >90 ML/MIN/1.73M2
ERYTHROCYTE [DISTWIDTH] IN BLOOD BY AUTOMATED COUNT: 17.4 % (ref 10–15)
FOLATE SERPL-MCNC: 18.3 NG/ML (ref 4.6–34.8)
GLUCOSE BLDC GLUCOMTR-MCNC: 147 MG/DL (ref 70–99)
GLUCOSE BLDC GLUCOMTR-MCNC: 151 MG/DL (ref 70–99)
GLUCOSE BLDC GLUCOMTR-MCNC: 161 MG/DL (ref 70–99)
GLUCOSE BLDC GLUCOMTR-MCNC: 179 MG/DL (ref 70–99)
GLUCOSE BLDC GLUCOMTR-MCNC: 88 MG/DL (ref 70–99)
GLUCOSE SERPL-MCNC: 95 MG/DL (ref 70–99)
HCT VFR BLD AUTO: 28.8 % (ref 35–47)
HGB BLD-MCNC: 9 G/DL (ref 11.7–15.7)
MAGNESIUM SERPL-MCNC: 1.9 MG/DL (ref 1.7–2.3)
MCH RBC QN AUTO: 31.6 PG (ref 26.5–33)
MCHC RBC AUTO-ENTMCNC: 31.3 G/DL (ref 31.5–36.5)
MCV RBC AUTO: 101 FL (ref 78–100)
NT-PROBNP SERPL-MCNC: 739 PG/ML (ref 0–900)
PHOSPHATE SERPL-MCNC: 2.1 MG/DL (ref 2.5–4.5)
PLATELET # BLD AUTO: 460 10E3/UL (ref 150–450)
POTASSIUM SERPL-SCNC: 3.7 MMOL/L (ref 3.4–5.3)
PROT SERPL-MCNC: 4.7 G/DL (ref 6.4–8.3)
RBC # BLD AUTO: 2.85 10E6/UL (ref 3.8–5.2)
SODIUM SERPL-SCNC: 138 MMOL/L (ref 135–145)
VIT B12 SERPL-MCNC: 1987 PG/ML (ref 232–1245)
WBC # BLD AUTO: 8.4 10E3/UL (ref 4–11)

## 2023-10-14 PROCEDURE — 250N000011 HC RX IP 250 OP 636: Performed by: SURGERY

## 2023-10-14 PROCEDURE — 80053 COMPREHEN METABOLIC PANEL: CPT | Performed by: NURSE PRACTITIONER

## 2023-10-14 PROCEDURE — 99232 SBSQ HOSP IP/OBS MODERATE 35: CPT | Performed by: CLINICAL NURSE SPECIALIST

## 2023-10-14 PROCEDURE — 250N000013 HC RX MED GY IP 250 OP 250 PS 637: Performed by: INTERNAL MEDICINE

## 2023-10-14 PROCEDURE — 83735 ASSAY OF MAGNESIUM: CPT | Performed by: INTERNAL MEDICINE

## 2023-10-14 PROCEDURE — 85027 COMPLETE CBC AUTOMATED: CPT | Performed by: NURSE PRACTITIONER

## 2023-10-14 PROCEDURE — 82248 BILIRUBIN DIRECT: CPT | Performed by: STUDENT IN AN ORGANIZED HEALTH CARE EDUCATION/TRAINING PROGRAM

## 2023-10-14 PROCEDURE — 250N000011 HC RX IP 250 OP 636: Mod: JZ | Performed by: INTERNAL MEDICINE

## 2023-10-14 PROCEDURE — 83880 ASSAY OF NATRIURETIC PEPTIDE: CPT | Performed by: STUDENT IN AN ORGANIZED HEALTH CARE EDUCATION/TRAINING PROGRAM

## 2023-10-14 PROCEDURE — 250N000011 HC RX IP 250 OP 636: Performed by: INTERNAL MEDICINE

## 2023-10-14 PROCEDURE — 120N000001 HC R&B MED SURG/OB

## 2023-10-14 PROCEDURE — 250N000011 HC RX IP 250 OP 636: Mod: JZ | Performed by: SURGERY

## 2023-10-14 PROCEDURE — 250N000013 HC RX MED GY IP 250 OP 250 PS 637: Performed by: NURSE PRACTITIONER

## 2023-10-14 PROCEDURE — 250N000013 HC RX MED GY IP 250 OP 250 PS 637: Performed by: CLINICAL NURSE SPECIALIST

## 2023-10-14 PROCEDURE — 250N000013 HC RX MED GY IP 250 OP 250 PS 637: Performed by: SURGERY

## 2023-10-14 PROCEDURE — 99233 SBSQ HOSP IP/OBS HIGH 50: CPT | Performed by: INTERNAL MEDICINE

## 2023-10-14 PROCEDURE — 84100 ASSAY OF PHOSPHORUS: CPT | Performed by: STUDENT IN AN ORGANIZED HEALTH CARE EDUCATION/TRAINING PROGRAM

## 2023-10-14 RX ORDER — MAGNESIUM OXIDE 400 MG/1
400 TABLET ORAL EVERY 4 HOURS
Status: COMPLETED | OUTPATIENT
Start: 2023-10-14 | End: 2023-10-14

## 2023-10-14 RX ORDER — HYDRALAZINE HYDROCHLORIDE 20 MG/ML
10 INJECTION INTRAMUSCULAR; INTRAVENOUS EVERY 4 HOURS PRN
Status: DISCONTINUED | OUTPATIENT
Start: 2023-10-14 | End: 2023-10-27 | Stop reason: HOSPADM

## 2023-10-14 RX ORDER — CALCIUM CARBONATE 500 MG/1
500 TABLET, CHEWABLE ORAL 3 TIMES DAILY PRN
Status: DISCONTINUED | OUTPATIENT
Start: 2023-10-14 | End: 2023-10-27 | Stop reason: HOSPADM

## 2023-10-14 RX ORDER — POTASSIUM CHLORIDE 1500 MG/1
20 TABLET, EXTENDED RELEASE ORAL ONCE
Status: COMPLETED | OUTPATIENT
Start: 2023-10-14 | End: 2023-10-14

## 2023-10-14 RX ORDER — LANOLIN ALCOHOL/MO/W.PET/CERES
3 CREAM (GRAM) TOPICAL
Status: DISCONTINUED | OUTPATIENT
Start: 2023-10-14 | End: 2023-10-27 | Stop reason: HOSPADM

## 2023-10-14 RX ADMIN — PIPERACILLIN AND TAZOBACTAM 3.38 G: 3; .375 INJECTION, POWDER, LYOPHILIZED, FOR SOLUTION INTRAVENOUS at 16:11

## 2023-10-14 RX ADMIN — LEVOTHYROXINE SODIUM 112 MCG: 0.11 TABLET ORAL at 06:13

## 2023-10-14 RX ADMIN — Medication 1 TABLET: at 08:39

## 2023-10-14 RX ADMIN — OXYCODONE HYDROCHLORIDE 10 MG: 5 TABLET ORAL at 02:09

## 2023-10-14 RX ADMIN — PIPERACILLIN AND TAZOBACTAM 3.38 G: 3; .375 INJECTION, POWDER, LYOPHILIZED, FOR SOLUTION INTRAVENOUS at 23:49

## 2023-10-14 RX ADMIN — POTASSIUM & SODIUM PHOSPHATES POWDER PACK 280-160-250 MG 1 PACKET: 280-160-250 PACK at 10:15

## 2023-10-14 RX ADMIN — METRONIDAZOLE 500 MG: 500 INJECTION, SOLUTION INTRAVENOUS at 12:57

## 2023-10-14 RX ADMIN — PIPERACILLIN AND TAZOBACTAM 3.38 G: 3; .375 INJECTION, POWDER, LYOPHILIZED, FOR SOLUTION INTRAVENOUS at 08:39

## 2023-10-14 RX ADMIN — POTASSIUM & SODIUM PHOSPHATES POWDER PACK 280-160-250 MG 1 PACKET: 280-160-250 PACK at 18:10

## 2023-10-14 RX ADMIN — INSULIN ASPART 6 UNITS: 100 INJECTION, SOLUTION INTRAVENOUS; SUBCUTANEOUS at 08:34

## 2023-10-14 RX ADMIN — HYDROMORPHONE HYDROCHLORIDE 0.5 MG: 1 INJECTION, SOLUTION INTRAMUSCULAR; INTRAVENOUS; SUBCUTANEOUS at 01:21

## 2023-10-14 RX ADMIN — PANTOPRAZOLE SODIUM 40 MG: 40 TABLET, DELAYED RELEASE ORAL at 06:06

## 2023-10-14 RX ADMIN — Medication 60 ML: at 08:45

## 2023-10-14 RX ADMIN — PREGABALIN 25 MG: 25 CAPSULE ORAL at 13:33

## 2023-10-14 RX ADMIN — PREGABALIN 25 MG: 25 CAPSULE ORAL at 20:16

## 2023-10-14 RX ADMIN — POTASSIUM & SODIUM PHOSPHATES POWDER PACK 280-160-250 MG 1 PACKET: 280-160-250 PACK at 13:33

## 2023-10-14 RX ADMIN — MAGNESIUM OXIDE TAB 400 MG (241.3 MG ELEMENTAL MG) 400 MG: 400 (241.3 MG) TAB at 09:49

## 2023-10-14 RX ADMIN — METRONIDAZOLE 500 MG: 500 INJECTION, SOLUTION INTRAVENOUS at 20:15

## 2023-10-14 RX ADMIN — THIAMINE HCL TAB 100 MG 100 MG: 100 TAB at 08:39

## 2023-10-14 RX ADMIN — HYDROMORPHONE HYDROCHLORIDE 0.5 MG: 1 INJECTION, SOLUTION INTRAMUSCULAR; INTRAVENOUS; SUBCUTANEOUS at 03:12

## 2023-10-14 RX ADMIN — POTASSIUM CHLORIDE 20 MEQ: 1500 TABLET, EXTENDED RELEASE ORAL at 09:49

## 2023-10-14 RX ADMIN — SENNOSIDES AND DOCUSATE SODIUM 1 TABLET: 8.6; 5 TABLET ORAL at 08:39

## 2023-10-14 RX ADMIN — PIPERACILLIN AND TAZOBACTAM 3.38 G: 3; .375 INJECTION, POWDER, LYOPHILIZED, FOR SOLUTION INTRAVENOUS at 00:56

## 2023-10-14 RX ADMIN — METRONIDAZOLE 500 MG: 500 INJECTION, SOLUTION INTRAVENOUS at 06:06

## 2023-10-14 RX ADMIN — MAGNESIUM OXIDE TAB 400 MG (241.3 MG ELEMENTAL MG) 400 MG: 400 (241.3 MG) TAB at 13:33

## 2023-10-14 RX ADMIN — PREGABALIN 25 MG: 25 CAPSULE ORAL at 08:39

## 2023-10-14 RX ADMIN — FOLIC ACID 1 MG: 1 TABLET ORAL at 08:39

## 2023-10-14 RX ADMIN — SENNOSIDES AND DOCUSATE SODIUM 1 TABLET: 8.6; 5 TABLET ORAL at 20:16

## 2023-10-14 RX ADMIN — POLYETHYLENE GLYCOL 3350 17 G: 17 POWDER, FOR SOLUTION ORAL at 08:40

## 2023-10-14 RX ADMIN — INSULIN GLARGINE 10 UNITS: 100 INJECTION, SOLUTION SUBCUTANEOUS at 20:14

## 2023-10-14 RX ADMIN — ENOXAPARIN SODIUM 40 MG: 40 INJECTION SUBCUTANEOUS at 09:06

## 2023-10-14 ASSESSMENT — ACTIVITIES OF DAILY LIVING (ADL)
ADLS_ACUITY_SCORE: 38

## 2023-10-14 NOTE — PLAN OF CARE
Problem: Pain Acute  Goal: Optimal Pain Control and Function  Outcome: Progressing  Intervention: Develop Pain Management Plan  Recent Flowsheet Documentation  Taken 10/14/2023 0154 by Aviva Bazan RN  Pain Management Interventions: medication (see MAR)  Taken 10/13/2023 2130 by Aviva Bazan RN  Pain Management Interventions: medication (see MAR)  Intervention: Prevent or Manage Pain  Recent Flowsheet Documentation  Taken 10/14/2023 0118 by Aviva Bazan RN  Medication Review/Management: medications reviewed  Taken 10/13/2023 2130 by Aviva Bazan RN  Medication Review/Management: medications reviewed     Problem: Infection  Goal: Absence of Infection Signs and Symptoms  Outcome: Progressing  Problem: Plan of Care - These are the overarching goals to be used throughout the patient stay.    Goal: Absence of Hospital-Acquired Illness or Injury  Intervention: Prevent Skin Injury  Recent Flowsheet Documentation  Taken 10/14/2023 0340 by Aviva Bazan RN  Body Position:   turned   right  Taken 10/14/2023 0118 by Aviva Bazan RN  Body Position:   turned   left  Taken 10/13/2023 2130 by Aviva Bazan RN  Body Position:   turned   right   Goal Outcome Evaluation:     A&Ox4, soft Bps 90s-100s. Bed bound. Rectal tube and motta in place. LLE dressing change done at 2130 and 0340, heavy assistance with dressing changes, assist of 2-3. turns q2h. requested barrier cream for redness and moisture to R groin, paged crosscover and recommended updated photos and for surgery to come see wound in AM d/t severity. Photos updated. Oxy given x1 and dilaudid given x2 with relief.

## 2023-10-14 NOTE — PROGRESS NOTES
UPDATED INCISION SITE PHOTOS:    Media Information    Document Information    Other: Photograph   Left groin wound   10/14/2023 2:50 AM   Attached To:   Hospital Encounter on 10/2/23      Media Information    Document Information    Other: Photograph   Right inner groin site   10/14/2023 2:54 AM   Attached To:   Hospital Encounter on 10/2/23        Media Information    Document Information    Other: Photograph   Left incision site   10/14/2023 2:58 AM   Attached To:   Hospital Encounter on 10/2/23      Media Information    Document Information    Other: Photograph   Left inner groin/thigh site   10/14/2023 2:59 AM              Media Information    Document Information    Other: Photograph   Right inner groin/ thigh site   10/14/2023 3:41 AM   Attached To:   Hospital Encounter on 10/2/23     Redness on R inner groin/thigh marked with marker.

## 2023-10-14 NOTE — PROGRESS NOTES
02-May-2021 21:05 General Surgery Progress Note:    Hospital Day # 12    ASSESSMENT:   1. Necrotizing soft tissue infection    2. Severe sepsis (H)    3. Stool getting into wound    Discussion of diverting colostomy with patient  She will not consent or do a diverting colostomy a this time    Lidia Nam is a 63 year old female stable    PLAN:   Continue cares  Will not consent to a diverting colosotmy  We will check back next week.      SUBJECTIVE:   Lidia Nam is stable    Patient Vitals for the past 24 hrs:   BP Temp Temp src Pulse Resp SpO2   10/14/23 1103 106/57 98.3  F (36.8  C) Oral 79 20 92 %   10/14/23 0727 (!) 149/83 98.2  F (36.8  C) Oral 80 18 96 %   10/13/23 2313 90/53 98.7  F (37.1  C) Oral 93 18 94 %   10/13/23 1802 106/53 98.3  F (36.8  C) Oral 99 18 94 %   10/13/23 1700 106/52 -- -- 90 22 94 %   10/13/23 1600 96/54 97.8  F (36.6  C) Oral 84 21 91 %   10/13/23 1500 103/53 -- -- 86 23 96 %       Physical Exam:  General: NAD, pleasant  CV:RRR  LUNGS:CTA bilaterally  ABD: dressings intact  EXT:no CCE       Media Information    Document Information    Other: Photograph      10/14/2023 3:41 AM   Attached To:   Hospital Encounter on 10/2/23   Source Information    Aviva Bazan RN  Sjn P2               Jimbo Westbrook MD

## 2023-10-14 NOTE — PROGRESS NOTES
[facility]    Medicine Progress Note - Hospitalist Service    Date of Admission:  10/2/2023    Assessment & Plan   63 year old woman with hx of DM2, obesity, tobacco abuse, alcohol abuse, hypothyroid, goiter who presented 10/2/23 with dyspnea, cough, generalized weakness and found to have malodorous drainage from a wound in her left inguinal/vaginal area. CT scan showed necrotizing fasciitis and she was emergently taken to the OR for vulvectomy and broad debridement of the left buttock and left thigh. She returned to the ICU on pressors and was febrile. Taken back to OR and required extensive debridement from the left buttock, perineum, down to the left ankle. She required intubation by ENT due to large goiter.  Now off of pressors and extubated 10/11, transferred out of ICU 10/13/23        Severe sepsis and septic shock  Necrotizing soft tissue infection/necrotizing fasciitis  Lactic acidosis resolved  Hypoxic respiratory failure resolved  Left upper lobe pneumonia  Presented on 10/2/2023 with pneumonia and necrotizing fasciitis.   She was emergently taken to the OR for vulvectomy and broad debridement of the left buttock and left thigh  After procedure she had to go on pressors and was febrile so taken back to the OR requiring extensive debridement from the left buttock, perineum and down into the ankle  CT chest showed findings suspicious for left upper lobe pneumonia   Cultures with polymicrobial organism--Bacteroids and actinno, Fusobacterium nucleatum  Sputum cultures growing haemophilus and strep constellatus, GNB  Blood cultures remain no growth to date  ENT intubated the patient because of large goiter, extubated 10/11  -- continue current flagyl and zosyn  -- Diverting colostomy was recommended to prevent recurrent wound infection, Patient is still uncertain about under going colostomy placement and she is more concerned with how his  is handling this.  -- surgery to follow    -- ID following, pain tream following  -- patient asking to start PT but remains on bed-rest, defer activity restrictions to general surgery   -- continue current bowel meds, defer further rectal tube management to surgery  -- hold further diuresis given large net negative fluid balance since admission. Normally takes lasix BID at home.       Mild hypophosphatemia, hypomagnesemia, hypokalemia: replace per protocol       History of alcohol abuse   reports 6-12 beers per day  Currently no signs of withdrawal  -- continue MV, folic acid and thiamine       Large goiter  History of hypothyroidism  Currently on room air  TSH was within normal limits, T4 was 0.78, repeated T4 was 0.93   --Continue levothyroxine      Type 2 diabetes mellitus with hyperglycemia without long-term current use of insulin  Noted to have some low trending glucoses  Continue with Lantus, decreased to 10 units twice daily  Start carb counting insulin with meals at 1:10 ratio for now  Changed to medium intensity SSI  Holding home metformin       Normocytic anemia  Currently stable   Vit B12 1987  --Monitor CBC  -- Follow-up folate level  -- has received 1 unit of blood this hospitalization       GI PPx  Continue with PPI             Diet: Moderate Consistent Carb (60 g CHO per Meal) Diet  Snacks/Supplements Adult: Expedite Bottle; With Meals    DVT Prophylaxis: Enoxaparin (Lovenox) SQ  Rdz Catheter: PRESENT, indication: Wound Healing  Lines: PRESENT      PICC 10/03/23 Triple Lumen Right Basilic Multiple medications-Site Assessment: WDL      Cardiac Monitoring: None  Code Status: No CPR- Pre-arrest intubation OK      Clinically Significant Risk Factors        # Hypokalemia: Lowest K = 3.3 mmol/L in last 2 days, will replace as needed    # Hypercalcemia: corrected calcium is >10.1, will monitor as appropriate    # Hypoalbuminemia: Lowest albumin = 1.7 g/dL at 10/3/2023  5:21 AM, will monitor as appropriate            # Obesity: Estimated  "body mass index is 36.23 kg/m  as calculated from the following:    Height as of this encounter: 1.575 m (5' 2\").    Weight as of this encounter: 89.9 kg (198 lb 1.6 oz).             Disposition Plan      Expected Discharge Date: 10/16/2023                  Dl Menendez DO  Hospitalist Service  [facility]  Securely message with [employer] (more info)  Text page via [employer] Paging/Directory   ______________________________________________________________________    Interval History   NAD. Denies any complaints besides left leg weakness after surgery, asking to start PT    Physical Exam   Vital Signs: Temp: 98.2  F (36.8  C) Temp src: Oral BP: (!) 149/83 Pulse: 80   Resp: 18 SpO2: 96 % O2 Device: None (Room air)    Weight: 198 lbs 1.6 oz  General: NAD  RESPIRATORY: Breathing nonlabored  CARDIOVASCULAR: No le edema bilat.   NEUROLOGIC: Alert, speech clear, decreased strength LLE         Medical Decision Making       >50 MINUTES SPENT BY ME on the date of service doing chart review, history, exam, documentation & further activities per the note.      Data       "

## 2023-10-14 NOTE — PROGRESS NOTES
[facility] ACUTE PAIN SERVICE    ([facility], [facility], [facility])   Daily PAIN Progress Note    Assessment/Plan:  Lidia Nam is a 63 year old female who was admitted on 10/2/2023.  10 Days Post-Op   emergent surgery for removal of dead tissue and debridement due to necrotizing fascitis infection.  Pain Service is asked to see the patient for pain associated with extensive surgical wounds.  Admitted for groin pain, shortness of breath and generalized weakness.  On exam she was found to have necrotizing infection of her left groin.  She was started on IV antibiotics and Surgery was Consulted.   Patient is status post vulvectomy, broad debridement of the left buttock and left thigh.  Admitted to  ICU on pressors and was febrile. Taken back to OR for further extensive debridement from the left buttock, perineum, down to the left ankle. She had required intubation by ENT due to large goiter.  Now off of pressors and extubated 10/11, transferred out of ICU 10/13/23  Patient's medical history significant for DM II, thyromegaly, hypothyroidism, nicotine dependence, and morbid obesity.   The patient does smoke, does report alcohol use ( reports 6-12 beers/day, no chemical dependency history.      [facility] has been consulted to assist with goals of care.  Patient and family hopeful for recover from complicated medical and surgical issues.  Patient has leaking of stool with rectal tube in place.  Diverting colostomy has been recommended which patient and family are holding off on doing.     Over the past 24 hours patient received;  1(10mg) and 2(0.5mg) IV hydromorphone   And scheduled lyrica 25 mg tid started two days ago.           PLAN:   1) Pain is consistent with necrotizing soft tissue infection, now post op. The patient's home MME was none mg daily, was on fentanyl infusion and boluses while intubated in the ICU, now off fentanyl, should be considered opioid tolerant. Agree with  palliative care.   2)Multimodal Medication Therapy  Topicals: defer to WOC for wound care otherwise will add diclofenac alternating with lidocaine for joint pain/musculoskeletal pain  Lidocaine/Voltaren for musculoskeletal and joint pain prn  NSAID'S: consider will discuss with Intensivist  Muscle Relaxants:  Adjuvants: lyrica 25 mg tid   add scheduled APAP 975mg TID  Atarax 25mg q6hprn  Consider adding NSAID   Antidepressants/anxiolytics:  Opioids: oxycodone 5-10mg q4hprn   IV Pain medication: IV Dilaudid 0.5mg q2hprn prior to dressing changes  3)Non-medication interventions  Pharmacy consult- appreciate recommendations   Acupuncture consult- please offer  Integrative consult - called referral to [phone]   4)Constipation Prophylaxis: Senna BIDPRN, miralax and senna-docusate scheduled (defer to surgery)  5) Follow up   -Opioid prescriber has been none regularly as outpatient  -Discharge Recommendations - We recommend prescribing the following at the time of discharge: TBD         Principal Problem:    Severe sepsis (H)  Active Problems:    Type 2 diabetes mellitus with hyperglycemia, without long-term current use of insulin (H)    Necrotizing soft tissue infection    Septic shock (H)     LOS: 12 days       Subjective:  Patient reports pain is manageable, does feel some benefit from the lyrica and the dilaudid is helping get through dressing changes.     enoxaparin ANTICOAGULANT  40 mg Subcutaneous Q24H    folic acid  1 mg Oral or Feeding Tube Daily    insulin aspart   Subcutaneous TID AC    insulin aspart  1-7 Units Subcutaneous TID AC    insulin aspart  1-5 Units Subcutaneous At Bedtime    insulin glargine  10 Units Subcutaneous BID    levothyroxine  112 mcg Oral or Feeding Tube Daily    magnesium oxide  400 mg Oral Q4H    metroNIDAZOLE  500 mg Intravenous Q8H    multivitamin w/minerals  1 tablet Oral Daily    pantoprazole  40 mg Oral QAM AC    piperacillin-tazobactam  3.375 g Intravenous Q8H    polyethylene glycol   17 g Oral or Feeding Tube Daily    potassium & sodium phosphates  1 packet Oral or Feeding Tube Q4H    pregabalin  25 mg Oral TID    senna-docusate  1 tablet Oral BID    sodium chloride (PF)  3 mL Intracatheter Q8H    thiamine  100 mg Oral or Feeding Tube Daily    wound support modular  60 mL Oral Daily       Objective:  Vital signs in last 24 hours:  Temp:  [97.8  F (36.6  C)-98.7  F (37.1  C)] 98.3  F (36.8  C)  Pulse:  [79-99] 79  Resp:  [18-28] 20  BP: ()/(52-83) 106/57  SpO2:  [91 %-97 %] 92 %  Weight:   Weight change:   Body mass index is 36.23 kg/m .    Intake/Output last 3 shifts:  I/O last 3 completed shifts:  In: 2204 [P.O.:1320; I.V.:884]  Out: 1925 [Urine:1825; Stool:100]  Intake/Output this shift:  I/O this shift:  In: 236 [P.O.:236]  Out: 225 [Urine:225]    Review of Systems:   As per subjective, all others negative.    Physical Exam:    General Appearance:  Alert, cooperative, no distress, rests on back, spouse at bedside   Head:  Normocephalic, without obvious abnormality, atraumatic   Eyes:  PERRL, conjunctiva/corneas clear, EOM's intact   Nose: Nares normal, septum midline, mucosa normal, no drainage   Throat: Lips, mucosa, and tongue normal; teeth and gums normal   Neck: Supple, symmetrical, trachea midline, large goiter   Lungs:   , respirations unlabored   Abdomen:   Soft, non-tender, rectal tube   Extremities: Open wounds with Dressing covering, deconditioned fatigues easily   Skin: Left hand swollen, bruising, no rashes or lesions   Neurologic: Alert and oriented X 3, decreased strength upper body          Imaging:  Personally Reviewed.  XR Chest Port 1 View    Result Date: 10/4/2023  EXAM: XR CHEST PORT 1 VIEW LOCATION: [facility] DATE: 10/4/2023 INDICATION: ETT position COMPARISON: 10/3/2023     IMPRESSION: Endotracheal tube terminates 4.0 cm above the suresh. Right upper extremity PICC in the lower SVC. Enteric decompression tube descends below the diaphragm,  but the tip is outside the field-of-view. Decreased left basilar airspace opacities compared to prior. No pleural effusion or pneumothorax. Normal cardiomediastinal silhouette.    XR Chest Port 1 View    Result Date: 10/3/2023  EXAM: XR CHEST PORT 1 VIEW LOCATION: [facility] DATE: 10/3/2023 INDICATION: Endotracheal tube positioning COMPARISON: None. FINDINGS: ET tube approximately 3.5 cm above the suresh. NG tube passes into the stomach. Right PICC with tip in the distal SVC. There is no pneumothorax. The heart size is normal. There is infiltrate in the left lower lung. The right lung is clear.     IMPRESSION: ET tube 3.5 cm above the suresh.    CT Abdomen Pelvis w Contrast    Result Date: 10/2/2023  EXAM: CT ABDOMEN PELVIS W CONTRAST LOCATION: [facility] DATE: 10/2/2023 INDICATION: groin infection concerning for necrotizing infection COMPARISON: Same day CTA chest. TECHNIQUE: CT scan of the abdomen and pelvis was performed following injection of IV contrast. Multiplanar reformats were obtained. Dose reduction techniques were used. CONTRAST: 90ml isovue 370 FINDINGS: LOWER CHEST: Mild left basilar atelectasis and/or scarring. Aortic valvular and coronary artery calcifications. HEPATOBILIARY: Hepatomegaly and hepatic steatosis. PANCREAS: Normal. SPLEEN: Normal. ADRENAL GLANDS: Unremarkable right adrenal gland. Thickening of the left adrenal gland without a discrete nodule. KIDNEYS/BLADDER: Right renal hypodensity is too small to characterize. No dedicated imaging follow-up is required for this. No hydronephrosis. Mildly distended urinary bladder is grossly unremarkable. BOWEL: No bowel obstruction. Colonic diverticulosis. No ascites or pneumoperitoneum. LYMPH NODES: Prominent asymmetric left inguinal lymph nodes, for example lymph node measuring 2.3 x 1.2 cm (series 2, image 78), likely reactive. VASCULATURE: Normal caliber abdominal aorta with extensive calcified  aortoiliac atherosclerotic plaque. PELVIC ORGANS: 1.7 cm left adnexal cyst (series 2, image 61). No dedicated imaging follow-up is required for this given size. MUSCULOSKELETAL: Asymmetric skin thickening and subcutaneous stranding in the visualized left lower extremity, perineum, and groin. There is moderate associated subcutaneous and subfascial/intermuscular gas. No drainable fluid collection. Thoracolumbar spondylosis. No destructive osseous lesion.     IMPRESSION: 1.  Findings compatible with clinically suspected necrotizing fasciitis of the left groin, perineum, and visualized left lower extremity. No drainable fluid collection to suggest abscess. 2.  Hepatomegaly and hepatic steatosis. [Critical Result: Necrotizing fasciitis] Finding was identified on 10/2/2023 7:33 PM CDT. Dr. Victor was contacted by me on 10/2/2023 7:44 PM CDT and verbalized understanding of the critical result.     CT Chest Pulmonary Embolism w Contrast    Result Date: 10/2/2023  EXAM: CT CHEST PULMONARY EMBOLISM W CONTRAST LOCATION: [facility] DATE: 10/2/2023 INDICATION: shortness of breath COMPARISON: 06/25/2020 TECHNIQUE: CT chest pulmonary angiogram during arterial phase injection of IV contrast. Multiplanar reformats and MIP reconstructions were performed. Dose reduction techniques were used. CONTRAST: 90ml isovue 370 FINDINGS: ANGIOGRAM CHEST: Pulmonary arteries are normal caliber and negative for pulmonary emboli. Thoracic aorta is negative for dissection. LUNGS AND PLEURA: Patchy airspace consolidation and groundglass attenuation has developed in the inferior lingular segment of the left upper lobe. Mild dependent atelectasis bilaterally. Stable mild extrinsic compression of the trachea by the goiter. MEDIASTINUM/AXILLAE: Severely enlarged goiter. Calcified aortic valve leaflets. CORONARY ARTERY CALCIFICATION: Moderate calcified atherosclerosis left anterior descending and right coronary arteries. UPPER  "ABDOMEN: Diffuse bilateral adrenal enlargement. MUSCULOSKELETAL: Mild degenerative change thoracic spine.     IMPRESSION: 1.  Findings suspicious for left upper lobe pneumonia 2.  Negative for PE      Lab Results:  Personally Reviewed.   Recent Labs   Lab 10/14/23  0614 10/13/23  0449 10/12/23  0505   WBC 8.4 11.6* 16.5*   HGB 9.0* 9.8* 9.6*   HCT 28.8* 30.8* 30.1*   * 480* 504*     Recent Labs   Lab 10/14/23  0614 10/13/23  0449 10/12/23  0505    136 137   CO2 25 25 29   BUN 8.5 8.4 12.8   ALBUMIN 2.4*  --   --    ALKPHOS 103  --   --    ALT 14  --   --    AST 18  --   --      No results for input(s): \"INR\" in the last 168 hours.      MANAGEMENT DISCUSSED with the following over the past 24 hours: RN   NOTE(S)/MEDICAL RECORDS REVIEWED over the past 24 hours: PT, Surgery, Nursing Hospital Medicine  Medical complexity over the past 24 hours:  - Parenteral (IV) CONTROLLED SUBSTANCES ordered  - Prescription DRUG MANAGEMENT performed        Chacha MAJOR, CNS-BC, DNP  Acute Care Pain Management Program  [facility] (Sonny PEDRAZA, [place])   Preference if for [facility] Satinder Escobar  Click HERE to page Neha                  "

## 2023-10-14 NOTE — PROGRESS NOTES
PT: Orders acknowledged, chart reviewed. After discussion with rehab team, we would need very detailed activity orders from Surgical service prior to attempting mobility to ensure patient safety given her extensive exposed open wounds. Without this information, we cannot start rehabilitation safely. We will continue to chart check, but if patient is to remain on bed rest, please let us know so that we may initiate at a more appropriate time.

## 2023-10-15 LAB
ANION GAP SERPL CALCULATED.3IONS-SCNC: 6 MMOL/L (ref 7–15)
BUN SERPL-MCNC: 5.7 MG/DL (ref 8–23)
CALCIUM SERPL-MCNC: 7.9 MG/DL (ref 8.8–10.2)
CHLORIDE SERPL-SCNC: 110 MMOL/L (ref 98–107)
CREAT SERPL-MCNC: 0.37 MG/DL (ref 0.51–0.95)
DEPRECATED HCO3 PLAS-SCNC: 25 MMOL/L (ref 22–29)
EGFRCR SERPLBLD CKD-EPI 2021: >90 ML/MIN/1.73M2
ERYTHROCYTE [DISTWIDTH] IN BLOOD BY AUTOMATED COUNT: 17.6 % (ref 10–15)
GLUCOSE BLDC GLUCOMTR-MCNC: 140 MG/DL (ref 70–99)
GLUCOSE BLDC GLUCOMTR-MCNC: 170 MG/DL (ref 70–99)
GLUCOSE BLDC GLUCOMTR-MCNC: 187 MG/DL (ref 70–99)
GLUCOSE BLDC GLUCOMTR-MCNC: 191 MG/DL (ref 70–99)
GLUCOSE SERPL-MCNC: 115 MG/DL (ref 70–99)
HCT VFR BLD AUTO: 28.9 % (ref 35–47)
HGB BLD-MCNC: 9.1 G/DL (ref 11.7–15.7)
MAGNESIUM SERPL-MCNC: 1.8 MG/DL (ref 1.7–2.3)
MCH RBC QN AUTO: 31.9 PG (ref 26.5–33)
MCHC RBC AUTO-ENTMCNC: 31.5 G/DL (ref 31.5–36.5)
MCV RBC AUTO: 101 FL (ref 78–100)
PHOSPHATE SERPL-MCNC: 2.3 MG/DL (ref 2.5–4.5)
PLATELET # BLD AUTO: 436 10E3/UL (ref 150–450)
POTASSIUM SERPL-SCNC: 3.6 MMOL/L (ref 3.4–5.3)
RBC # BLD AUTO: 2.85 10E6/UL (ref 3.8–5.2)
SODIUM SERPL-SCNC: 141 MMOL/L (ref 135–145)
WBC # BLD AUTO: 7.5 10E3/UL (ref 4–11)

## 2023-10-15 PROCEDURE — 83735 ASSAY OF MAGNESIUM: CPT | Performed by: SURGERY

## 2023-10-15 PROCEDURE — 80048 BASIC METABOLIC PNL TOTAL CA: CPT | Performed by: NURSE PRACTITIONER

## 2023-10-15 PROCEDURE — 250N000013 HC RX MED GY IP 250 OP 250 PS 637: Performed by: INTERNAL MEDICINE

## 2023-10-15 PROCEDURE — 250N000013 HC RX MED GY IP 250 OP 250 PS 637: Performed by: CLINICAL NURSE SPECIALIST

## 2023-10-15 PROCEDURE — 120N000001 HC R&B MED SURG/OB

## 2023-10-15 PROCEDURE — 250N000011 HC RX IP 250 OP 636: Mod: JZ | Performed by: SURGERY

## 2023-10-15 PROCEDURE — 250N000011 HC RX IP 250 OP 636: Mod: JZ | Performed by: INTERNAL MEDICINE

## 2023-10-15 PROCEDURE — 84100 ASSAY OF PHOSPHORUS: CPT | Performed by: SURGERY

## 2023-10-15 PROCEDURE — 250N000013 HC RX MED GY IP 250 OP 250 PS 637: Performed by: SURGERY

## 2023-10-15 PROCEDURE — 250N000012 HC RX MED GY IP 250 OP 636 PS 637: Performed by: INTERNAL MEDICINE

## 2023-10-15 PROCEDURE — 250N000013 HC RX MED GY IP 250 OP 250 PS 637: Performed by: NURSE PRACTITIONER

## 2023-10-15 PROCEDURE — 99232 SBSQ HOSP IP/OBS MODERATE 35: CPT | Performed by: INTERNAL MEDICINE

## 2023-10-15 PROCEDURE — 250N000011 HC RX IP 250 OP 636: Performed by: INTERNAL MEDICINE

## 2023-10-15 PROCEDURE — 85027 COMPLETE CBC AUTOMATED: CPT | Performed by: NURSE PRACTITIONER

## 2023-10-15 RX ORDER — POTASSIUM CHLORIDE 1500 MG/1
20 TABLET, EXTENDED RELEASE ORAL ONCE
Status: COMPLETED | OUTPATIENT
Start: 2023-10-15 | End: 2023-10-15

## 2023-10-15 RX ORDER — MAGNESIUM SULFATE HEPTAHYDRATE 40 MG/ML
2 INJECTION, SOLUTION INTRAVENOUS ONCE
Status: COMPLETED | OUTPATIENT
Start: 2023-10-15 | End: 2023-10-15

## 2023-10-15 RX ORDER — HYDROMORPHONE HYDROCHLORIDE 1 MG/ML
0.5 INJECTION, SOLUTION INTRAMUSCULAR; INTRAVENOUS; SUBCUTANEOUS
Status: DISCONTINUED | OUTPATIENT
Start: 2023-10-15 | End: 2023-10-27 | Stop reason: HOSPADM

## 2023-10-15 RX ORDER — HYDROMORPHONE HCL IN WATER/PF 6 MG/30 ML
0.2 PATIENT CONTROLLED ANALGESIA SYRINGE INTRAVENOUS 2 TIMES DAILY PRN
Status: DISCONTINUED | OUTPATIENT
Start: 2023-10-15 | End: 2023-10-27 | Stop reason: HOSPADM

## 2023-10-15 RX ADMIN — METFORMIN HYDROCHLORIDE 1000 MG: 500 TABLET, FILM COATED ORAL at 17:33

## 2023-10-15 RX ADMIN — METRONIDAZOLE 500 MG: 500 INJECTION, SOLUTION INTRAVENOUS at 06:20

## 2023-10-15 RX ADMIN — THIAMINE HCL TAB 100 MG 100 MG: 100 TAB at 09:00

## 2023-10-15 RX ADMIN — INSULIN ASPART 4 UNITS: 100 INJECTION, SOLUTION INTRAVENOUS; SUBCUTANEOUS at 08:59

## 2023-10-15 RX ADMIN — OXYCODONE HYDROCHLORIDE 5 MG: 5 TABLET ORAL at 21:35

## 2023-10-15 RX ADMIN — INSULIN GLARGINE 10 UNITS: 100 INJECTION, SOLUTION SUBCUTANEOUS at 21:26

## 2023-10-15 RX ADMIN — INSULIN GLARGINE 10 UNITS: 100 INJECTION, SOLUTION SUBCUTANEOUS at 07:53

## 2023-10-15 RX ADMIN — ENOXAPARIN SODIUM 40 MG: 40 INJECTION SUBCUTANEOUS at 10:18

## 2023-10-15 RX ADMIN — SENNOSIDES AND DOCUSATE SODIUM 1 TABLET: 8.6; 5 TABLET ORAL at 09:00

## 2023-10-15 RX ADMIN — ACETAMINOPHEN 650 MG: 325 TABLET ORAL at 12:53

## 2023-10-15 RX ADMIN — Medication 60 ML: at 14:26

## 2023-10-15 RX ADMIN — POTASSIUM CHLORIDE 20 MEQ: 1500 TABLET, EXTENDED RELEASE ORAL at 10:19

## 2023-10-15 RX ADMIN — PREGABALIN 25 MG: 25 CAPSULE ORAL at 14:18

## 2023-10-15 RX ADMIN — INSULIN ASPART 3 UNITS: 100 INJECTION, SOLUTION INTRAVENOUS; SUBCUTANEOUS at 19:25

## 2023-10-15 RX ADMIN — POTASSIUM & SODIUM PHOSPHATES POWDER PACK 280-160-250 MG 1 PACKET: 280-160-250 PACK at 14:18

## 2023-10-15 RX ADMIN — METRONIDAZOLE 500 MG: 500 INJECTION, SOLUTION INTRAVENOUS at 20:25

## 2023-10-15 RX ADMIN — SENNOSIDES AND DOCUSATE SODIUM 1 TABLET: 8.6; 5 TABLET ORAL at 20:26

## 2023-10-15 RX ADMIN — MAGNESIUM SULFATE HEPTAHYDRATE 2 G: 40 INJECTION, SOLUTION INTRAVENOUS at 10:23

## 2023-10-15 RX ADMIN — POTASSIUM & SODIUM PHOSPHATES POWDER PACK 280-160-250 MG 1 PACKET: 280-160-250 PACK at 17:32

## 2023-10-15 RX ADMIN — METRONIDAZOLE 500 MG: 500 INJECTION, SOLUTION INTRAVENOUS at 14:13

## 2023-10-15 RX ADMIN — FOLIC ACID 1 MG: 1 TABLET ORAL at 09:00

## 2023-10-15 RX ADMIN — PANTOPRAZOLE SODIUM 40 MG: 40 TABLET, DELAYED RELEASE ORAL at 06:20

## 2023-10-15 RX ADMIN — Medication 1 TABLET: at 09:00

## 2023-10-15 RX ADMIN — PREGABALIN 25 MG: 25 CAPSULE ORAL at 09:00

## 2023-10-15 RX ADMIN — PIPERACILLIN AND TAZOBACTAM 3.38 G: 3; .375 INJECTION, POWDER, LYOPHILIZED, FOR SOLUTION INTRAVENOUS at 08:58

## 2023-10-15 RX ADMIN — LEVOTHYROXINE SODIUM 112 MCG: 0.11 TABLET ORAL at 06:20

## 2023-10-15 RX ADMIN — PIPERACILLIN AND TAZOBACTAM 3.38 G: 3; .375 INJECTION, POWDER, LYOPHILIZED, FOR SOLUTION INTRAVENOUS at 16:03

## 2023-10-15 RX ADMIN — POTASSIUM & SODIUM PHOSPHATES POWDER PACK 280-160-250 MG 1 PACKET: 280-160-250 PACK at 10:33

## 2023-10-15 RX ADMIN — PREGABALIN 25 MG: 25 CAPSULE ORAL at 20:26

## 2023-10-15 RX ADMIN — ACETAMINOPHEN 325 MG: 325 TABLET ORAL at 21:35

## 2023-10-15 ASSESSMENT — ACTIVITIES OF DAILY LIVING (ADL)
ADLS_ACUITY_SCORE: 38

## 2023-10-15 NOTE — PROGRESS NOTES
Care Management Follow Up    Length of Stay (days): 13    Expected Discharge Date: 10/17/2023     Concerns to be Addressed:  complex wound, possible colostomy.     Patient plan of care discussed at interdisciplinary rounds: Yes    Anticipated Discharge Disposition:  TBD     Anticipated Discharge Services:    Anticipated Discharge DME:      Patient/family educated on Medicare website which has current facility and service quality ratings:    Education Provided on the Discharge Plan:    Patient/Family in Agreement with the Plan:      Referrals Placed by CM/SW:  [facility]  Private pay costs discussed: Not applicable    Additional Information:  CM sent referral to [facility]    Rona Perez RN

## 2023-10-15 NOTE — PROGRESS NOTES
[facility]    Medicine Progress Note - Hospitalist Service    Date of Admission:  10/2/2023    Assessment & Plan   63 year old woman with hx of DM2, obesity, tobacco abuse, alcohol abuse, hypothyroid, goiter who presented 10/2/23 with dyspnea, cough, generalized weakness and found to have malodorous drainage from a wound in her left inguinal/vaginal area. CT scan showed necrotizing fasciitis and she was emergently taken to the OR for vulvectomy and broad debridement of the left buttock and left thigh. She returned to the ICU on pressors and was febrile. Taken back to OR and required extensive debridement from the left buttock, perineum, down to the left ankle. She required intubation by ENT due to large goiter.  Now off of pressors and extubated 10/11, transferred out of ICU 10/13/23        Severe sepsis and septic shock  Necrotizing soft tissue infection/necrotizing fasciitis  Lactic acidosis resolved  Hypoxic respiratory failure resolved  Left upper lobe pneumonia  Presented on 10/2/2023 with pneumonia and necrotizing fasciitis.   She was emergently taken to the OR for vulvectomy and broad debridement of the left buttock and left thigh  After procedure she had to go on pressors and was febrile so taken back to the OR requiring extensive debridement from the left buttock, perineum and down into the ankle  CT chest showed findings suspicious for left upper lobe pneumonia   Cultures with polymicrobial organism--Bacteroids and actinno, Fusobacterium nucleatum  Sputum cultures growing haemophilus and strep constellatus, GNB  Blood cultures remain no growth to date  ENT intubated the patient because of large goiter, extubated 10/11  -- continue current flagyl and zosyn  -- Diverting colostomy was recommended to prevent recurrent wound infection, Patient is still uncertain about under going colostomy placement and she is more concerned with how his  is handling this.  -- surgery to follow  "  -- ID following, pain tream following  -- patient asking to start PT but remains on bed-rest, defer activity restrictions to general surgery   -- continue current bowel meds, defer further rectal tube management to surgery  -- hold further diuresis given large net negative fluid balance since admission. Normally takes lasix BID at home.       Mild hypophosphatemia, hypokalemia: replace per protocol       History of alcohol abuse   reports 6-12 beers per day  Currently no signs of withdrawal  -- continue MV, folic acid and thiamine       Large goiter  History of hypothyroidism  Currently on room air  TSH was within normal limits, T4 was 0.78, repeated T4 was 0.93   --Continue levothyroxine      Type 2 diabetes mellitus with hyperglycemia without long-term current use of insulin  Noted to have some low trending glucoses 10/14  Continue with Lantus, decreased to 10 units twice daily  Continue carb counting insulin with meals at 1:10 ratio for now  Continue medium intensity SSI  Resume home metformin       Normocytic anemia  Currently stable   Vit B12 1987, folate 18.3  -- Monitor CBC  -- has received 1 unit of blood this hospitalization       GI PPx  Continue with PPI             Diet: Moderate Consistent Carb (60 g CHO per Meal) Diet  Snacks/Supplements Adult: Expedite Bottle; With Meals    DVT Prophylaxis: Enoxaparin (Lovenox) SQ  Rdz Catheter: PRESENT, indication: Wound Healing  Lines: PRESENT      PICC 10/03/23 Triple Lumen Right Basilic Multiple medications-Site Assessment: WDL      Cardiac Monitoring: None  Code Status: No CPR- Pre-arrest intubation OK      Clinically Significant Risk Factors              # Hypoalbuminemia: Lowest albumin = 1.7 g/dL at 10/3/2023  5:21 AM, will monitor as appropriate            # Obesity: Estimated body mass index is 36.45 kg/m  as calculated from the following:    Height as of this encounter: 1.575 m (5' 2\").    Weight as of this encounter: 90.4 kg (199 lb 4.7 oz).     "         Disposition Plan      Expected Discharge Date: 10/18/2023                  Dl Menendez, DO  Hospitalist Service  [facility]  Securely message with [employer] (more info)  Text page via [employer] Paging/Directory   ______________________________________________________________________    Interval History   NAD. Has ongoing left leg weakness after surgery, asking to start PT    Physical Exam   Vital Signs: Temp: 97.5  F (36.4  C) Temp src: Oral BP: 103/56 Pulse: 82   Resp: 22 SpO2: 95 % O2 Device: None (Room air)    Weight: 199 lbs 4.73 oz  General: NAD  RESPIRATORY: Breathing nonlabored  CARDIOVASCULAR: No le edema bilat.   NEUROLOGIC: Alert, speech clear, decreased strength LLE         Medical Decision Making       >45 MINUTES SPENT BY ME on the date of service doing chart review, history, exam, documentation & further activities per the note.      Data

## 2023-10-15 NOTE — PROGRESS NOTES
Will not see today:  PAIN MANAGEMENT SERVICE CHART CHECK    This patient's chart has been reviewed by the Pain Service. Reviewed with RN and Dr. Menendez.  No opioids over the past 24 hours.  IV hydromorphone changed to bid prn for dressing changes not managed by oral pain medications.    Pain Service with no additional recommendations at this time.  We will sign off.  Please re consult if we can be of additional assistance.      Thank you!    Chacha Escobar APRN, CNS-BC, DNP  Acute Care Pain Management Program  [facility] (Sonny PEDRAZA, [place])   Preference if for [facility] Satinder Escobar  Click HERE to page Neha

## 2023-10-15 NOTE — PLAN OF CARE
Problem: Sepsis/Septic Shock  Goal: Optimal Coping  Outcome: Progressing  Goal: Absence of Bleeding  Outcome: Progressing  Goal: Blood Glucose Level Within Targeted Range  Outcome: Progressing  Goal: Absence of Infection Signs and Symptoms  Outcome: Progressing  Intervention: Promote Recovery  Recent Flowsheet Documentation  Taken 10/14/2023 2300 by Veronica Ruiz, RN  Activity Management: bedrest  Goal: Optimal Nutrition Intake  Outcome: Progressing     Problem: Malnutrition  Goal: Improved Nutritional Intake  Outcome: Progressing     Problem: Pain Acute  Goal: Optimal Pain Control and Function  Outcome: Progressing     Problem: Infection  Goal: Absence of Infection Signs and Symptoms  Outcome: Progressing   Goal Outcome Evaluation:    No pain reported. Repositioned/weight shifting q2h. Scant drainage from rectal tube. Zoysn and flagyl given. VSS.    Veronica Ruiz, RN

## 2023-10-16 ENCOUNTER — APPOINTMENT (OUTPATIENT)
Dept: PHYSICAL THERAPY | Facility: HOSPITAL | Age: 63
DRG: 853 | End: 2023-10-16
Attending: INTERNAL MEDICINE
Payer: COMMERCIAL

## 2023-10-16 LAB
ANION GAP SERPL CALCULATED.3IONS-SCNC: 6 MMOL/L (ref 7–15)
BUN SERPL-MCNC: 7.6 MG/DL (ref 8–23)
CALCIUM SERPL-MCNC: 8.7 MG/DL (ref 8.8–10.2)
CHLORIDE SERPL-SCNC: 106 MMOL/L (ref 98–107)
CREAT SERPL-MCNC: 0.42 MG/DL (ref 0.51–0.95)
DEPRECATED HCO3 PLAS-SCNC: 28 MMOL/L (ref 22–29)
EGFRCR SERPLBLD CKD-EPI 2021: >90 ML/MIN/1.73M2
GLUCOSE BLDC GLUCOMTR-MCNC: 121 MG/DL (ref 70–99)
GLUCOSE BLDC GLUCOMTR-MCNC: 125 MG/DL (ref 70–99)
GLUCOSE BLDC GLUCOMTR-MCNC: 144 MG/DL (ref 70–99)
GLUCOSE BLDC GLUCOMTR-MCNC: 87 MG/DL (ref 70–99)
GLUCOSE BLDC GLUCOMTR-MCNC: 95 MG/DL (ref 70–99)
GLUCOSE SERPL-MCNC: 98 MG/DL (ref 70–99)
PHOSPHATE SERPL-MCNC: 2.8 MG/DL (ref 2.5–4.5)
POTASSIUM SERPL-SCNC: 3.7 MMOL/L (ref 3.4–5.3)
SODIUM SERPL-SCNC: 140 MMOL/L (ref 135–145)

## 2023-10-16 PROCEDURE — 250N000013 HC RX MED GY IP 250 OP 250 PS 637: Performed by: INTERNAL MEDICINE

## 2023-10-16 PROCEDURE — 250N000013 HC RX MED GY IP 250 OP 250 PS 637: Performed by: NURSE PRACTITIONER

## 2023-10-16 PROCEDURE — 97162 PT EVAL MOD COMPLEX 30 MIN: CPT | Mod: GP

## 2023-10-16 PROCEDURE — 80048 BASIC METABOLIC PNL TOTAL CA: CPT | Performed by: NURSE PRACTITIONER

## 2023-10-16 PROCEDURE — 99232 SBSQ HOSP IP/OBS MODERATE 35: CPT | Performed by: INTERNAL MEDICINE

## 2023-10-16 PROCEDURE — 250N000011 HC RX IP 250 OP 636: Performed by: CLINICAL NURSE SPECIALIST

## 2023-10-16 PROCEDURE — 99232 SBSQ HOSP IP/OBS MODERATE 35: CPT

## 2023-10-16 PROCEDURE — 120N000001 HC R&B MED SURG/OB

## 2023-10-16 PROCEDURE — 250N000011 HC RX IP 250 OP 636: Mod: JZ | Performed by: SURGERY

## 2023-10-16 PROCEDURE — 250N000011 HC RX IP 250 OP 636: Performed by: NURSE PRACTITIONER

## 2023-10-16 PROCEDURE — 250N000013 HC RX MED GY IP 250 OP 250 PS 637: Performed by: SURGERY

## 2023-10-16 PROCEDURE — 250N000011 HC RX IP 250 OP 636: Performed by: INTERNAL MEDICINE

## 2023-10-16 PROCEDURE — 97530 THERAPEUTIC ACTIVITIES: CPT | Mod: GP

## 2023-10-16 PROCEDURE — 84100 ASSAY OF PHOSPHORUS: CPT | Performed by: INTERNAL MEDICINE

## 2023-10-16 PROCEDURE — 250N000011 HC RX IP 250 OP 636: Mod: JZ | Performed by: NURSE PRACTITIONER

## 2023-10-16 PROCEDURE — 250N000013 HC RX MED GY IP 250 OP 250 PS 637

## 2023-10-16 RX ORDER — METRONIDAZOLE 500 MG/1
500 TABLET ORAL 3 TIMES DAILY
Status: COMPLETED | OUTPATIENT
Start: 2023-10-16 | End: 2023-10-25

## 2023-10-16 RX ORDER — POTASSIUM CHLORIDE 1500 MG/1
20 TABLET, EXTENDED RELEASE ORAL ONCE
Status: COMPLETED | OUTPATIENT
Start: 2023-10-16 | End: 2023-10-16

## 2023-10-16 RX ADMIN — PREGABALIN 25 MG: 25 CAPSULE ORAL at 14:14

## 2023-10-16 RX ADMIN — Medication 60 ML: at 14:15

## 2023-10-16 RX ADMIN — SENNOSIDES AND DOCUSATE SODIUM 1 TABLET: 8.6; 5 TABLET ORAL at 09:41

## 2023-10-16 RX ADMIN — OXYCODONE HYDROCHLORIDE 10 MG: 5 TABLET ORAL at 03:02

## 2023-10-16 RX ADMIN — HYDROMORPHONE HYDROCHLORIDE 0.5 MG: 1 INJECTION, SOLUTION INTRAMUSCULAR; INTRAVENOUS; SUBCUTANEOUS at 16:41

## 2023-10-16 RX ADMIN — METFORMIN HYDROCHLORIDE 1000 MG: 500 TABLET, FILM COATED ORAL at 09:41

## 2023-10-16 RX ADMIN — OXYCODONE HYDROCHLORIDE 10 MG: 5 TABLET ORAL at 09:41

## 2023-10-16 RX ADMIN — ENOXAPARIN SODIUM 40 MG: 40 INJECTION SUBCUTANEOUS at 09:42

## 2023-10-16 RX ADMIN — METRONIDAZOLE 500 MG: 500 INJECTION, SOLUTION INTRAVENOUS at 10:00

## 2023-10-16 RX ADMIN — METRONIDAZOLE 500 MG: 500 INJECTION, SOLUTION INTRAVENOUS at 05:32

## 2023-10-16 RX ADMIN — MAGNESIUM HYDROXIDE 30 ML: 400 SUSPENSION ORAL at 03:02

## 2023-10-16 RX ADMIN — INSULIN GLARGINE 10 UNITS: 100 INJECTION, SOLUTION SUBCUTANEOUS at 09:44

## 2023-10-16 RX ADMIN — LEVOTHYROXINE SODIUM 112 MCG: 0.11 TABLET ORAL at 05:32

## 2023-10-16 RX ADMIN — ACETAMINOPHEN 650 MG: 325 TABLET ORAL at 03:01

## 2023-10-16 RX ADMIN — PIPERACILLIN AND TAZOBACTAM 3.38 G: 3; .375 INJECTION, POWDER, LYOPHILIZED, FOR SOLUTION INTRAVENOUS at 23:58

## 2023-10-16 RX ADMIN — METRONIDAZOLE 500 MG: 500 TABLET ORAL at 17:05

## 2023-10-16 RX ADMIN — METFORMIN HYDROCHLORIDE 1000 MG: 500 TABLET, FILM COATED ORAL at 17:05

## 2023-10-16 RX ADMIN — FOLIC ACID 1 MG: 1 TABLET ORAL at 09:42

## 2023-10-16 RX ADMIN — PANTOPRAZOLE SODIUM 40 MG: 40 TABLET, DELAYED RELEASE ORAL at 05:25

## 2023-10-16 RX ADMIN — INSULIN GLARGINE 10 UNITS: 100 INJECTION, SOLUTION SUBCUTANEOUS at 20:59

## 2023-10-16 RX ADMIN — THIAMINE HCL TAB 100 MG 100 MG: 100 TAB at 09:41

## 2023-10-16 RX ADMIN — PIPERACILLIN AND TAZOBACTAM 3.38 G: 3; .375 INJECTION, POWDER, LYOPHILIZED, FOR SOLUTION INTRAVENOUS at 17:05

## 2023-10-16 RX ADMIN — PIPERACILLIN AND TAZOBACTAM 3.38 G: 3; .375 INJECTION, POWDER, LYOPHILIZED, FOR SOLUTION INTRAVENOUS at 00:17

## 2023-10-16 RX ADMIN — POTASSIUM CHLORIDE 20 MEQ: 1500 TABLET, EXTENDED RELEASE ORAL at 09:41

## 2023-10-16 RX ADMIN — PIPERACILLIN AND TAZOBACTAM 3.38 G: 3; .375 INJECTION, POWDER, LYOPHILIZED, FOR SOLUTION INTRAVENOUS at 09:42

## 2023-10-16 RX ADMIN — ACETAMINOPHEN 650 MG: 325 TABLET ORAL at 09:41

## 2023-10-16 RX ADMIN — PREGABALIN 25 MG: 25 CAPSULE ORAL at 20:59

## 2023-10-16 RX ADMIN — POLYETHYLENE GLYCOL 3350 17 G: 17 POWDER, FOR SOLUTION ORAL at 09:42

## 2023-10-16 RX ADMIN — Medication 1 TABLET: at 09:41

## 2023-10-16 RX ADMIN — METRONIDAZOLE 500 MG: 500 TABLET ORAL at 20:59

## 2023-10-16 RX ADMIN — PREGABALIN 25 MG: 25 CAPSULE ORAL at 09:42

## 2023-10-16 RX ADMIN — INSULIN ASPART 7 UNITS: 100 INJECTION, SOLUTION INTRAVENOUS; SUBCUTANEOUS at 14:15

## 2023-10-16 ASSESSMENT — ACTIVITIES OF DAILY LIVING (ADL)
ADLS_ACUITY_SCORE: 38

## 2023-10-16 NOTE — PROGRESS NOTES
[facility]    Medicine Progress Note - Hospitalist Service    Date of Admission:  10/2/2023    Assessment & Plan   63 year old woman with hx of DM2, obesity, tobacco abuse, alcohol abuse, hypothyroid, goiter who presented 10/2/23 with dyspnea, cough, generalized weakness and found to have malodorous drainage from a wound in her left inguinal/vaginal area. CT scan showed necrotizing fasciitis and she was emergently taken to the OR for vulvectomy and broad debridement of the left buttock and left thigh. She returned to the ICU on pressors and was febrile. Taken back to OR and required extensive debridement from the left buttock, perineum, down to the left ankle. She required intubation by ENT due to large goiter.  Now off of pressors and extubated 10/11, transferred out of ICU 10/13/23        Severe sepsis and septic shock  Necrotizing soft tissue infection/necrotizing fasciitis  Lactic acidosis resolved  Hypoxic respiratory failure resolved  Left upper lobe pneumonia  Presented on 10/2/2023 with pneumonia and necrotizing fasciitis.   She was emergently taken to the OR for vulvectomy and broad debridement of the left buttock and left thigh  After procedure she had to go on pressors and was febrile so taken back to the OR requiring extensive debridement from the left buttock, perineum and down into the ankle  CT chest showed findings suspicious for left upper lobe pneumonia   Cultures with polymicrobial organism--Bacteroids and actinno, Fusobacterium nucleatum  Sputum cultures growing haemophilus and strep constellatus, GNB  Blood cultures remain no growth to date  ENT intubated the patient because of large goiter, extubated 10/11  -- continue current flagyl and zosyn per ID  -- Diverting colostomy was recommended to prevent recurrent wound infection, Patient is still uncertain about under going colostomy placement and she is more concerned with how his  is handling this.  -- patient  "asking to start PT, defer activity restrictions to general surgery. Therapy team is asking for specific therapy restrictions with regards to wounds  -- continue current bowel meds, defer further rectal tube management to surgery  -- hold further diuresis given large net negative fluid balance since admission. Normally takes lasix BID at home.       Mild hypophosphatemia, hypokalemia: replaced per protocol       History of alcohol abuse   reports 6-12 beers per day  Currently no signs of withdrawal  -- continue MV, folic acid and thiamine       Large goiter  History of hypothyroidism  Currently on room air  TSH was within normal limits, T4 was 0.78, repeated T4 was 0.93   --Continue levothyroxine      Type 2 diabetes mellitus with hyperglycemia without long-term current use of insulin  Noted to have some low trending glucoses 10/14  Continue with Lantus, decreased to 10 units twice daily  Continue carb counting insulin with meals at 1:10 ratio for now  Continue medium intensity SSI  Resumed home metformin       Normocytic anemia  Currently stable   Vit B12 1987, folate 18.3  -- Monitor CBC  -- has received 1 unit of blood this hospitalization       GI PPx  Continue with PPI             Diet: Moderate Consistent Carb (60 g CHO per Meal) Diet  Snacks/Supplements Adult: Expedite Bottle; With Meals    DVT Prophylaxis: Enoxaparin (Lovenox) SQ  Rdz Catheter: PRESENT, indication: Wound Healing  Lines: PRESENT      PICC 10/03/23 Triple Lumen Right Basilic Multiple medications-Site Assessment: WDL      Cardiac Monitoring: None  Code Status: No CPR- Pre-arrest intubation OK      Clinically Significant Risk Factors              # Hypoalbuminemia: Lowest albumin = 1.7 g/dL at 10/3/2023  5:21 AM, will monitor as appropriate            # Obesity: Estimated body mass index is 36.45 kg/m  as calculated from the following:    Height as of this encounter: 1.575 m (5' 2\").    Weight as of this encounter: 90.4 kg (199 lb 4.7 " oz).             Disposition Plan      Expected Discharge Date: 10/18/2023                  Dl Menendez DO  Hospitalist Service  [facility]  Securely message with [employer] (more info)  Text page via [employer] Paging/Directory   ______________________________________________________________________    Interval History   NAD. Has ongoing left leg weakness after surgery, keeps asking to start PT    Physical Exam   Vital Signs: Temp: 98.3  F (36.8  C) Temp src: Oral BP: 123/58 Pulse: 84   Resp: 18 SpO2: 96 % O2 Device: None (Room air)    Weight: 199 lbs 4.73 oz  General: NAD  RESPIRATORY: Breathing nonlabored  CARDIOVASCULAR: Trace le edema bilat.   NEUROLOGIC: Alert, speech clear, decreased strength LLE         Medical Decision Making       >45 MINUTES SPENT BY ME on the date of service doing chart review, history, exam, documentation & further activities per the note.      Data

## 2023-10-16 NOTE — PROGRESS NOTES
Stool continuing to leak around rectal tube. Charge RN repositioned and checked balloon status. Dressings changed. Will continue to monitor.

## 2023-10-16 NOTE — PLAN OF CARE
Problem: Pain Acute  Goal: Optimal Pain Control and Function  Outcome: Progressing  Intervention: Prevent or Manage Pain  Pt reporting tolerable pain. Received PRN tylenol and oxycodone prior to dressing change.     Problem: Malnutrition  Goal: Improved Nutritional Intake  Outcome: Progressing   Pt wanted to eat breakfast, but by the time dressing change was done it was closer to lunch so patient ordered lunch and ate 100%. Blood sugars were 95 and 87. Carb count insulin given for lunch. Continuing to encourage PO intake.     HAFSA HANCOCK RN

## 2023-10-16 NOTE — PROGRESS NOTES
Care Management Follow Up    Length of Stay (days): 14    Expected Discharge Date: 10/18/2023     Concerns to be Addressed:       Patient plan of care discussed at interdisciplinary rounds: Yes    Anticipated Discharge Disposition:       Anticipated Discharge Services:    Anticipated Discharge DME:      Patient/family educated on Medicare website which has current facility and service quality ratings:    Education Provided on the Discharge Plan:    Patient/Family in Agreement with the Plan:      Referrals Placed by CM/SW:    Private pay costs discussed: Not applicable    Additional Information:  LTACH referral pending for complex wounds.     Reported that patient drinks 6-12 beers per day. SW attempted to meet with patient but she was with care team. CM to reattempt at a later time.     Social History  Patient lives in a house with spouse. He assists with IADLs.     HEYDI CisnerosW

## 2023-10-16 NOTE — PROGRESS NOTES
10/16/23 1301   Appointment Info   Signing Clinician's Name / Credentials (PT) Aisha Cook, PT, DPT   Rehab Comments (PT) per RN - had discussion with surgery team who said that pt is WBAT and has no activity restrictions.   Living Environment   People in Home spouse   Current Living Arrangements house   Home Accessibility stairs to enter home;stairs within home   Number of Stairs, Main Entrance 6   Stair Railings, Main Entrance railings on both sides of stairs   Number of Stairs, Within Home, Primary greater than 10 stairs   Stair Railings, Within Home, Primary none   Self-Care   Usual Activity Tolerance moderate   Current Activity Tolerance poor   Equipment Currently Used at Home none   Fall history within last six months no   General Information   Onset of Illness/Injury or Date of Surgery 10/02/23   Referring Physician Dl Menendez,    Patient/Family Therapy Goals Statement (PT) Return to home & improve core stability   Pertinent History of Current Problem (include personal factors and/or comorbidities that impact the POC) 63-year-old female with a history of hypothyroidism, diabetes mellitus and obesity who presented with groin pain to the emergency room.  Patient has been complaining of shortness of breath and a wound in her left inguinal area that has been draining pus and is severely malodorous.  Pt had CT done that showed necrotizing fasciitis then underwent debridement for this diagnosis.   Existing Precautions/Restrictions weight bearing   Weight-Bearing Status - LLE weight-bearing as tolerated   Weight-Bearing Status - RLE weight-bearing as tolerated   Bed Mobility   Bed Mobility supine-sit   Supine-Sit [place]ike (Bed Mobility) verbal cues;supervision;2 person assist;maximum assist (25% patient effort)   Transfers   Comment, (Transfers) N/A pt unable to tolerate during evaluation   Gait/Stairs (Locomotion)   Comment, (Gait/Stairs) N/A pt unable to tolerate during evaluation   Clinical  Impression   Criteria for Skilled Therapeutic Intervention Yes, treatment indicated   PT Diagnosis (PT) Impaired functional mobility   Influenced by the following impairments weakness, decreased ROM, impaired balance, pain   Functional limitations due to impairments gait, transfers, stairs   Clinical Presentation (PT Evaluation Complexity) evolving   Clinical Presentation Rationale pt presents as medically diagnosed   Clinical Decision Making (Complexity) moderate complexity   Planned Therapy Interventions (PT) gait training;balance training;home exercise program;ROM (range of motion);stair training;strengthening;transfer training;stretching   Risk & Benefits of therapy have been explained evaluation/treatment results reviewed;patient   PT Total Evaluation Time   PT Eval, Moderate Complexity Minutes (59714) 10   Plan of Care Review   Plan of Care Reviewed With patient   Physical Therapy Goals   PT Frequency 5x/week   PT Predicted Duration/Target Date for Goal Attainment 10/27/23   PT Goals Transfers;Gait   PT: Transfers Minimal assist;Sit to/from stand;Assistive device;Within precautions   PT: Gait Minimal assist;Rolling walker;10 feet;Within precautions   Interventions   Interventions Quick Adds Therapeutic Activity   Therapeutic Activity   Therapeutic Activities: dynamic activities to improve functional performance Minutes (05836) 25   Symptoms Noted During/After Treatment Fatigue;Increased pain   Treatment Detail/Skilled Intervention Supine to/from sit x1: cueing for safety/technique/use of bed rails, Max A x2 for trunk & LE managment; Rolling R and L: cueing for safety/technique, Max A x2; Sitting balance EOB: cueing for safety/posture/UE support - attempted with unilateral or no UE support, pt unable to complete activity without UE support,Max A intially however CGA by end of session with static sitting balance; Max A x2 for scooting up in bed, cueing for safety; increased time for managing  lines/tubes/linens/pain   PT Discharge Planning   PT Plan bed mobility, sitting EOB/balance, sit to stand transfers   PT Discharge Recommendation (DC Rec) (S)  LTACH, pending meets medical criteria   PT Rationale for DC Rec LTACH for improving activity tolerance & strengthening; pt requiring increased medical cares   PT Brief overview of current status Max Ax2 for bed mobility; Max A intial sitting balance but improved to CGA at end of session   PT Equipment Needed at Discharge walker, rolling   Total Session Time   Timed Code Treatment Minutes 25   Total Session Time (sum of timed and untimed services) 35

## 2023-10-16 NOTE — PLAN OF CARE
Goal Outcome Evaluation:  VSS, afebrile.  Denied pain for most of shift. Dressing changes slightly uncomfortable (rating pain 2-4) but declined medication intervention. At bedtime patient was having a hard time getting comfortable and complaining of left hip discomfort and requested pain medication. Tylenol and oxycodone given per patient request.  Ate well for supper meal tonight and drinking good amount of fluids.  Rdz catheter and rectal tube in place. Patient is having stool leakage from rectal tube that requires monitoring and cleaning.  TL PICC line-dressing changed today. IV flagyl and zosyn.  Electrolyte protocols were replaced and are rechecks in am.  Wound care done twice today at 1400 and 2100.  Added low air loss system to bed today.  Turn and repo with pillow support.

## 2023-10-16 NOTE — PROGRESS NOTES
"INFECTIOUS DISEASE FOLLOW UP NOTE    Date: 10/16/2023   CHIEF COMPLAINT:   Chief Complaint   Patient presents with    Groin Pain    Shortness of Breath    Generalized Weakness        ASSESSMENT:    Necrotizing fasciitis: CT with necrotizing fasciitis L groin, perineum, and visualized LLE now down to ankle. s/p OR 10/2, GS and cultures are polymicrobial-bacteroides and actino on culture so far. Repeat OR 10/4 with progressive disease. Active issue.   Severe sepsis: pressors in ICU  DM2  Goiter: ENT intubated in OR.   Sputum with haemophilus and strep constellatus     PLAN:  - continue zosyn IV, change metronidazole to po   - likely PO abx at some point pending clinical course       CORINNE SARMIENTO MD   [facility]  Direct messaging: "[employer] Paging   On-Call ID provider: [phone], option: [phone]       ______________________________________________________________________    SUBJECTIVE / INTERVAL HISTORY:   Says eating some.  Out of ICU.       OBJECTIVE:  /58 (BP Location: Left arm)   Pulse 84   Temp 98.3  F (36.8  C) (Oral)   Resp 18   Ht 1.575 m (5' 2\")   Wt 90.4 kg (199 lb 4.7 oz)   LMP  (LMP Unknown)   SpO2 96%   BMI 36.45 kg/m      Resp: 18    GEN: lying in bed, no distress  RESPIRATORY:  no respiratory distress  CARDIOVASCULAR:  regular, no murmur   ABDOMEN:  Soft, normal bowel sounds, non-tender,   EXTREMITIES: No edema.  SKIN/HAIR/NAILS:  left lower extremities wrapped    NEURO: grossly intact  IV: central lines      Antibiotics:  Zosyn  flagyl    Pertinent labs:  No results found for: \"CRP\"   CBC RESULTS:   Recent Labs   Lab Test 10/04/23  0357   WBC 16.8*   RBC 2.84*   HGB 9.0*   HCT 27.5*   MCV 97   MCH 31.7   MCHC 32.7   RDW 14.6         Last Comprehensive Metabolic Panel:  Sodium   Date Value Ref Range Status   10/16/2023 140 135 - 145 mmol/L Final     Comment:     Reference intervals for this test were updated on 09/26/2023 to more accurately reflect our " healthy population. There may be differences in the flagging of prior results with similar values performed with this method. Interpretation of those prior results can be made in the context of the updated reference intervals.      Potassium   Date Value Ref Range Status   10/16/2023 3.7 3.4 - 5.3 mmol/L Final   06/23/2020 4.4 3.5 - 5.0 mmol/L Final     Chloride   Date Value Ref Range Status   10/16/2023 106 98 - 107 mmol/L Final   06/23/2020 98 98 - 107 mmol/L Final     Carbon Dioxide (CO2)   Date Value Ref Range Status   10/16/2023 28 22 - 29 mmol/L Final   06/23/2020 32 (H) 22 - 31 mmol/L Final     Anion Gap   Date Value Ref Range Status   10/16/2023 6 (L) 7 - 15 mmol/L Final   06/23/2020 5 5 - 18 mmol/L Final     Glucose   Date Value Ref Range Status   06/23/2020 115 70 - 125 mg/dL Final     GLUCOSE BY METER POCT   Date Value Ref Range Status   10/16/2023 87 70 - 99 mg/dL Final     Urea Nitrogen   Date Value Ref Range Status   10/16/2023 7.6 (L) 8.0 - 23.0 mg/dL Final   06/23/2020 8 8 - 22 mg/dL Final     Creatinine   Date Value Ref Range Status   10/16/2023 0.42 (L) 0.51 - 0.95 mg/dL Final     GFR Estimate   Date Value Ref Range Status   10/16/2023 >90 >60 mL/min/1.73m2 Final   06/23/2020 >60 >60 mL/min/1.73m2 Final     GFR, ESTIMATED POCT   Date Value Ref Range Status   10/02/2023 >60 >60 mL/min/1.73m2 Final     Calcium   Date Value Ref Range Status   10/16/2023 8.7 (L) 8.8 - 10.2 mg/dL Final        MICROBIOLOGY DATA:  Personally reviewed.  7-Day Micro Results       No results found for the last 168 hours.             RADIOLOGY:  Personally Reviewed.  Recent Results (from the past 24 hour(s))   XR Chest Port 1 View    Narrative    EXAM: XR CHEST PORT 1 VIEW  LOCATION: [facility]  DATE: 10/4/2023    INDICATION: ETT position  COMPARISON: 10/3/2023      Impression    IMPRESSION: Endotracheal tube terminates 4.0 cm above the suresh. Right upper extremity PICC in the lower SVC. Enteric  decompression tube descends below the diaphragm, but the tip is outside the field-of-view.    Decreased left basilar airspace opacities compared to prior. No pleural effusion or pneumothorax. Normal cardiomediastinal silhouette.       Principal Problem:    Severe sepsis (H)  Active Problems:    Type 2 diabetes mellitus with hyperglycemia, without long-term current use of insulin (H)    Necrotizing soft tissue infection    Septic shock (H)

## 2023-10-16 NOTE — PROGRESS NOTES
Pt's rectal tube leaking a large amount, from patient's groin down to knees. Reinflated balloon in hopes that will help prevent the amount of leakage. Removed dressing and redressed everything with the assistance of another nurse, this took 1hr 15mins. Pt premedicated with oxycodone and tylenol. Pt tolerated dressing change very well. Using leg sling to assist with dressing changes and repositioning. WOC RN seeing patient once a week, they will see her Wednesday.     HAFSA HANCOCK RN

## 2023-10-17 ENCOUNTER — APPOINTMENT (OUTPATIENT)
Dept: PHYSICAL THERAPY | Facility: HOSPITAL | Age: 63
DRG: 853 | End: 2023-10-17
Payer: COMMERCIAL

## 2023-10-17 LAB
ANION GAP SERPL CALCULATED.3IONS-SCNC: 9 MMOL/L (ref 7–15)
BUN SERPL-MCNC: 5.6 MG/DL (ref 8–23)
CALCIUM SERPL-MCNC: 9.5 MG/DL (ref 8.8–10.2)
CHLORIDE SERPL-SCNC: 103 MMOL/L (ref 98–107)
CREAT SERPL-MCNC: 0.4 MG/DL (ref 0.51–0.95)
DEPRECATED HCO3 PLAS-SCNC: 25 MMOL/L (ref 22–29)
EGFRCR SERPLBLD CKD-EPI 2021: >90 ML/MIN/1.73M2
ERYTHROCYTE [DISTWIDTH] IN BLOOD BY AUTOMATED COUNT: 18.1 % (ref 10–15)
GLUCOSE BLDC GLUCOMTR-MCNC: 102 MG/DL (ref 70–99)
GLUCOSE BLDC GLUCOMTR-MCNC: 113 MG/DL (ref 70–99)
GLUCOSE BLDC GLUCOMTR-MCNC: 117 MG/DL (ref 70–99)
GLUCOSE BLDC GLUCOMTR-MCNC: 118 MG/DL (ref 70–99)
GLUCOSE BLDC GLUCOMTR-MCNC: 118 MG/DL (ref 70–99)
GLUCOSE SERPL-MCNC: 97 MG/DL (ref 70–99)
HCT VFR BLD AUTO: 34.3 % (ref 35–47)
HGB BLD-MCNC: 10.7 G/DL (ref 11.7–15.7)
MCH RBC QN AUTO: 31.8 PG (ref 26.5–33)
MCHC RBC AUTO-ENTMCNC: 31.2 G/DL (ref 31.5–36.5)
MCV RBC AUTO: 102 FL (ref 78–100)
PHOSPHATE SERPL-MCNC: 2.9 MG/DL (ref 2.5–4.5)
PLATELET # BLD AUTO: 471 10E3/UL (ref 150–450)
POTASSIUM SERPL-SCNC: 4.9 MMOL/L (ref 3.4–5.3)
RBC # BLD AUTO: 3.36 10E6/UL (ref 3.8–5.2)
SODIUM SERPL-SCNC: 137 MMOL/L (ref 135–145)
WBC # BLD AUTO: 5.2 10E3/UL (ref 4–11)

## 2023-10-17 PROCEDURE — 85027 COMPLETE CBC AUTOMATED: CPT | Performed by: INTERNAL MEDICINE

## 2023-10-17 PROCEDURE — 250N000013 HC RX MED GY IP 250 OP 250 PS 637: Performed by: NURSE PRACTITIONER

## 2023-10-17 PROCEDURE — 80048 BASIC METABOLIC PNL TOTAL CA: CPT | Performed by: NURSE PRACTITIONER

## 2023-10-17 PROCEDURE — 99232 SBSQ HOSP IP/OBS MODERATE 35: CPT

## 2023-10-17 PROCEDURE — 120N000001 HC R&B MED SURG/OB

## 2023-10-17 PROCEDURE — 97110 THERAPEUTIC EXERCISES: CPT | Mod: GP

## 2023-10-17 PROCEDURE — 97112 NEUROMUSCULAR REEDUCATION: CPT | Mod: GP

## 2023-10-17 PROCEDURE — 250N000013 HC RX MED GY IP 250 OP 250 PS 637

## 2023-10-17 PROCEDURE — 250N000011 HC RX IP 250 OP 636: Performed by: CLINICAL NURSE SPECIALIST

## 2023-10-17 PROCEDURE — 99232 SBSQ HOSP IP/OBS MODERATE 35: CPT | Performed by: STUDENT IN AN ORGANIZED HEALTH CARE EDUCATION/TRAINING PROGRAM

## 2023-10-17 PROCEDURE — 250N000013 HC RX MED GY IP 250 OP 250 PS 637: Performed by: INTERNAL MEDICINE

## 2023-10-17 PROCEDURE — 36415 COLL VENOUS BLD VENIPUNCTURE: CPT | Performed by: INTERNAL MEDICINE

## 2023-10-17 PROCEDURE — 84100 ASSAY OF PHOSPHORUS: CPT | Performed by: INTERNAL MEDICINE

## 2023-10-17 PROCEDURE — 97530 THERAPEUTIC ACTIVITIES: CPT | Mod: GP

## 2023-10-17 PROCEDURE — 250N000011 HC RX IP 250 OP 636: Mod: JZ | Performed by: NURSE PRACTITIONER

## 2023-10-17 RX ADMIN — PREGABALIN 25 MG: 25 CAPSULE ORAL at 09:05

## 2023-10-17 RX ADMIN — INSULIN ASPART 5 UNITS: 100 INJECTION, SOLUTION INTRAVENOUS; SUBCUTANEOUS at 18:41

## 2023-10-17 RX ADMIN — Medication 1 TABLET: at 09:05

## 2023-10-17 RX ADMIN — PREGABALIN 25 MG: 25 CAPSULE ORAL at 20:34

## 2023-10-17 RX ADMIN — SENNOSIDES AND DOCUSATE SODIUM 1 TABLET: 8.6; 5 TABLET ORAL at 20:34

## 2023-10-17 RX ADMIN — METRONIDAZOLE 500 MG: 500 TABLET ORAL at 20:34

## 2023-10-17 RX ADMIN — METFORMIN HYDROCHLORIDE 1000 MG: 500 TABLET, FILM COATED ORAL at 09:05

## 2023-10-17 RX ADMIN — PIPERACILLIN AND TAZOBACTAM 3.38 G: 3; .375 INJECTION, POWDER, LYOPHILIZED, FOR SOLUTION INTRAVENOUS at 16:52

## 2023-10-17 RX ADMIN — SENNOSIDES AND DOCUSATE SODIUM 1 TABLET: 8.6; 5 TABLET ORAL at 09:07

## 2023-10-17 RX ADMIN — HYDROXYZINE HYDROCHLORIDE 25 MG: 25 TABLET, FILM COATED ORAL at 14:55

## 2023-10-17 RX ADMIN — METFORMIN HYDROCHLORIDE 1000 MG: 500 TABLET, FILM COATED ORAL at 16:50

## 2023-10-17 RX ADMIN — PANTOPRAZOLE SODIUM 40 MG: 40 TABLET, DELAYED RELEASE ORAL at 05:52

## 2023-10-17 RX ADMIN — Medication 60 ML: at 11:15

## 2023-10-17 RX ADMIN — ENOXAPARIN SODIUM 40 MG: 40 INJECTION SUBCUTANEOUS at 09:04

## 2023-10-17 RX ADMIN — INSULIN ASPART 5 UNITS: 100 INJECTION, SOLUTION INTRAVENOUS; SUBCUTANEOUS at 11:20

## 2023-10-17 RX ADMIN — INSULIN GLARGINE 10 UNITS: 100 INJECTION, SOLUTION SUBCUTANEOUS at 20:44

## 2023-10-17 RX ADMIN — THIAMINE HCL TAB 100 MG 100 MG: 100 TAB at 09:05

## 2023-10-17 RX ADMIN — INSULIN GLARGINE 10 UNITS: 100 INJECTION, SOLUTION SUBCUTANEOUS at 09:05

## 2023-10-17 RX ADMIN — HYDROMORPHONE HYDROCHLORIDE 0.5 MG: 1 INJECTION, SOLUTION INTRAMUSCULAR; INTRAVENOUS; SUBCUTANEOUS at 21:53

## 2023-10-17 RX ADMIN — PIPERACILLIN AND TAZOBACTAM 3.38 G: 3; .375 INJECTION, POWDER, LYOPHILIZED, FOR SOLUTION INTRAVENOUS at 09:12

## 2023-10-17 RX ADMIN — METRONIDAZOLE 500 MG: 500 TABLET ORAL at 09:05

## 2023-10-17 RX ADMIN — OXYCODONE HYDROCHLORIDE 10 MG: 5 TABLET ORAL at 05:56

## 2023-10-17 RX ADMIN — OXYCODONE HYDROCHLORIDE 5 MG: 5 TABLET ORAL at 14:54

## 2023-10-17 RX ADMIN — FOLIC ACID 1 MG: 1 TABLET ORAL at 09:05

## 2023-10-17 RX ADMIN — PIPERACILLIN AND TAZOBACTAM 3.38 G: 3; .375 INJECTION, POWDER, LYOPHILIZED, FOR SOLUTION INTRAVENOUS at 23:40

## 2023-10-17 RX ADMIN — PREGABALIN 25 MG: 25 CAPSULE ORAL at 14:54

## 2023-10-17 RX ADMIN — LEVOTHYROXINE SODIUM 112 MCG: 0.11 TABLET ORAL at 05:52

## 2023-10-17 RX ADMIN — POLYETHYLENE GLYCOL 3350 17 G: 17 POWDER, FOR SOLUTION ORAL at 09:07

## 2023-10-17 RX ADMIN — METRONIDAZOLE 500 MG: 500 TABLET ORAL at 14:54

## 2023-10-17 ASSESSMENT — ACTIVITIES OF DAILY LIVING (ADL)
ADLS_ACUITY_SCORE: 38
ADLS_ACUITY_SCORE: 38
ADLS_ACUITY_SCORE: 34
ADLS_ACUITY_SCORE: 34
ADLS_ACUITY_SCORE: 38

## 2023-10-17 NOTE — PROGRESS NOTES
"CLINICAL NUTRITION SERVICES - REASSESSMENT NOTE     Nutrition Prescription    RECOMMENDATIONS FOR MDs/PROVIDERS TO ORDER:  ? Medication for gas  ie gas ex    Malnutrition Status:    Moderate malnutrition in chronic illness    Recommendations already ordered by Registered Dietitian (RD):  Add cheese sticks twice per day between meals  Start Calorie counts    Future/Additional Recommendations:  Monitor po/protein intake, weight, wound healing, labs poc     EVALUATION OF THE PROGRESS TOWARD GOALS   Diet: moderate consistent CHO  Nutrition Supplement expedite daily for wound healing  Intake: 1-2 meals per day recorded. Pt states she prefers finger foods at this time as can not sit all the way up. She is not interested in protein shakes but is willing to trial string cheese twice per day. She c/o severe gas pain after eating    ANTHROPOMETRICS  Height: 157.5 cm (5' 2\")  Most Recent Weight: 90.4 kg (199 lb 4.7 oz)    Weight History:   Wt Readings from Last 10 Encounters:   10/15/23 90.4 kg (199 lb 4.7 oz)   05/01/23 101 kg (222 lb 11.2 oz)   03/14/23 103.9 kg (229 lb)   11/15/22 103.9 kg (229 lb)   09/07/22 105.1 kg (231 lb 12 oz)   08/31/22 102.1 kg (225 lb)   08/14/20 109.3 kg (241 lb)   06/23/20 97.5 kg (215 lb)     GI CONCERNS  Has a rectal tube  Diarrhea  Last BM 10/16/2023 ( x 2)    LABS  Reviewed: Na 137, K 4.9, urea nitrogen 5.6 (L), Cr 4.0 (L), phos 2.9, Glu 97    MEDICATIONS  Reviewed: lovenox, folic acid, novolog, lantus pen, levothyroxine, metformin, flagyl, multi vit with minerals, pantoprazole, zosyn, miralax, senna-docusate, thiamine 100 mg    CURRENT NUTRITION DIAGNOSIS  Inadequate protein intake related to poor po intake as evidenced by 36% of estimated protein needs on 10/16      INTERVENTIONS  Implementation  Start Calorie counts  Add string cheese twice per day   Follow stool frequency and possible need for banatrol    Goals  Decrease diarrhea  Meet estimated needs  Wound " healing      Monitoring/Evaluation  Progress toward goals will be monitored and evaluated per protocol.

## 2023-10-17 NOTE — PROGRESS NOTES
Care Management Follow Up    Length of Stay (days): 15    Expected Discharge Date: 10/18/2023     Concerns to be Addressed: discharge planning      Patient plan of care discussed at interdisciplinary rounds: Yes    Anticipated Discharge Disposition:  LTACH pending acceptance     Anticipated Discharge Services:  LTACH  Anticipated Discharge DME:  n/a    Patient/family educated on Medicare website which has current facility and service quality ratings:  yes  Education Provided on the Discharge Plan: yes    Patient/Family in Agreement with the Plan:  yes    Referrals Placed by CM/SW:  yes  Private pay costs discussed: Not applicable    Additional Information:  TYLER spoke with LTACH liaison who stated that pt looks appropriate; needing to know if plan is to divert to colostomy vs transfer with rectal tube as well as information on end date of abx. Care management following.  8:57 AM    Per MD, pt is declining colostomy and electing to keep rectal tube. Goal is to switch to PO abx tomorrow. TYLER updated LTACH liaison who is continuing to review for admission this week. Care management following.  3:54 PM    CHEYENNE Thorpe  10/17/2023

## 2023-10-17 NOTE — PROGRESS NOTES
[facility]    Medicine Progress Note - Hospitalist Service    Date of Admission:  10/2/2023    Assessment & Plan   63 year old woman with hx of DM2, obesity, tobacco abuse, alcohol abuse, hypothyroid, goiter who presented 10/2/23 with dyspnea, cough, generalized weakness and found to have malodorous drainage from a wound in her left inguinal/vaginal area. CT scan showed necrotizing fasciitis and she was emergently taken to the OR for vulvectomy and broad debridement of the left buttock and left thigh. She returned to the ICU on pressors and was febrile. Taken back to OR and required extensive debridement from the left buttock, perineum, down to the left ankle. She required intubation by ENT due to large goiter.  Now off of pressors and extubated 10/11, transferred out of ICU 10/13/23.     Dispo remains to be determined, likely LTACH facility. SW assisting.         Severe sepsis and septic shock  Necrotizing soft tissue infection/necrotizing fasciitis  Lactic acidosis resolved  Hypoxic respiratory failure resolved  Left upper lobe pneumonia  Presented on 10/2/2023 with pneumonia and necrotizing fasciitis.   She was emergently taken to the OR for vulvectomy and broad debridement of the left buttock and left thigh  After procedure she had to go on pressors and was febrile so taken back to the OR requiring extensive debridement from the left buttock, perineum and down into the ankle  CT chest showed findings suspicious for left upper lobe pneumonia   Cultures with polymicrobial organism--Bacteroids and actinno, Fusobacterium nucleatum  Sputum cultures growing haemophilus and strep constellatus, GNB  Blood cultures remain no growth to date  ENT intubated the patient because of large goiter, extubated 10/11  -- continue current flagyl and IV zosyn per ID, likely transition to PO Augmentin and flagyl tomorrow  -- Diverting colostomy was recommended to prevent recurrent wound infection, patient does  not want procedure after discussion with care team and family   -- patient asking to start PT, defer activity restrictions to general surgery. Therapy team is asking for specific therapy restrictions with regards to wounds  -- continue current bowel meds, defer further rectal tube management to surgery  -- hold further diuresis given large net negative fluid balance since admission. Normally takes lasix BID at home. Monitor intake and volume status, can restart as appropriate       Mild hypophosphatemia, hypokalemia: replaced per protocol       History of alcohol abuse   reports 6-12 beers per day  Currently no signs of withdrawal  -- continue MV, folic acid and thiamine       Large goiter  History of hypothyroidism  Currently on room air  TSH was within normal limits, T4 was 0.78, repeated T4 was 0.93   --Continue levothyroxine      Type 2 diabetes mellitus with hyperglycemia without long-term current use of insulin  Noted to have some low trending glucoses 10/14  Continue with Lantus, decreased to 10 units twice daily  Continue carb counting insulin with meals at 1:10 ratio for now  Continue medium intensity SSI  Continue home metformin       Macrocytic anemia  Currently stable   Vit B12 1987, folate 18.3. May be related to alcohol use  -- hgb stable and improved, will stop checking hgb unless clinical changes   -- has received 1 unit of blood this hospitalization       GI PPx  Continue with PPI             Diet: Moderate Consistent Carb (60 g CHO per Meal) Diet  Snacks/Supplements Adult: Expedite Bottle; With Meals  Snacks/Supplements Adult: Other; string chees; Between Meals  Calorie Counts    DVT Prophylaxis: Enoxaparin (Lovenox) SQ  Rdz Catheter: PRESENT, indication: Wound Healing  Lines: PRESENT      PICC 10/03/23 Triple Lumen Right Basilic Multiple medications-Site Assessment: WDL      Cardiac Monitoring: None  Code Status: No CPR- Pre-arrest intubation OK      Clinically Significant Risk Factors     "          # Hypoalbuminemia: Lowest albumin = 1.7 g/dL at 10/3/2023  5:21 AM, will monitor as appropriate            # Obesity: Estimated body mass index is 36.45 kg/m  as calculated from the following:    Height as of this encounter: 1.575 m (5' 2\").    Weight as of this encounter: 90.4 kg (199 lb 4.7 oz).             Disposition Plan     Expected Discharge Date: 10/18/2023                  Dl Robles DO  Hospitalist Service  [facility]  Securely message with [facility] (more info)  Text page via [employer] Paging/Directory   ______________________________________________________________________    Interval History   NAOE. Doesn't have any specific complaints. States her appetite is elusive but was able to eat a good breakfast this AM.     Physical Exam   Vital Signs: Temp: 97.7  F (36.5  C) Temp src: Oral BP: 99/55 Pulse: 94   Resp: 18 SpO2: 94 % O2 Device: None (Room air)    Weight: 199 lbs 4.73 oz    General: NAD  RESPIRATORY: Breathing nonlabored  CARDIOVASCULAR: Trace le edema bilat.   NEUROLOGIC: Alert, speech clear, decreased strength LLE         Medical Decision Making       30 MINUTES SPENT BY ME on the date of service doing chart review, history, exam, documentation & further activities per the note.      Data       "

## 2023-10-17 NOTE — PLAN OF CARE
"  Problem: Plan of Care - These are the overarching goals to be used throughout the patient stay.    Goal: Patient-Specific Goal (Individualized)  Description: You can add care plan individualizations to a care plan. Examples of Individualization might be:  \"Parent requests to be called daily at 9am for status\", \"I have a hard time hearing out of my right ear\", or \"Do not touch me to wake me up as it startles me\".  Outcome: Progressing     Problem: Plan of Care - These are the overarching goals to be used throughout the patient stay.    Goal: Patient-Specific Goal (Individualized)  Description: You can add care plan individualizations to a care plan. Examples of Individualization might be:  \"Parent requests to be called daily at 9am for status\", \"I have a hard time hearing out of my right ear\", or \"Do not touch me to wake me up as it startles me\".  Outcome: Progressing     Problem: Plan of Care - These are the overarching goals to be used throughout the patient stay.    Goal: Absence of Hospital-Acquired Illness or Injury  Outcome: Progressing  Intervention: Identify and Manage Fall Risk  Recent Flowsheet Documentation  Taken 10/17/2023 0100 by Uri Ansari, RN  Safety Promotion/Fall Prevention:   lighting adjusted   clutter free environment maintained  Taken 10/16/2023 2100 by Uri Ansari, RN  Safety Promotion/Fall Prevention:   lighting adjusted   clutter free environment maintained   Goal Outcome Evaluation:                        "

## 2023-10-17 NOTE — PLAN OF CARE
Problem: Plan of Care - These are the overarching goals to be used throughout the patient stay.    Goal: Absence of Hospital-Acquired Illness or Injury  Intervention: Prevent Skin Injury  Recent Flowsheet Documentation  Taken 10/17/2023 0800 by J Carlos Coon RN  Body Position:   log-rolled   turned   right     Problem: Plan of Care - These are the overarching goals to be used throughout the patient stay.    Goal: Optimal Comfort and Wellbeing  Intervention: Provide Person-Centered Care  Recent Flowsheet Documentation  Taken 10/17/2023 0800 by J Carlos Coon RN  Trust Relationship/Rapport:   care explained   choices provided   emotional support provided     Problem: Sepsis/Septic Shock  Goal: Blood Glucose Level Within Targeted Range  Outcome: Met   Goal Outcome Evaluation:       Pt AxOx4 on room air. On IV and PO abx. Left buttock and left groin wound dressing changed x 2. PRN Oxy and atarax administered for pain after dressing change x 1, effective. Afebrile. RUE triple lumen PICC flushed and c/d/i. Rdz patent. Scant rectal tube leakage after lunch due to passing flatus per pt report, writer checked and tube is still intact. Rectal tube collection bag changed.

## 2023-10-17 NOTE — PROGRESS NOTES
"INFECTIOUS DISEASE FOLLOW UP NOTE    Date: 10/17/2023   CHIEF COMPLAINT:   Chief Complaint   Patient presents with    Groin Pain    Shortness of Breath    Generalized Weakness        ASSESSMENT:    Necrotizing fasciitis: CT with necrotizing fasciitis L groin, perineum, and visualized LLE now down to ankle. s/p OR 10/2, GS and cultures are polymicrobial-bacteroides and actino on culture so far. Repeat OR 10/4 with progressive disease. Active issue.  Encouraged diverting colostomy, she is still reluctant.   Severe sepsis:   DM2  Goiter:    Sputum with haemophilus and strep constellatus     PLAN:  - continue zosyn IV, change metronidazole to po   - likely change zosyn to augmentin, possibly as early as tomorrow.        CORINNE SARMIENTO MD   [facility]  Direct messaging: [employer] Paging   On-Call ID provider: [phone], option: [phone]       ______________________________________________________________________    SUBJECTIVE / INTERVAL HISTORY:   Working with physical therapy.    Reluctant to have diverting colostomy.  I told it would be helpful in the overall healing course.      OBJECTIVE:  /64 (BP Location: Left arm)   Pulse 77   Temp 98.5  F (36.9  C) (Oral)   Resp 18   Ht 1.575 m (5' 2\")   Wt 90.4 kg (199 lb 4.7 oz)   LMP  (LMP Unknown)   SpO2 96%   BMI 36.45 kg/m      Resp: 18    GEN: lying in bed, no distress  RESPIRATORY:  no respiratory distress  CARDIOVASCULAR:  regular, no murmur   ABDOMEN:  Soft, normal bowel sounds, non-tender,   EXTREMITIES: No edema.  SKIN/HAIR/NAILS:  left lower extremities wrapped    NEURO: grossly intact  IV: central lines      Antibiotics:  Zosyn  Flagyl--po    Pertinent labs:  No results found for: \"CRP\"   CBC RESULTS:   Recent Labs   Lab Test 10/04/23  0357   WBC 16.8*   RBC 2.84*   HGB 9.0*   HCT 27.5*   MCV 97   MCH 31.7   MCHC 32.7   RDW 14.6         Last Comprehensive Metabolic Panel:  Sodium   Date Value Ref Range Status   10/17/2023 " 137 135 - 145 mmol/L Final     Comment:     Reference intervals for this test were updated on 09/26/2023 to more accurately reflect our healthy population. There may be differences in the flagging of prior results with similar values performed with this method. Interpretation of those prior results can be made in the context of the updated reference intervals.      Potassium   Date Value Ref Range Status   10/17/2023 4.9 3.4 - 5.3 mmol/L Final     Comment:     Specimen slightly hemolyzed. The reported potassium value may be falsely elevated. Analysis of a non-hemolyzed specimen (i.e. re-draw) may result in a lower potassium value.   06/23/2020 4.4 3.5 - 5.0 mmol/L Final     Chloride   Date Value Ref Range Status   10/17/2023 103 98 - 107 mmol/L Final   06/23/2020 98 98 - 107 mmol/L Final     Carbon Dioxide (CO2)   Date Value Ref Range Status   10/17/2023 25 22 - 29 mmol/L Final   06/23/2020 32 (H) 22 - 31 mmol/L Final     Anion Gap   Date Value Ref Range Status   10/17/2023 9 7 - 15 mmol/L Final   06/23/2020 5 5 - 18 mmol/L Final     Glucose   Date Value Ref Range Status   10/17/2023 97 70 - 99 mg/dL Final   06/23/2020 115 70 - 125 mg/dL Final     GLUCOSE BY METER POCT   Date Value Ref Range Status   10/17/2023 102 (H) 70 - 99 mg/dL Final     Urea Nitrogen   Date Value Ref Range Status   10/17/2023 5.6 (L) 8.0 - 23.0 mg/dL Final   06/23/2020 8 8 - 22 mg/dL Final     Creatinine   Date Value Ref Range Status   10/17/2023 0.40 (L) 0.51 - 0.95 mg/dL Final     GFR Estimate   Date Value Ref Range Status   10/17/2023 >90 >60 mL/min/1.73m2 Final   06/23/2020 >60 >60 mL/min/1.73m2 Final     GFR, ESTIMATED POCT   Date Value Ref Range Status   10/02/2023 >60 >60 mL/min/1.73m2 Final     Calcium   Date Value Ref Range Status   10/17/2023 9.5 8.8 - 10.2 mg/dL Final        MICROBIOLOGY DATA:  Personally reviewed.  7-Day Micro Results       No results found for the last 168 hours.             RADIOLOGY:  Personally  Reviewed.  Recent Results (from the past 24 hour(s))   XR Chest Port 1 View    Narrative    EXAM: XR CHEST PORT 1 VIEW  LOCATION: [facility]  DATE: 10/4/2023    INDICATION: ETT position  COMPARISON: 10/3/2023      Impression    IMPRESSION: Endotracheal tube terminates 4.0 cm above the suresh. Right upper extremity PICC in the lower SVC. Enteric decompression tube descends below the diaphragm, but the tip is outside the field-of-view.    Decreased left basilar airspace opacities compared to prior. No pleural effusion or pneumothorax. Normal cardiomediastinal silhouette.       Principal Problem:    Severe sepsis (H)  Active Problems:    Type 2 diabetes mellitus with hyperglycemia, without long-term current use of insulin (H)    Necrotizing soft tissue infection    Septic shock (H)

## 2023-10-18 ENCOUNTER — APPOINTMENT (OUTPATIENT)
Dept: PHYSICAL THERAPY | Facility: HOSPITAL | Age: 63
DRG: 853 | End: 2023-10-18
Payer: COMMERCIAL

## 2023-10-18 LAB
ANION GAP SERPL CALCULATED.3IONS-SCNC: 9 MMOL/L (ref 7–15)
BUN SERPL-MCNC: 7.5 MG/DL (ref 8–23)
CALCIUM SERPL-MCNC: 9.3 MG/DL (ref 8.8–10.2)
CHLORIDE SERPL-SCNC: 103 MMOL/L (ref 98–107)
CREAT SERPL-MCNC: 0.43 MG/DL (ref 0.51–0.95)
DEPRECATED HCO3 PLAS-SCNC: 25 MMOL/L (ref 22–29)
EGFRCR SERPLBLD CKD-EPI 2021: >90 ML/MIN/1.73M2
GLUCOSE BLDC GLUCOMTR-MCNC: 123 MG/DL (ref 70–99)
GLUCOSE BLDC GLUCOMTR-MCNC: 154 MG/DL (ref 70–99)
GLUCOSE BLDC GLUCOMTR-MCNC: 155 MG/DL (ref 70–99)
GLUCOSE BLDC GLUCOMTR-MCNC: 93 MG/DL (ref 70–99)
GLUCOSE SERPL-MCNC: 131 MG/DL (ref 70–99)
PHOSPHATE SERPL-MCNC: 3.1 MG/DL (ref 2.5–4.5)
POTASSIUM SERPL-SCNC: 3.7 MMOL/L (ref 3.4–5.3)
SODIUM SERPL-SCNC: 137 MMOL/L (ref 135–145)

## 2023-10-18 PROCEDURE — 250N000013 HC RX MED GY IP 250 OP 250 PS 637: Performed by: NURSE PRACTITIONER

## 2023-10-18 PROCEDURE — 250N000011 HC RX IP 250 OP 636: Mod: JZ | Performed by: NURSE PRACTITIONER

## 2023-10-18 PROCEDURE — 250N000013 HC RX MED GY IP 250 OP 250 PS 637: Performed by: INTERNAL MEDICINE

## 2023-10-18 PROCEDURE — 84100 ASSAY OF PHOSPHORUS: CPT | Performed by: STUDENT IN AN ORGANIZED HEALTH CARE EDUCATION/TRAINING PROGRAM

## 2023-10-18 PROCEDURE — 80048 BASIC METABOLIC PNL TOTAL CA: CPT | Performed by: NURSE PRACTITIONER

## 2023-10-18 PROCEDURE — 120N000001 HC R&B MED SURG/OB

## 2023-10-18 PROCEDURE — 99232 SBSQ HOSP IP/OBS MODERATE 35: CPT

## 2023-10-18 PROCEDURE — 250N000013 HC RX MED GY IP 250 OP 250 PS 637

## 2023-10-18 PROCEDURE — 97530 THERAPEUTIC ACTIVITIES: CPT | Mod: GP

## 2023-10-18 PROCEDURE — 250N000011 HC RX IP 250 OP 636: Performed by: CLINICAL NURSE SPECIALIST

## 2023-10-18 RX ORDER — POTASSIUM CHLORIDE 1500 MG/1
20 TABLET, EXTENDED RELEASE ORAL ONCE
Qty: 1 TABLET | Refills: 0 | Status: COMPLETED | OUTPATIENT
Start: 2023-10-18 | End: 2023-10-18

## 2023-10-18 RX ADMIN — ACETAMINOPHEN 650 MG: 325 TABLET ORAL at 05:36

## 2023-10-18 RX ADMIN — AMOXICILLIN AND CLAVULANATE POTASSIUM 1 TABLET: 875; 125 TABLET, FILM COATED ORAL at 11:45

## 2023-10-18 RX ADMIN — METRONIDAZOLE 500 MG: 500 TABLET ORAL at 20:40

## 2023-10-18 RX ADMIN — PREGABALIN 25 MG: 25 CAPSULE ORAL at 14:31

## 2023-10-18 RX ADMIN — HYDROMORPHONE HYDROCHLORIDE 0.5 MG: 1 INJECTION, SOLUTION INTRAMUSCULAR; INTRAVENOUS; SUBCUTANEOUS at 20:27

## 2023-10-18 RX ADMIN — HYDROXYZINE HYDROCHLORIDE 25 MG: 25 TABLET, FILM COATED ORAL at 05:36

## 2023-10-18 RX ADMIN — PIPERACILLIN AND TAZOBACTAM 3.38 G: 3; .375 INJECTION, POWDER, LYOPHILIZED, FOR SOLUTION INTRAVENOUS at 08:44

## 2023-10-18 RX ADMIN — INSULIN GLARGINE 10 UNITS: 100 INJECTION, SOLUTION SUBCUTANEOUS at 08:39

## 2023-10-18 RX ADMIN — METFORMIN HYDROCHLORIDE 1000 MG: 500 TABLET, FILM COATED ORAL at 08:39

## 2023-10-18 RX ADMIN — THIAMINE HCL TAB 100 MG 100 MG: 100 TAB at 08:39

## 2023-10-18 RX ADMIN — METFORMIN HYDROCHLORIDE 1000 MG: 500 TABLET, FILM COATED ORAL at 18:06

## 2023-10-18 RX ADMIN — SENNOSIDES AND DOCUSATE SODIUM 1 TABLET: 8.6; 5 TABLET ORAL at 20:40

## 2023-10-18 RX ADMIN — HYDROXYZINE HYDROCHLORIDE 25 MG: 25 TABLET, FILM COATED ORAL at 10:10

## 2023-10-18 RX ADMIN — INSULIN GLARGINE 10 UNITS: 100 INJECTION, SOLUTION SUBCUTANEOUS at 22:07

## 2023-10-18 RX ADMIN — INSULIN ASPART 5 UNITS: 100 INJECTION, SOLUTION INTRAVENOUS; SUBCUTANEOUS at 18:10

## 2023-10-18 RX ADMIN — PANTOPRAZOLE SODIUM 40 MG: 40 TABLET, DELAYED RELEASE ORAL at 05:37

## 2023-10-18 RX ADMIN — AMOXICILLIN AND CLAVULANATE POTASSIUM 1 TABLET: 875; 125 TABLET, FILM COATED ORAL at 20:40

## 2023-10-18 RX ADMIN — SENNOSIDES AND DOCUSATE SODIUM 1 TABLET: 8.6; 5 TABLET ORAL at 08:39

## 2023-10-18 RX ADMIN — METRONIDAZOLE 500 MG: 500 TABLET ORAL at 14:31

## 2023-10-18 RX ADMIN — LEVOTHYROXINE SODIUM 112 MCG: 0.11 TABLET ORAL at 05:37

## 2023-10-18 RX ADMIN — ACETAMINOPHEN 650 MG: 325 TABLET ORAL at 10:10

## 2023-10-18 RX ADMIN — POTASSIUM CHLORIDE 20 MEQ: 1500 TABLET, EXTENDED RELEASE ORAL at 08:39

## 2023-10-18 RX ADMIN — POLYETHYLENE GLYCOL 3350 17 G: 17 POWDER, FOR SOLUTION ORAL at 08:40

## 2023-10-18 RX ADMIN — PREGABALIN 25 MG: 25 CAPSULE ORAL at 08:38

## 2023-10-18 RX ADMIN — Medication 1 TABLET: at 08:39

## 2023-10-18 RX ADMIN — FOLIC ACID 1 MG: 1 TABLET ORAL at 08:39

## 2023-10-18 RX ADMIN — PREGABALIN 25 MG: 25 CAPSULE ORAL at 20:40

## 2023-10-18 RX ADMIN — ENOXAPARIN SODIUM 40 MG: 40 INJECTION SUBCUTANEOUS at 08:55

## 2023-10-18 RX ADMIN — METRONIDAZOLE 500 MG: 500 TABLET ORAL at 08:39

## 2023-10-18 ASSESSMENT — ACTIVITIES OF DAILY LIVING (ADL)
ADLS_ACUITY_SCORE: 38

## 2023-10-18 NOTE — PROGRESS NOTES
[facility]    Medicine Progress Note - Hospitalist Service    Date of Admission:  10/2/2023    Assessment & Plan   63 year old woman with hx of DM2, obesity, tobacco abuse, alcohol abuse, hypothyroid, goiter who presented 10/2/23 with dyspnea, cough, generalized weakness and found to have malodorous drainage from a wound in her left inguinal/vaginal area. CT scan showed necrotizing fasciitis and she was emergently taken to the OR for vulvectomy and broad debridement of the left buttock and left thigh. She returned to the ICU on pressors and was febrile. Taken back to OR and required extensive debridement from the left buttock, perineum, down to the left ankle. She required intubation by ENT due to large goiter.  Now off of pressors and extubated 10/11, transferred out of ICU 10/13/23.     Dispo remains to be determined, likely LTACH facility. SW assisting.  On PO abx 10/18/23.          Severe sepsis and septic shock  Necrotizing soft tissue infection/necrotizing fasciitis  Lactic acidosis resolved  Hypoxic respiratory failure resolved  Left upper lobe pneumonia  Presented on 10/2/2023 with pneumonia and necrotizing fasciitis.   She was emergently taken to the OR for vulvectomy and broad debridement of the left buttock and left thigh  After procedure she had to go on pressors and was febrile so taken back to the OR requiring extensive debridement from the left buttock, perineum and down into the ankle  CT chest showed findings suspicious for left upper lobe pneumonia   Cultures with polymicrobial organism--Bacteroids and actinno, Fusobacterium nucleatum  Sputum cultures growing haemophilus and strep constellatus, GNB  Blood cultures remain no growth to date  ENT intubated the patient because of large goiter, extubated 10/11  -- PO Augmentin and metronidazole per ID  -- Diverting colostomy was recommended to prevent recurrent wound infection, patient does not want procedure after discussion  with care team and family   -- continue therapy   -- continue current bowel meds, defer further rectal tube management to surgery  -- hold further diuresis given large net negative fluid balance since admission. Normally takes lasix BID at home. Monitor intake and volume status, can restart as appropriate     Mild hypophosphatemia, hypokalemia: replaced per protocol    History of alcohol abuse   reports 6-12 beers per day  Currently no signs of withdrawal  -- continue MV, folic acid and thiamine    Large goiter  History of hypothyroidism  Currently on room air  TSH was within normal limits, T4 was 0.78, repeated T4 was 0.93   --Continue levothyroxine    Type 2 diabetes mellitus with hyperglycemia without long-term current use of insulin  Noted to have some low trending glucoses 10/14  - Continue with Lantus, decreased to 10 units twice daily  - Continue carb counting insulin with meals at 1:10 ratio for now  - Continue medium intensity SSI  - Continue home metformin    Macrocytic anemia  Currently stable   Vit B12 1987, folate 18.3. May be related to alcohol use  -- hgb stable and improved, will stop checking hgb unless clinical changes   -- has received 1 unit of blood this hospitalization     GI PPx  Continue with PPI          Diet: Moderate Consistent Carb (60 g CHO per Meal) Diet  Snacks/Supplements Adult: Expedite Bottle; With Meals  Snacks/Supplements Adult: Other; string chees; Between Meals  Calorie Counts    DVT Prophylaxis: Enoxaparin (Lovenox) SQ  Rdz Catheter: PRESENT, indication: Wound Healing  Lines: PRESENT      PICC 10/03/23 Triple Lumen Right Basilic Multiple medications-Site Assessment: WDL      Cardiac Monitoring: None  Code Status: No CPR- Pre-arrest intubation OK      Clinically Significant Risk Factors              # Hypoalbuminemia: Lowest albumin = 1.7 g/dL at 10/3/2023  5:21 AM, will monitor as appropriate            # Obesity: Estimated body mass index is 36.45 kg/m  as calculated  "from the following:    Height as of this encounter: 1.575 m (5' 2\").    Weight as of this encounter: 90.4 kg (199 lb 4.7 oz).             Disposition Plan pending placement      Expected Discharge Date: 10/19/2023        Discharge Comments: Pending LTACH placement          Dl Robles DO  Hospitalist Service  [facility]  Securely message with [employer] (more info)  Text page via [employer] Paging/Directory   ______________________________________________________________________    Interval History   Doing well this AM while getting morning meds. No acute complaints. Pain is controlled.     Physical Exam   Vital Signs: Temp: 98.1  F (36.7  C) Temp src: Oral BP: 122/61 Pulse: 74   Resp: 15 SpO2: 95 % O2 Device: None (Room air)    Weight: 199 lbs 4.73 oz    General: NAD  RESPIRATORY: Breathing nonlabored  CARDIOVASCULAR: RRR  NEUROLOGIC: Alert, speech clear, decreased strength LLE         Medical Decision Making       20 MINUTES SPENT BY ME on the date of service doing chart review, history, exam, documentation & further activities per the note.      Data       "

## 2023-10-18 NOTE — PROGRESS NOTES
[facility] Nurse Inpatient Assessment     Consulted for: NEELA    Summary: Met surgical PA at bedside, patient is to be extubated today then will have a discussion about goals of care.     Patient History (according to provider note(s):        63-year-old female with a history of hypothyroidism, diabetes mellitus and obesity who presented with groin pain to the emergency room.  Patient has been complaining of shortness of breath and a wound in her left inguinal area that has been draining pus and is severely malodorous.  She was immediately given antibiotics and had a CT done that showed necrotizing fasciitis.     She was emergently taken to the operating room where she underwent debridement.  Arrives to the ICU intubated on pressors     She was intubated in the OR by ENT due to significant goiter.    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  Wound history/plan of care: Groin infection, debrided entire necrotizing area   Wound base: 40 % slough and adipose tissue, 60 % muscle - more granulation noted, slough is lessening     Palpation of the wound bed: normal and boggy      Drainage: small     Description of drainage: serosanguinous     Measurements (length x width x depth, in cm): no exact measurements taken as patient has to be positioned multiple ways to view entire wound     Tunneling: N/A     Undermining: up to 5 cm at lateral thigh pocket, space between thigh and gluteal muscles about 5cm deep  Periwound skin: Erythema- blanchable      Color: pink and red      Temperature: warm  Odor: mild  Pain: mild and tension to hands, feet and body, shooting  Pain interventions prior to dressing change: slow and gentle cares   Treatment goal: Heal , Increase granulation, Remove necrotic tissue, and Soften necrotic tissue  STATUS: initial assessment  Supplies ordered: at bedside    Had a long  talk with patient about goals of care, progress of wound. Patient wanted to get the rectal tube removed. We did discuss the diverting colostomy and why it would be beneficial for wound healing. Patient stated she understands but her fear of having another ET tube and being on the vent again is her major mental reinaldo. She also stated that she was note sure if her  will be able to help her with cares. [facility] reiterated that she has a long recovery road ahead of her and would not be going home to have her family care for her in the near future so there would be plenty of time to learn ostomy care for herself and her family. WO team would like to place a wound vac and reduce the dressing changes to twice a week, this would help prepare the tissues for reconstructive surgery with a plastics team.     Treatment Plan:     BID vashe packing, see orders    Orders: Reviewed    RECOMMEND PRIMARY TEAM ORDER: None, at this time  Education provided: plan of care, Infection prevention , and Moisture management  Discussed plan of care with: Patient, Nurse, and Physicians Assistant  WO nurse follow-up plan: weekly - working with multidisciplinary team to determine next wound steps  Notify [facility] if wound(s) deteriorate.  Nursing to notify the Provider(s) and re-consult the [facility] Nurse if new skin concern.    DATA:     Current support surface: Standard  Standard gel/foam mattress (IsoFlex, Atmos air, etc)  Containment of urine/stool: Incontinence Protocol  BMI: Body mass index is 36.45 kg/m .   Active diet order: Orders Placed This Encounter      Moderate Consistent Carb (60 g CHO per Meal) Diet     Output: I/O last 3 completed shifts:  In: 359 [P.O.:240; I.V.:119]  Out: 1775 [Urine:1475; Stool:300]     Labs:   Recent Labs   Lab 10/17/23  0636 10/15/23  0629 10/14/23  0614 10/13/23  0449   ALBUMIN  --   --  2.4*  --    HGB 10.7*   < > 9.0* 9.8*   WBC 5.2   < > 8.4 11.6*   A1C  --   --   --  6.3*    < > = values in this interval not  displayed.     Pressure injury risk assessment:   Sensory Perception: 3-->slightly limited  Moisture: 3-->occasionally moist  Activity: 1-->bedfast  Mobility: 2-->very limited  Nutrition: 2-->probably inadequate  Friction and Shear: 2-->potential problem  Mario Score: 13    TRACE Penn RN CWOCN  Pager no longer in use, please contact through [employer] group: [facility] [employer] [facility]

## 2023-10-18 NOTE — PLAN OF CARE
Problem: Sepsis/Septic Shock  Goal: Absence of Infection Signs and Symptoms  Outcome: Progressing     Problem: Sepsis/Septic Shock  Goal: Optimal Nutrition Intake  Outcome: Progressing     Problem: Pain Acute  Goal: Optimal Pain Control and Function  Outcome: Progressing     Problem: Infection  Goal: Absence of Infection Signs and Symptoms  Outcome: Progressing   Goal Outcome Evaluation:    Pt slept between cares. VSS on room air. Iv dilaudid given x1 in the evening during wound care. Rectal tube bypassed and stool noted all over L groin and L buttock wound. Cleansed and new dressing applied including new dressing on L lower leg dressing. Rectal tube checked frequently thereafter to ensure patency. Dressing remained clean. Rdz patent with good urine output. Turned in bed for comfort. Antibiotics given per orders.

## 2023-10-18 NOTE — PROGRESS NOTES
"INFECTIOUS DISEASE FOLLOW UP NOTE    Date: 10/18/2023   CHIEF COMPLAINT:   Chief Complaint   Patient presents with    Groin Pain    Shortness of Breath    Generalized Weakness        ASSESSMENT:    Necrotizing fasciitis: CT with necrotizing fasciitis L groin, perineum, and visualized LLE now down to ankle. s/p OR 10/2, GS and cultures are polymicrobial-bacteroides and actino on culture so far. Repeat OR 10/4 with progressive disease. Active issue.  Encouraged diverting colostomy, she is still reluctant.   Severe sepsis:   DM2  Goiter:    Sputum with haemophilus and strep constellatus     PLAN:  - change zosyn IV to po Augmentin, changed metronidazole to po           CORINNE SARMIENTO MD   [facility]  Direct messaging: [employer] Paging   On-Call ID provider: [phone], option: [phone]       ______________________________________________________________________    SUBJECTIVE / INTERVAL HISTORY:        Reluctant to have diverting colostomy.  I told it would be helpful in the overall healing course.  Doesn't like to eat \"because you know what will happen\"    OBJECTIVE:  /61 (BP Location: Left arm)   Pulse 74   Temp 98.1  F (36.7  C) (Oral)   Resp 15   Ht 1.575 m (5' 2\")   Wt 90.4 kg (199 lb 4.7 oz)   LMP  (LMP Unknown)   SpO2 95%   BMI 36.45 kg/m      Resp: 15    GEN: lying in bed right lateral decubitus position,  no distress  RESPIRATORY:  no respiratory distress  CARDIOVASCULAR:  regular, no murmur   ABDOMEN:  Soft, normal bowel sounds, non-tender,   EXTREMITIES: No edema.  SKIN/HAIR/NAILS:  left lower extremities wrapped    NEURO: grossly intact  IV: central lines      Antibiotics:  Zosyn  Flagyl--po    Pertinent labs:  No results found for: \"CRP\"     CBC RESULTS:   Recent Labs   Lab Test 10/17/23  0636   WBC 5.2   RBC 3.36*   HGB 10.7*   HCT 34.3*   *   MCH 31.8   MCHC 31.2*   RDW 18.1*   *       Last Comprehensive Metabolic Panel:  Sodium   Date Value Ref Range " Status   10/18/2023 137 135 - 145 mmol/L Final     Comment:     Reference intervals for this test were updated on 09/26/2023 to more accurately reflect our healthy population. There may be differences in the flagging of prior results with similar values performed with this method. Interpretation of those prior results can be made in the context of the updated reference intervals.      Potassium   Date Value Ref Range Status   10/18/2023 3.7 3.4 - 5.3 mmol/L Final   06/23/2020 4.4 3.5 - 5.0 mmol/L Final     Chloride   Date Value Ref Range Status   10/18/2023 103 98 - 107 mmol/L Final   06/23/2020 98 98 - 107 mmol/L Final     Carbon Dioxide (CO2)   Date Value Ref Range Status   10/18/2023 25 22 - 29 mmol/L Final   06/23/2020 32 (H) 22 - 31 mmol/L Final     Anion Gap   Date Value Ref Range Status   10/18/2023 9 7 - 15 mmol/L Final   06/23/2020 5 5 - 18 mmol/L Final     Glucose   Date Value Ref Range Status   10/18/2023 131 (H) 70 - 99 mg/dL Final   06/23/2020 115 70 - 125 mg/dL Final     GLUCOSE BY METER POCT   Date Value Ref Range Status   10/18/2023 123 (H) 70 - 99 mg/dL Final     Urea Nitrogen   Date Value Ref Range Status   10/18/2023 7.5 (L) 8.0 - 23.0 mg/dL Final   06/23/2020 8 8 - 22 mg/dL Final     Creatinine   Date Value Ref Range Status   10/18/2023 0.43 (L) 0.51 - 0.95 mg/dL Final     GFR Estimate   Date Value Ref Range Status   10/18/2023 >90 >60 mL/min/1.73m2 Final   06/23/2020 >60 >60 mL/min/1.73m2 Final     GFR, ESTIMATED POCT   Date Value Ref Range Status   10/02/2023 >60 >60 mL/min/1.73m2 Final     Calcium   Date Value Ref Range Status   10/18/2023 9.3 8.8 - 10.2 mg/dL Final        MICROBIOLOGY DATA:  Personally reviewed.  7-Day Micro Results       No results found for the last 168 hours.             RADIOLOGY:  Personally Reviewed.  Recent Results (from the past 24 hour(s))   XR Chest Port 1 View    Narrative    EXAM: XR CHEST PORT 1 VIEW  LOCATION: [facility]  DATE:  10/4/2023    INDICATION: ETT position  COMPARISON: 10/3/2023      Impression    IMPRESSION: Endotracheal tube terminates 4.0 cm above the suresh. Right upper extremity PICC in the lower SVC. Enteric decompression tube descends below the diaphragm, but the tip is outside the field-of-view.    Decreased left basilar airspace opacities compared to prior. No pleural effusion or pneumothorax. Normal cardiomediastinal silhouette.       Principal Problem:    Severe sepsis (H)  Active Problems:    Type 2 diabetes mellitus with hyperglycemia, without long-term current use of insulin (H)    Necrotizing soft tissue infection    Septic shock (H)

## 2023-10-18 NOTE — PLAN OF CARE
Problem: Plan of Care - These are the overarching goals to be used throughout the patient stay.    Goal: Optimal Comfort and Wellbeing  Intervention: Provide Person-Centered Care  Recent Flowsheet Documentation  Taken 10/18/2023 0800 by J Carlos Coon RN  Trust Relationship/Rapport:   care explained   choices provided   emotional support provided     Problem: Risk for Delirium  Goal: Improved Sleep  Outcome: Progressing   Goal Outcome Evaluation:       Pt AxOx4 on room air. Wound dressings changed by WOC RN. Rectal tube patent and intact, no leaks. Rdz catheter with good output, catheter cares done. IV zosyn transitioned to PO augmentin by ID. PRN Tylenol and Atarax administered prior to dressing change to good effect. RUE PICC flushed. VSS.

## 2023-10-19 ENCOUNTER — APPOINTMENT (OUTPATIENT)
Dept: PHYSICAL THERAPY | Facility: HOSPITAL | Age: 63
DRG: 853 | End: 2023-10-19
Payer: COMMERCIAL

## 2023-10-19 LAB
ANION GAP SERPL CALCULATED.3IONS-SCNC: 8 MMOL/L (ref 7–15)
BUN SERPL-MCNC: 5.8 MG/DL (ref 8–23)
CALCIUM SERPL-MCNC: 9.6 MG/DL (ref 8.8–10.2)
CHLORIDE SERPL-SCNC: 104 MMOL/L (ref 98–107)
CREAT SERPL-MCNC: 0.36 MG/DL (ref 0.51–0.95)
DEPRECATED HCO3 PLAS-SCNC: 26 MMOL/L (ref 22–29)
EGFRCR SERPLBLD CKD-EPI 2021: >90 ML/MIN/1.73M2
GLUCOSE BLDC GLUCOMTR-MCNC: 120 MG/DL (ref 70–99)
GLUCOSE BLDC GLUCOMTR-MCNC: 127 MG/DL (ref 70–99)
GLUCOSE BLDC GLUCOMTR-MCNC: 146 MG/DL (ref 70–99)
GLUCOSE BLDC GLUCOMTR-MCNC: 165 MG/DL (ref 70–99)
GLUCOSE BLDC GLUCOMTR-MCNC: 99 MG/DL (ref 70–99)
GLUCOSE SERPL-MCNC: 115 MG/DL (ref 70–99)
HOLD SPECIMEN: NORMAL
PHOSPHATE SERPL-MCNC: 3 MG/DL (ref 2.5–4.5)
POTASSIUM SERPL-SCNC: 3.9 MMOL/L (ref 3.4–5.3)
SODIUM SERPL-SCNC: 138 MMOL/L (ref 135–145)

## 2023-10-19 PROCEDURE — 84100 ASSAY OF PHOSPHORUS: CPT | Performed by: INTERNAL MEDICINE

## 2023-10-19 PROCEDURE — 250N000013 HC RX MED GY IP 250 OP 250 PS 637: Performed by: NURSE PRACTITIONER

## 2023-10-19 PROCEDURE — 97110 THERAPEUTIC EXERCISES: CPT | Mod: GP

## 2023-10-19 PROCEDURE — 99232 SBSQ HOSP IP/OBS MODERATE 35: CPT | Performed by: STUDENT IN AN ORGANIZED HEALTH CARE EDUCATION/TRAINING PROGRAM

## 2023-10-19 PROCEDURE — 97112 NEUROMUSCULAR REEDUCATION: CPT | Mod: GP

## 2023-10-19 PROCEDURE — 250N000011 HC RX IP 250 OP 636: Mod: JZ | Performed by: NURSE PRACTITIONER

## 2023-10-19 PROCEDURE — 97530 THERAPEUTIC ACTIVITIES: CPT | Mod: GP

## 2023-10-19 PROCEDURE — 250N000012 HC RX MED GY IP 250 OP 636 PS 637: Performed by: INTERNAL MEDICINE

## 2023-10-19 PROCEDURE — 99231 SBSQ HOSP IP/OBS SF/LOW 25: CPT

## 2023-10-19 PROCEDURE — 250N000013 HC RX MED GY IP 250 OP 250 PS 637: Performed by: INTERNAL MEDICINE

## 2023-10-19 PROCEDURE — 80048 BASIC METABOLIC PNL TOTAL CA: CPT | Performed by: NURSE PRACTITIONER

## 2023-10-19 PROCEDURE — 120N000001 HC R&B MED SURG/OB

## 2023-10-19 PROCEDURE — 250N000013 HC RX MED GY IP 250 OP 250 PS 637

## 2023-10-19 RX ADMIN — ENOXAPARIN SODIUM 40 MG: 40 INJECTION SUBCUTANEOUS at 08:59

## 2023-10-19 RX ADMIN — FOLIC ACID 1 MG: 1 TABLET ORAL at 08:59

## 2023-10-19 RX ADMIN — Medication 1 TABLET: at 08:59

## 2023-10-19 RX ADMIN — ACETAMINOPHEN 650 MG: 325 TABLET ORAL at 05:36

## 2023-10-19 RX ADMIN — HYDROXYZINE HYDROCHLORIDE 25 MG: 25 TABLET, FILM COATED ORAL at 11:36

## 2023-10-19 RX ADMIN — INSULIN ASPART 5 UNITS: 100 INJECTION, SOLUTION INTRAVENOUS; SUBCUTANEOUS at 18:21

## 2023-10-19 RX ADMIN — SENNOSIDES AND DOCUSATE SODIUM 1 TABLET: 8.6; 5 TABLET ORAL at 08:58

## 2023-10-19 RX ADMIN — POLYETHYLENE GLYCOL 3350 17 G: 17 POWDER, FOR SOLUTION ORAL at 08:59

## 2023-10-19 RX ADMIN — AMOXICILLIN AND CLAVULANATE POTASSIUM 1 TABLET: 875; 125 TABLET, FILM COATED ORAL at 08:59

## 2023-10-19 RX ADMIN — THIAMINE HCL TAB 100 MG 100 MG: 100 TAB at 08:58

## 2023-10-19 RX ADMIN — SENNOSIDES AND DOCUSATE SODIUM 1 TABLET: 8.6; 5 TABLET ORAL at 21:22

## 2023-10-19 RX ADMIN — METRONIDAZOLE 500 MG: 500 TABLET ORAL at 13:02

## 2023-10-19 RX ADMIN — AMOXICILLIN AND CLAVULANATE POTASSIUM 1 TABLET: 875; 125 TABLET, FILM COATED ORAL at 21:22

## 2023-10-19 RX ADMIN — OXYCODONE HYDROCHLORIDE 5 MG: 5 TABLET ORAL at 13:02

## 2023-10-19 RX ADMIN — METFORMIN HYDROCHLORIDE 1000 MG: 500 TABLET, FILM COATED ORAL at 08:58

## 2023-10-19 RX ADMIN — PANTOPRAZOLE SODIUM 40 MG: 40 TABLET, DELAYED RELEASE ORAL at 05:36

## 2023-10-19 RX ADMIN — INSULIN GLARGINE 10 UNITS: 100 INJECTION, SOLUTION SUBCUTANEOUS at 09:33

## 2023-10-19 RX ADMIN — LEVOTHYROXINE SODIUM 112 MCG: 0.11 TABLET ORAL at 05:28

## 2023-10-19 RX ADMIN — METRONIDAZOLE 500 MG: 500 TABLET ORAL at 21:22

## 2023-10-19 RX ADMIN — PREGABALIN 25 MG: 25 CAPSULE ORAL at 21:22

## 2023-10-19 RX ADMIN — INSULIN ASPART 3 UNITS: 100 INJECTION, SOLUTION INTRAVENOUS; SUBCUTANEOUS at 09:33

## 2023-10-19 RX ADMIN — ACETAMINOPHEN 650 MG: 325 TABLET ORAL at 11:36

## 2023-10-19 RX ADMIN — PREGABALIN 25 MG: 25 CAPSULE ORAL at 08:58

## 2023-10-19 RX ADMIN — METRONIDAZOLE 500 MG: 500 TABLET ORAL at 08:58

## 2023-10-19 RX ADMIN — PREGABALIN 25 MG: 25 CAPSULE ORAL at 13:02

## 2023-10-19 RX ADMIN — INSULIN GLARGINE 10 UNITS: 100 INJECTION, SOLUTION SUBCUTANEOUS at 21:48

## 2023-10-19 RX ADMIN — Medication 60 ML: at 16:42

## 2023-10-19 RX ADMIN — HYDROXYZINE HYDROCHLORIDE 25 MG: 25 TABLET, FILM COATED ORAL at 05:36

## 2023-10-19 RX ADMIN — METFORMIN HYDROCHLORIDE 1000 MG: 500 TABLET, FILM COATED ORAL at 16:43

## 2023-10-19 ASSESSMENT — ACTIVITIES OF DAILY LIVING (ADL)
ADLS_ACUITY_SCORE: 38

## 2023-10-19 NOTE — PLAN OF CARE
Problem: Sepsis/Septic Shock  Goal: Optimal Coping  Outcome: Progressing  Goal: Absence of Bleeding  Outcome: Progressing  Goal: Absence of Infection Signs and Symptoms  Outcome: Progressing  Goal: Optimal Nutrition Intake  Outcome: Progressing     Problem: Malnutrition  Goal: Improved Nutritional Intake  Outcome: Progressing     Problem: Pain Acute  Goal: Optimal Pain Control and Function  Outcome: Progressing  Intervention: Prevent or Manage Pain  Recent Flowsheet Documentation  Taken 10/18/2023 1600 by Veronica Ruiz RN  Medication Review/Management:   medications reviewed   high-risk medications identified     Problem: Infection  Goal: Absence of Infection Signs and Symptoms  Outcome: Progressing   Goal Outcome Evaluation:      9/10 pain reported during dressing change. PRN Dilaudid given and effective-patient later rating 1/10. Dressing change done x1. BS 93 and 154. No coverage given.     Veronica Ruiz, RN

## 2023-10-19 NOTE — PROGRESS NOTES
"CLINICAL NUTRITION SERVICES - REASSESSMENT NOTE     Nutrition Prescription    RECOMMENDATIONS FOR MDs/PROVIDERS TO ORDER:    Malnutrition Status:    Moderate in chronic illness of disease    Recommendations already ordered by Registered Dietitian (RD):  Add gelatein 20 ( SF) twice per day between meals    Future/Additional Recommendations:  Monitor po intake ( Calories and protein), wound healing, labs, poc     EVALUATION OF THE PROGRESS TOWARD GOALS   Diet: Moderate consistent CHO ( 60 g CHO at each meal)  Nutrition Supplements: expedite for wound healing and string cheese twice per day for added protein  Intake: po intake from 10/57=877 Calories ( 66% of lower end of Calorie needs) and 48 g protein ( 48% protein needs) pt ate 2 meals       ANTHROPOMETRICS  Height: 157.5 cm (5' 2\")  Most Recent Weight: 90.4 kg (199 lb 4.7 oz)    Weight History:   Wt Readings from Last 10 Encounters:   10/15/23 90.4 kg (199 lb 4.7 oz)   05/01/23 101 kg (222 lb 11.2 oz)   03/14/23 103.9 kg (229 lb)   11/15/22 103.9 kg (229 lb)   09/07/22 105.1 kg (231 lb 12 oz)   08/31/22 102.1 kg (225 lb)   08/14/20 109.3 kg (241 lb)   06/23/20 97.5 kg (215 lb)     GI CONCERNS  Fecal incontinence  Last BM 10/19/2023 ( liquid)-has rectal tube in place  Audible normoactive BS  Passing flatus    LABS  Reviewed: Na 138, K 3.9, urea nitrogen 5.8 (L), Cr 0.36 (L), phos 3.0, Glu 127, 115    Estimated nutrition needs:  Dosing weight: 90.8 Kg for Calories and 50 Kg (IBW) for protein, 61.7 Kg ( adjusted weight) for fluid    Estimated energy needs: 2847-9216 Calories/day ( 14-17 Rufus/Kg)  Justification: obese  Estimated protein needs: 100+ g/day ( >/= 2 g/Kg of IBW)  Justification: increased needs/wound  Estimated fluid needs: 7798-2080 ml/day ( 25-30 ml/Kg)  Justification: maintenance     MEDICATIONS  Reviewed: augmentin, lovenox, folic acid, novolog, lantus pen, levothyroxine, metformin, multi vit with minerals, pantoprazole, miralax, rebecca-docusate, thiamine " 100 mg, expedite    CURRENT NUTRITION DIAGNOSIS  Inadequate oral intake related to poor po intake as evidenced by met 66% Calorie needs and 48% protein needs on 10/18      INTERVENTIONS  Implementation  Pt willing to try the SF gelatein ( 20 g protein) for added protein  Continue cheese sticks twice per day and expedite daily  Encouraged po/protein intake    Goals  Meet estimated nutrition needs-not met  Wound healing  Decrease diarrhea-progressing per RN    Monitoring/Evaluation  Progress toward goals will be monitored and evaluated per protocol.

## 2023-10-19 NOTE — PROGRESS NOTES
[facility]    Medicine Progress Note - Hospitalist Service    Date of Admission:  10/2/2023    Assessment & Plan   63 year old woman with hx of DM2, obesity, tobacco abuse, alcohol abuse, hypothyroid, goiter who presented 10/2/23 with dyspnea, cough, generalized weakness and found to have malodorous drainage from a wound in her left inguinal/vaginal area. CT scan showed necrotizing fasciitis and she was emergently taken to the OR for vulvectomy and broad debridement of the left buttock and left thigh. She returned to the ICU on pressors and was febrile. Taken back to OR and required extensive debridement from the left buttock, perineum, down to the left ankle. She required intubation by ENT due to large goiter.  Now off of pressors and extubated 10/11, transferred out of ICU 10/13/23.     Dispo remains to be determined, likely LTACH facility. SW assisting.  On PO abx 10/18/23.  Barriers to discharge at this point include wound cares.  Likely be able to discharge to LTAC if she has wound VAC, colostomy needed for wound VAC.  Discussion had inpatient open to discussing with surgery about colostomy.        Severe sepsis and septic shock  Necrotizing soft tissue infection/necrotizing fasciitis  Lactic acidosis resolved  Hypoxic respiratory failure resolved  Left upper lobe pneumonia  Presented on 10/2/2023 with pneumonia and necrotizing fasciitis.   She was emergently taken to the OR for vulvectomy and broad debridement of the left buttock and left thigh  After procedure she had to go on pressors and was febrile so taken back to the OR requiring extensive debridement from the left buttock, perineum and down into the ankle  CT chest showed findings suspicious for left upper lobe pneumonia   Cultures with polymicrobial organism--Bacteroids and actinno, Fusobacterium nucleatum  Sputum cultures growing haemophilus and strep constellatus, GNB  Blood cultures remain no growth to date  ENT intubated  the patient because of large goiter, extubated 10/11  -- PO Augmentin and metronidazole per ID  -- Diverting colostomy was recommended to prevent recurrent wound infection, patient initially did not want procedure after discussion with care team and family.  However, on further discussion on 10-, patient is now reconsidering and will talk with surgery for further information  -- continue therapy   -- continue current bowel meds, defer further rectal tube management to surgery  -- hold further diuresis given large net negative fluid balance since admission. Normally takes lasix BID at home. Monitor intake and volume status, can restart as appropriate     Mild hypophosphatemia, hypokalemia: replaced per protocol    History of alcohol abuse   reports 6-12 beers per day  Currently no signs of withdrawal  -- continue MV, folic acid and thiamine    Large goiter  History of hypothyroidism  Currently on room air  TSH was within normal limits, T4 was 0.78, repeated T4 was 0.93   --Continue levothyroxine    Type 2 diabetes mellitus with hyperglycemia without long-term current use of insulin  Noted to have some low trending glucoses 10/14  - Continue with Lantus, decreased to 10 units twice daily  - Continue carb counting insulin with meals at 1:10 ratio for now  - Continue medium intensity SSI  - Continue home metformin    Macrocytic anemia  Currently stable   Vit B12 1987, folate 18.3. May be related to alcohol use  -- hgb stable and improved, will stop checking hgb unless clinical changes   -- has received 1 unit of blood this hospitalization     GI PPx  Continue with PPI          Diet: Moderate Consistent Carb (60 g CHO per Meal) Diet  Snacks/Supplements Adult: Expedite Bottle; With Meals  Snacks/Supplements Adult: Other; string chees; Between Meals  Calorie Counts  Snacks/Supplements Adult: Gelatein sugar-free; Between Meals    DVT Prophylaxis: Enoxaparin (Lovenox) SQ  Rdz Catheter: PRESENT, indication:  "Wound Healing  Lines: PRESENT      PICC 10/03/23 Triple Lumen Right Basilic Multiple medications-Site Assessment: WDL      Cardiac Monitoring: None  Code Status: No CPR- Pre-arrest intubation OK      Clinically Significant Risk Factors              # Hypoalbuminemia: Lowest albumin = 1.7 g/dL at 10/3/2023  5:21 AM, will monitor as appropriate            # Obesity: Estimated body mass index is 36.45 kg/m  as calculated from the following:    Height as of this encounter: 1.575 m (5' 2\").    Weight as of this encounter: 90.4 kg (199 lb 4.7 oz).             Disposition Plan pending placement and surgical intervention      Expected Discharge Date: 10/25/2023    Discharge Delays: Placement - LTAC/ARU    Discharge Comments: Pending LTACH placement          Dl Robles DO  Hospitalist Service  [facility]  Securely message with [employer] (more info)  Text page via [employer] Paging/Directory   ______________________________________________________________________    Interval History   Patient is having a \"okay\" morning.  Pain is controlled.  She has been eating without any nausea or vomiting.  No acute complaints.    Physical Exam   Vital Signs: Temp: 98  F (36.7  C) Temp src: Oral BP: 105/55 Pulse: 91   Resp: 16 SpO2: 94 % O2 Device: None (Room air)    Weight: 199 lbs 4.73 oz    General: NAD  RESPIRATORY: Breathing nonlabored  CARDIOVASCULAR: RRR  SKIN: Extensive debridement bandages surrounding thigh of left lower extremity, clean and without discharge  NEUROLOGIC: Alert, speech clear, decreased strength LLE         Medical Decision Making       25 MINUTES SPENT BY ME on the date of service doing chart review, history, exam, documentation & further activities per the note.      Data       "

## 2023-10-19 NOTE — PROGRESS NOTES
"Care Management Follow Up    Length of Stay (days): 17    Expected Discharge Date: 10/20/2023     Concerns to be Addressed:     Discharge planning  Patient plan of care discussed at interdisciplinary rounds: Yes    Anticipated Discharge Disposition:  LTACH     Anticipated Discharge Services:  NA  Anticipated Discharge DME:  NA    Patient/family educated on Medicare website which has current facility and service quality ratings:  yes  Education Provided on the Discharge Plan:  yes  Patient/Family in Agreement with the Plan:  yes    Referrals Placed by CM/SW:  Post Acute Facilities  Private pay costs discussed: Not applicable    Additional Information:  Chart reviewed.     Discharge plan for LTACH, RNCM sent communication following up on bed availability/ acceptance. Liaison will review and follow up with CM.     Social HX:  \"Pt lives with spouse in a house. Patient has not been taking care of herself, not bathing, laying in bed most days.  says she drinks 6-12 beers/day and smokes, and not eating properly.  helps with all IADLs.\"    CM will continue to follow care progression and aide in discharge planning as needed.     9:36 AM - Discussed with LTACH liaison, LTACH has a wait list, patient is appropriate for LTACH at this time. Liaison needing clairty on time allotted for BID dressing changes and questions regarding Wound Vac. RNCM discussed with Charge RN and WOC.    10:03 AM - Discussed with MD, RNCM explained possible barriers or areas of clarification needed for patients GOC. MD to discuss with Surgery and Patient.     10:07 AM - RNCM updated by MD after talking with Patient, considering Colostomy placement, MD will discuss with surgery.     Olena Santizo RN      "

## 2023-10-19 NOTE — PLAN OF CARE
Problem: Pain Acute  Goal: Optimal Pain Control and Function  Intervention: Prevent or Manage Pain  Recent Flowsheet Documentation  Taken 10/19/2023 0800 by J Carlos Coon RN  Medication Review/Management:   medications reviewed   high-risk medications identified     Problem: Malnutrition  Goal: Improved Nutritional Intake  Intervention: Promote and Optimize Oral Intake  Recent Flowsheet Documentation  Taken 10/19/2023 0800 by J Carlos Coon RN  Nutrition Interventions: supplemental foods provided   Goal Outcome Evaluation:       Pt ate breakfast but afraid of eating lunch due to small leakage around rectal tube r/t passing flatus after breakfast prior to dressing change, nutritional intake encouraged by writer to promote wound healing. On PO abx. Full dressing change done x 1 with pt c/o of severe pain, PRN Tylenol and atarax administered prior to dressing change and PRN 5 mg oxy administered post dressing change. Partial dressing change done on left buttock in the evening due to small smear of stool leakage with reposition. Rdz catheter intact with good output. Rectal tube with small leakage after breakfast and with evening reposition but otherwise intact. RUE PICC flushed and patent. VSS.

## 2023-10-19 NOTE — PROGRESS NOTES
"INFECTIOUS DISEASE FOLLOW UP NOTE    Date: 10/19/2023   CHIEF COMPLAINT:   Chief Complaint   Patient presents with    Groin Pain    Shortness of Breath    Generalized Weakness        ASSESSMENT:    Necrotizing fasciitis: CT with necrotizing fasciitis L groin, perineum, and visualized LLE now down to ankle. s/p OR 10/2, GS and cultures are polymicrobial-bacteroides and actino on culture so far. Repeat OR 10/4 with progressive disease. Active issue.  Encouraged diverting colostomy, she is still reluctant.   Severe sepsis:   DM2  Goiter:    Sputum with haemophilus and strep constellatus     PLAN:  - tolerating po Augmentin, changed metronidazole to po.  Continue for 6 more days.    We'll sign off.  Please call if questions or problems.             CORINNE SARMIENTO MD   [facility]  Direct messaging: [employer] Paging   On-Call ID provider: [phone], option: 9       ______________________________________________________________________    SUBJECTIVE / INTERVAL HISTORY:      Wants a family conference to discuss diverting colostomy.    Tolerating po antibiotics.     OBJECTIVE:  /55 (BP Location: Left arm)   Pulse 85   Temp 97.6  F (36.4  C) (Oral)   Resp 16   Ht 1.575 m (5' 2\")   Wt 90.4 kg (199 lb 4.7 oz)   LMP  (LMP Unknown)   SpO2 96%   BMI 36.45 kg/m      Resp: 16    GEN: lying in bed right lateral decubitus position,  no distress  RESPIRATORY:  no respiratory distress  CARDIOVASCULAR:  regular, no murmur   ABDOMEN:  Soft, normal bowel sounds, non-tender,   EXTREMITIES: No edema.  SKIN/HAIR/NAILS:  left lower extremities wrapped    NEURO: grossly intact  IV: central lines      Antibiotics:  Zosyn  Flagyl--po    Pertinent labs:  No results found for: \"CRP\"     CBC RESULTS:   Recent Labs   Lab Test 10/17/23  0636   WBC 5.2   RBC 3.36*   HGB 10.7*   HCT 34.3*   *   MCH 31.8   MCHC 31.2*   RDW 18.1*   *       Last Comprehensive Metabolic Panel:  Sodium   Date Value " Ref Range Status   10/19/2023 138 135 - 145 mmol/L Final     Comment:     Reference intervals for this test were updated on 09/26/2023 to more accurately reflect our healthy population. There may be differences in the flagging of prior results with similar values performed with this method. Interpretation of those prior results can be made in the context of the updated reference intervals.      Potassium   Date Value Ref Range Status   10/19/2023 3.9 3.4 - 5.3 mmol/L Final   06/23/2020 4.4 3.5 - 5.0 mmol/L Final     Chloride   Date Value Ref Range Status   10/19/2023 104 98 - 107 mmol/L Final   06/23/2020 98 98 - 107 mmol/L Final     Carbon Dioxide (CO2)   Date Value Ref Range Status   10/19/2023 26 22 - 29 mmol/L Final   06/23/2020 32 (H) 22 - 31 mmol/L Final     Anion Gap   Date Value Ref Range Status   10/19/2023 8 7 - 15 mmol/L Final   06/23/2020 5 5 - 18 mmol/L Final     Glucose   Date Value Ref Range Status   06/23/2020 115 70 - 125 mg/dL Final     GLUCOSE BY METER POCT   Date Value Ref Range Status   10/19/2023 146 (H) 70 - 99 mg/dL Final     Urea Nitrogen   Date Value Ref Range Status   10/19/2023 5.8 (L) 8.0 - 23.0 mg/dL Final   06/23/2020 8 8 - 22 mg/dL Final     Creatinine   Date Value Ref Range Status   10/19/2023 0.36 (L) 0.51 - 0.95 mg/dL Final     GFR Estimate   Date Value Ref Range Status   10/19/2023 >90 >60 mL/min/1.73m2 Final   06/23/2020 >60 >60 mL/min/1.73m2 Final     GFR, ESTIMATED POCT   Date Value Ref Range Status   10/02/2023 >60 >60 mL/min/1.73m2 Final     Calcium   Date Value Ref Range Status   10/19/2023 9.6 8.8 - 10.2 mg/dL Final        MICROBIOLOGY DATA:  Personally reviewed.  7-Day Micro Results       No results found for the last 168 hours.             RADIOLOGY:  Personally Reviewed.  Recent Results (from the past 24 hour(s))   XR Chest Port 1 View    Narrative    EXAM: XR CHEST PORT 1 VIEW  LOCATION: [facility]  DATE: 10/4/2023    INDICATION: ETT  position  COMPARISON: 10/3/2023      Impression    IMPRESSION: Endotracheal tube terminates 4.0 cm above the suresh. Right upper extremity PICC in the lower SVC. Enteric decompression tube descends below the diaphragm, but the tip is outside the field-of-view.    Decreased left basilar airspace opacities compared to prior. No pleural effusion or pneumothorax. Normal cardiomediastinal silhouette.       Principal Problem:    Necrotizing soft tissue infection  Active Problems:    Type 2 diabetes mellitus with hyperglycemia, without long-term current use of insulin (H)    Severe sepsis (H)    Septic shock (H)

## 2023-10-20 ENCOUNTER — APPOINTMENT (OUTPATIENT)
Dept: PHYSICAL THERAPY | Facility: HOSPITAL | Age: 63
DRG: 853 | End: 2023-10-20
Payer: COMMERCIAL

## 2023-10-20 LAB
ANION GAP SERPL CALCULATED.3IONS-SCNC: 7 MMOL/L (ref 7–15)
BUN SERPL-MCNC: 6.9 MG/DL (ref 8–23)
CALCIUM SERPL-MCNC: 9.8 MG/DL (ref 8.8–10.2)
CHLORIDE SERPL-SCNC: 103 MMOL/L (ref 98–107)
CREAT SERPL-MCNC: 0.37 MG/DL (ref 0.51–0.95)
DEPRECATED HCO3 PLAS-SCNC: 27 MMOL/L (ref 22–29)
EGFRCR SERPLBLD CKD-EPI 2021: >90 ML/MIN/1.73M2
GLUCOSE BLDC GLUCOMTR-MCNC: 123 MG/DL (ref 70–99)
GLUCOSE BLDC GLUCOMTR-MCNC: 131 MG/DL (ref 70–99)
GLUCOSE BLDC GLUCOMTR-MCNC: 135 MG/DL (ref 70–99)
GLUCOSE BLDC GLUCOMTR-MCNC: 143 MG/DL (ref 70–99)
GLUCOSE BLDC GLUCOMTR-MCNC: 98 MG/DL (ref 70–99)
GLUCOSE SERPL-MCNC: 114 MG/DL (ref 70–99)
PHOSPHATE SERPL-MCNC: 3.2 MG/DL (ref 2.5–4.5)
POTASSIUM SERPL-SCNC: 3.9 MMOL/L (ref 3.4–5.3)
SODIUM SERPL-SCNC: 137 MMOL/L (ref 135–145)

## 2023-10-20 PROCEDURE — 99232 SBSQ HOSP IP/OBS MODERATE 35: CPT | Performed by: SURGERY

## 2023-10-20 PROCEDURE — 99233 SBSQ HOSP IP/OBS HIGH 50: CPT | Performed by: INTERNAL MEDICINE

## 2023-10-20 PROCEDURE — 84100 ASSAY OF PHOSPHORUS: CPT | Performed by: STUDENT IN AN ORGANIZED HEALTH CARE EDUCATION/TRAINING PROGRAM

## 2023-10-20 PROCEDURE — 97530 THERAPEUTIC ACTIVITIES: CPT | Mod: GP

## 2023-10-20 PROCEDURE — 250N000013 HC RX MED GY IP 250 OP 250 PS 637: Performed by: NURSE PRACTITIONER

## 2023-10-20 PROCEDURE — 80048 BASIC METABOLIC PNL TOTAL CA: CPT | Performed by: NURSE PRACTITIONER

## 2023-10-20 PROCEDURE — 250N000013 HC RX MED GY IP 250 OP 250 PS 637: Performed by: INTERNAL MEDICINE

## 2023-10-20 PROCEDURE — 250N000011 HC RX IP 250 OP 636: Performed by: CLINICAL NURSE SPECIALIST

## 2023-10-20 PROCEDURE — 120N000001 HC R&B MED SURG/OB

## 2023-10-20 PROCEDURE — 250N000011 HC RX IP 250 OP 636: Mod: JZ | Performed by: NURSE PRACTITIONER

## 2023-10-20 PROCEDURE — 97110 THERAPEUTIC EXERCISES: CPT | Mod: GP

## 2023-10-20 PROCEDURE — 250N000013 HC RX MED GY IP 250 OP 250 PS 637

## 2023-10-20 RX ORDER — ACETAMINOPHEN 325 MG/1
975 TABLET ORAL ONCE
Status: CANCELLED | OUTPATIENT
Start: 2023-10-20 | End: 2023-10-20

## 2023-10-20 RX ADMIN — PANTOPRAZOLE SODIUM 40 MG: 40 TABLET, DELAYED RELEASE ORAL at 06:58

## 2023-10-20 RX ADMIN — HYDROMORPHONE HYDROCHLORIDE 0.5 MG: 1 INJECTION, SOLUTION INTRAMUSCULAR; INTRAVENOUS; SUBCUTANEOUS at 02:47

## 2023-10-20 RX ADMIN — INSULIN ASPART 3 UNITS: 100 INJECTION, SOLUTION INTRAVENOUS; SUBCUTANEOUS at 19:11

## 2023-10-20 RX ADMIN — HYDROXYZINE HYDROCHLORIDE 25 MG: 25 TABLET, FILM COATED ORAL at 11:53

## 2023-10-20 RX ADMIN — LEVOTHYROXINE SODIUM 112 MCG: 0.11 TABLET ORAL at 06:58

## 2023-10-20 RX ADMIN — SENNOSIDES AND DOCUSATE SODIUM 1 TABLET: 8.6; 5 TABLET ORAL at 21:16

## 2023-10-20 RX ADMIN — HYDROXYZINE HYDROCHLORIDE 25 MG: 25 TABLET, FILM COATED ORAL at 02:47

## 2023-10-20 RX ADMIN — PREGABALIN 25 MG: 25 CAPSULE ORAL at 13:42

## 2023-10-20 RX ADMIN — METRONIDAZOLE 500 MG: 500 TABLET ORAL at 09:32

## 2023-10-20 RX ADMIN — AMOXICILLIN AND CLAVULANATE POTASSIUM 1 TABLET: 875; 125 TABLET, FILM COATED ORAL at 09:32

## 2023-10-20 RX ADMIN — METFORMIN HYDROCHLORIDE 1000 MG: 500 TABLET, FILM COATED ORAL at 19:12

## 2023-10-20 RX ADMIN — INSULIN ASPART 2 UNITS: 100 INJECTION, SOLUTION INTRAVENOUS; SUBCUTANEOUS at 09:44

## 2023-10-20 RX ADMIN — PREGABALIN 25 MG: 25 CAPSULE ORAL at 09:32

## 2023-10-20 RX ADMIN — THIAMINE HCL TAB 100 MG 100 MG: 100 TAB at 09:32

## 2023-10-20 RX ADMIN — SENNOSIDES AND DOCUSATE SODIUM 1 TABLET: 8.6; 5 TABLET ORAL at 09:32

## 2023-10-20 RX ADMIN — OXYCODONE HYDROCHLORIDE 5 MG: 5 TABLET ORAL at 15:49

## 2023-10-20 RX ADMIN — AMOXICILLIN AND CLAVULANATE POTASSIUM 1 TABLET: 875; 125 TABLET, FILM COATED ORAL at 21:17

## 2023-10-20 RX ADMIN — PREGABALIN 25 MG: 25 CAPSULE ORAL at 21:16

## 2023-10-20 RX ADMIN — Medication 1 TABLET: at 09:32

## 2023-10-20 RX ADMIN — POLYETHYLENE GLYCOL 3350 17 G: 17 POWDER, FOR SOLUTION ORAL at 09:31

## 2023-10-20 RX ADMIN — ENOXAPARIN SODIUM 40 MG: 40 INJECTION SUBCUTANEOUS at 09:37

## 2023-10-20 RX ADMIN — METRONIDAZOLE 500 MG: 500 TABLET ORAL at 21:16

## 2023-10-20 RX ADMIN — FOLIC ACID 1 MG: 1 TABLET ORAL at 09:32

## 2023-10-20 RX ADMIN — INSULIN GLARGINE 10 UNITS: 100 INJECTION, SOLUTION SUBCUTANEOUS at 09:41

## 2023-10-20 RX ADMIN — ACETAMINOPHEN 650 MG: 325 TABLET ORAL at 11:53

## 2023-10-20 RX ADMIN — METFORMIN HYDROCHLORIDE 1000 MG: 500 TABLET, FILM COATED ORAL at 09:31

## 2023-10-20 RX ADMIN — INSULIN GLARGINE 10 UNITS: 100 INJECTION, SOLUTION SUBCUTANEOUS at 21:19

## 2023-10-20 RX ADMIN — METRONIDAZOLE 500 MG: 500 TABLET ORAL at 13:42

## 2023-10-20 ASSESSMENT — ACTIVITIES OF DAILY LIVING (ADL)
ADLS_ACUITY_SCORE: 38
ADLS_ACUITY_SCORE: 35
ADLS_ACUITY_SCORE: 36
ADLS_ACUITY_SCORE: 35
ADLS_ACUITY_SCORE: 35
ADLS_ACUITY_SCORE: 38
ADLS_ACUITY_SCORE: 35
ADLS_ACUITY_SCORE: 38
ADLS_ACUITY_SCORE: 35
ADLS_ACUITY_SCORE: 38

## 2023-10-20 NOTE — PROGRESS NOTES
General Surgery Progress Note  Lidia Nam  [id number]    Subjective  Asked to revisit colostomy with Ms. Nam as she has had further discussion with her family.  Overall doing well aside from discomfort from rectal tube.    Objective  Temp:  [97.6  F (36.4  C)-98.5  F (36.9  C)] 98.5  F (36.9  C)  Pulse:  [85-97] 94  Resp:  [16-20] 20  BP: ()/(52-63) 120/63  SpO2:  [92 %-96 %] 94 %    Physical Exam:  Gen: Awake, alert, NAD  CVS: RRR  Resp: NLB  Abd: Soft, nt/nd, RLQ incision, midline lower abdominal incision  Extrem: Dressing in place    Assessment & Plan  63 year old female with nec fasc s/p significant debridement, asked to revisit colostomy.    She has discussed extensively with her family and has decided that she no long would like to be DNR/DNI and would want to continue more aggressive cares.  With her wounds as large as they are, her stool cannot be managed without a rectal tube and this is causing her a lot of discomfort.  Wound cares will take months at least.  We discussed that a colostomy is reasonable.  We did discuss the risks with anesthesia and intubation as well.     Placed on the OR schedule for Tuesday 10/24 for laparoscopic possible open colostomy formation.    Roddy Johns MD

## 2023-10-20 NOTE — PROGRESS NOTES
CLINICAL NUTRITION SERVICES - REASSESSMENT NOTE    EVALUATION OF THE PROGRESS TOWARD GOALS   Diet: moderate consistent CHO ( 60 grams with each meal) with Calorie counts  Nutrition Supplements: expedite daily, SF gelatein and cheese stick twice per day  Intake: pt consumed 100% of breakfast and supper meals 10/19 she ate most of the cheese sticks and took the expedite.  Calorie count result: 1195 Calories and 71 g protein ( pt met~  94% lower end of Calorie needs and ~ 71% protein needs) She states she was not sent the SF gelatein yesterday or today-notified kitchen     INTERVENTIONS  Implementation  Continue Calorie counts x 1 more day and discontinue if pt meets 75% or more of her needs    Goals  Meet > 75% estimated nutrition needs  Wound healing    Monitoring/Evaluation  Progress toward goals will be monitored and evaluated per protocol.

## 2023-10-20 NOTE — PLAN OF CARE
Goal Outcome Evaluation:      Plan of Care Reviewed With: patient    Overall Patient Progress: no change    Outcome Evaluation: Has intermittent pain. Treated with PRN atarax and IV dilaudid 0.5mg prior to dressing change. Tolerated well with assist of 3 to change wet-to-dry dressing. States she is interested in getting ostomy and would like to have necessary conversations regarding possibility of surgery. PO antibiotics given. VSS.    Serafin Muñoz RN

## 2023-10-20 NOTE — PROGRESS NOTES
"Care Management Follow Up    Length of Stay (days): 18    Expected Discharge Date: 10/25/2023     Concerns to be Addressed:     Discharge planning  Patient plan of care discussed at interdisciplinary rounds: Yes    Anticipated Discharge Disposition:  LTACH     Anticipated Discharge Services:  NA  Anticipated Discharge DME:  NA    Patient/family educated on Medicare website which has current facility and service quality ratings:  yes  Education Provided on the Discharge Plan:  yes  Patient/Family in Agreement with the Plan:  yes    Referrals Placed by CM/SW:  Post Acute Facilities  Private pay costs discussed: Not applicable    Additional Information:  Discharge plan for LTACH as pt is appropriate for admissions. Pt is interested in colostomy now, and plans to talk with care team about said surgery.Care management following.  10:19 AM    [facility] is full at this time; additional referral sent to [facility] for review.  11:31 AM    Per charge RN, pt is scheduled to get colostomy 10/23-10/24. SW will continue to following for discharge plan and recommendations post surgery.  2:25 PM    Social HX:  \"Pt lives with spouse in a house. Patient has not been taking care of herself, not bathing, laying in bed most days.  says she drinks 6-12 beers/day and smokes, and not eating properly.  helps with all IADLs.\"    CHEYENNE Thorpe      "

## 2023-10-21 LAB
ANION GAP SERPL CALCULATED.3IONS-SCNC: 8 MMOL/L (ref 7–15)
BUN SERPL-MCNC: 9.1 MG/DL (ref 8–23)
CALCIUM SERPL-MCNC: 9.8 MG/DL (ref 8.8–10.2)
CHLORIDE SERPL-SCNC: 102 MMOL/L (ref 98–107)
CREAT SERPL-MCNC: 0.38 MG/DL (ref 0.51–0.95)
DEPRECATED HCO3 PLAS-SCNC: 27 MMOL/L (ref 22–29)
EGFRCR SERPLBLD CKD-EPI 2021: >90 ML/MIN/1.73M2
GLUCOSE BLDC GLUCOMTR-MCNC: 111 MG/DL (ref 70–99)
GLUCOSE BLDC GLUCOMTR-MCNC: 113 MG/DL (ref 70–99)
GLUCOSE BLDC GLUCOMTR-MCNC: 128 MG/DL (ref 70–99)
GLUCOSE BLDC GLUCOMTR-MCNC: 91 MG/DL (ref 70–99)
GLUCOSE BLDC GLUCOMTR-MCNC: 92 MG/DL (ref 70–99)
GLUCOSE SERPL-MCNC: 108 MG/DL (ref 70–99)
PHOSPHATE SERPL-MCNC: 3.4 MG/DL (ref 2.5–4.5)
PLATELET # BLD AUTO: 325 10E3/UL (ref 150–450)
POTASSIUM SERPL-SCNC: 4 MMOL/L (ref 3.4–5.3)
SODIUM SERPL-SCNC: 137 MMOL/L (ref 135–145)

## 2023-10-21 PROCEDURE — 250N000013 HC RX MED GY IP 250 OP 250 PS 637: Performed by: NURSE PRACTITIONER

## 2023-10-21 PROCEDURE — 99232 SBSQ HOSP IP/OBS MODERATE 35: CPT | Performed by: INTERNAL MEDICINE

## 2023-10-21 PROCEDURE — 250N000013 HC RX MED GY IP 250 OP 250 PS 637

## 2023-10-21 PROCEDURE — 120N000001 HC R&B MED SURG/OB

## 2023-10-21 PROCEDURE — 250N000011 HC RX IP 250 OP 636: Mod: JZ | Performed by: NURSE PRACTITIONER

## 2023-10-21 PROCEDURE — 250N000013 HC RX MED GY IP 250 OP 250 PS 637: Performed by: INTERNAL MEDICINE

## 2023-10-21 PROCEDURE — 85049 AUTOMATED PLATELET COUNT: CPT | Performed by: INTERNAL MEDICINE

## 2023-10-21 PROCEDURE — 80048 BASIC METABOLIC PNL TOTAL CA: CPT | Performed by: NURSE PRACTITIONER

## 2023-10-21 PROCEDURE — 84100 ASSAY OF PHOSPHORUS: CPT | Performed by: INTERNAL MEDICINE

## 2023-10-21 RX ADMIN — PREGABALIN 25 MG: 25 CAPSULE ORAL at 13:16

## 2023-10-21 RX ADMIN — PREGABALIN 25 MG: 25 CAPSULE ORAL at 08:51

## 2023-10-21 RX ADMIN — OXYCODONE HYDROCHLORIDE 10 MG: 5 TABLET ORAL at 19:07

## 2023-10-21 RX ADMIN — INSULIN ASPART 1 UNITS: 100 INJECTION, SOLUTION INTRAVENOUS; SUBCUTANEOUS at 09:05

## 2023-10-21 RX ADMIN — METRONIDAZOLE 500 MG: 500 TABLET ORAL at 13:16

## 2023-10-21 RX ADMIN — OXYCODONE HYDROCHLORIDE 5 MG: 5 TABLET ORAL at 15:06

## 2023-10-21 RX ADMIN — METFORMIN HYDROCHLORIDE 1000 MG: 500 TABLET, FILM COATED ORAL at 17:35

## 2023-10-21 RX ADMIN — ACETAMINOPHEN 650 MG: 325 TABLET ORAL at 21:34

## 2023-10-21 RX ADMIN — AMOXICILLIN AND CLAVULANATE POTASSIUM 1 TABLET: 875; 125 TABLET, FILM COATED ORAL at 08:50

## 2023-10-21 RX ADMIN — INSULIN ASPART 4 UNITS: 100 INJECTION, SOLUTION INTRAVENOUS; SUBCUTANEOUS at 13:15

## 2023-10-21 RX ADMIN — THIAMINE HCL TAB 100 MG 100 MG: 100 TAB at 08:51

## 2023-10-21 RX ADMIN — SENNOSIDES AND DOCUSATE SODIUM 1 TABLET: 8.6; 5 TABLET ORAL at 21:34

## 2023-10-21 RX ADMIN — HYDROXYZINE HYDROCHLORIDE 25 MG: 25 TABLET, FILM COATED ORAL at 09:23

## 2023-10-21 RX ADMIN — METRONIDAZOLE 500 MG: 500 TABLET ORAL at 08:51

## 2023-10-21 RX ADMIN — Medication 1 TABLET: at 08:51

## 2023-10-21 RX ADMIN — PREGABALIN 25 MG: 25 CAPSULE ORAL at 21:34

## 2023-10-21 RX ADMIN — METRONIDAZOLE 500 MG: 500 TABLET ORAL at 21:34

## 2023-10-21 RX ADMIN — INSULIN GLARGINE 10 UNITS: 100 INJECTION, SOLUTION SUBCUTANEOUS at 21:36

## 2023-10-21 RX ADMIN — INSULIN ASPART 4 UNITS: 100 INJECTION, SOLUTION INTRAVENOUS; SUBCUTANEOUS at 19:03

## 2023-10-21 RX ADMIN — ENOXAPARIN SODIUM 40 MG: 40 INJECTION SUBCUTANEOUS at 08:46

## 2023-10-21 RX ADMIN — METFORMIN HYDROCHLORIDE 1000 MG: 500 TABLET, FILM COATED ORAL at 08:51

## 2023-10-21 RX ADMIN — FOLIC ACID 1 MG: 1 TABLET ORAL at 08:52

## 2023-10-21 RX ADMIN — SENNOSIDES AND DOCUSATE SODIUM 1 TABLET: 8.6; 5 TABLET ORAL at 09:12

## 2023-10-21 RX ADMIN — AMOXICILLIN AND CLAVULANATE POTASSIUM 1 TABLET: 875; 125 TABLET, FILM COATED ORAL at 21:34

## 2023-10-21 RX ADMIN — Medication 60 ML: at 13:16

## 2023-10-21 RX ADMIN — LEVOTHYROXINE SODIUM 112 MCG: 0.11 TABLET ORAL at 05:38

## 2023-10-21 RX ADMIN — OXYCODONE HYDROCHLORIDE 5 MG: 5 TABLET ORAL at 09:23

## 2023-10-21 RX ADMIN — ACETAMINOPHEN 650 MG: 325 TABLET ORAL at 09:23

## 2023-10-21 RX ADMIN — POLYETHYLENE GLYCOL 3350 17 G: 17 POWDER, FOR SOLUTION ORAL at 09:12

## 2023-10-21 RX ADMIN — INSULIN GLARGINE 10 UNITS: 100 INJECTION, SOLUTION SUBCUTANEOUS at 08:49

## 2023-10-21 ASSESSMENT — ACTIVITIES OF DAILY LIVING (ADL)
ADLS_ACUITY_SCORE: 33
ADLS_ACUITY_SCORE: 35
ADLS_ACUITY_SCORE: 33
ADLS_ACUITY_SCORE: 35
ADLS_ACUITY_SCORE: 35
ADLS_ACUITY_SCORE: 33

## 2023-10-21 NOTE — PROGRESS NOTES
[facility]    Medicine Progress Note - Hospitalist Service    Date of Admission:  10/2/2023    Assessment & Plan   62 yo F with h/o DM2, obesity, tobacco abuse, alcohol abuse, hypothyroid, goiter who presented 10/2/23 with dyspnea, cough, generalized weakness and found to have malodorous drainage from a wound in her left inguinal/vaginal area. CT scan showed necrotizing fasciitis and she was emergently taken to the OR for vulvectomy and broad debridement of the left buttock and left thigh. She returned to the ICU on pressors and was febrile. Taken back to OR and required extensive debridement from the left buttock, perineum, down to the left ankle. She required intubation by ENT due to large goiter.  Now off of pressors and extubated 10/11, transferred out of ICU 10/13/23.     Dispo remains to be determined, likely LTACH facility. SW assisting.  On PO abx 10/18/23.  She discussed with surgery 10/20/23 and would like to proceed with diverting colostomy so that rectal tube can be removed.    Discussed with plastic surgery fellow at [facility] who did not feel closure was necessary at this point and she could discharge to LTACH after diverting colostomy and follow up with them down the road.        Severe sepsis and septic shock  Necrotizing soft tissue infection/necrotizing fasciitis  Lactic acidosis resolved  Hypoxic respiratory failure resolved  Left upper lobe pneumonia  Presented on 10/2/2023 with pneumonia and necrotizing fasciitis.   She was emergently taken to the OR for vulvectomy and broad debridement of the left buttock and left thigh  After procedure she had to go on pressors and was febrile so taken back to the OR requiring extensive debridement from the left buttock, perineum and down into the ankle  CT chest showed findings suspicious for left upper lobe pneumonia   Cultures with polymicrobial organism--Bacteroids and actinno, Fusobacterium nucleatum  Sputum cultures growing  haemophilus and strep constellatus, GNB  Blood cultures remain no growth to date  ENT intubated the patient because of large goiter, extubated 10/11  -- PO Augmentin and metronidazole per ID  -- Diverting colostomy was recommended to prevent recurrent wound infection, patient initially did not want procedure after discussion with care team and family.  However, on further discussion on 10/20/23 she is now requesting this.  -- continue therapy   -- continue current bowel meds, defer further rectal tube management to surgery  -- hold further diuresis given large net negative fluid balance since admission. Normally takes lasix BID at home. Monitor intake and volume status, can restart as appropriate     Mild hypophosphatemia, hypokalemia: replaced per protocol    History of alcohol abuse   reports 6-12 beers per day  Currently no signs of withdrawal  -- continue MV, folic acid and thiamine    Large goiter  History of hypothyroidism  Currently on room air  TSH was within normal limits, T4 was 0.78, repeated T4 was 0.93   --Continue levothyroxine    Type 2 diabetes mellitus with hyperglycemia without long-term current use of insulin  Noted to have some low trending glucoses 10/14  - Continue with Lantus, decreased to 10 units twice daily  - Continue carb counting insulin with meals at 1:10 ratio for now  - Continue medium intensity SSI  - Continue home metformin    Macrocytic anemia  Currently stable   Vit B12 1987, folate 18.3. May be related to alcohol use  -- hgb stable and improved, will stop checking hgb unless clinical changes   -- has received 1 unit of blood this hospitalization     GI PPx  Continue with PPI          Diet: Moderate Consistent Carb (60 g CHO per Meal) Diet  Snacks/Supplements Adult: Expedite Bottle; With Meals  Snacks/Supplements Adult: Other; string chees; Between Meals  Calorie Counts  Snacks/Supplements Adult: Gelatein sugar-free; Between Meals    DVT Prophylaxis: Enoxaparin (Lovenox)  "SQ  Rdz Catheter: PRESENT, indication: Wound Healing  Lines: PRESENT      PICC 10/03/23 Triple Lumen Right Basilic Multiple medications-Site Assessment: WDL      Cardiac Monitoring: None  Code Status: Full Code      Clinically Significant Risk Factors              # Hypoalbuminemia: Lowest albumin = 1.7 g/dL at 10/3/2023  5:21 AM, will monitor as appropriate            # Obesity: Estimated body mass index is 36.45 kg/m  as calculated from the following:    Height as of this encounter: 1.575 m (5' 2\").    Weight as of this encounter: 90.4 kg (199 lb 4.7 oz).             Disposition Plan pending placement and surgical intervention      Expected Discharge Date: 10/25/2023    Discharge Delays: Placement - LTAC/ARU    Discharge Comments: Pending LTACH placement          Ervin Addison MD  Hospitalist Service  [facility]  Securely message with [employer] (more info)  Text page via [employer] Paging/Directory   ______________________________________________________________________    Interval History   Some pain with dressing changes.  Doing ok otherwise.  Would like to proceed with diverting colostomy.    Physical Exam   Vital Signs: Temp: 98.3  F (36.8  C) Temp src: Oral BP: 124/52 Pulse: 95   Resp: 20 SpO2: 93 % O2 Device: None (Room air)    Weight: 199 lbs 4.73 oz    General: NAD  RESPIRATORY: rhonchi bilaterally  CARDIOVASCULAR: RRR S1S2 1/6 systolic murmur  Abdomen: +BS, soft, NT/ND  SKIN: Extensive debridement bandages surrounding entirety of left lower extremity, and left groin up into left lower quadrant, clean and without discharge  NEUROLOGIC: Alert, speech clear, decreased strength LLE         Medical Decision Making       50 MINUTES SPENT BY ME on the date of service doing chart review, history, exam, documentation & further activities per the note.    Discussed at length with general surgery and also with plastic surgery    Data       "

## 2023-10-21 NOTE — PROGRESS NOTES
General Surgery Progress Note  Lidia Nam  [id number]    Subjective  Discussed with patient this morning regarding colostomy.  All of her questions and concerns answered.  She has no new complaints.    Objective  Temp:  [98.3  F (36.8  C)-99  F (37.2  C)] 99  F (37.2  C)  Pulse:  [92-95] 95  Resp:  [18-20] 18  BP: (118-124)/(52-68) 118/66  SpO2:  [93 %-98 %] 98 %    Physical Exam:  Gen: Awake, alert, NAD  CVS: RRR  Resp: NLB  Abd: Soft, nt/nd, RLQ incision, midline lower abdominal incision  Extrem: Left lower extremity dressing in place    Assessment & Plan  63 year old female with nec fasc s/p significant debridement.    Plan is to undergo laparoscopic colostomy for the patient next Tuesday.  Patient can have diet as tolerated  Continue with local wound care per [facility] nurse  Surgery team following loosely with you until the patient has surgery  Continue medical management per primary team    Jp Penny DO  General Surgeon  [facility]  Surgery Clinic - [address]  [address] [place] [id number]?  Office: [phone]  Employed by - [employer]  Pager: [phone]

## 2023-10-21 NOTE — PLAN OF CARE
Goal Outcome Evaluation:     [id number]... Pt is a/o x4, up in the chair with PT for short period, c/o butt, back giovanna-area pain 5/10 and was given Tylenol and Oxycodone at different time interval. Pt was also seen by General   Surgery today and may likely have Colostomy on Tuesday 10/24. Rdz cath and rectal tube are intact, outputs have been charted. Wound dressing was done today, BG= 123 and 135 mg/dL.           [id number]... Pt had a restful shift, left leg, buttock and groin wound dressing is intact, BG= was 98 and 143 mg/dL. Rdz cath and rectal tube are intact, outputs have been charted. Triple lumen is patent, good blood returns. VSS

## 2023-10-21 NOTE — PLAN OF CARE
Problem: Plan of Care - These are the overarching goals to be used throughout the patient stay.    Goal: Optimal Comfort and Wellbeing  Outcome: Progressing  Intervention: Provide Person-Centered Care  Recent Flowsheet Documentation  Taken 10/21/2023 0111 by Helen Calvert RN  Trust Relationship/Rapport: care explained     Problem: Sepsis/Septic Shock  Goal: Optimal Coping  Outcome: Progressing  Intervention: Optimize Psychosocial Adjustment to Illness  Recent Flowsheet Documentation  Taken 10/21/2023 0111 by Helen Calvert RN  Supportive Measures: active listening utilized     Problem: Pain Acute  Goal: Optimal Pain Control and Function  Outcome: Progressing  Intervention: Optimize Psychosocial Wellbeing  Recent Flowsheet Documentation  Taken 10/21/2023 0111 by Helen Calvert RN  Supportive Measures: active listening utilized     Problem: Infection  Goal: Absence of Infection Signs and Symptoms  Outcome: Progressing   Goal Outcome Evaluation:       Pt is A/O x 4 denies pain or any discomfort. Rdz intact and patent, bypassing rectal tube. Wound dressing clean dry and intact.  mg/dl, picc line patent with blood return. VSS continue to monitor.Helen Calvert RN

## 2023-10-21 NOTE — PLAN OF CARE
Problem: Plan of Care - These are the overarching goals to be used throughout the patient stay.    Goal: Plan of Care Review  Description: The Plan of Care Review/Shift note should be completed every shift.  The Outcome Evaluation is a brief statement about your assessment that the patient is improving, declining, or no change.  This information will be displayed automatically on your shift note.  Outcome: Progressing     Problem: Sepsis/Septic Shock  Goal: Optimal Coping  Outcome: Progressing  Intervention: Optimize Psychosocial Adjustment to Illness  Recent Flowsheet Documentation  Taken 10/21/2023 0800 by Alice Brewer RN  Supportive Measures: active listening utilized     Problem: Pain Acute  Goal: Optimal Pain Control and Function  Outcome: Progressing  Intervention: Optimize Psychosocial Wellbeing  Recent Flowsheet Documentation  Taken 10/21/2023 0800 by Alice Brewer RN  Supportive Measures: active listening utilized     Problem: Infection  Goal: Absence of Infection Signs and Symptoms  Outcome: Progressing   Goal Outcome Evaluation:       Dressing change done to left leg/buttock/giovanna area.  Patient tolerated well.  Premedicated with atarax, oxy, and tyl.  Rectal tube and motta patent.  Plan for colostomy surgery Tuesday to promote wound healing.  Plan for eventual LTACH.

## 2023-10-21 NOTE — PROGRESS NOTES
[facility]    Medicine Progress Note - Hospitalist Service    Date of Admission:  10/2/2023    Assessment & Plan   62 yo F with h/o DM2, obesity, tobacco abuse, alcohol abuse, hypothyroid, goiter who presented 10/2/23 with dyspnea, cough, generalized weakness and found to have malodorous drainage from a wound in her left inguinal/vaginal area. CT scan showed necrotizing fasciitis and she was emergently taken to the OR for vulvectomy and broad debridement of the left buttock and left thigh. She returned to the ICU on pressors and was febrile. Taken back to OR and required extensive debridement from the left buttock, perineum, down to the left ankle. She required intubation by ENT due to large goiter.  Now off of pressors and extubated 10/11, transferred out of ICU 10/13/23.     Dispo remains to be determined, likely LTACH facility. SW assisting.  On PO abx 10/18/23.  She discussed with surgery 10/20/23 and would like to proceed with diverting colostomy so that rectal tube can be removed.    Discussed with plastic surgery fellow at [facility] who did not feel closure was necessary at this point and she could discharge to LTACH after diverting colostomy and follow up with them down the road.        Severe sepsis and septic shock  Necrotizing soft tissue infection/necrotizing fasciitis  Lactic acidosis resolved  Hypoxic respiratory failure resolved  Left upper lobe pneumonia  Presented on 10/2/2023 with pneumonia and necrotizing fasciitis.   She was emergently taken to the OR for vulvectomy and broad debridement of the left buttock and left thigh  After procedure she had to go on pressors and was febrile so taken back to the OR requiring extensive debridement from the left buttock, perineum and down into the ankle  CT chest showed findings suspicious for left upper lobe pneumonia   Cultures with polymicrobial organism--Bacteroids and actinno, Fusobacterium nucleatum  Sputum cultures growing  haemophilus and strep constellatus, GNB  Blood cultures remain no growth to date  ENT intubated the patient because of large goiter, extubated 10/11  -- PO Augmentin and metronidazole per ID  -- Diverting colostomy was recommended to prevent recurrent wound infection, patient initially did not want procedure after discussion with care team and family.  However, on further discussion on 10/20/23 she is now requesting this.  -- continue therapy   -- continue current bowel meds, defer further rectal tube management to surgery  -- hold further diuresis given large net negative fluid balance since admission. Normally takes lasix BID at home. Monitor intake and volume status, can restart as appropriate     Mild hypophosphatemia, hypokalemia: replaced per protocol    History of alcohol abuse   reports 6-12 beers per day  Currently no signs of withdrawal  -- continue MV, folic acid and thiamine    Large goiter  History of hypothyroidism  Currently on room air  TSH was within normal limits, T4 was 0.78, repeated T4 was 0.93   --Continue levothyroxine    Type 2 diabetes mellitus with hyperglycemia without long-term current use of insulin  Noted to have some low trending glucoses 10/14  - Continue with Lantus, decreased to 10 units twice daily  - Continue carb counting insulin with meals at 1:10 ratio for now  - Continue medium intensity SSI  - Continue home metformin    Macrocytic anemia  Currently stable   Vit B12 1987, folate 18.3. May be related to alcohol use  -- hgb stable and improved, will stop checking hgb unless clinical changes   -- has received 1 unit of blood this hospitalization     GI PPx  Continue with PPI          Diet: Moderate Consistent Carb (60 g CHO per Meal) Diet  Snacks/Supplements Adult: Expedite Bottle; With Meals  Snacks/Supplements Adult: Other; string chees; Between Meals  Snacks/Supplements Adult: Gelatein sugar-free; Between Meals    DVT Prophylaxis: Enoxaparin (Lovenox) SQ  Rdz  "Catheter: PRESENT, indication: Wound Healing  Lines: PRESENT      PICC 10/03/23 Triple Lumen Right Basilic Multiple medications-Site Assessment: WDL      Cardiac Monitoring: None  Code Status: Full Code      Clinically Significant Risk Factors              # Hypoalbuminemia: Lowest albumin = 1.7 g/dL at 10/3/2023  5:21 AM, will monitor as appropriate            # Obesity: Estimated body mass index is 36.45 kg/m  as calculated from the following:    Height as of this encounter: 1.575 m (5' 2\").    Weight as of this encounter: 90.4 kg (199 lb 4.7 oz).             Disposition Plan pending placement and surgical intervention      Expected Discharge Date: 10/25/2023    Discharge Delays: Placement - LTAC/ARU    Discharge Comments: Pending LTACH placement          Ervin Addison MD  Hospitalist Service  [facility]  Securely message with [employer] (more info)  Text page via [employer] Paging/Directory   ______________________________________________________________________    Interval History   No new changes. Pain reasonably controlled.    Physical Exam   Vital Signs: Temp: 98.7  F (37.1  C) Temp src: Oral BP: 104/51 Pulse: 97   Resp: 18 SpO2: 95 % O2 Device: None (Room air)    Weight: 199 lbs 4.73 oz    General: NAD  RESPIRATORY: rhonchi bilaterally  CARDIOVASCULAR: RRR S1S2 1/6 systolic murmur  Abdomen: +BS, soft, NT/ND  SKIN: Extensive debridement bandages surrounding entirety of left lower extremity, and left groin up into left lower quadrant, clean and without discharge  NEUROLOGIC: Alert, speech clear, decreased strength LLE         Medical Decision Making       35 MINUTES SPENT BY ME on the date of service doing chart review, history, exam, documentation & further activities per the note.        Data       "

## 2023-10-21 NOTE — PROGRESS NOTES
CLINICAL NUTRITION SERVICES - REASSESSMENT NOTE    EVALUATION OF THE PROGRESS TOWARD GOALS   Diet: moderate consistent CHO ( 60 grams with each meal) with Calorie counts  Nutrition Supplements: expedite daily, SF gelatein and cheese stick twice per day    Intake: No Meal slips saved, per flow sheet fair intake at breakfast 26 gm cho (ordered eggs, toast, milk) and good intake at dinner 75% (broth, crackers, SF pudding, grapes, cranberry juice)   This morning drank 4 ounces cranberry juice and declined breakfast.    2 meals (breakfast, dinner): ~495 calories, 26 gm protein  If pt ate supplements/snacks ~440 calories, 64 gm protein - not documented    ~Total: 935 calories, 90 gm protein   (73% calories, 90% protein)      Per chart, wounds cannot be managed without rectal tube and this is causing discomfort.  Pt scheduled for open colostomy Tuesday 10/24    INTERVENTIONS  Implementation  Continue supplements    Goals  Meet > 75% estimated nutrition needs - progressing  Wound healing    Monitoring/Evaluation  Progress toward goals will be monitored and evaluated per protocol.

## 2023-10-21 NOTE — PLAN OF CARE
Pt alertx4, lung sounds clear, SpO2 96%+ on room air. Pt requested prn PO oxycodone 5 mg for rectal pain. VSS.    NOT on Telemetry.    Protocols:  K+: 4.0, am recheck, 10/22.  Phosphorus: 3.0, am recheck, 10/22.    Problem: Pain Acute  Goal: Optimal Pain Control and Function  Outcome: Progressing  Intervention: Optimize Psychosocial Wellbeing  Recent Flowsheet Documentation  Taken 10/21/2023 9215 by Delma Brandt RN  Supportive Measures: active listening utilized     Problem: Infection  Goal: Absence of Infection Signs and Symptoms  Outcome: Progressing

## 2023-10-22 ENCOUNTER — APPOINTMENT (OUTPATIENT)
Dept: PHYSICAL THERAPY | Facility: HOSPITAL | Age: 63
DRG: 853 | End: 2023-10-22
Payer: COMMERCIAL

## 2023-10-22 LAB
ANION GAP SERPL CALCULATED.3IONS-SCNC: 7 MMOL/L (ref 7–15)
BUN SERPL-MCNC: 9.2 MG/DL (ref 8–23)
CALCIUM SERPL-MCNC: 9.3 MG/DL (ref 8.8–10.2)
CHLORIDE SERPL-SCNC: 103 MMOL/L (ref 98–107)
CREAT SERPL-MCNC: 0.37 MG/DL (ref 0.51–0.95)
DEPRECATED HCO3 PLAS-SCNC: 27 MMOL/L (ref 22–29)
EGFRCR SERPLBLD CKD-EPI 2021: >90 ML/MIN/1.73M2
GLUCOSE BLDC GLUCOMTR-MCNC: 102 MG/DL (ref 70–99)
GLUCOSE BLDC GLUCOMTR-MCNC: 104 MG/DL (ref 70–99)
GLUCOSE BLDC GLUCOMTR-MCNC: 106 MG/DL (ref 70–99)
GLUCOSE BLDC GLUCOMTR-MCNC: 121 MG/DL (ref 70–99)
GLUCOSE BLDC GLUCOMTR-MCNC: 148 MG/DL (ref 70–99)
GLUCOSE SERPL-MCNC: 114 MG/DL (ref 70–99)
PHOSPHATE SERPL-MCNC: 3.1 MG/DL (ref 2.5–4.5)
POTASSIUM SERPL-SCNC: 3.8 MMOL/L (ref 3.4–5.3)
SODIUM SERPL-SCNC: 137 MMOL/L (ref 135–145)

## 2023-10-22 PROCEDURE — 250N000013 HC RX MED GY IP 250 OP 250 PS 637

## 2023-10-22 PROCEDURE — 250N000013 HC RX MED GY IP 250 OP 250 PS 637: Performed by: NURSE PRACTITIONER

## 2023-10-22 PROCEDURE — 99232 SBSQ HOSP IP/OBS MODERATE 35: CPT | Performed by: INTERNAL MEDICINE

## 2023-10-22 PROCEDURE — 250N000013 HC RX MED GY IP 250 OP 250 PS 637: Performed by: INTERNAL MEDICINE

## 2023-10-22 PROCEDURE — 84100 ASSAY OF PHOSPHORUS: CPT | Performed by: INTERNAL MEDICINE

## 2023-10-22 PROCEDURE — 120N000001 HC R&B MED SURG/OB

## 2023-10-22 PROCEDURE — 80048 BASIC METABOLIC PNL TOTAL CA: CPT | Performed by: NURSE PRACTITIONER

## 2023-10-22 PROCEDURE — 97110 THERAPEUTIC EXERCISES: CPT | Mod: GP

## 2023-10-22 PROCEDURE — 97116 GAIT TRAINING THERAPY: CPT | Mod: GP

## 2023-10-22 PROCEDURE — 250N000011 HC RX IP 250 OP 636: Mod: JZ | Performed by: NURSE PRACTITIONER

## 2023-10-22 RX ORDER — POTASSIUM CHLORIDE 1500 MG/1
20 TABLET, EXTENDED RELEASE ORAL ONCE
Status: COMPLETED | OUTPATIENT
Start: 2023-10-22 | End: 2023-10-22

## 2023-10-22 RX ADMIN — SENNOSIDES AND DOCUSATE SODIUM 1 TABLET: 8.6; 5 TABLET ORAL at 08:03

## 2023-10-22 RX ADMIN — PREGABALIN 25 MG: 25 CAPSULE ORAL at 21:39

## 2023-10-22 RX ADMIN — LEVOTHYROXINE SODIUM 112 MCG: 0.11 TABLET ORAL at 06:50

## 2023-10-22 RX ADMIN — Medication 60 ML: at 12:32

## 2023-10-22 RX ADMIN — SENNOSIDES AND DOCUSATE SODIUM 1 TABLET: 8.6; 5 TABLET ORAL at 20:41

## 2023-10-22 RX ADMIN — METFORMIN HYDROCHLORIDE 1000 MG: 500 TABLET, FILM COATED ORAL at 08:02

## 2023-10-22 RX ADMIN — ENOXAPARIN SODIUM 40 MG: 40 INJECTION SUBCUTANEOUS at 08:06

## 2023-10-22 RX ADMIN — INSULIN ASPART 1 UNITS: 100 INJECTION, SOLUTION INTRAVENOUS; SUBCUTANEOUS at 18:30

## 2023-10-22 RX ADMIN — METFORMIN HYDROCHLORIDE 1000 MG: 500 TABLET, FILM COATED ORAL at 17:32

## 2023-10-22 RX ADMIN — Medication 1 TABLET: at 08:03

## 2023-10-22 RX ADMIN — METRONIDAZOLE 500 MG: 500 TABLET ORAL at 08:04

## 2023-10-22 RX ADMIN — POTASSIUM CHLORIDE 20 MEQ: 1500 TABLET, EXTENDED RELEASE ORAL at 12:09

## 2023-10-22 RX ADMIN — INSULIN ASPART 3 UNITS: 100 INJECTION, SOLUTION INTRAVENOUS; SUBCUTANEOUS at 07:58

## 2023-10-22 RX ADMIN — INSULIN GLARGINE 10 UNITS: 100 INJECTION, SOLUTION SUBCUTANEOUS at 20:47

## 2023-10-22 RX ADMIN — PREGABALIN 25 MG: 25 CAPSULE ORAL at 15:22

## 2023-10-22 RX ADMIN — METRONIDAZOLE 500 MG: 500 TABLET ORAL at 15:22

## 2023-10-22 RX ADMIN — INSULIN GLARGINE 10 UNITS: 100 INJECTION, SOLUTION SUBCUTANEOUS at 07:56

## 2023-10-22 RX ADMIN — THIAMINE HCL TAB 100 MG 100 MG: 100 TAB at 08:03

## 2023-10-22 RX ADMIN — AMOXICILLIN AND CLAVULANATE POTASSIUM 1 TABLET: 875; 125 TABLET, FILM COATED ORAL at 20:40

## 2023-10-22 RX ADMIN — PREGABALIN 25 MG: 25 CAPSULE ORAL at 08:03

## 2023-10-22 RX ADMIN — FOLIC ACID 1 MG: 1 TABLET ORAL at 08:04

## 2023-10-22 RX ADMIN — HYDROXYZINE HYDROCHLORIDE 25 MG: 25 TABLET, FILM COATED ORAL at 20:41

## 2023-10-22 RX ADMIN — OXYCODONE HYDROCHLORIDE 5 MG: 5 TABLET ORAL at 12:09

## 2023-10-22 RX ADMIN — ACETAMINOPHEN 650 MG: 325 TABLET ORAL at 20:42

## 2023-10-22 RX ADMIN — METRONIDAZOLE 500 MG: 500 TABLET ORAL at 21:39

## 2023-10-22 RX ADMIN — POLYETHYLENE GLYCOL 3350 17 G: 17 POWDER, FOR SOLUTION ORAL at 08:01

## 2023-10-22 RX ADMIN — ACETAMINOPHEN 650 MG: 325 TABLET ORAL at 12:09

## 2023-10-22 RX ADMIN — AMOXICILLIN AND CLAVULANATE POTASSIUM 1 TABLET: 875; 125 TABLET, FILM COATED ORAL at 08:02

## 2023-10-22 ASSESSMENT — ACTIVITIES OF DAILY LIVING (ADL)
ADLS_ACUITY_SCORE: 33

## 2023-10-22 NOTE — PLAN OF CARE
Problem: Plan of Care - These are the overarching goals to be used throughout the patient stay.    Goal: Optimal Comfort and Wellbeing  Outcome: Progressing     Problem: Pain Acute  Goal: Optimal Pain Control and Function  Outcome: Progressing     Problem: Sepsis/Septic Shock  Goal: Absence of Infection Signs and Symptoms  Outcome: Progressing     Goal Outcome Evaluation:      Plan of Care Reviewed With: patient    Overall Patient Progress: improving    Pt is A/O x 4 rates pain a 4/10 at rectum. Rdz patent and draining, patient was bypassing rectal tube overnight, bag changed to improve function. Wound dressing changed and packed x2 overnight. picc line patent with blood return. VSS overnight.     Laly Gupta, Nursing Student

## 2023-10-22 NOTE — PROGRESS NOTES
General Surgery Chart Review Note    Patient chart reviewed. Patient not seen.     Continue with local wound cares.     On schedule for diverting colostomy on 10/24. NPO order placed for this, ADAT until that time.     Iglesia Burciaga PA-C  [facility]  Surgery [address] [address]?  Office: [phone]

## 2023-10-22 NOTE — PROGRESS NOTES
[facility]    Medicine Progress Note - Hospitalist Service    Date of Admission:  10/2/2023    Assessment & Plan   62 yo F with h/o DM2, obesity, tobacco abuse, alcohol abuse, hypothyroid, goiter who presented 10/2/23 with dyspnea, cough, generalized weakness and found to have malodorous drainage from a wound in her left inguinal/vaginal area. CT scan showed necrotizing fasciitis and she was emergently taken to the OR for vulvectomy and broad debridement of the left buttock and left thigh. She returned to the ICU on pressors and was febrile. Taken back to OR and required extensive debridement from the left buttock, perineum, down to the left ankle. She required intubation by ENT due to large goiter.  Now off of pressors and extubated 10/11, transferred out of ICU 10/13/23.     Dispo remains to be determined, likely LTACH facility. SW assisting.  On PO abx 10/18/23.  She discussed with surgery 10/20/23 and would like to proceed with diverting colostomy so that rectal tube can be removed.  Surgery has concerns about intubation given large goiter - they will discuss with anesthesia 10/23/24.  Tentative plan for surgery 10/24/23.    Discussed with plastic surgery fellow at [facility] who did not feel closure was necessary at this point and she could discharge to LTACH after diverting colostomy and follow up with them down the road.  Would arrange outpatient appointment with plastic surgery prior to discharge to LTACH.        Severe sepsis and septic shock  Necrotizing soft tissue infection/necrotizing fasciitis  Lactic acidosis resolved  Hypoxic respiratory failure resolved  Left upper lobe pneumonia  Presented on 10/2/2023 with pneumonia and necrotizing fasciitis.   She was emergently taken to the OR for vulvectomy and broad debridement of the left buttock and left thigh  After procedure she had to go on pressors and was febrile so taken back to the OR requiring extensive debridement from the left  buttock, perineum and down into the ankle  CT chest showed findings suspicious for left upper lobe pneumonia   Cultures with polymicrobial organism--Bacteroids and actinno, Fusobacterium nucleatum  Sputum cultures growing haemophilus and strep constellatus, GNB  Blood cultures remain no growth to date  ENT intubated the patient because of large goiter, extubated 10/11  -- PO Augmentin and metronidazole per ID  -- Diverting colostomy was recommended to prevent recurrent wound infection, patient initially did not want procedure after discussion with care team and family.  However, on further discussion on 10/20/23 she is now requesting this.  -- continue therapy   -- continue current bowel meds, defer further rectal tube management to surgery  -- hold further diuresis given large net negative fluid balance since admission. Normally takes lasix BID at home. Monitor intake and volume status, can restart as appropriate.  Weight changed likely scale differences, seems euvolemic.    Mild hypophosphatemia, hypokalemia: replaced per protocol    History of alcohol abuse   reports 6-12 beers per day  Currently no signs of withdrawal  -- continue MV, folic acid and thiamine    Large goiter  History of hypothyroidism  Currently on room air  TSH was within normal limits, T4 was 0.78, repeated T4 was 0.93   --Continue levothyroxine    Type 2 diabetes mellitus with hyperglycemia without long-term current use of insulin  Noted to have some low trending glucoses 10/14  - Continue with Lantus, decreased to 10 units twice daily  - Continue carb counting insulin with meals at 1:10 ratio for now  - Continue medium intensity SSI  - Continue home metformin    Macrocytic anemia  Currently stable   Vit B12 1987, folate 18.3. May be related to alcohol use  -- hgb stable and improved, will stop checking hgb unless clinical changes   -- has received 1 unit of blood this hospitalization     GI PPx  Continue with PPI          Diet:  "Moderate Consistent Carb (60 g CHO per Meal) Diet  Snacks/Supplements Adult: Expedite Bottle; With Meals  Snacks/Supplements Adult: Other; string chees; Between Meals  Snacks/Supplements Adult: Gelatein sugar-free; Between Meals    DVT Prophylaxis: Enoxaparin (Lovenox) SQ  Rdz Catheter: PRESENT, indication: Wound Healing  Lines: PRESENT      PICC 10/03/23 Triple Lumen Right Basilic Multiple medications-Site Assessment: WDL      Cardiac Monitoring: None  Code Status: Full Code      Clinically Significant Risk Factors              # Hypoalbuminemia: Lowest albumin = 1.7 g/dL at 10/3/2023  5:21 AM, will monitor as appropriate            # Obesity: Estimated body mass index is 36.45 kg/m  as calculated from the following:    Height as of this encounter: 1.575 m (5' 2\").    Weight as of this encounter: 90.4 kg (199 lb 4.7 oz).             Disposition Plan pending placement and surgical intervention     Expected Discharge Date: 10/25/2023    Discharge Delays: Placement - LTAC/ARU    Discharge Comments: Pending LTACH placement          Ervin Addison MD  Hospitalist Service  [facility]  Securely message with [employer] (more info)  Text page via [employer] Paging/Directory   ______________________________________________________________________    Interval History   Still wants to proceed with surgery. Pain reasonably controlled.    Physical Exam   Vital Signs: Temp: 98  F (36.7  C) Temp src: Oral BP: 115/58 Pulse: 92   Resp: 18 SpO2: 95 % O2 Device: None (Room air)    Weight: 199 lbs 4.73 oz    General: NAD  RESPIRATORY: rhonchi bilaterally  CARDIOVASCULAR: RRR S1S2 1/6 systolic murmur  Abdomen: +BS, soft, NT/ND  SKIN: Extensive debridement bandages surrounding entirety of left lower extremity, and left groin up into left lower quadrant, clean and without discharge  NEUROLOGIC: Alert, speech clear, decreased strength LLE         Medical Decision Making       36 MINUTES SPENT BY ME on the date of service " doing chart review, history, exam, documentation & further activities per the note.        Data

## 2023-10-22 NOTE — PLAN OF CARE
"Goal Outcome Evaluation:    [id number].... Pt had a relatively quiet shift, was pre-medicated before the wound dressing was changed, she was given Tylenol and Oxycodone. Pt ambulated with PT in hallway. She's scheduled for Diverting Colostomy on Tuesday 10/24. BG= 106 and 102 mg/dL, refused lunch. Rdz cath intact and draining ken urine, rectal tube is intact but stool is bypassing. Writer attempted to adjust but pt refused. Pt stated \" Just leave it alone, it will soon come out... I don't want anybody adjusting it... it hurts\". Will continue to  monitor.              [id number]... Pt was in bed all shift, left groin,butt.leg wound dressing was changed at 9:00 PM. Rdz cath and rectal tube are patent, outputs have been charted. Pt was pre-medicated prior to dressing change.BG= 104 and 148 mg/dL.       "

## 2023-10-22 NOTE — PROVIDER NOTIFICATION
Notified MD at 11:18 PM regarding low urine output.      Spoke with: Dr. Grimaldo    Orders were obtained.    Comments: Provider notified due to no urine output in motta. Pt bladder scanned for 695. Order obtained to irrigate motta. Motta irrigated per order, 600mL of ken colored urine with sediment drained from motta after irrigation. Per provider, no UA needed at this time.    Devang Rouse RN

## 2023-10-22 NOTE — PROGRESS NOTES
Care Management Follow Up    Length of Stay (days): 20    Expected Discharge Date: 10/25/2023     Concerns to be Addressed:     Discharge planning  Patient plan of care discussed at interdisciplinary rounds: Yes    Anticipated Discharge Disposition:  LTACH     Anticipated Discharge Services:  NA  Anticipated Discharge DME:  NA    Patient/family educated on Medicare website which has current facility and service quality ratings:  yes  Education Provided on the Discharge Plan:  yes  Patient/Family in Agreement with the Plan:  yes    Referrals Placed by CM/SW:  Post Acute Facilities  Private pay costs discussed: Not applicable    Additional Information:  Pt is scheduled to get colostomy 10/24. TYLER will continue to following for discharge plan and recommendations post surgery. Rachel continues to follow as pt is appropriate for admission per liaison; goal is to transfer post surgery pending bed availability. Additional referral sent to Jesi GUEVARA for review. TYLER left message for admissions.  10:39 AM    CHEYENNE Thorpe  10/22/2023

## 2023-10-23 ENCOUNTER — DOCUMENTATION ONLY (OUTPATIENT)
Dept: OTOLARYNGOLOGY | Facility: CLINIC | Age: 63
End: 2023-10-23
Payer: COMMERCIAL

## 2023-10-23 ENCOUNTER — HOSPITAL ENCOUNTER (OUTPATIENT)
Age: 63
End: 2023-10-23
Payer: COMMERCIAL

## 2023-10-23 ENCOUNTER — APPOINTMENT (OUTPATIENT)
Dept: PHYSICAL THERAPY | Facility: HOSPITAL | Age: 63
DRG: 853 | End: 2023-10-23
Payer: COMMERCIAL

## 2023-10-23 LAB
ANION GAP SERPL CALCULATED.3IONS-SCNC: 8 MMOL/L (ref 7–15)
BUN SERPL-MCNC: 10.5 MG/DL (ref 8–23)
CALCIUM SERPL-MCNC: 9.2 MG/DL (ref 8.8–10.2)
CHLORIDE SERPL-SCNC: 103 MMOL/L (ref 98–107)
CREAT SERPL-MCNC: 0.36 MG/DL (ref 0.51–0.95)
DEPRECATED HCO3 PLAS-SCNC: 26 MMOL/L (ref 22–29)
EGFRCR SERPLBLD CKD-EPI 2021: >90 ML/MIN/1.73M2
GLUCOSE BLDC GLUCOMTR-MCNC: 113 MG/DL (ref 70–99)
GLUCOSE BLDC GLUCOMTR-MCNC: 128 MG/DL (ref 70–99)
GLUCOSE BLDC GLUCOMTR-MCNC: 132 MG/DL (ref 70–99)
GLUCOSE BLDC GLUCOMTR-MCNC: 136 MG/DL (ref 70–99)
GLUCOSE BLDC GLUCOMTR-MCNC: 149 MG/DL (ref 70–99)
GLUCOSE SERPL-MCNC: 124 MG/DL (ref 70–99)
PHOSPHATE SERPL-MCNC: 3 MG/DL (ref 2.5–4.5)
POTASSIUM SERPL-SCNC: 3.7 MMOL/L (ref 3.4–5.3)
SODIUM SERPL-SCNC: 137 MMOL/L (ref 135–145)

## 2023-10-23 PROCEDURE — 250N000011 HC RX IP 250 OP 636: Performed by: NURSE PRACTITIONER

## 2023-10-23 PROCEDURE — 250N000013 HC RX MED GY IP 250 OP 250 PS 637: Performed by: NURSE PRACTITIONER

## 2023-10-23 PROCEDURE — 99232 SBSQ HOSP IP/OBS MODERATE 35: CPT | Performed by: STUDENT IN AN ORGANIZED HEALTH CARE EDUCATION/TRAINING PROGRAM

## 2023-10-23 PROCEDURE — 250N000013 HC RX MED GY IP 250 OP 250 PS 637: Performed by: INTERNAL MEDICINE

## 2023-10-23 PROCEDURE — 250N000013 HC RX MED GY IP 250 OP 250 PS 637: Performed by: STUDENT IN AN ORGANIZED HEALTH CARE EDUCATION/TRAINING PROGRAM

## 2023-10-23 PROCEDURE — 97530 THERAPEUTIC ACTIVITIES: CPT | Mod: GP

## 2023-10-23 PROCEDURE — 120N000001 HC R&B MED SURG/OB

## 2023-10-23 PROCEDURE — 250N000011 HC RX IP 250 OP 636: Mod: JZ | Performed by: NURSE PRACTITIONER

## 2023-10-23 PROCEDURE — 80048 BASIC METABOLIC PNL TOTAL CA: CPT | Performed by: NURSE PRACTITIONER

## 2023-10-23 PROCEDURE — 250N000013 HC RX MED GY IP 250 OP 250 PS 637

## 2023-10-23 PROCEDURE — 97116 GAIT TRAINING THERAPY: CPT | Mod: GP

## 2023-10-23 PROCEDURE — 84100 ASSAY OF PHOSPHORUS: CPT | Performed by: INTERNAL MEDICINE

## 2023-10-23 RX ORDER — POTASSIUM CHLORIDE 1500 MG/1
20 TABLET, EXTENDED RELEASE ORAL ONCE
Status: COMPLETED | OUTPATIENT
Start: 2023-10-23 | End: 2023-10-23

## 2023-10-23 RX ADMIN — THIAMINE HCL TAB 100 MG 100 MG: 100 TAB at 08:52

## 2023-10-23 RX ADMIN — AMOXICILLIN AND CLAVULANATE POTASSIUM 1 TABLET: 875; 125 TABLET, FILM COATED ORAL at 08:53

## 2023-10-23 RX ADMIN — Medication 1 TABLET: at 08:54

## 2023-10-23 RX ADMIN — INSULIN GLARGINE 10 UNITS: 100 INJECTION, SOLUTION SUBCUTANEOUS at 08:04

## 2023-10-23 RX ADMIN — POTASSIUM CHLORIDE 20 MEQ: 1500 TABLET, EXTENDED RELEASE ORAL at 08:53

## 2023-10-23 RX ADMIN — PREGABALIN 25 MG: 25 CAPSULE ORAL at 13:29

## 2023-10-23 RX ADMIN — ENOXAPARIN SODIUM 40 MG: 40 INJECTION SUBCUTANEOUS at 08:05

## 2023-10-23 RX ADMIN — OXYCODONE HYDROCHLORIDE 10 MG: 5 TABLET ORAL at 13:22

## 2023-10-23 RX ADMIN — PREGABALIN 25 MG: 25 CAPSULE ORAL at 08:52

## 2023-10-23 RX ADMIN — PREGABALIN 25 MG: 25 CAPSULE ORAL at 21:34

## 2023-10-23 RX ADMIN — METRONIDAZOLE 500 MG: 500 TABLET ORAL at 21:34

## 2023-10-23 RX ADMIN — SENNOSIDES AND DOCUSATE SODIUM 1 TABLET: 8.6; 5 TABLET ORAL at 08:52

## 2023-10-23 RX ADMIN — INSULIN ASPART 4 UNITS: 100 INJECTION, SOLUTION INTRAVENOUS; SUBCUTANEOUS at 08:04

## 2023-10-23 RX ADMIN — METRONIDAZOLE 500 MG: 500 TABLET ORAL at 13:29

## 2023-10-23 RX ADMIN — INSULIN ASPART 6 UNITS: 100 INJECTION, SOLUTION INTRAVENOUS; SUBCUTANEOUS at 18:10

## 2023-10-23 RX ADMIN — AMOXICILLIN AND CLAVULANATE POTASSIUM 1 TABLET: 875; 125 TABLET, FILM COATED ORAL at 21:33

## 2023-10-23 RX ADMIN — METRONIDAZOLE 500 MG: 500 TABLET ORAL at 08:52

## 2023-10-23 RX ADMIN — LEVOTHYROXINE SODIUM 112 MCG: 0.11 TABLET ORAL at 06:11

## 2023-10-23 RX ADMIN — INSULIN GLARGINE 10 UNITS: 100 INJECTION, SOLUTION SUBCUTANEOUS at 21:37

## 2023-10-23 RX ADMIN — FOLIC ACID 1 MG: 1 TABLET ORAL at 08:53

## 2023-10-23 RX ADMIN — INSULIN ASPART 5 UNITS: 100 INJECTION, SOLUTION INTRAVENOUS; SUBCUTANEOUS at 12:18

## 2023-10-23 RX ADMIN — POLYETHYLENE GLYCOL 3350 17 G: 17 POWDER, FOR SOLUTION ORAL at 08:54

## 2023-10-23 RX ADMIN — HYDROXYZINE HYDROCHLORIDE 25 MG: 25 TABLET, FILM COATED ORAL at 13:23

## 2023-10-23 RX ADMIN — ONDANSETRON 4 MG: 4 TABLET, ORALLY DISINTEGRATING ORAL at 21:50

## 2023-10-23 RX ADMIN — METFORMIN HYDROCHLORIDE 1000 MG: 500 TABLET, FILM COATED ORAL at 08:53

## 2023-10-23 RX ADMIN — Medication 60 ML: at 11:52

## 2023-10-23 RX ADMIN — METFORMIN HYDROCHLORIDE 1000 MG: 500 TABLET, FILM COATED ORAL at 18:07

## 2023-10-23 ASSESSMENT — ACTIVITIES OF DAILY LIVING (ADL)
ADLS_ACUITY_SCORE: 35
ADLS_ACUITY_SCORE: 33
ADLS_ACUITY_SCORE: 35
ADLS_ACUITY_SCORE: 33
ADLS_ACUITY_SCORE: 35
ADLS_ACUITY_SCORE: 34
ADLS_ACUITY_SCORE: 35

## 2023-10-23 NOTE — PLAN OF CARE
Problem: Pain Acute  Goal: Optimal Pain Control and Function  Outcome: Progressing     Problem: Infection  Goal: Absence of Infection Signs and Symptoms  Outcome: Progressing   Problem: Plan of Care - These are the overarching goals to be used throughout the patient stay.    Goal: Optimal Comfort and Wellbeing  Outcome: Progressing    Pt denied pain or nausea overnight. Pt slept on and off. Rdz patent and draining. Rectal tube draining. Dressing dry and intact. VSS.    Devang Rouse RN

## 2023-10-23 NOTE — PROGRESS NOTES
[facility]    Medicine Progress Note - Hospitalist Service    Date of Admission:  10/2/2023    Assessment & Plan   62 yo F with h/o DM2, obesity, tobacco abuse, alcohol abuse, hypothyroid, goiter who presented 10/2/23 with dyspnea, cough, generalized weakness and found to have malodorous drainage from a wound in her left inguinal/vaginal area. CT scan showed necrotizing fasciitis and she was emergently taken to the OR for vulvectomy and broad debridement of the left buttock and left thigh. She returned to the ICU on pressors and was febrile. Taken back to OR and required extensive debridement from the left buttock, perineum, down to the left ankle. She required intubation by ENT due to large goiter.  Now off of pressors and extubated 10/11, transferred out of ICU 10/13/23.     Dispo remains to be determined, likely LTACH facility. SW assisting.  On PO abx 10/18/23.  She discussed with surgery 10/20/23 and would like to proceed with diverting colostomy so that rectal tube can be removed.  Surgery has concerns about intubation given large goiter - they will discuss with anesthesia 10/23/24.  Tentative plan for surgery 10/24/23.    Discussed with plastic surgery fellow at [facility] who did not feel closure was necessary at this point and she could discharge to LTACH after diverting colostomy and follow up with them down the road.  Would arrange outpatient appointment with plastic surgery prior to discharge to LTACH.        Severe sepsis and septic shock  Necrotizing soft tissue infection/necrotizing fasciitis  Lactic acidosis resolved  Hypoxic respiratory failure resolved  Left upper lobe pneumonia  Presented on 10/2/2023 with pneumonia and necrotizing fasciitis.   She was emergently taken to the OR for vulvectomy and broad debridement of the left buttock and left thigh  After procedure she had to go on pressors and was febrile so taken back to the OR requiring extensive debridement from the left  buttock, perineum and down into the ankle  CT chest showed findings suspicious for left upper lobe pneumonia   Cultures with polymicrobial organism--Bacteroids and actinno, Fusobacterium nucleatum  Sputum cultures growing haemophilus and strep constellatus, GNB  Blood cultures remain no growth to date  ENT intubated the patient because of large goiter, extubated 10/11  -- PO Augmentin and metronidazole per ID  -- Diverting colostomy was recommended to prevent recurrent wound infection, patient initially did not want procedure after discussion with care team and family.  However, on further discussion on 10/20/23 she is now requesting this.  -- continue therapy   -- continue current bowel meds, defer further rectal tube management to surgery  -- hold further diuresis given large net negative fluid balance since admission. Normally takes lasix BID at home. Monitor intake and volume status, can restart as appropriate.  Weight changed likely scale differences, seems euvolemic.  -- 10/23: Per Dr. Johns surgery not to be done at [place] due to inability to find ENT coverage.     Mild hypophosphatemia, hypokalemia: replaced per protocol    History of alcohol abuse   reports 6-12 beers per day  Currently no signs of withdrawal  -- continue MV, folic acid and thiamine    Large goiter  History of hypothyroidism  Currently on room air  TSH was within normal limits, T4 was 0.78, repeated T4 was 0.93   --Continue levothyroxine    Type 2 diabetes mellitus with hyperglycemia without long-term current use of insulin  Noted to have some low trending glucoses 10/14  - Continue with Lantus, decreased to 10 units twice daily  - Continue carb counting insulin with meals at 1:10 ratio for now  - Continue medium intensity SSI  - Continue home metformin    Macrocytic anemia  Currently stable   Vit B12 1987, folate 18.3. May be related to alcohol use  -- hgb stable and improved, will stop checking hgb unless clinical changes   --  "has received 1 unit of blood this hospitalization     GI PPx  Continue with PPI             Diet: Moderate Consistent Carb (60 g CHO per Meal) Diet  Snacks/Supplements Adult: Expedite Bottle; With Meals  Snacks/Supplements Adult: Other; string chees; Between Meals  Snacks/Supplements Adult: Gelatein sugar-free; Between Meals    DVT Prophylaxis: Enoxaparin (Lovenox) SQ  Rdz Catheter: PRESENT, indication: Wound Healing  Lines: PRESENT      PICC 10/03/23 Triple Lumen Right Basilic Multiple medications-Site Assessment: WDL      Cardiac Monitoring: None  Code Status: Full Code      Clinically Significant Risk Factors              # Hypoalbuminemia: Lowest albumin = 1.7 g/dL at 10/3/2023  5:21 AM, will monitor as appropriate            # Obesity: Estimated body mass index is 32.21 kg/m  as calculated from the following:    Height as of this encounter: 1.575 m (5' 2.01\").    Weight as of this encounter: 79.9 kg (176 lb 2.4 oz).             Disposition Plan      Expected Discharge Date: 10/27/2023    Discharge Delays: Placement - LTAC/ARU    Discharge Comments: LTACH  surg here vs transfer to [facility]?            Ailyn Serra MD  Hospitalist Service  [facility]  Securely message with [employer] (more info)  Text page via [employer] Paging/Directory   ______________________________________________________________________    Interval History   Patient is new to me today.  Patient is seen and examined at bedside.  No acute complaints. Frustrated with surgery not able to be done.    Physical Exam   Vital Signs: Temp: 98.4  F (36.9  C) Temp src: Oral BP: 107/55 Pulse: 100   Resp: 20 SpO2: 94 % O2 Device: None (Room air)    Weight: 176 lbs 2.36 oz    GEN: Alert and oriented. Not in acute distress.  HEENT: Atraumatic, mucous membrane- moist and pink.  Chest: Bilateral air entry.  CVS: S1S2 regular.   Abdomen: Soft. Non-tender, non-distended. No organomegaly. No guarding or rigidity. Bowel sounds active. Rectal " tube and motta in place.  Extremities: left LE dressing.  CNS: No involuntary movements.  Skin: no cyanosis or clubbing.     Medical Decision Making             Data

## 2023-10-23 NOTE — PROGRESS NOTES
Received call from outside hospital about transfer for assistance with intubation for patient who was prior intubated with ENT.   Patient needs repeat surgery and the hospital from outside hospital was told that the operating surgeon spoke with ENT who is not available to help. This is second hand information. The surgeon nor ENT were on the call.     Discussed that further decision on transfer should be done after an updated conversation happens between hospitalist and ENT since the hospital does have on call ENT coverage for these situations. Also provided my direct number for a group call with the hospitalist and ENT on call if deemed necessary for further transfer of care decisions.    On further review of the patient's chart - on 10/2/2023 the patient was easily intubated with a glydescope and 7.5 ETT.   Dr Lopez recommended that if the patient should stay intubated for a prolonged period of time (>10days) then consideration for a trach should be considered. It does not seem that this is the question for transfer at this time. Will await further communication from outside hospital at this time and am available to discuss plan of care.

## 2023-10-23 NOTE — PROGRESS NOTES
Transfer Type: [facility]  Transfer Triage Note    Date of call: 10/23/23  Time of call: 11:47 AM    Current Patient Location:[place]  Patient who was admitted at [facility] for  necrotizing fasciitis and she was emergently taken to the OR for vulvectomy and broad debridement of the left buttock and left thigh. She returned to the ICU on pressors and was febrile. Taken back to OR and required extensive debridement from the left buttock, perineum, down to the left ankle. She required intubation by ENT due to large goiter.  Now off of pressors and extubated 10/11, transferred out of ICU 10/13/2 . Patient needs a  diverting colostomy and due to a goiter ENT assistance for intubation for the procedure. Apparently difficult to get ENT assistance so I ask ENT on call at [facility] Dr Arriaza to help with next step. See her note for detail. Apparently [facility] as on call ENT coverage and Dr Arriaza recommended to explore that route first. So for now patient was not accepted and primary team at [facility] was given by Dr Arriaza her phone number if needed to discuss further.Would also need colorectal or general surgery to accept the patient  Before accepting the patient.    Frankie Wakefield MD

## 2023-10-23 NOTE — PROGRESS NOTES
General Surgery Brief Note    I discussed with Ms. Nam this morning that we unfortunately have to cancel the surgery for tomorrow, as I had made clear on Friday may happen.  With her history of difficult intubation our anesthesia providers would like ENT available for the intubation, which we cannot arrange for tomorrow, or the near future.  We have asked our medicine team to begin the transfer process to a higher level of care with ENT available.    Roddy Johns MD

## 2023-10-23 NOTE — PLAN OF CARE
Problem: Plan of Care - These are the overarching goals to be used throughout the patient stay.    Goal: Absence of Hospital-Acquired Illness or Injury  Intervention: Identify and Manage Fall Risk  Recent Flowsheet Documentation  Taken 10/23/2023 0810 by Modesta Maldonado RN  Safety Promotion/Fall Prevention: activity supervised     Problem: Plan of Care - These are the overarching goals to be used throughout the patient stay.    Goal: Optimal Comfort and Wellbeing  Outcome: Progressing  Intervention: Monitor Pain and Promote Comfort  Recent Flowsheet Documentation  Taken 10/23/2023 0810 by Modesta Maldonado RN  Pain Management Interventions:   repositioned   medication offered but refused     Problem: Wound  Goal: Optimal Wound Healing  Outcome: Progressing   Goal Outcome Evaluation:         A/Ox4. VSS on RA. C/o 3/10 pain, premed with oxy and atarax for wound dressing change. Up assist x1 GB/W. Worked with PT. Up in chair for lunch. Wound dressing completed per order this shift. R Triple lumen PICC SL, dressing changed today. Gray and white lumen are sluggish when flushing, blood return noted, red lumen flushes well and has good blood return. Phos 3.0, recheck in AM. K 3.7, replaced and recheck in AM.  Mod carb diet with BGM and carb count coverage. Wound healing supplements between meals. Rdz patent with good output. Rectal tube in place with small dark brown output. Continue on oral abx. Nursing to continue to monitor.

## 2023-10-24 ENCOUNTER — APPOINTMENT (OUTPATIENT)
Dept: PHYSICAL THERAPY | Facility: HOSPITAL | Age: 63
DRG: 853 | End: 2023-10-24
Payer: COMMERCIAL

## 2023-10-24 LAB
ANION GAP SERPL CALCULATED.3IONS-SCNC: 8 MMOL/L (ref 7–15)
BUN SERPL-MCNC: 12.8 MG/DL (ref 8–23)
CALCIUM SERPL-MCNC: 9.7 MG/DL (ref 8.8–10.2)
CHLORIDE SERPL-SCNC: 102 MMOL/L (ref 98–107)
CREAT SERPL-MCNC: 0.4 MG/DL (ref 0.51–0.95)
DEPRECATED HCO3 PLAS-SCNC: 28 MMOL/L (ref 22–29)
EGFRCR SERPLBLD CKD-EPI 2021: >90 ML/MIN/1.73M2
GLUCOSE BLDC GLUCOMTR-MCNC: 103 MG/DL (ref 70–99)
GLUCOSE BLDC GLUCOMTR-MCNC: 111 MG/DL (ref 70–99)
GLUCOSE BLDC GLUCOMTR-MCNC: 120 MG/DL (ref 70–99)
GLUCOSE BLDC GLUCOMTR-MCNC: 124 MG/DL (ref 70–99)
GLUCOSE BLDC GLUCOMTR-MCNC: 159 MG/DL (ref 70–99)
GLUCOSE SERPL-MCNC: 120 MG/DL (ref 70–99)
PHOSPHATE SERPL-MCNC: 3.5 MG/DL (ref 2.5–4.5)
POTASSIUM SERPL-SCNC: 4 MMOL/L (ref 3.4–5.3)
SODIUM SERPL-SCNC: 138 MMOL/L (ref 135–145)

## 2023-10-24 PROCEDURE — 84100 ASSAY OF PHOSPHORUS: CPT | Performed by: STUDENT IN AN ORGANIZED HEALTH CARE EDUCATION/TRAINING PROGRAM

## 2023-10-24 PROCEDURE — 250N000013 HC RX MED GY IP 250 OP 250 PS 637: Performed by: NURSE PRACTITIONER

## 2023-10-24 PROCEDURE — 250N000013 HC RX MED GY IP 250 OP 250 PS 637

## 2023-10-24 PROCEDURE — 120N000001 HC R&B MED SURG/OB

## 2023-10-24 PROCEDURE — 99232 SBSQ HOSP IP/OBS MODERATE 35: CPT | Performed by: STUDENT IN AN ORGANIZED HEALTH CARE EDUCATION/TRAINING PROGRAM

## 2023-10-24 PROCEDURE — 97116 GAIT TRAINING THERAPY: CPT | Mod: GP

## 2023-10-24 PROCEDURE — 250N000011 HC RX IP 250 OP 636: Mod: JZ | Performed by: NURSE PRACTITIONER

## 2023-10-24 PROCEDURE — 80048 BASIC METABOLIC PNL TOTAL CA: CPT | Performed by: NURSE PRACTITIONER

## 2023-10-24 PROCEDURE — 99232 SBSQ HOSP IP/OBS MODERATE 35: CPT | Performed by: SURGERY

## 2023-10-24 PROCEDURE — 250N000013 HC RX MED GY IP 250 OP 250 PS 637: Performed by: INTERNAL MEDICINE

## 2023-10-24 PROCEDURE — 97110 THERAPEUTIC EXERCISES: CPT | Mod: GP

## 2023-10-24 PROCEDURE — 97606 NEG PRS WND THER DME>50 SQCM: CPT

## 2023-10-24 RX ADMIN — ENOXAPARIN SODIUM 40 MG: 40 INJECTION SUBCUTANEOUS at 09:53

## 2023-10-24 RX ADMIN — HYDROXYZINE HYDROCHLORIDE 25 MG: 25 TABLET, FILM COATED ORAL at 14:37

## 2023-10-24 RX ADMIN — LEVOTHYROXINE SODIUM 112 MCG: 0.11 TABLET ORAL at 05:23

## 2023-10-24 RX ADMIN — METFORMIN HYDROCHLORIDE 1000 MG: 500 TABLET, FILM COATED ORAL at 09:50

## 2023-10-24 RX ADMIN — FOLIC ACID 1 MG: 1 TABLET ORAL at 09:48

## 2023-10-24 RX ADMIN — PREGABALIN 25 MG: 25 CAPSULE ORAL at 14:16

## 2023-10-24 RX ADMIN — METRONIDAZOLE 500 MG: 500 TABLET ORAL at 09:47

## 2023-10-24 RX ADMIN — INSULIN GLARGINE 10 UNITS: 100 INJECTION, SOLUTION SUBCUTANEOUS at 21:23

## 2023-10-24 RX ADMIN — AMOXICILLIN AND CLAVULANATE POTASSIUM 1 TABLET: 875; 125 TABLET, FILM COATED ORAL at 21:22

## 2023-10-24 RX ADMIN — AMOXICILLIN AND CLAVULANATE POTASSIUM 1 TABLET: 875; 125 TABLET, FILM COATED ORAL at 09:46

## 2023-10-24 RX ADMIN — THIAMINE HCL TAB 100 MG 100 MG: 100 TAB at 09:46

## 2023-10-24 RX ADMIN — POLYETHYLENE GLYCOL 3350 17 G: 17 POWDER, FOR SOLUTION ORAL at 10:06

## 2023-10-24 RX ADMIN — PREGABALIN 25 MG: 25 CAPSULE ORAL at 21:22

## 2023-10-24 RX ADMIN — OXYCODONE HYDROCHLORIDE 10 MG: 5 TABLET ORAL at 14:37

## 2023-10-24 RX ADMIN — SENNOSIDES AND DOCUSATE SODIUM 1 TABLET: 8.6; 5 TABLET ORAL at 10:07

## 2023-10-24 RX ADMIN — METFORMIN HYDROCHLORIDE 1000 MG: 500 TABLET, FILM COATED ORAL at 18:01

## 2023-10-24 RX ADMIN — Medication 1 TABLET: at 09:43

## 2023-10-24 RX ADMIN — INSULIN GLARGINE 10 UNITS: 100 INJECTION, SOLUTION SUBCUTANEOUS at 10:08

## 2023-10-24 RX ADMIN — INSULIN ASPART 5 UNITS: 100 INJECTION, SOLUTION INTRAVENOUS; SUBCUTANEOUS at 13:27

## 2023-10-24 RX ADMIN — PREGABALIN 25 MG: 25 CAPSULE ORAL at 09:47

## 2023-10-24 RX ADMIN — SENNOSIDES AND DOCUSATE SODIUM 1 TABLET: 8.6; 5 TABLET ORAL at 21:21

## 2023-10-24 RX ADMIN — Medication 60 ML: at 13:24

## 2023-10-24 RX ADMIN — METRONIDAZOLE 500 MG: 500 TABLET ORAL at 21:22

## 2023-10-24 RX ADMIN — INSULIN ASPART 5 UNITS: 100 INJECTION, SOLUTION INTRAVENOUS; SUBCUTANEOUS at 18:32

## 2023-10-24 RX ADMIN — CALCIUM CARBONATE (ANTACID) CHEW TAB 500 MG 500 MG: 500 CHEW TAB at 21:19

## 2023-10-24 RX ADMIN — METRONIDAZOLE 500 MG: 500 TABLET ORAL at 14:16

## 2023-10-24 ASSESSMENT — ACTIVITIES OF DAILY LIVING (ADL)
ADLS_ACUITY_SCORE: 34
ADLS_ACUITY_SCORE: 30
ADLS_ACUITY_SCORE: 34
ADLS_ACUITY_SCORE: 30
ADLS_ACUITY_SCORE: 34
ADLS_ACUITY_SCORE: 34
ADLS_ACUITY_SCORE: 30
ADLS_ACUITY_SCORE: 30
ADLS_ACUITY_SCORE: 34
ADLS_ACUITY_SCORE: 30
ADLS_ACUITY_SCORE: 34
ADLS_ACUITY_SCORE: 34

## 2023-10-24 NOTE — PROGRESS NOTES
[facility]    Medicine Progress Note - Hospitalist Service    Date of Admission:  10/2/2023    Assessment & Plan   64 yo F with h/o DM2, obesity, tobacco abuse, alcohol abuse, hypothyroid, goiter who presented 10/2/23 with dyspnea, cough, generalized weakness and found to have malodorous drainage from a wound in her left inguinal/vaginal area. CT scan showed necrotizing fasciitis and she was emergently taken to the OR for vulvectomy and broad debridement of the left buttock and left thigh. She returned to the ICU on pressors and was febrile. Taken back to OR and required extensive debridement from the left buttock, perineum, down to the left ankle. She required intubation by ENT due to large goiter.  Now off of pressors and extubated 10/11, transferred out of ICU 10/13/23.     Discussed with plastic surgery fellow at [facility] who did not feel closure was necessary at this point and she could discharge to LTACH after diverting colostomy and follow up with them down the road.  Would arrange outpatient appointment with plastic surgery prior to discharge to LTACH.     Dispo remains to be determined, likely LTACH facility. SW assisting.  On PO abx 10/18/23.  Patient discussed with surgery 10/20/23 and would like to proceed with diverting colostomy so that rectal tube can be removed.  Anesthesiology has concerns about intubation given large goiter. No ENT coverage here. So far, unsuccessful in transferring patient to [facility], [facility], or [facility].     Severe sepsis and septic shock  Necrotizing soft tissue infection/necrotizing fasciitis  Lactic acidosis resolved  Hypoxic respiratory failure resolved  Left upper lobe pneumonia  Presented on 10/2/2023 with pneumonia and necrotizing fasciitis.   She was emergently taken to the OR for vulvectomy and broad debridement of the left buttock and left thigh  After procedure she had to go on pressors and was febrile so taken back to the OR requiring extensive  debridement from the left buttock, perineum and down into the ankle  CT chest showed findings suspicious for left upper lobe pneumonia   Cultures with polymicrobial organism--Bacteroids and actinno, Fusobacterium nucleatum  Sputum cultures growing haemophilus and strep constellatus, GNB  Blood cultures remain no growth to date  ENT intubated the patient because of large goiter, extubated 10/11  -- PO Augmentin and metronidazole per ID  -- Diverting colostomy was recommended to prevent recurrent wound infection, patient initially did not want procedure after discussion with care team and family.  However, on further discussion on 10/20/23 she is now requesting this.  -- continue therapy   -- continue current bowel meds, defer further rectal tube management to surgery  -- hold further diuresis given large net negative fluid balance since admission. Normally takes lasix BID at home. Monitor intake and volume status, can restart as appropriate.  Weight changed likely scale differences, seems euvolemic.  -- 10/23: Per Dr. Johns surgery not to be done at [place] due to inability to find ENT coverage.   -- 10/24 So far, unsuccessful in transferring patient to [facility], [facility], or [facility].    Mild hypophosphatemia, hypokalemia: replaced per protocol    History of alcohol abuse   reports 6-12 beers per day  Currently no signs of withdrawal  -- continue MV, folic acid and thiamine    Large goiter  History of hypothyroidism  Currently on room air  TSH was within normal limits, T4 was 0.78, repeated T4 was 0.93   --Continue levothyroxine    Type 2 diabetes mellitus with hyperglycemia without long-term current use of insulin  Noted to have some low trending glucoses 10/14  - Continue with Lantus, decreased to 10 units twice daily  - Continue carb counting insulin with meals at 1:10 ratio for now  - Continue medium intensity SSI  - Continue home metformin    Macrocytic anemia  Currently stable   Vit B12 1987, folate 18.3. May  "be related to alcohol use  -- hgb stable and improved, will stop checking hgb unless clinical changes   -- has received 1 unit of blood this hospitalization     GI PPx  Continue with PPI             Diet: Moderate Consistent Carb (60 g CHO per Meal) Diet  Snacks/Supplements Adult: Expedite Bottle; With Meals  Snacks/Supplements Adult: Other; string chees; Between Meals  Snacks/Supplements Adult: Gelatein sugar-free; Between Meals    DVT Prophylaxis: Enoxaparin (Lovenox) SQ  Rdz Catheter: PRESENT, indication: Wound Healing  Lines: PRESENT             Cardiac Monitoring: None  Code Status: Full Code      Clinically Significant Risk Factors              # Hypoalbuminemia: Lowest albumin = 1.7 g/dL at 10/3/2023  5:21 AM, will monitor as appropriate            # Obesity: Estimated body mass index is 32.21 kg/m  as calculated from the following:    Height as of this encounter: 1.575 m (5' 2.01\").    Weight as of this encounter: 79.9 kg (176 lb 2.4 oz).             Disposition Plan     Expected Discharge Date: 10/27/2023    Discharge Delays: Placement - LTAC/ARU    Discharge Comments: LTACH  surg here vs transfer to [facility]?            Ailyn Serra MD  Hospitalist Service  [facility]  Securely message with [employer] (more info)  Text page via [employer] Paging/Directory   ______________________________________________________________________    Interval History   Patient is seen and examined at bedside.  No acute complaints. Frustrated with surgery not able to be done here.    Physical Exam   Vital Signs: Temp: 98.5  F (36.9  C) Temp src: Oral BP: 110/62 Pulse: 101   Resp: 19 SpO2: 95 % O2 Device: None (Room air)    Weight: 176 lbs 2.36 oz    GEN: Alert and oriented. Not in acute distress.  HEENT: Atraumatic, mucous membrane- moist and pink.  Chest: Bilateral air entry.  CVS: S1S2 regular.   Abdomen: Soft. Non-tender, non-distended. No organomegaly. No guarding or rigidity. Bowel sounds active. Rectal " tube and motta in place.  Extremities: left LE dressing.  CNS: No involuntary movements.  Skin: no cyanosis or clubbing.     Medical Decision Making             Data

## 2023-10-24 NOTE — PLAN OF CARE
Problem: Pain Acute  Goal: Optimal Pain Control and Function  Outcome: Progressing     Problem: Infection  Goal: Absence of Infection Signs and Symptoms  Outcome: Progressing   Goal Outcome Evaluation:       Pt denied pain during the shift. Slept well overnight. Rdz in place with ken urine output. Rectal tube in place , bag changed. VSS on RA.

## 2023-10-24 NOTE — PROGRESS NOTES
"Care Management Follow Up    Length of Stay (days): 22    Expected Discharge Date: 10/27/2023     Concerns to be Addressed:     Care progression - discharge planning  Patient plan of care discussed at interdisciplinary rounds: Yes    Anticipated Discharge Disposition:  TBD - LTACH?     Anticipated Discharge Services:  TBD  Anticipated Discharge DME:  NA    Patient/family educated on Medicare website which has current facility and service quality ratings:  NA  Education Provided on the Discharge Plan:  Per team  Patient/Family in Agreement with the Plan:  Yes    Referrals Placed by CM/SW:  Yes  Private pay costs discussed: Not applicable    Additional Information:  RNCM called [facility] (LTACH) and left a message for Virgie regarding referral.    Social Hx: \"RNCM to follow - extensive wound, LTACH liaison following (accepted to [facility] pending bed availability), needs follow up on [facility]. Surgery for colostomy planned for 10/24 - unable to place wound vac, BID dressing changes won't improve wound/ status, large infection risk. Lives with spouse, he assists as needed, bed bound, poor self care. Reports 6-12 beers per day.\"    RNCM to follow for medical progression, recommendations, and final discharge plan.     Ibis Lin RN     11:30 AM Rec'd call from Virgie. Patient is being consider, but not accepted yet. Virgie wants info from the WOC or nursing regarding the wound care  1) How long to complete the wound care?  2) Frequency, is it done once a day or twice a day?  3) Is this a 1 or 2 person wound care?  4) What is the plan for her bowels? Is patient going to have rectal tubes long term or still plan for colostomy?    Virgie needs to know these answers to review for the criteria for admission.   Currently no open bed, but maybe by the end of the week.    Updated provider. Provider said it's been difficult to place patient. Not sure of the plan for rectal tube and colostomy yet. Still trying to transfer " patient   - [facility] not available  - [facility] is full    2:45 PM spoke with bedside nurse, Dorothy, MARI will be here to put on the wound vac. Patient wants the rectal tube out. No current plan for colostomy yet.     RNCM updated Virgie

## 2023-10-24 NOTE — PROGRESS NOTES
General Surgery Progress Note:    Hospital Day # 22    ASSESSMENT:   1. Necrotizing soft tissue infection    2. Severe sepsis (H)    3.  Goiter    Lidia Nam is a 63 year old female presenting with necrotizing fasciitis of the left lower extremity status post debridement.  The patient's current situation is complicated with control of wound care to allow long-term healing of the patient's wounds.  The recommendations from the general surgery team is to seed with a diverting colostomy in order to get the rectal tube out and to prevent any kind of contamination of the wounds.  In addition, the patient will require aggressive plastic surgery evaluation for wound healing in the future.    Unfortunately, we cannot proceed with a diverting colostomy given that we do not have ENT coverage.  Anesthesia team will not proceed with intubation of the patient given the difficulty of the patient's status due to the presence of the large goiter.    PLAN:   -We will discuss with the [facility] team to see if they would accept the patient.  The [facility] has already denied except its.    If the patient is not excepted to [facility], the patient will require long-term rectal tube basement to prevent contamination of the wounds.  If the patient is not excepting of the rectal tube long-term, the patient will have to be very diligent wound care.    -Continue with diet per primary  -Management per primary team.  -General surgery team will try to help and facilitate discussions with possible transfers.  -Continue with wound care per [facility] nurse    SUBJECTIVE:   Lidia Nam was seen on rounds.  Patient is frustrated because she cannot undergo the diverting colostomy given the presence of her goiter.  Patient has no further complaints today.    Patient Vitals for the past 24 hrs:   BP Temp Temp src Pulse Resp SpO2 Weight   10/24/23 0716 110/62 98.5  F (36.9  C) Oral 101 19 95 % --   10/23/23 2344 96/46 98.6  F (37  C) Oral 105 19  92 % --   10/23/23 1516 107/55 98.4  F (36.9  C) Oral 100 20 94 % --   10/23/23 1407 -- -- -- -- 18 -- --   10/23/23 1332 -- -- -- -- -- -- 79.9 kg (176 lb 2.4 oz)   10/23/23 1322 -- -- -- -- 18 -- --       Physical Exam:  General: NAD, pleasant  CV:RRR  LUNGS:Normal respiratory effort, no accessory muscle use  EXT: Left lower extremity with surgical dressings in position.    No results displayed because visit has over 200 results.           DO Jp Woody DO  General Surgeon  [facility]  Surgery [facility] - [address] [address]?  Office: [phone]  Employed by - [facility]  Pager: [phone]

## 2023-10-24 NOTE — PROGRESS NOTES
[facility] Nurse Inpatient Assessment     Consulted for: LLE    Summary: Vac placed. 2 pumps on veriflo, application time 70 minutes    Patient History (according to provider note(s):        63-year-old female with a history of hypothyroidism, diabetes mellitus and obesity who presented with groin pain to the emergency room.  Patient has been complaining of shortness of breath and a wound in her left inguinal area that has been draining pus and is severely malodorous.  She was immediately given antibiotics and had a CT done that showed necrotizing fasciitis.     She was emergently taken to the operating room where she underwent debridement.  Arrives to the ICU intubated on pressors     She was intubated in the OR by ENT due to significant goiter.    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  Wound history/plan of care: Groin infection, debrided entire necrotizing area   Wound base: 40 % slough and adipose tissue, 60 % muscle - more granulation noted, slough is lessening     Palpation of the wound bed: normal and boggy      Drainage: small     Description of drainage: serosanguinous     Measurements (length x width x depth, in cm): no exact measurements taken as patient has to be positioned multiple ways to view entire wound     Tunneling: N/A     Undermining: up to 5 cm at lateral thigh pocket, space between thigh and gluteal muscles about 5cm deep  Periwound skin: Erythema- blanchable      Color: pink and red      Temperature: warm  Odor: mild  Pain: mild and tension to hands, feet and body, shooting  Pain interventions prior to dressing change: slow and gentle cares   Treatment goal: Heal , Increase granulation, Remove necrotic tissue, and Soften necrotic tissue  STATUS: initial assessment  Supplies ordered: at bedside    Negative pressure wound therapy applied to:  LLE, buttock, groin   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 no   If yes, what interventions are in place? Protein supplements  Reason for initiating vac therapy? Presence of co-morbidities, High risk of infections, and Need for accelerated granulation tissue  Which?of?the?following?co-morbidities?apply? Diabetes  If diabetic is patient on a diabetic management program? Yes   Is osteomyelitis present in wound? no   If yes what treatments are in place? N/A  Number of foam pieces removed from a wound (excluding foam for bridge) :  0 CleanseChoice Foam   Verified this matched the number of foam pieces applied last dressing change: Yes   Number of foam pieces packed into wound (excluding foam for bridge) :  12 CleanseChoice Foam , 2 pieces of adaptic      Treatment Plan:     Negative pressure wound therapy plan:  Wound location: LLE, L buttock, L groin - 2 pumps   Change Days: Tues/ Fri by WOC RN    Supplies (including all accessories) used: 4 medium Veraflo cleanse choice gray foam, Adaptic/Curity oil emulsion contact layer , Adapt barrier ring, and Cavilon advanced   Cleanse with Vashe prior to replacing VAC    Suction setting: -125   Methods used: Window paned all periwound skin with vac drape prior to applying sponge and Placed barrier ring into periwound creases to improve seal    Staff RN to assess integrity of dressing and ensure suction is set at appropriate level every shift.   Date canister. Chart canister output every shift. Change cannister weekly and PRN if full/occluded     Remove foam dressing and replace with BID normal saline moist gauze dressing if:   -a dressing failure which cannot be repaired within 2 hours   -patient is discharging to home without a  "home pump   -patient is discharging to a facility outside the local area   -if a dressing is a \"Silver Foam\", remove before Radiation Therapy or MRI     The hospital VAC pump is not to be discharged with the patient.?Ensure to disconnect patient from machine prior to discharge. Then,    - If a home KCI VAC pump has been delivered, connect home cannister to dressing tubing then connect cannister to home pump and turn on machine    - If transferring to a nearby facility with a KCI vac, can disconnect and clamp tubing then cover end with glove so can be reconnected within 2 hours       BID vashe packing, see orders - For if vac fails    Orders: Reviewed    RECOMMEND PRIMARY TEAM ORDER: None, at this time  Education provided: plan of care, Infection prevention , and Moisture management  Discussed plan of care with: Patient, Nurse, and Physicians Assistant  [facility] nurse follow-up plan: weekly - working with multidisciplinary team to determine next wound steps  Notify WO if wound(s) deteriorate.  Nursing to notify the Provider(s) and re-consult the WO Nurse if new skin concern.    DATA:     Current support surface: Standard  Standard gel/foam mattress (IsoFlex, Atmos air, etc)  Containment of urine/stool: Incontinence Protocol  BMI: Body mass index is 32.21 kg/m .   Active diet order: Orders Placed This Encounter      Moderate Consistent Carb (60 g CHO per Meal) Diet     Output: I/O last 3 completed shifts:  In: 450 [P.O.:450]  Out: 850 [Urine:650; Stool:200]     Labs:   No lab results found in last 7 days.    Invalid input(s): \"GLUCOMBO\"    Pressure injury risk assessment:   Sensory Perception: 4-->no impairment  Moisture: 3-->occasionally moist  Activity: 3-->walks occasionally  Mobility: 2-->very limited  Nutrition: 2-->probably inadequate  Friction and Shear: 2-->potential problem  Mario Score: 16    TRACE Penn RN CWOCN  Pager no longer in use, please contact through [employer] group: [facility]  [facility]

## 2023-10-24 NOTE — PLAN OF CARE
Problem: Wound  Goal: Optimal Wound Healing  Outcome: Progressing     AOx4, denies pain. Post-meal nausea managed with PRN Zofran. Wound dressing change completed. Right triple lumen PICC flushed, grey and white lumens are still sluggish; blood return noted for all 3. Both motta and rectal tube patent with output. Diverting colostomy canceled, awaiting LTACH placement. Will continue to monitor.    Brad Thibodeaux RN

## 2023-10-25 LAB
ANION GAP SERPL CALCULATED.3IONS-SCNC: 8 MMOL/L (ref 7–15)
BUN SERPL-MCNC: 8.8 MG/DL (ref 8–23)
CALCIUM SERPL-MCNC: 9.3 MG/DL (ref 8.8–10.2)
CHLORIDE SERPL-SCNC: 97 MMOL/L (ref 98–107)
CREAT SERPL-MCNC: 0.4 MG/DL (ref 0.51–0.95)
DEPRECATED HCO3 PLAS-SCNC: 28 MMOL/L (ref 22–29)
EGFRCR SERPLBLD CKD-EPI 2021: >90 ML/MIN/1.73M2
GLUCOSE BLDC GLUCOMTR-MCNC: 116 MG/DL (ref 70–99)
GLUCOSE BLDC GLUCOMTR-MCNC: 122 MG/DL (ref 70–99)
GLUCOSE BLDC GLUCOMTR-MCNC: 144 MG/DL (ref 70–99)
GLUCOSE BLDC GLUCOMTR-MCNC: 154 MG/DL (ref 70–99)
GLUCOSE BLDC GLUCOMTR-MCNC: 74 MG/DL (ref 70–99)
GLUCOSE SERPL-MCNC: 118 MG/DL (ref 70–99)
PHOSPHATE SERPL-MCNC: 3 MG/DL (ref 2.5–4.5)
POTASSIUM SERPL-SCNC: 4 MMOL/L (ref 3.4–5.3)
SODIUM SERPL-SCNC: 133 MMOL/L (ref 135–145)

## 2023-10-25 PROCEDURE — 84100 ASSAY OF PHOSPHORUS: CPT | Performed by: STUDENT IN AN ORGANIZED HEALTH CARE EDUCATION/TRAINING PROGRAM

## 2023-10-25 PROCEDURE — 250N000013 HC RX MED GY IP 250 OP 250 PS 637: Performed by: NURSE PRACTITIONER

## 2023-10-25 PROCEDURE — 258N000003 HC RX IP 258 OP 636: Performed by: STUDENT IN AN ORGANIZED HEALTH CARE EDUCATION/TRAINING PROGRAM

## 2023-10-25 PROCEDURE — 250N000011 HC RX IP 250 OP 636: Mod: JZ | Performed by: NURSE PRACTITIONER

## 2023-10-25 PROCEDURE — 250N000013 HC RX MED GY IP 250 OP 250 PS 637

## 2023-10-25 PROCEDURE — 99231 SBSQ HOSP IP/OBS SF/LOW 25: CPT | Performed by: PHYSICIAN ASSISTANT

## 2023-10-25 PROCEDURE — 250N000013 HC RX MED GY IP 250 OP 250 PS 637: Performed by: INTERNAL MEDICINE

## 2023-10-25 PROCEDURE — 99232 SBSQ HOSP IP/OBS MODERATE 35: CPT | Performed by: STUDENT IN AN ORGANIZED HEALTH CARE EDUCATION/TRAINING PROGRAM

## 2023-10-25 PROCEDURE — 80048 BASIC METABOLIC PNL TOTAL CA: CPT | Performed by: NURSE PRACTITIONER

## 2023-10-25 PROCEDURE — 120N000001 HC R&B MED SURG/OB

## 2023-10-25 PROCEDURE — 250N000011 HC RX IP 250 OP 636: Performed by: CLINICAL NURSE SPECIALIST

## 2023-10-25 RX ORDER — SODIUM CHLORIDE 9 MG/ML
INJECTION, SOLUTION INTRAVENOUS CONTINUOUS
Status: ACTIVE | OUTPATIENT
Start: 2023-10-25 | End: 2023-10-26

## 2023-10-25 RX ADMIN — INSULIN ASPART 2 UNITS: 100 INJECTION, SOLUTION INTRAVENOUS; SUBCUTANEOUS at 20:02

## 2023-10-25 RX ADMIN — THIAMINE HCL TAB 100 MG 100 MG: 100 TAB at 09:02

## 2023-10-25 RX ADMIN — ALUMINUM HYDROXIDE, MAGNESIUM HYDROXIDE, AND SIMETHICONE 30 ML: 200; 200; 20 SUSPENSION ORAL at 02:59

## 2023-10-25 RX ADMIN — SODIUM CHLORIDE: 9 INJECTION, SOLUTION INTRAVENOUS at 17:37

## 2023-10-25 RX ADMIN — INSULIN GLARGINE 10 UNITS: 100 INJECTION, SOLUTION SUBCUTANEOUS at 09:03

## 2023-10-25 RX ADMIN — ALUMINUM HYDROXIDE, MAGNESIUM HYDROXIDE, AND SIMETHICONE 30 ML: 200; 200; 20 SUSPENSION ORAL at 18:20

## 2023-10-25 RX ADMIN — ENOXAPARIN SODIUM 40 MG: 40 INJECTION SUBCUTANEOUS at 09:01

## 2023-10-25 RX ADMIN — SENNOSIDES AND DOCUSATE SODIUM 1 TABLET: 8.6; 5 TABLET ORAL at 09:02

## 2023-10-25 RX ADMIN — Medication 60 ML: at 15:25

## 2023-10-25 RX ADMIN — INSULIN GLARGINE 10 UNITS: 100 INJECTION, SOLUTION SUBCUTANEOUS at 20:06

## 2023-10-25 RX ADMIN — HYDROMORPHONE HYDROCHLORIDE 0.5 MG: 1 INJECTION, SOLUTION INTRAMUSCULAR; INTRAVENOUS; SUBCUTANEOUS at 05:58

## 2023-10-25 RX ADMIN — PREGABALIN 25 MG: 25 CAPSULE ORAL at 09:02

## 2023-10-25 RX ADMIN — METRONIDAZOLE 500 MG: 500 TABLET ORAL at 09:02

## 2023-10-25 RX ADMIN — Medication 1 TABLET: at 09:02

## 2023-10-25 RX ADMIN — POLYETHYLENE GLYCOL 3350 17 G: 17 POWDER, FOR SOLUTION ORAL at 09:04

## 2023-10-25 RX ADMIN — OXYCODONE HYDROCHLORIDE 10 MG: 5 TABLET ORAL at 03:05

## 2023-10-25 RX ADMIN — LEVOTHYROXINE SODIUM 112 MCG: 0.11 TABLET ORAL at 05:35

## 2023-10-25 RX ADMIN — FOLIC ACID 1 MG: 1 TABLET ORAL at 09:02

## 2023-10-25 RX ADMIN — INSULIN ASPART 7 UNITS: 100 INJECTION, SOLUTION INTRAVENOUS; SUBCUTANEOUS at 15:25

## 2023-10-25 RX ADMIN — METRONIDAZOLE 500 MG: 500 TABLET ORAL at 13:48

## 2023-10-25 RX ADMIN — CALCIUM CARBONATE (ANTACID) CHEW TAB 500 MG 500 MG: 500 CHEW TAB at 00:31

## 2023-10-25 RX ADMIN — INSULIN ASPART: 100 INJECTION, SOLUTION INTRAVENOUS; SUBCUTANEOUS at 11:03

## 2023-10-25 RX ADMIN — PREGABALIN 25 MG: 25 CAPSULE ORAL at 20:07

## 2023-10-25 RX ADMIN — METFORMIN HYDROCHLORIDE 1000 MG: 500 TABLET, FILM COATED ORAL at 17:37

## 2023-10-25 RX ADMIN — METFORMIN HYDROCHLORIDE 1000 MG: 500 TABLET, FILM COATED ORAL at 09:02

## 2023-10-25 RX ADMIN — SENNOSIDES AND DOCUSATE SODIUM 1 TABLET: 8.6; 5 TABLET ORAL at 20:06

## 2023-10-25 RX ADMIN — PREGABALIN 25 MG: 25 CAPSULE ORAL at 13:48

## 2023-10-25 ASSESSMENT — ACTIVITIES OF DAILY LIVING (ADL)
ADLS_ACUITY_SCORE: 34
ADLS_ACUITY_SCORE: 28
ADLS_ACUITY_SCORE: 28
ADLS_ACUITY_SCORE: 34
ADLS_ACUITY_SCORE: 28
ADLS_ACUITY_SCORE: 34
ADLS_ACUITY_SCORE: 28
ADLS_ACUITY_SCORE: 28

## 2023-10-25 NOTE — PROGRESS NOTES
"Care Management Follow Up    Length of Stay (days): 23    Expected Discharge Date: 10/27/2023     Concerns to be Addressed:     Care progression - discharge planning  Patient plan of care discussed at interdisciplinary rounds: Yes    Anticipated Discharge Disposition:  TBD - LTACH?     Anticipated Discharge Services:  TBD  Anticipated Discharge DME:  NA    Patient/family educated on Medicare website which has current facility and service quality ratings:  NA  Education Provided on the Discharge Plan:  Per team  Patient/Family in Agreement with the Plan:  Yes    Referrals Placed by CM/SW:  Yes  Private pay costs discussed: Not applicable    Additional Information:  Per provider, Dr. Serra, no discharge plans yet. Still working on getting the colostomy.    Social Hx: \"RNCM to follow - extensive wound, LTACH liaison following (accepted to [place] pending bed availability), needs follow up on [facility]. Surgery canceled for colostomy. Lives with spouse, he assists as needed, bed bound, poor self care. Reports 6-12 beers per day.\"    RNCM to follow for medical progression, recommendations, and final discharge plan.     CLAUDIA Velásquez asked if provider spoke with ENT?  Sent message to provider    Per provider, he did not personally speak with ENT. The surgery team said they spoke with ENT.    Sent message to Katiana"

## 2023-10-25 NOTE — PLAN OF CARE
Problem: Pain Acute  Goal: Optimal Pain Control and Function  Outcome: Progressing  Intervention: Prevent or Manage Pain  Recent Flowsheet Documentation  Taken 10/25/2023 0000 by Georgina Gonzales RN  Bowel Elimination Promotion: adequate fluid intake promoted     Problem: Wound  Goal: Optimal Coping  Outcome: Progressing   Goal Outcome Evaluation:         Pt is alert and oriented. Reported abdominal discomfort (from gas) and rectal tube pain. Given Maalox, Oxycodone and Dilaudid. Some relief achieved. Rdz catheter in place, with ken urine output. Wound vac intact with serosanguinous drainage.

## 2023-10-25 NOTE — PROGRESS NOTES
[facility]    Medicine Progress Note - Hospitalist Service    Date of Admission:  10/2/2023    Assessment & Plan   62 yo F with h/o DM2, obesity, tobacco abuse, alcohol abuse, hypothyroid, goiter who presented 10/2/23 with dyspnea, cough, generalized weakness and found to have malodorous drainage from a wound in her left inguinal/vaginal area. CT scan showed necrotizing fasciitis and she was emergently taken to the OR for vulvectomy and broad debridement of the left buttock and left thigh. She returned to the ICU on pressors and was febrile. Taken back to OR and required extensive debridement from the left buttock, perineum, down to the left ankle. She required intubation by ENT due to large goiter.  Now off of pressors and extubated 10/11, transferred out of ICU 10/13/23.     Discussed with plastic surgery fellow at [facility] who did not feel closure was necessary at this point and she could discharge to LTACH after diverting colostomy and follow up with them down the road.  Would arrange outpatient appointment with plastic surgery prior to discharge to LTACH.     Dispo remains to be determined, likely LTACH facility. SW assisting.  On PO abx 10/18/23.  Patient discussed with surgery 10/20/23 and would like to proceed with diverting colostomy so that rectal tube can be removed.  Anesthesiology has concerns about intubation given large goiter. No ENT coverage here. So far, unsuccessful in transferring patient to [facility], [facility] or [facility].     Severe sepsis and septic shock  Necrotizing soft tissue infection/necrotizing fasciitis  Lactic acidosis resolved  Hypoxic respiratory failure resolved  Left upper lobe pneumonia  Presented on 10/2/2023 with pneumonia and necrotizing fasciitis.   She was emergently taken to the OR for vulvectomy and broad debridement of the left buttock and left thigh  After procedure she had to go on pressors and was febrile so taken back to the OR requiring extensive  debridement from the left buttock, perineum and down into the ankle  CT chest showed findings suspicious for left upper lobe pneumonia   Cultures with polymicrobial organism--Bacteroids and actinno, Fusobacterium nucleatum  Sputum cultures growing haemophilus and strep constellatus, GNB  Blood cultures remain no growth to date  ENT intubated the patient because of large goiter, extubated 10/11  -- Diverting colostomy was recommended to prevent recurrent wound infection, patient initially did not want procedure after discussion with care team and family.  However, on further discussion on 10/20/23 she is now requesting this.  -- continue current bowel meds, defer further rectal tube management to surgery  -- hold further diuresis given large net negative fluid balance since admission. Normally takes lasix BID at home. Monitor intake and volume status, can restart as appropriate.  Weight changed likely scale differences, seems euvolemic.  -- 10/23: Per Dr. Johns surgery not to be done at [facility] due to inability to find ENT coverage.   -- 10/24 So far, unsuccessful in transferring patient to [facility], [facility], or [facility].  -- 10/25: completed PO Augmentin and metronidazole. Wound vac in place. Placement pending.    Mild hypophosphatemia, hypokalemia: replaced per protocol    Hyponatremia, mild  Hypochloremia  - likely 2/2 decreased intake  - IVF  - Monitor bmp    History of alcohol abuse   reports 6-12 beers per day  Currently no signs of withdrawal  -- continue MV, folic acid and thiamine    Large goiter  History of hypothyroidism  Currently on room air  TSH was within normal limits, T4 was 0.78, repeated T4 was 0.93   --Continue levothyroxine    Type 2 diabetes mellitus with hyperglycemia without long-term current use of insulin  Noted to have some low trending glucoses 10/14  - Continue with Lantus, decreased to 10 units twice daily  - Continue carb counting insulin with meals at 1:10 ratio for now  - Continue  "medium intensity SSI  - Continue home metformin    Macrocytic anemia  Currently stable   Vit B12 1987, folate 18.3. May be related to alcohol use  -- hgb stable and improved, will stop checking hgb unless clinical changes   -- has received 1 unit of blood this hospitalization     GI PPx  Continue with PPI             Diet: Moderate Consistent Carb (60 g CHO per Meal) Diet  Snacks/Supplements Adult: Expedite Bottle; With Meals  Snacks/Supplements Adult: Gelatein sugar-free; Between Meals    DVT Prophylaxis: Enoxaparin (Lovenox) SQ  Rdz Catheter: PRESENT, indication: Wound Healing  Lines: PRESENT      PICC 10/03/23 Triple Lumen Right Basilic Multiple medications-Site Assessment: WDL      Cardiac Monitoring: None  Code Status: Full Code      Clinically Significant Risk Factors              # Hypoalbuminemia: Lowest albumin = 1.7 g/dL at 10/3/2023  5:21 AM, will monitor as appropriate            # Obesity: Estimated body mass index is 32.21 kg/m  as calculated from the following:    Height as of this encounter: 1.575 m (5' 2.01\").    Weight as of this encounter: 79.9 kg (176 lb 2.4 oz).             Disposition Plan     Expected Discharge Date: 10/27/2023    Discharge Delays: Placement - LTAC/ARU    Discharge Comments: LTACH  surg here vs transfer to [facility]?            Ailyn Serra MD  Hospitalist Service  [facility]  Securely message with [employer] (more info)  Text page via [employer] Paging/Directory   ______________________________________________________________________    Interval History   Patient is seen and examined at bedside.  No acute complaints.     Physical Exam   Vital Signs: Temp: 98.2  F (36.8  C) Temp src: Oral BP: 104/59 Pulse: 107   Resp: 18 SpO2: 94 % O2 Device: None (Room air)    Weight: 176 lbs 2.36 oz    GEN: Alert and oriented. Not in acute distress.  HEENT: Atraumatic, mucous membrane- moist and pink.  Chest: Bilateral air entry.  CVS: S1S2 regular.   Abdomen: Soft. " Non-tender, non-distended. No organomegaly. No guarding or rigidity. Bowel sounds active. Rectal tube and motta in place.  Extremities: Wound vac in place.  CNS: No involuntary movements.  Skin: no cyanosis or clubbing.     Medical Decision Making             Data

## 2023-10-25 NOTE — PLAN OF CARE
Shift from 0700 to 2330-      Problem: Wound  Goal: Optimal Coping  Outcome: Progressing  Intervention: Support Patient and Family Response  Recent Flowsheet Documentation  Taken 10/25/2023 0930 by Rosemarie Contreras RN  Supportive Measures:   active listening utilized   decision-making supported   positive reinforcement provided   relaxation techniques promoted   problem-solving facilitated   self-care encouraged  Goal: Optimal Functional Ability  Outcome: Progressing  Intervention: Optimize Functional Ability  Recent Flowsheet Documentation  Taken 10/25/2023 0930 by Rosemarie Contreras RN  Activity Management: activity adjusted per tolerance  Taken 10/25/2023 0912 by Rosemarie Contreras RN  Activity Management:   activity adjusted per tolerance   activity encouraged  Goal: Absence of Infection Signs and Symptoms  Outcome: Progressing  Goal: Improved Oral Intake  Outcome: Progressing  Goal: Optimal Pain Control and Function  Outcome: Progressing  Goal: Skin Health and Integrity  Outcome: Progressing  Intervention: Optimize Skin Protection  Recent Flowsheet Documentation  Taken 10/25/2023 0930 by Rosemaire Contreras RN  Activity Management: activity adjusted per tolerance  Head of Bed (HOB) Positioning: HOB at 20-30 degrees  Taken 10/25/2023 0912 by Rosemarie Contreras RN  Activity Management:   activity adjusted per tolerance   activity encouraged  Head of Bed (HOB) Positioning: HOB at 20-30 degrees  Goal: Optimal Wound Healing  Outcome: Progressing     Problem: Pain Acute  Goal: Optimal Pain Control and Function  Outcome: Progressing  Intervention: Prevent or Manage Pain  Recent Flowsheet Documentation  Taken 10/25/2023 0930 by Rosemarie Contreras RN  Bowel Elimination Promotion:   adequate fluid intake promoted   diet adjusted  Medication Review/Management: medications reviewed  Intervention: Optimize Psychosocial Wellbeing  Recent Flowsheet Documentation  Taken 10/25/2023 0930 by Rosemarie Contreras RN  Supportive Measures:   active  listening utilized   decision-making supported   positive reinforcement provided   relaxation techniques promoted   problem-solving facilitated   self-care encouraged       Goal Outcome Evaluation:    Patient's 2 wound vacs, motta and rectal tube in place.    Pain controlled.     , 74,154 and 144.  Pt on lantus, sliding scale, and carb count.     Encouraging turning every 2 hrs. Pt on bedrest. Refuses turning at times.    Tolerating diet. Drinking supplement with meals.    IVF started today.     Pt on phos and K+ protocol.  Phos 3.0; K+ 4.0; Recheck in am.

## 2023-10-25 NOTE — PROGRESS NOTES
"CLINICAL NUTRITION SERVICES - REASSESSMENT NOTE     Nutrition Prescription    RECOMMENDATIONS FOR MDs/PROVIDERS TO ORDER:    Malnutrition Status:    Moderate malnutrition In Context of: Chronic illness or disease     Recommendations already ordered by Registered Dietitian (RD):  Continue Expedite and Gelatein as ordered  Discontinue string cheese    Future/Additional Recommendations:  Monitor intake, weight, labs, supplement tolerance     EVALUATION OF THE PROGRESS TOWARD GOALS   Diet: Moderate Consistent Carbohydrate  Nutrition Support: Expedite, string cheese, Gelatein SF  Intake: 100% of meals per flowsheets       NEW FINDINGS   Diverting colostomy not done d/t lack of ENT coverage need for intubation d/t presence of large goiter.    Met with pt, fair appetite. Does not like the string cheese, but is drinking Expedite and Gelatein, is also trying to order other protein items with meals.    ANTHROPOMETRICS  Height: 157.5 cm (5' 2.008\")  Most Recent Weight: 79.9 kg (176 lb 2.4 oz)    BMI: Obesity Grade I BMI 30-34.9  Weight History:   Date/Time Weight   10/23/23 1332 79.9 kg (176 lb 2.4 oz)   10/22/23 1103 81.5 kg (179 lb 10.8 oz)   10/22/23 1100 81.5 kg (179 lb 10.8 oz)   10/15/23 0743 90.4 kg (199 lb 4.7 oz)   10/13/23 0000 89.9 kg (198 lb 1.6 oz)   10/12/23 0400 89.8 kg (198 lb)   10/11/23 0430 91.4 kg (201 lb 8 oz)     PHYSICAL FINDINGS  See malnutrition section below.  Necrotizing soft tissue infection perineum, groin, left leg s/p I&D 10/2, 10/4  1+ edema arms, hands, legs, ankles, feet    GI CONCERNS  Liquid stool-rectal tube  Intermittent nausea    LABS  Reviewed    MEDICATIONS  Reviewed    Previous Nutrition Diagnosis  Inadequate oral intake related to poor po intake as evidenced by met 66% Calorie needs and 48% protein needs on 10/18   Evaluation: Improving    CURRENT NUTRITION DIAGNOSIS  Increased nutrient needs protein related to wound healing as evidenced by presence of wounds.  "     INTERVENTIONS  Implementation  Continue Expedite and Gelatein as ordered  Discontinue string cheese    Goals  Meet > 75% estimated nutrition needs - progressing  Wound healing    Monitoring/Evaluation  Progress toward goals will be monitored and evaluated per protocol.

## 2023-10-25 NOTE — PROGRESS NOTES
General Surgery Progress Note:    Hospital Day # 23    ASSESSMENT:   1. Necrotizing soft tissue infection    2. Severe sepsis (H)        Lidia Nam is a 63 year old female with morbid obesity, type 2 diabetes, tobacco abuse, alcohol abuse, hypothyroidism with goiter who presented with necrotizing fasciitis and underwent emergent I&D on 10/2/2023 with vulvectomy and broad debridement of left buttock and thigh with return to the OR on 10/4 with further debridement on the entire back left leg and peritoneum and lower abdominal wall due to ongoing necrotizing fasciitis.  Patient with a significant large goiter and required ENT on-call to come to help.  This was over the weekend on 10/2.  Patient remained in critical condition in the ICU and eventually extubated on 10/11.  Patient had improved enough to be out of ICU on 10/13.  She had no further evidence of necrotizing fasciitis and has been followed by wound care nurses.  Patient has remained stable however due to the extensive open wound and proximity to rectum she would benefit from end colostomy which she has wanted to proceed with.  Patient requires not only further care of her wounds in a facility that is able to handle these large open wounds such as [facility] or at [facility].  She also will require ENT help with intubation.  Currently we have no ENT on-call that is available to help with cares.    PLAN:   -Currently we are working to transfer patient.  The current facilities such as the [facility] has declined patient which is not our first option as he do not have the capability to care for the large open wounds such as the other 2 hospitals.  [facility] currently is not taking anyone who has not [facility] on the waiting list.  [facility] states that we need to call daily.  We plan to call Thursday morning.    Td MORSE [facility]  [address] [address]  Phone-  [phone]  Fax- [phone]

## 2023-10-26 ENCOUNTER — APPOINTMENT (OUTPATIENT)
Dept: PHYSICAL THERAPY | Facility: HOSPITAL | Age: 63
DRG: 853 | End: 2023-10-26
Payer: COMMERCIAL

## 2023-10-26 LAB
ANION GAP SERPL CALCULATED.3IONS-SCNC: 8 MMOL/L (ref 7–15)
BUN SERPL-MCNC: 8.3 MG/DL (ref 8–23)
CALCIUM SERPL-MCNC: 8.8 MG/DL (ref 8.8–10.2)
CHLORIDE SERPL-SCNC: 101 MMOL/L (ref 98–107)
CREAT SERPL-MCNC: 0.36 MG/DL (ref 0.51–0.95)
DEPRECATED HCO3 PLAS-SCNC: 26 MMOL/L (ref 22–29)
EGFRCR SERPLBLD CKD-EPI 2021: >90 ML/MIN/1.73M2
GLUCOSE BLDC GLUCOMTR-MCNC: 114 MG/DL (ref 70–99)
GLUCOSE BLDC GLUCOMTR-MCNC: 117 MG/DL (ref 70–99)
GLUCOSE BLDC GLUCOMTR-MCNC: 128 MG/DL (ref 70–99)
GLUCOSE BLDC GLUCOMTR-MCNC: 133 MG/DL (ref 70–99)
GLUCOSE BLDC GLUCOMTR-MCNC: 136 MG/DL (ref 70–99)
GLUCOSE SERPL-MCNC: 119 MG/DL (ref 70–99)
PHOSPHATE SERPL-MCNC: 2.6 MG/DL (ref 2.5–4.5)
POTASSIUM SERPL-SCNC: 3.9 MMOL/L (ref 3.4–5.3)
SODIUM SERPL-SCNC: 135 MMOL/L (ref 135–145)

## 2023-10-26 PROCEDURE — 250N000013 HC RX MED GY IP 250 OP 250 PS 637: Performed by: NURSE PRACTITIONER

## 2023-10-26 PROCEDURE — 80048 BASIC METABOLIC PNL TOTAL CA: CPT | Performed by: NURSE PRACTITIONER

## 2023-10-26 PROCEDURE — 97116 GAIT TRAINING THERAPY: CPT | Mod: GP

## 2023-10-26 PROCEDURE — 250N000011 HC RX IP 250 OP 636: Mod: JZ | Performed by: NURSE PRACTITIONER

## 2023-10-26 PROCEDURE — 99232 SBSQ HOSP IP/OBS MODERATE 35: CPT | Performed by: PHYSICIAN ASSISTANT

## 2023-10-26 PROCEDURE — 99232 SBSQ HOSP IP/OBS MODERATE 35: CPT | Performed by: STUDENT IN AN ORGANIZED HEALTH CARE EDUCATION/TRAINING PROGRAM

## 2023-10-26 PROCEDURE — 97530 THERAPEUTIC ACTIVITIES: CPT | Mod: GP

## 2023-10-26 PROCEDURE — 250N000011 HC RX IP 250 OP 636: Performed by: NURSE PRACTITIONER

## 2023-10-26 PROCEDURE — 258N000003 HC RX IP 258 OP 636: Performed by: STUDENT IN AN ORGANIZED HEALTH CARE EDUCATION/TRAINING PROGRAM

## 2023-10-26 PROCEDURE — 84100 ASSAY OF PHOSPHORUS: CPT | Performed by: STUDENT IN AN ORGANIZED HEALTH CARE EDUCATION/TRAINING PROGRAM

## 2023-10-26 PROCEDURE — 250N000013 HC RX MED GY IP 250 OP 250 PS 637: Performed by: INTERNAL MEDICINE

## 2023-10-26 PROCEDURE — 120N000001 HC R&B MED SURG/OB

## 2023-10-26 RX ADMIN — FOLIC ACID 1 MG: 1 TABLET ORAL at 08:00

## 2023-10-26 RX ADMIN — SODIUM CHLORIDE: 9 INJECTION, SOLUTION INTRAVENOUS at 06:23

## 2023-10-26 RX ADMIN — INSULIN GLARGINE 10 UNITS: 100 INJECTION, SOLUTION SUBCUTANEOUS at 07:59

## 2023-10-26 RX ADMIN — METFORMIN HYDROCHLORIDE 1000 MG: 500 TABLET, FILM COATED ORAL at 17:16

## 2023-10-26 RX ADMIN — PREGABALIN 25 MG: 25 CAPSULE ORAL at 08:00

## 2023-10-26 RX ADMIN — PREGABALIN 25 MG: 25 CAPSULE ORAL at 14:07

## 2023-10-26 RX ADMIN — LEVOTHYROXINE SODIUM 112 MCG: 0.11 TABLET ORAL at 06:23

## 2023-10-26 RX ADMIN — POLYETHYLENE GLYCOL 3350 17 G: 17 POWDER, FOR SOLUTION ORAL at 08:00

## 2023-10-26 RX ADMIN — THIAMINE HCL TAB 100 MG 100 MG: 100 TAB at 08:00

## 2023-10-26 RX ADMIN — SENNOSIDES AND DOCUSATE SODIUM 1 TABLET: 8.6; 5 TABLET ORAL at 08:00

## 2023-10-26 RX ADMIN — INSULIN ASPART 5 UNITS: 100 INJECTION, SOLUTION INTRAVENOUS; SUBCUTANEOUS at 07:59

## 2023-10-26 RX ADMIN — METFORMIN HYDROCHLORIDE 1000 MG: 500 TABLET, FILM COATED ORAL at 08:00

## 2023-10-26 RX ADMIN — ENOXAPARIN SODIUM 40 MG: 40 INJECTION SUBCUTANEOUS at 08:00

## 2023-10-26 RX ADMIN — PREGABALIN 25 MG: 25 CAPSULE ORAL at 21:33

## 2023-10-26 RX ADMIN — INSULIN ASPART 2 UNITS: 100 INJECTION, SOLUTION INTRAVENOUS; SUBCUTANEOUS at 18:14

## 2023-10-26 RX ADMIN — INSULIN GLARGINE 10 UNITS: 100 INJECTION, SOLUTION SUBCUTANEOUS at 21:32

## 2023-10-26 RX ADMIN — SENNOSIDES AND DOCUSATE SODIUM 1 TABLET: 8.6; 5 TABLET ORAL at 21:33

## 2023-10-26 RX ADMIN — PROCHLORPERAZINE EDISYLATE 10 MG: 5 INJECTION INTRAMUSCULAR; INTRAVENOUS at 09:23

## 2023-10-26 RX ADMIN — Medication 1 TABLET: at 08:00

## 2023-10-26 ASSESSMENT — ACTIVITIES OF DAILY LIVING (ADL)
ADLS_ACUITY_SCORE: 28

## 2023-10-26 NOTE — PROGRESS NOTES
General Surgery Progress Note:    Hospital Day # 24    ASSESSMENT:   1. Necrotizing soft tissue infection    2. Severe sepsis (H)      Lidia Nam is a 63 year old female with morbid obesity, type 2 diabetes, tobacco abuse, alcohol abuse, hypothyroidism with goiter who presented with necrotizing fasciitis and underwent emergent I&D on 10/2/2023 with vulvectomy and broad debridement of left buttock and thigh with return to the OR on 10/4 with further debridement on the entire back left leg and peritoneum and lower abdominal wall due to ongoing necrotizing fasciitis.  Patient with a significant large goiter and required ENT on-call to come to help.  This was over the weekend on 10/2.   She has come a long way and doing well. Wound clean and being cared for by C with wound vac and dressings  Need for colostomy for further healing  Need for plastics to continue with grafting and Need for facility that has the ability to care for large open wounds such as a Burn Care facility.   Need for ENT to do intubation for any surgeries  Need for Facility that can take care of all needs which [place] [facility] [place] [facility]  Will ask plastic surgery to give advise- Previously we did discuss with Dr Anguiano and he does not do these type of surgeries but I would like to verify best recommendations. Currently we strongly feel she needs to be in a facility that has a burn MD surgeon to help with skin grafting.     PLAN:   We [place] [facility] [place] and there are no beds and we were asked to call tomorrow  Interestingly patient reports having [facility] with Medicaid- I am under the impression we cannot get her on a waiting at [facility]. [facility] stated has to have [facility] to get on a wait list.    40min discussing with patient what we have been doing and why to get her to a facility that can meet all her needs stated above.     SUBJECTIVE:   Lidia Nam is feeling frustrated. She is overall ok. Happy with her wound cares and  likes the wound vac. Afebrile. No significant events.    Patient Vitals for the past 24 hrs:   BP Temp Temp src Pulse Resp SpO2   10/26/23 0753 106/51 97.4  F (36.3  C) Axillary 102 16 96 %   10/25/23 2354 108/55 98.1  F (36.7  C) Oral 101 18 92 %   10/25/23 1946 106/56 97.4  F (36.3  C) Oral 104 18 94 %   10/25/23 1519 104/59 98.2  F (36.8  C) Oral 107 18 94 %       Physical Exam:  General: NAD, pleasant  Reviewed photos from [facility] note 10/24    No results displayed because visit has over 200 results.   Recent Results (from the past 24 hour(s))   Glucose by meter    Collection Time: 10/25/23  1:57 PM   Result Value Ref Range    GLUCOSE BY METER POCT 74 70 - 99 mg/dL   Glucose by meter    Collection Time: 10/25/23  5:51 PM   Result Value Ref Range    GLUCOSE BY METER POCT 154 (H) 70 - 99 mg/dL   Glucose by meter    Collection Time: 10/25/23  9:39 PM   Result Value Ref Range    GLUCOSE BY METER POCT 144 (H) 70 - 99 mg/dL   Glucose by meter    Collection Time: 10/26/23  1:59 AM   Result Value Ref Range    GLUCOSE BY METER POCT 133 (H) 70 - 99 mg/dL   Basic metabolic panel    Collection Time: 10/26/23  6:25 AM   Result Value Ref Range    Sodium 135 135 - 145 mmol/L    Potassium 3.9 3.4 - 5.3 mmol/L    Chloride 101 98 - 107 mmol/L    Carbon Dioxide (CO2) 26 22 - 29 mmol/L    Anion Gap 8 7 - 15 mmol/L    Urea Nitrogen 8.3 8.0 - 23.0 mg/dL    Creatinine 0.36 (L) 0.51 - 0.95 mg/dL    GFR Estimate >90 >60 mL/min/1.73m2    Calcium 8.8 8.8 - 10.2 mg/dL    Glucose 119 (H) 70 - 99 mg/dL   Phosphorus    Collection Time: 10/26/23  6:25 AM   Result Value Ref Range    Phosphorus 2.6 2.5 - 4.5 mg/dL   Glucose by meter    Collection Time: 10/26/23  7:00 AM   Result Value Ref Range    GLUCOSE BY METER POCT 117 (H) 70 - 99 mg/dL             JOSE Mckeon  [facility]  [address] [place] [id number]  Phone- [phone]  Fax- [phone]

## 2023-10-26 NOTE — PROGRESS NOTES
"Care Management Follow Up    Length of Stay (days): 24    Expected Discharge Date: 10/27/2023     Concerns to be Addressed:     Care progression - discharge planning  Patient plan of care discussed at interdisciplinary rounds: Yes    Anticipated Discharge Disposition:  TBD - LTACH?     Anticipated Discharge Services:  TBD  Anticipated Discharge DME:  NA    Patient/family educated on Medicare website which has current facility and service quality ratings:  NA  Education Provided on the Discharge Plan:  Per team  Patient/Family in Agreement with the Plan:  Yes    Referrals Placed by CM/SW:  Yes  Private pay costs discussed: Not applicable    Additional Information:  Per provider, Dr. Serra, no discharge plans yet. Still working on getting the colostomy.    Katiana Montemayor, station 2 manager, said Dr. Joy is requesting a care conference to discuss plans for the colostomy.     Called Tabbie Day [phone] and left message to page her to call CM    Social Hx: \"RNCM to follow - extensive wound, LTACH liaison following (accepted to [facility] pending bed availability), needs follow up on [facility]. Surgery canceled for colostomy. Lives with spouse, he assists as needed, bed bound, poor self care. Reports 6-12 beers per day.\"    RNCM to follow for medical progression, recommendations, and final discharge plan.     Ibis Lin RN     "

## 2023-10-26 NOTE — PROGRESS NOTES
[facility]    Medicine Progress Note - Hospitalist Service    Date of Admission:  10/2/2023    Assessment & Plan   62 yo F with h/o DM2, obesity, tobacco abuse, alcohol abuse, hypothyroid, goiter who presented 10/2/23 with dyspnea, cough, generalized weakness and found to have malodorous drainage from a wound in her left inguinal/vaginal area. CT scan showed necrotizing fasciitis and she was emergently taken to the OR for vulvectomy and broad debridement of the left buttock and left thigh. She returned to the ICU on pressors and was febrile. Taken back to OR and required extensive debridement from the left buttock, perineum, down to the left ankle. She required intubation by ENT due to large goiter.  Now off of pressors and extubated 10/11, transferred out of ICU 10/13/23.     Discussed with plastic surgery fellow at [facility] who did not feel closure was necessary at this point and she could discharge to LTACH after diverting colostomy and follow up with them down the road.  Would arrange outpatient appointment with plastic surgery prior to discharge to LTACH.     Dispo remains to be determined, likely LTACH facility. SW assisting.  On PO abx 10/18/23.  Patient discussed with surgery 10/20/23 and would like to proceed with diverting colostomy so that rectal tube can be removed.  Anesthesiology has concerns about intubation given large goiter. No ENT coverage here. So far, unsuccessful in transferring patient to [facility], [facility], or [facility].     Severe sepsis and septic shock  Necrotizing soft tissue infection/necrotizing fasciitis  Lactic acidosis resolved  Hypoxic respiratory failure resolved  Left upper lobe pneumonia  Presented on 10/2/2023 with pneumonia and necrotizing fasciitis.   She was emergently taken to the OR for vulvectomy and broad debridement of the left buttock and left thigh  After procedure she had to go on pressors and was febrile so taken back to the OR requiring extensive  debridement from the left buttock, perineum and down into the ankle  CT chest showed findings suspicious for left upper lobe pneumonia   Cultures with polymicrobial organism--Bacteroids and actinno, Fusobacterium nucleatum  Sputum cultures growing haemophilus and strep constellatus, GNB  Blood cultures remain no growth to date  ENT intubated the patient because of large goiter, extubated 10/11  -- Diverting colostomy was recommended to prevent recurrent wound infection, patient initially did not want procedure after discussion with care team and family.  However, on further discussion on 10/20/23 she is now requesting this.  -- continue current bowel meds, defer further rectal tube management to surgery  -- hold further diuresis given large net negative fluid balance since admission. Normally takes lasix BID at home. Monitor intake and volume status, can restart as appropriate.  Weight changed likely scale differences, seems euvolemic.  -- 10/23: Per Dr. Johns surgery not to be done at [place] due to inability to find ENT coverage.   -- 10/24 So far, unsuccessful in transferring patient to [facility], [facility], or [facility].  -- 10/25: completed PO Augmentin and metronidazole. Wound vac in place. Placement pending.  -- 10/26: Working with PT.     Mild hypophosphatemia, hypokalemia: replaced per protocol    Hyponatremia, mild  Hypochloremia  - likely 2/2 decreased intake  - IVF  - Monitor bmp    History of alcohol abuse   reports 6-12 beers per day  Currently no signs of withdrawal  -- continue MV, folic acid and thiamine    Large goiter  History of hypothyroidism  Currently on room air  TSH was within normal limits, T4 was 0.78, repeated T4 was 0.93   --Continue levothyroxine    Type 2 diabetes mellitus with hyperglycemia without long-term current use of insulin  Noted to have some low trending glucoses 10/14  - Continue with Lantus, decreased to 10 units twice daily  - Continue carb counting insulin with meals at 1:10  "ratio for now  - Continue medium intensity SSI  - Continue home metformin    Macrocytic anemia  Currently stable   Vit B12 1987, folate 18.3. May be related to alcohol use  -- hgb stable and improved, will stop checking hgb unless clinical changes   -- has received 1 unit of blood this hospitalization     GI PPx  Continue with PPI             Diet: Moderate Consistent Carb (60 g CHO per Meal) Diet  Snacks/Supplements Adult: Expedite Bottle; With Meals  Snacks/Supplements Adult: Gelatein sugar-free; Between Meals    DVT Prophylaxis: Enoxaparin (Lovenox) SQ  Rdz Catheter: PRESENT, indication: Wound Healing  Lines: PRESENT      PICC 10/03/23 Triple Lumen Right Basilic Multiple medications-Site Assessment: WDL      Cardiac Monitoring: None  Code Status: Full Code      Clinically Significant Risk Factors              # Hypoalbuminemia: Lowest albumin = 1.7 g/dL at 10/3/2023  5:21 AM, will monitor as appropriate            # Obesity: Estimated body mass index is 32.77 kg/m  as calculated from the following:    Height as of this encounter: 1.575 m (5' 2.01\").    Weight as of this encounter: 81.3 kg (179 lb 3.2 oz).             Disposition Plan     Expected Discharge Date: 10/27/2023    Discharge Delays: Placement - LTAC/ARU    Discharge Comments: LTACH  surg here vs transfer to [facility]?            Ailyn Serra MD  Hospitalist Service  [facility]  Securely message with [address] (more info)  Text page via [employer] Paging/Directory   ______________________________________________________________________    Interval History   Patient is seen and examined at bedside.  No acute complaints. Working with PT today.    Physical Exam   Vital Signs: Temp: 97.4  F (36.3  C) Temp src: Axillary BP: 106/51 Pulse: 102   Resp: 16 SpO2: 96 % O2 Device: None (Room air)    Weight: 179 lbs 3.2 oz    GEN: Alert and oriented. Not in acute distress.  HEENT: Atraumatic, mucous membrane- moist and pink.  Chest: Bilateral air " entry.  CVS: S1S2 regular.   Abdomen: Soft. Non-tender, non-distended. No organomegaly. No guarding or rigidity. Bowel sounds active. Rectal tube and motta in place.  Extremities: Wound vac in place.  CNS: No involuntary movements.  Skin: no cyanosis or clubbing.     Medical Decision Making             Data

## 2023-10-26 NOTE — PLAN OF CARE
Problem: Sepsis/Septic Shock  Goal: Absence of Infection Signs and Symptoms  Outcome: Progressing   Goal Outcome Evaluation:       Wound vac's clean and intact with good seal.  Pt up walking in halls with therapy.  C/o nausea this AM, prn compazine given and effective.  Pt sleeping this afternoon.    Malia Gallegos RN

## 2023-10-26 NOTE — PLAN OF CARE
Problem: Plan of Care - These are the overarching goals to be used throughout the patient stay.    Goal: Optimal Comfort and Wellbeing  Outcome: Progressing     Problem: Risk for Delirium  Goal: Improved Sleep  Outcome: Progressing     Problem: Pain Acute  Goal: Optimal Pain Control and Function  Outcome: Progressing  Intervention: Prevent or Manage Pain  Recent Flowsheet Documentation  Taken 10/26/2023 0015 by Maximiliano Romo, RN  Bowel Elimination Promotion:   adequate fluid intake promoted   diet adjusted  Medication Review/Management: medications reviewed  Intervention: Optimize Psychosocial Wellbeing  Recent Flowsheet Documentation  Taken 10/26/2023 0015 by Maximiliano Romo, RN  Supportive Measures:   active listening utilized   positive reinforcement provided   relaxation techniques promoted   problem-solving facilitated   self-care encouraged   Goal Outcome Evaluation:    VSS on RA.    A/Ox4.     Denied pain and nausea.     Wound vacs in place overnight with 25 ml drainage from groin and 75 ml from thigh. Seals intact and drainage marked.    Rdz and rectal tube patent with good output. Reported some gas discomfort but stated it was tolerable. Bowel sounds hyperactive.     Good appetite. Ate 100% of breakfast.    Slept  between cares.               Maximiliano Romo RN

## 2023-10-27 VITALS
HEART RATE: 76 BPM | HEIGHT: 62 IN | WEIGHT: 179.2 LBS | OXYGEN SATURATION: 93 % | SYSTOLIC BLOOD PRESSURE: 96 MMHG | RESPIRATION RATE: 18 BRPM | TEMPERATURE: 97.9 F | DIASTOLIC BLOOD PRESSURE: 56 MMHG | BODY MASS INDEX: 32.97 KG/M2

## 2023-10-27 LAB
ANION GAP SERPL CALCULATED.3IONS-SCNC: 4 MMOL/L (ref 7–15)
BUN SERPL-MCNC: 7.5 MG/DL (ref 8–23)
CALCIUM SERPL-MCNC: 9.8 MG/DL (ref 8.8–10.2)
CHLORIDE SERPL-SCNC: 103 MMOL/L (ref 98–107)
CREAT SERPL-MCNC: 0.38 MG/DL (ref 0.51–0.95)
DEPRECATED HCO3 PLAS-SCNC: 29 MMOL/L (ref 22–29)
EGFRCR SERPLBLD CKD-EPI 2021: >90 ML/MIN/1.73M2
GLUCOSE BLDC GLUCOMTR-MCNC: 129 MG/DL (ref 70–99)
GLUCOSE BLDC GLUCOMTR-MCNC: 97 MG/DL (ref 70–99)
GLUCOSE BLDC GLUCOMTR-MCNC: 99 MG/DL (ref 70–99)
GLUCOSE BLDC GLUCOMTR-MCNC: 99 MG/DL (ref 70–99)
GLUCOSE SERPL-MCNC: 92 MG/DL (ref 70–99)
PHOSPHATE SERPL-MCNC: 3.1 MG/DL (ref 2.5–4.5)
POTASSIUM SERPL-SCNC: 3.8 MMOL/L (ref 3.4–5.3)
SODIUM SERPL-SCNC: 136 MMOL/L (ref 135–145)

## 2023-10-27 PROCEDURE — G0463 HOSPITAL OUTPT CLINIC VISIT: HCPCS

## 2023-10-27 PROCEDURE — 250N000013 HC RX MED GY IP 250 OP 250 PS 637: Performed by: STUDENT IN AN ORGANIZED HEALTH CARE EDUCATION/TRAINING PROGRAM

## 2023-10-27 PROCEDURE — 250N000013 HC RX MED GY IP 250 OP 250 PS 637: Performed by: INTERNAL MEDICINE

## 2023-10-27 PROCEDURE — 250N000011 HC RX IP 250 OP 636: Performed by: NURSE PRACTITIONER

## 2023-10-27 PROCEDURE — 99231 SBSQ HOSP IP/OBS SF/LOW 25: CPT | Performed by: SURGERY

## 2023-10-27 PROCEDURE — 250N000013 HC RX MED GY IP 250 OP 250 PS 637: Performed by: NURSE PRACTITIONER

## 2023-10-27 PROCEDURE — 80048 BASIC METABOLIC PNL TOTAL CA: CPT | Performed by: NURSE PRACTITIONER

## 2023-10-27 PROCEDURE — 250N000011 HC RX IP 250 OP 636: Mod: JZ | Performed by: NURSE PRACTITIONER

## 2023-10-27 PROCEDURE — 99239 HOSP IP/OBS DSCHRG MGMT >30: CPT | Performed by: STUDENT IN AN ORGANIZED HEALTH CARE EDUCATION/TRAINING PROGRAM

## 2023-10-27 PROCEDURE — 84100 ASSAY OF PHOSPHORUS: CPT | Performed by: STUDENT IN AN ORGANIZED HEALTH CARE EDUCATION/TRAINING PROGRAM

## 2023-10-27 RX ORDER — POTASSIUM CHLORIDE 1500 MG/1
20 TABLET, EXTENDED RELEASE ORAL ONCE
Status: COMPLETED | OUTPATIENT
Start: 2023-10-27 | End: 2023-10-27

## 2023-10-27 RX ORDER — ONDANSETRON 4 MG/1
4 TABLET, ORALLY DISINTEGRATING ORAL EVERY 6 HOURS PRN
DISCHARGE
Start: 2023-10-27 | End: 2024-02-14

## 2023-10-27 RX ORDER — OXYCODONE HYDROCHLORIDE 10 MG/1
10 TABLET ORAL EVERY 4 HOURS PRN
Status: SHIPPED | DISCHARGE
Start: 2023-10-27 | End: 2024-01-26 | Stop reason: DRUGHIGH

## 2023-10-27 RX ORDER — FOLIC ACID 1 MG/1
1 TABLET ORAL EVERY EVENING
DISCHARGE
Start: 2023-10-28 | End: 2024-02-14

## 2023-10-27 RX ORDER — AMOXICILLIN 250 MG
1 CAPSULE ORAL 2 TIMES DAILY PRN
DISCHARGE
Start: 2023-10-27 | End: 2024-02-14

## 2023-10-27 RX ORDER — MULTIPLE VITAMINS W/ MINERALS TAB 9MG-400MCG
1 TAB ORAL DAILY
DISCHARGE
Start: 2023-10-28 | End: 2024-02-14

## 2023-10-27 RX ORDER — LANOLIN ALCOHOL/MO/W.PET/CERES
100 CREAM (GRAM) TOPICAL EVERY EVENING
DISCHARGE
Start: 2023-10-28 | End: 2024-02-14

## 2023-10-27 RX ORDER — OXYCODONE HYDROCHLORIDE 5 MG/1
5 TABLET ORAL EVERY 4 HOURS PRN
Status: SHIPPED | DISCHARGE
Start: 2023-10-27 | End: 2024-02-14

## 2023-10-27 RX ORDER — FOLIC ACID 1 MG/1
1 TABLET ORAL EVERY EVENING
Status: DISCONTINUED | OUTPATIENT
Start: 2023-10-28 | End: 2023-10-27 | Stop reason: HOSPADM

## 2023-10-27 RX ORDER — PREGABALIN 25 MG/1
25 CAPSULE ORAL 3 TIMES DAILY
Status: SHIPPED | DISCHARGE
Start: 2023-10-27 | End: 2024-02-14

## 2023-10-27 RX ORDER — ACETAMINOPHEN 325 MG/1
650 TABLET ORAL EVERY 4 HOURS PRN
DISCHARGE
Start: 2023-10-27

## 2023-10-27 RX ADMIN — SENNOSIDES AND DOCUSATE SODIUM 1 TABLET: 8.6; 5 TABLET ORAL at 09:12

## 2023-10-27 RX ADMIN — OXYCODONE HYDROCHLORIDE 10 MG: 5 TABLET ORAL at 12:58

## 2023-10-27 RX ADMIN — POLYETHYLENE GLYCOL 3350 17 G: 17 POWDER, FOR SOLUTION ORAL at 09:20

## 2023-10-27 RX ADMIN — PROCHLORPERAZINE MALEATE 10 MG: 10 TABLET ORAL at 12:58

## 2023-10-27 RX ADMIN — ONDANSETRON 4 MG: 4 TABLET, ORALLY DISINTEGRATING ORAL at 12:22

## 2023-10-27 RX ADMIN — OXYCODONE HYDROCHLORIDE 10 MG: 5 TABLET ORAL at 09:12

## 2023-10-27 RX ADMIN — ACETAMINOPHEN 650 MG: 325 TABLET ORAL at 12:57

## 2023-10-27 RX ADMIN — THIAMINE HCL TAB 100 MG 100 MG: 100 TAB at 09:12

## 2023-10-27 RX ADMIN — Medication 1 TABLET: at 09:12

## 2023-10-27 RX ADMIN — METFORMIN HYDROCHLORIDE 1000 MG: 500 TABLET, FILM COATED ORAL at 09:11

## 2023-10-27 RX ADMIN — LEVOTHYROXINE SODIUM 112 MCG: 0.11 TABLET ORAL at 05:33

## 2023-10-27 RX ADMIN — ENOXAPARIN SODIUM 40 MG: 40 INJECTION SUBCUTANEOUS at 09:18

## 2023-10-27 RX ADMIN — POTASSIUM CHLORIDE 20 MEQ: 1500 TABLET, EXTENDED RELEASE ORAL at 09:11

## 2023-10-27 RX ADMIN — INSULIN GLARGINE 10 UNITS: 100 INJECTION, SOLUTION SUBCUTANEOUS at 09:19

## 2023-10-27 RX ADMIN — INSULIN ASPART 3 UNITS: 100 INJECTION, SOLUTION INTRAVENOUS; SUBCUTANEOUS at 09:47

## 2023-10-27 RX ADMIN — PREGABALIN 25 MG: 25 CAPSULE ORAL at 13:39

## 2023-10-27 RX ADMIN — Medication 60 ML: at 15:50

## 2023-10-27 RX ADMIN — PREGABALIN 25 MG: 25 CAPSULE ORAL at 09:12

## 2023-10-27 RX ADMIN — HYDROXYZINE HYDROCHLORIDE 25 MG: 25 TABLET, FILM COATED ORAL at 09:12

## 2023-10-27 ASSESSMENT — ACTIVITIES OF DAILY LIVING (ADL)
ADLS_ACUITY_SCORE: 28

## 2023-10-27 NOTE — PLAN OF CARE
Problem: Plan of Care - These are the overarching goals to be used throughout the patient stay.    Goal: Absence of Hospital-Acquired Illness or Injury  Intervention: Identify and Manage Fall Risk  Recent Flowsheet Documentation  Taken 10/26/2023 1630 by Jacquelin Schumacher RN  Safety Promotion/Fall Prevention:   patient and family education   mobility aid in reach   supervised activity  Intervention: Prevent Skin Injury  Recent Flowsheet Documentation  Taken 10/26/2023 1630 by Jacquelin Schumacher RN  Body Position: position changed independently  Goal: Optimal Comfort and Wellbeing  Intervention: Provide Person-Centered Care  Recent Flowsheet Documentation  Taken 10/26/2023 1630 by Jacquelin Schumacher RN  Trust Relationship/Rapport:   care explained   emotional support provided   empathic listening provided   questions answered   questions encouraged   reassurance provided     Problem: Risk for Delirium  Goal: Improved Behavioral Control  Intervention: Minimize Safety Risk  Recent Flowsheet Documentation  Taken 10/26/2023 1630 by Jacquelin Schumacher RN  Trust Relationship/Rapport:   care explained   emotional support provided   empathic listening provided   questions answered   questions encouraged   reassurance provided     Problem: Restraint, Nonviolent  Goal: Absence of Harm or Injury  Intervention: Protect Dignity, Rights and Personal Wellbeing  Recent Flowsheet Documentation  Taken 10/26/2023 1630 by Jacquelin Schumacher RN  Trust Relationship/Rapport:   care explained   emotional support provided   empathic listening provided   questions answered   questions encouraged   reassurance provided  Intervention: Protect Skin and Joint Integrity  Recent Flowsheet Documentation  Taken 10/26/2023 1630 by Jacquelin Schumacher RN  Body Position: position changed independently     Problem: Sepsis/Septic Shock  Goal: Absence of Infection Signs and Symptoms  Intervention: Promote Recovery  Recent Flowsheet Documentation  Taken  10/26/2023 1630 by Jacquelin Schumacher RN  Activity Management: activity adjusted per tolerance     Problem: Mechanical Ventilation Invasive  Goal: Effective Communication  Intervention: Ensure Effective Communication  Recent Flowsheet Documentation  Taken 10/26/2023 1630 by Jacquelin Schumacher RN  Trust Relationship/Rapport:   care explained   emotional support provided   empathic listening provided   questions answered   questions encouraged   reassurance provided  Goal: Mechanical Ventilation Liberation  Intervention: Promote Extubation and Mechanical Ventilation Liberation  Recent Flowsheet Documentation  Taken 10/26/2023 1630   Medication Review/Management: medications reviewed  Goal: Absence of Ventilator-Induced Lung Injury  Intervention: Prevent Ventilator-Associated Pneumonia  Recent Flowsheet Documentation  Taken 10/26/2023 1630 by Jacquelin Schumacher RN  Head of Bed (HOB) Positioning: HOB at 20-30 degrees     Problem: Enteral Nutrition  Goal: Absence of Aspiration Signs and Symptoms  Intervention: Minimize Aspiration Risk  Recent Flowsheet Documentation  Taken 10/26/2023 1630 by Jacquelin Schumacher RN  Head of Bed (HOB) Positioning: HOB at 20-30 degrees     Problem: Pain Acute  Goal: Optimal Pain Control and Function  Intervention: Prevent or Manage Pain  Recent Flowsheet Documentation  Taken 10/26/2023 1630 by Jacquelin Schumacher RN  Medication Review/Management: medications reviewed     Problem: Wound  Goal: Optimal Functional Ability  Intervention: Optimize Functional Ability  Recent Flowsheet Documentation  Taken 10/26/2023 1630 by Jacquelin Schumacher RN  Activity Management: activity adjusted per tolerance  Activity Assistance Provided: assistance, 2 people  Goal: Skin Health and Integrity  Intervention: Optimize Skin Protection  Recent Flowsheet Documentation  Taken 10/26/2023 1630 by Jacquelin Schumacher RN  Activity Management: activity adjusted per tolerance  Head of Bed (HOB) Positioning: HOB at 20-30  "degrees     Problem: Wound  Goal: Optimal Functional Ability  Intervention: Optimize Functional Ability  Recent Flowsheet Documentation  Taken 10/26/2023 1630 by Jacquelin Schumacher, RN  Activity Management: activity adjusted per tolerance  Activity Assistance Provided: assistance, 2 people   Goal Outcome Evaluation:    Wound vac x 2 patent, no signs of leak.    Rdz and rectal tube intact and draining.     Hyperactive bowel sounds and passing \"a lot of gas.\" Per patient.  Tolerated dinner. Ate 75%.  Denied nausea.  Blood glucose - 114 and 128.  Slept intermittent now more alert/awake.  Denied discomfort all evening.    AVSS.       "

## 2023-10-27 NOTE — PROGRESS NOTES
"Care Management Follow Up    Length of Stay (days): 25    Expected Discharge Date: 10/28/2023     Concerns to be Addressed:     Care progression  Patient plan of care discussed at interdisciplinary rounds: Yes    Anticipated Discharge Disposition:  TBD     Anticipated Discharge Services:  TBD  Anticipated Discharge DME:  [facility]    Patient/family educated on Medicare website which has current facility and service quality ratings:  NA  Education Provided on the Discharge Plan:  Per team  Patient/Family in Agreement with the Plan:  NA    Referrals Placed by CM/SW:  [facility]  Private pay costs discussed: transportation costs    Additional Information:  Per provider, Dr. Serra, he will follow up regarding the discharge plans.    Social Hx: \"RNCM to follow - extensive wound, LTACH liaison following (accepted to [facility] pending bed availability), needs follow up on [facility]. Surgery canceled colostomy. Lives with spouse, he assists as needed, bed bound, poor self care. Reports 6-12 beers per day.\"    RNCM to follow for medical progression, recommendations, and final discharge plan.     Ibis Lin RN     1:10 PM Dr. Serra called and said patient will be transferring to [facility] this afternoon.  Notified the bedside nurse and patient  Paged the [facility] nurse and update given    Completed PCS  Called [facility] for ride set up by stretcher  5:42 PM - 6:27 PM  Updated bedside nurse      "

## 2023-10-27 NOTE — PROGRESS NOTES
General Surgery Progress Note:    Hospital Day # 25    ASSESSMENT:   1. Necrotizing soft tissue infection    2. Severe sepsis (H)      Lidia Nam is a 63 year old female with morbid obesity, type 2 diabetes, tobacco abuse, alcohol abuse, hypothyroidism with goiter who presented with necrotizing fasciitis and underwent emergent I&D on 10/2/2023 with vulvectomy and broad debridement of left buttock and thigh with return to the OR on 10/4 with further debridement on the entire back left leg and peritoneum and lower abdominal wall due to ongoing necrotizing fasciitis.    PLAN:   - continue with local wound care  - I have spoke to [facility].  No rooms available for transfer.   - continue diet as tolerated.     SUBJECTIVE:   Lidia Nam tolerating diet.  No new complaints.  Tolerating irrigating wound VAC.    Patient Vitals for the past 24 hrs:   BP Temp Temp src Pulse Resp SpO2   10/27/23 0731 102/66 97.5  F (36.4  C) Oral 95 18 96 %   10/26/23 2233 109/59 98  F (36.7  C) Oral 102 18 96 %   10/26/23 1525 100/57 98.2  F (36.8  C) Axillary 97 18 95 %       Physical Exam:  General: NAD, pleasant  Reviewed photos from [facility] note 10/24    No results displayed because visit has over 200 results.   Recent Results (from the past 24 hour(s))   Glucose by meter    Collection Time: 10/26/23 12:50 PM   Result Value Ref Range    GLUCOSE BY METER POCT 136 (H) 70 - 99 mg/dL   Glucose by meter    Collection Time: 10/26/23  5:14 PM   Result Value Ref Range    GLUCOSE BY METER POCT 114 (H) 70 - 99 mg/dL   Glucose by meter    Collection Time: 10/26/23  9:44 PM   Result Value Ref Range    GLUCOSE BY METER POCT 128 (H) 70 - 99 mg/dL   Glucose by meter    Collection Time: 10/27/23  2:33 AM   Result Value Ref Range    GLUCOSE BY METER POCT 97 70 - 99 mg/dL   Basic metabolic panel    Collection Time: 10/27/23  5:34 AM   Result Value Ref Range    Sodium 136 135 - 145 mmol/L    Potassium 3.8 3.4 - 5.3 mmol/L    Chloride 103 98  - 107 mmol/L    Carbon Dioxide (CO2) 29 22 - 29 mmol/L    Anion Gap 4 (L) 7 - 15 mmol/L    Urea Nitrogen 7.5 (L) 8.0 - 23.0 mg/dL    Creatinine 0.38 (L) 0.51 - 0.95 mg/dL    GFR Estimate >90 >60 mL/min/1.73m2    Calcium 9.8 8.8 - 10.2 mg/dL    Glucose 92 70 - 99 mg/dL   Phosphorus    Collection Time: 10/27/23  5:34 AM   Result Value Ref Range    Phosphorus 3.1 2.5 - 4.5 mg/dL   Glucose by meter    Collection Time: 10/27/23  7:37 AM   Result Value Ref Range    GLUCOSE BY METER POCT 99 70 - 99 mg/dL             Jp Penny DO  Fax- [phone]

## 2023-10-27 NOTE — PLAN OF CARE
Physical Therapy Discharge Summary    Reason for therapy discharge:    Discharged to [facility] burn unit    Progress towards therapy goal(s). See goals on Care Plan in [facility] electronic health record for goal details.  Goals met due pain and fatigue. .     Therapy recommendation(s):    Continued therapy is recommended.  Rationale/Recommendations:  to continue with mobility and strengthening.    Goal Outcome Evaluation:
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
## 2023-10-27 NOTE — PLAN OF CARE
Problem: Plan of Care - These are the overarching goals to be used throughout the patient stay.    Goal: Optimal Comfort and Wellbeing  Outcome: Progressing  Intervention: Provide Person-Centered Care  Recent Flowsheet Documentation  Taken 10/27/2023 0045 by Maximiliano Romo RN  Trust Relationship/Rapport:   care explained   choices provided     Problem: Risk for Delirium  Goal: Improved Attention and Thought Clarity  Outcome: Progressing     Problem: Pain Acute  Goal: Optimal Pain Control and Function  Outcome: Progressing  Intervention: Prevent or Manage Pain  Recent Flowsheet Documentation  Taken 10/27/2023 0045 by Maximiliano Romo, RN  Medication Review/Management: medications reviewed   Goal Outcome Evaluation:    VSS on RA.    A/Ox4.    Denied pain and nausea.    Wound vacs in place and intact on left groin and left thigh. Both on continuous suction at 125 mmg. Replaced cannister and vashe on thigh vac.    Rectal tube patent with good output. Stool liquid and brown. Bag changed. Bowel sounds hyperactive. Passing gas.    Rdz in place and patent. Good urine output, ken colored.    Slept between cares.    Recent Labs   Lab 10/27/23  0233 10/26/23  2144 10/26/23  1714 10/26/23  1250 10/26/23  0700 10/26/23  0625   GLC 97 128* 114* 136* 117* 119*               Maximiliano Romo RN
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
## 2023-11-03 ENCOUNTER — DOCUMENTATION ONLY (OUTPATIENT)
Dept: FAMILY MEDICINE | Facility: CLINIC | Age: 63
End: 2023-11-03
Payer: COMMERCIAL

## 2023-11-03 NOTE — PROGRESS NOTES
Lidia Nam has an upcoming lab appointment:    Future Appointments   Date Time Provider Department Center   11/6/2023  7:00 AM [facility] LAB [facility]     Patient is scheduled for the following lab(s): Labs per Santhosh    There is no order available. Please review and place either future orders or HMPO (Review of Health Maintenance Protocol Orders), as appropriate.    Health Maintenance Due   Topic    LIPID     ANNUAL REVIEW OF HM ORDERS      Lamine Maldonado

## 2023-12-07 ENCOUNTER — TELEPHONE (OUTPATIENT)
Dept: FAMILY MEDICINE | Facility: CLINIC | Age: 63
End: 2023-12-07
Payer: COMMERCIAL

## 2023-12-07 NOTE — TELEPHONE ENCOUNTER
General Call    Contacts         Type Contact Phone/Fax    12/07/2023 04:20 PM CST Phone (Incoming) Lidia Nam (Self) [phone] ()          Reason for Call:  FYI    What are your questions or concerns:  Patient wanted  to know she's been in the hospital for the last two months which is why she had missed her lab appt that was scheduled back on 11/6.       Okay to leave a detailed message?: Yes at Home number on file [phone] (home)

## 2024-01-12 ENCOUNTER — TELEPHONE (OUTPATIENT)
Dept: FAMILY MEDICINE | Facility: CLINIC | Age: 64
End: 2024-01-12
Payer: COMMERCIAL
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
## 2024-01-22 ENCOUNTER — TELEPHONE (OUTPATIENT)
Dept: FAMILY MEDICINE | Facility: CLINIC | Age: 64
End: 2024-01-22
Payer: COMMERCIAL

## 2024-01-22 NOTE — TELEPHONE ENCOUNTER
Patient Quality Outreach    Patient is due for the following:   Breast Cancer Screening - Mammogram    Next Steps:   Schedule a Adult Preventative    Type of outreach:    Phone, spoke to patient/parent. Spoke with patient; she declined to schedule.      Questions for provider review:    None           Andrea Behrend

## 2024-01-26 ENCOUNTER — OFFICE VISIT (OUTPATIENT)
Dept: FAMILY MEDICINE | Facility: CLINIC | Age: 64
End: 2024-01-26
Payer: COMMERCIAL

## 2024-01-26 VITALS
HEART RATE: 102 BPM | OXYGEN SATURATION: 98 % | TEMPERATURE: 98.1 F | BODY MASS INDEX: 34.04 KG/M2 | RESPIRATION RATE: 18 BRPM | DIASTOLIC BLOOD PRESSURE: 77 MMHG | SYSTOLIC BLOOD PRESSURE: 124 MMHG | WEIGHT: 185 LBS | HEIGHT: 62 IN

## 2024-01-26 DIAGNOSIS — R60.0 BILATERAL LOWER EXTREMITY EDEMA: ICD-10-CM

## 2024-01-26 DIAGNOSIS — E03.9 HYPOTHYROIDISM, UNSPECIFIED TYPE: ICD-10-CM

## 2024-01-26 DIAGNOSIS — R33.9 URINARY RETENTION: ICD-10-CM

## 2024-01-26 DIAGNOSIS — Z09 HOSPITAL DISCHARGE FOLLOW-UP: Primary | ICD-10-CM

## 2024-01-26 DIAGNOSIS — E11.65 TYPE 2 DIABETES MELLITUS WITH HYPERGLYCEMIA, WITHOUT LONG-TERM CURRENT USE OF INSULIN (H): ICD-10-CM

## 2024-01-26 DIAGNOSIS — M72.6 NECROTIZING FASCIITIS (H): ICD-10-CM

## 2024-01-26 PROBLEM — E66.01 MORBID OBESITY (H): Status: RESOLVED | Noted: 2022-09-07 | Resolved: 2024-01-26

## 2024-01-26 LAB
ALBUMIN SERPL BCG-MCNC: 3.9 G/DL (ref 3.5–5.2)
ALP SERPL-CCNC: 83 U/L (ref 40–150)
ALT SERPL W P-5'-P-CCNC: 13 U/L (ref 0–50)
ANION GAP SERPL CALCULATED.3IONS-SCNC: 10 MMOL/L (ref 7–15)
AST SERPL W P-5'-P-CCNC: 16 U/L (ref 0–45)
BILIRUB SERPL-MCNC: 0.2 MG/DL
BUN SERPL-MCNC: 8.5 MG/DL (ref 8–23)
CALCIUM SERPL-MCNC: 10 MG/DL (ref 8.8–10.2)
CHLORIDE SERPL-SCNC: 103 MMOL/L (ref 98–107)
CHOLEST SERPL-MCNC: 219 MG/DL
CREAT SERPL-MCNC: 0.53 MG/DL (ref 0.51–0.95)
CREAT UR-MCNC: 18.9 MG/DL
DEPRECATED HCO3 PLAS-SCNC: 27 MMOL/L (ref 22–29)
EGFRCR SERPLBLD CKD-EPI 2021: >90 ML/MIN/1.73M2
FASTING STATUS PATIENT QL REPORTED: ABNORMAL
GLUCOSE SERPL-MCNC: 139 MG/DL (ref 70–99)
HBA1C MFR BLD: 6.3 % (ref 0–5.6)
HDLC SERPL-MCNC: 48 MG/DL
LDLC SERPL CALC-MCNC: 153 MG/DL
MICROALBUMIN UR-MCNC: <12 MG/L
MICROALBUMIN/CREAT UR: NORMAL MG/G{CREAT}
NONHDLC SERPL-MCNC: 171 MG/DL
POTASSIUM SERPL-SCNC: 4.6 MMOL/L (ref 3.4–5.3)
PROT SERPL-MCNC: 7.3 G/DL (ref 6.4–8.3)
SODIUM SERPL-SCNC: 140 MMOL/L (ref 135–145)
TRIGL SERPL-MCNC: 90 MG/DL
TSH SERPL DL<=0.005 MIU/L-ACNC: 2.32 UIU/ML (ref 0.3–4.2)

## 2024-01-26 PROCEDURE — 82043 UR ALBUMIN QUANTITATIVE: CPT | Performed by: FAMILY MEDICINE

## 2024-01-26 PROCEDURE — 36415 COLL VENOUS BLD VENIPUNCTURE: CPT | Performed by: FAMILY MEDICINE

## 2024-01-26 PROCEDURE — 82570 ASSAY OF URINE CREATININE: CPT | Performed by: FAMILY MEDICINE

## 2024-01-26 PROCEDURE — 80053 COMPREHEN METABOLIC PANEL: CPT | Performed by: FAMILY MEDICINE

## 2024-01-26 PROCEDURE — 84443 ASSAY THYROID STIM HORMONE: CPT | Performed by: FAMILY MEDICINE

## 2024-01-26 PROCEDURE — 99214 OFFICE O/P EST MOD 30 MIN: CPT | Performed by: FAMILY MEDICINE

## 2024-01-26 PROCEDURE — 83036 HEMOGLOBIN GLYCOSYLATED A1C: CPT | Performed by: FAMILY MEDICINE

## 2024-01-26 PROCEDURE — 80061 LIPID PANEL: CPT | Performed by: FAMILY MEDICINE

## 2024-01-26 RX ORDER — POLYETHYLENE GLYCOL 3350 17 G/17G
17 POWDER, FOR SOLUTION ORAL DAILY PRN
COMMUNITY
Start: 2023-12-29

## 2024-01-26 RX ORDER — THIAMINE MONONITRATE (VIT B1) 100 MG
100 TABLET ORAL DAILY
COMMUNITY
Start: 2023-12-29 | End: 2024-02-14

## 2024-01-26 RX ORDER — TAMSULOSIN HYDROCHLORIDE 0.4 MG/1
0.4 CAPSULE ORAL DAILY
COMMUNITY
Start: 2024-01-05 | End: 2024-01-26

## 2024-01-26 RX ORDER — TAMSULOSIN HYDROCHLORIDE 0.4 MG/1
0.4 CAPSULE ORAL EVERY EVENING
Qty: 30 CAPSULE | Refills: 4 | Status: SHIPPED | OUTPATIENT
Start: 2024-01-26 | End: 2024-06-27

## 2024-01-26 RX ORDER — TAMSULOSIN HYDROCHLORIDE 0.4 MG/1
CAPSULE ORAL
Qty: 90 CAPSULE | OUTPATIENT
Start: 2024-01-26

## 2024-01-26 ASSESSMENT — ENCOUNTER SYMPTOMS
WOUND: 1
COUGH: 0
CHILLS: 0
DYSURIA: 0
TROUBLE SWALLOWING: 1
VOMITING: 0
PSYCHIATRIC NEGATIVE: 1
BRUISES/BLEEDS EASILY: 0
FEVER: 0
PALPITATIONS: 0
EYE PAIN: 0
UNEXPECTED WEIGHT CHANGE: 1
ABDOMINAL PAIN: 0
BACK PAIN: 0
SEIZURES: 0
COLOR CHANGE: 0
SORE THROAT: 0
SHORTNESS OF BREATH: 0
HEMATURIA: 0
ARTHRALGIAS: 0

## 2024-01-26 NOTE — PROGRESS NOTES
"  1. Hospital discharge follow-up  2. Necrotizing fasciitis (H)  This is a 62 yo female here for hospital/U follow up.    Since October 2, 2023, she has been at [facility] ([facility]), [facility] ([facility]).  Discharged home 9 days ago.  Reports symptoms started with small \"pimple\" on left vulva.  She warm packed this lesion and it started draining.  Two days later, she was septic and had necrotizing fasciitis.  She underwent multiple surgeries and finally, skin grafting to left lower extremity.  She is recovering now.  Still has some open areas posterior to left knee and in groin/perineum.  Has follow up early next week .  The remainder of her wounds look great!  Continue to manage her diabetes (to promote wound healing).      3. Urinary retention  Patient had a Rdz/indwelling catheter x 3 months.  Now, still having difficulty with bladder - mild dribbling, 1 episode of incontinence.  Will continue Tamsulosin until symptoms have improved.    - tamsulosin (FLOMAX) 0.4 MG capsule; Take 1 capsule (0.4 mg) by mouth every evening  Dispense: 30 capsule; Refill: 4    4. Type 2 diabetes mellitus with hyperglycemia, without long-term current use of insulin (H)  Patient has h/o type II DM - was off her Metformin - now back on 1 Metformin daily; no further insulin therapy.  Reports her numbers are \"fine\", but will need some followup.  Will check labs to establish new baseline as outpatient.  Continue to follow.    - Lipid panel reflex to direct LDL Non-fasting; Future  - HEMOGLOBIN A1C; Future  - Albumin Random Urine Quantitative with Creat Ratio; Future  - Comprehensive metabolic panel (BMP + Alb, Alk Phos, ALT, AST, Total. Bili, TP); Future  - Albumin Random Urine Quantitative with Creat Ratio  - Lipid panel reflex to direct LDL Non-fasting  - HEMOGLOBIN A1C  - Comprehensive metabolic panel (BMP + Alb, Alk Phos, ALT, AST, Total. Bili, TP)    5. Hypothyroidism, unspecified type  H/o severely enlarge " "thyroid gland - will recheck TSH  - TSH; Future  - TSH    6. Bilateral lower extremity edema  Mild bilateral lower extremity edeam - check labs -   - Comprehensive metabolic panel (BMP + Alb, Alk Phos, ALT, AST, Total. Bili, TP); Future  - Comprehensive metabolic panel (BMP + Alb, Alk Phos, ALT, AST, Total. Bili, TP)      Tara Murillo is a 63 year old, presenting for the following health issues:  Hospital F/U        1/26/2024     9:47 AM   Additional Questions   Roomed by Benjamin davila - for therapies    Still training her bladder - had Rdz x 3 months -   Pulled catheter just before [facility] -   Was there x 13 days prior to home - home since 1/17/2023    Had EKG/echo - normal -     Prior to hospitalization had cyst/ingrown hair on left labia - did warm packs - within 48 hours of opening , had red painful groin/inner thigh, butt,down her leg and then confusion set in - septic -    found her laying on floor -   Felt \"not quite right\", and sat down on floor -   Woke up and thought she could see the ceiling but couldn't get up or move  Got blanket/gurneyed out of house to ambulance    [place] - 10/2/2023 - opened everything up , wanted to go back and relook -   Took days - was intubated x days - couldn't move - finally after days was able to communicate - once she was extubated  \"No more surgeries\" -   [facility] for skin grafts  [facility]  home    Left leg - from left groin/labia/posterior leg   Has dressing behind knee and in perineum    Morning meds:  Multivitamin  Folic acid   Vitamin B1 -   Lyrica - TID post op - at [facility], was prn - had pain nurse - cut down oxycodone - restarted Lyrica 25 mg BID (has plenty for now)  Metformin - 500 mg ER - just once a day - when hospital sent her to somewhere, they had stopped her Metformin because A1c was \"good\" - but got restarted on once a day -   New:  Tamsulosin 0.4 mg daily - should take it until her " bladder is back in shape  Since home, has only had one major incontinence episode; otherwise a little dribbling   Oxycodone - 5 mg - was to send her home with #12, got #10 (under wrong patient's name)  Has been home 9 days, taken 4 pills  Used to help her relax - still has to get up and void every 1 to 1-1/2 hours    Has follow up [facility] on Monday  Will have  -   Will have outpatient therapy - to [facility] for outpatient therapy - will decide how often -   At home, bathroom is upstairs -   Working on cleaning at home -                Hospital Follow-up Visit:    Hospital/Nursing Home/IP Rehab Facility:  [facility]  Date of Admission: 11/10/23  Date of Discharge: 12/1-/23  Reason(s) for Admission: Open Wound of left thigh    Was your hospitalization related to COVID-19? No   Problems taking medications regularly:  None  Medication changes since discharge: reviewed  Problems adhering to non-medication therapy:  None    Summary of hospitalization:  [facility] discharge summary reviewed  Discharge summaries of multiple facilities  Diagnostic Tests/Treatments reviewed.  Follow up needed: none  Other Healthcare Providers Involved in Patient s Care:         Specialist appointment - [facility] - burn/wound care; therapy   Update since discharge: improved.         Plan of care communicated with patient                 Review of Systems   Constitutional:  Positive for unexpected weight change (due to severe illness). Negative for chills and fever.   HENT:  Positive for trouble swallowing (due to severely enlarged thyroid gland). Negative for ear pain and sore throat.    Eyes:  Negative for pain and visual disturbance.   Respiratory:  Negative for cough and shortness of breath.    Cardiovascular:  Negative for chest pain and palpitations.   Gastrointestinal:  Negative for abdominal pain and vomiting.   Genitourinary:  Negative for dysuria and hematuria.   Musculoskeletal:  " Negative for arthralgias and back pain.   Skin:  Positive for wound (left groin/perineum/left posterior leg; skin graft donor site left anterior thigh). Negative for color change and rash.   Neurological:  Negative for seizures and syncope.   Hematological:  Does not bruise/bleed easily.   Psychiatric/Behavioral: Negative.     All other systems reviewed and are negative.          Objective    /77 (BP Location: Left arm, Patient Position: Sitting, Cuff Size: Adult Regular)   Pulse 102   Temp 98.1  F (36.7  C) (Temporal)   Resp 18   Ht 1.575 m (5' 2.01\")   Wt 83.9 kg (185 lb)   LMP  (LMP Unknown)   SpO2 98%   BMI 33.83 kg/m    Body mass index is 33.83 kg/m .  Physical Exam  Vitals reviewed.   Constitutional:       General: She is not in acute distress.     Appearance: Normal appearance.   HENT:      Head: Normocephalic.      Right Ear: Tympanic membrane, ear canal and external ear normal.      Left Ear: Tympanic membrane, ear canal and external ear normal.      Nose: Nose normal.      Mouth/Throat:      Mouth: Mucous membranes are moist.      Pharynx: No posterior oropharyngeal erythema.   Eyes:      Extraocular Movements: Extraocular movements intact.      Conjunctiva/sclera: Conjunctivae normal.      Pupils: Pupils are equal, round, and reactive to light.   Neck:      Comments: Very enlarged thyroid gland - nodular    Cardiovascular:      Rate and Rhythm: Normal rate and regular rhythm.      Pulses: Normal pulses.      Heart sounds: Normal heart sounds. No murmur heard.  Pulmonary:      Effort: Pulmonary effort is normal.      Breath sounds: Normal breath sounds.   Abdominal:      Palpations: Abdomen is soft. There is no mass.      Tenderness: There is no abdominal tenderness. There is no guarding or rebound.   Musculoskeletal:         General: No deformity. Normal range of motion.      Cervical back: Normal range of motion and neck supple.   Lymphadenopathy:      Cervical: No cervical adenopathy. "   Skin:     General: Skin is warm and dry.      Findings: Lesion (grafted wound from left groin/perineum/posterior leg; large skin donor site anterior left thigh - some open area posterior left knee and in groin/perineum) present.   Neurological:      General: No focal deficit present.      Mental Status: She is alert.      Motor: Weakness (generalized) present.      Gait: Gait abnormal (using walker for stamina).   Psychiatric:         Mood and Affect: Mood normal.         Behavior: Behavior normal.      Comments: In good spirits today              Results for orders placed or performed in visit on 01/26/24   HEMOGLOBIN A1C     Status: Abnormal   Result Value Ref Range    Hemoglobin A1C 6.3 (H) 0.0 - 5.6 %       Prior to immunization administration, verified patients identity using patient s name and date of birth. Please see Immunization Activity for additional information.     Screening Questionnaire for Adult Immunization    Are you sick today?   No   Do you have allergies to medications, food, a vaccine component or latex?   Yes   Have you ever had a serious reaction after receiving a vaccination?   No   Do you have a long-term health problem with heart, lung, kidney, or metabolic disease (e.g., diabetes), asthma, a blood disorder, no spleen, complement component deficiency, a cochlear implant, or a spinal fluid leak?  Are you on long-term aspirin therapy?   Yes   Do you have cancer, leukemia, HIV/AIDS, or any other immune system problem?   No   Do you have a parent, brother, or sister with an immune system problem?   No   In the past 3 months, have you taken medications that affect  your immune system, such as prednisone, other steroids, or anticancer drugs; drugs for the treatment of rheumatoid arthritis, Crohn s disease, or psoriasis; or have you had radiation treatments?   Yes   Have you had a seizure, or a brain or other nervous system problem?   No   During the past year, have you received a transfusion  of blood or blood    products, or been given immune (gamma) globulin or antiviral drug?   Don't Know   For women: Are you pregnant or is there a chance you could become       pregnant during the next month?   No   Have you received any vaccinations in the past 4 weeks?   Yes     Immunization questionnaire was positive for at least one answer.  Notified Dr Trevizo.      Patient instructed to remain in clinic for 15 minutes afterwards, and to report any adverse reactions.     Screening performed by Benjamin Guzman on 1/26/2024 at 9:56 AM.     Signed Electronically by: RUSSELL DELGADILLO MD

## 2024-01-26 NOTE — LETTER
January 28, 2024      Lidia Nam  [address] [place] [id number]        Dear ,    We are writing to inform you of your test results.    Your labs look pretty good - except your cholesterol, which is still high (better than previous, but still high).  The decrease in cholesterol likely reflects the weight loss and prolonged illness.  We will continue to follow this - if it remains high will need cholesterol lowering therapy.      Resulted Orders   Albumin Random Urine Quantitative with Creat Ratio   Result Value Ref Range    Creatinine Urine mg/dL 18.9 mg/dL      Comment:      The reference ranges have not been established in urine creatinine. The results should be integrated into the clinical context for interpretation.    Albumin Urine mg/L <12.0 mg/L      Comment:      The reference ranges have not been established in urine albumin. The results should be integrated into the clinical context for interpretation.    Albumin Urine mg/g Cr        Comment:      Unable to calculate, urine albumin and/or urine creatinine is outside detectable limits.  Microalbuminuria is defined as an albumin:creatinine ratio of 17 to 299 for males and 25 to 299 for females. A ratio of albumin:creatinine of 300 or higher is indicative of overt proteinuria.  Due to biologic variability, positive results should be confirmed by a second, first-morning random or 24-hour timed urine specimen. If there is discrepancy, a third specimen is recommended. When 2 out of 3 results are in the microalbuminuria range, this is evidence for incipient nephropathy and warrants increased efforts at glucose control, blood pressure control, and institution of therapy with an angiotensin-converting-enzyme (ACE) inhibitor (if the patient can tolerate it).     Lipid panel reflex to direct LDL Non-fasting   Result Value Ref Range    Cholesterol 219 (H) <200 mg/dL    Triglycerides 90 <150 mg/dL    Direct Measure HDL 48 (L) >=50 mg/dL    LDL  Cholesterol Calculated 153 (H) <=100 mg/dL    Non HDL Cholesterol 171 (H) <130 mg/dL    Patient Fasting > 8hrs? Unknown     Narrative    Cholesterol  Desirable:  <200 mg/dL    Triglycerides  Normal:  Less than 150 mg/dL  Borderline High:  150-199 mg/dL  High:  200-499 mg/dL  Very High:  Greater than or equal to 500 mg/dL    Direct Measure HDL  Female:  Greater than or equal to 50 mg/dL   Male:  Greater than or equal to 40 mg/dL    LDL Cholesterol  Desirable:  <100mg/dL  Above Desirable:  100-129 mg/dL   Borderline High:  130-159 mg/dL   High:  160-189 mg/dL   Very High:  >= 190 mg/dL    Non HDL Cholesterol  Desirable:  130 mg/dL  Above Desirable:  130-159 mg/dL  Borderline High:  160-189 mg/dL  High:  190-219 mg/dL  Very High:  Greater than or equal to 220 mg/dL   HEMOGLOBIN A1C   Result Value Ref Range    Hemoglobin A1C 6.3 (H) 0.0 - 5.6 %      Comment:      Normal <5.7%   Prediabetes 5.7-6.4%    Diabetes 6.5% or higher     Note: Adopted from ADA consensus guidelines.   Comprehensive metabolic panel (BMP + Alb, Alk Phos, ALT, AST, Total. Bili, TP)   Result Value Ref Range    Sodium 140 135 - 145 mmol/L      Comment:      Reference intervals for this test were updated on 09/26/2023 to more accurately reflect our healthy population. There may be differences in the flagging of prior results with similar values performed with this method. Interpretation of those prior results can be made in the context of the updated reference intervals.     Potassium 4.6 3.4 - 5.3 mmol/L    Carbon Dioxide (CO2) 27 22 - 29 mmol/L    Anion Gap 10 7 - 15 mmol/L    Urea Nitrogen 8.5 8.0 - 23.0 mg/dL    Creatinine 0.53 0.51 - 0.95 mg/dL    GFR Estimate >90 >60 mL/min/1.73m2    Calcium 10.0 8.8 - 10.2 mg/dL    Chloride 103 98 - 107 mmol/L    Glucose 139 (H) 70 - 99 mg/dL    Alkaline Phosphatase 83 40 - 150 U/L      Comment:      Reference intervals for this test were updated on 11/14/2023 to more accurately reflect our healthy population.  There may be differences in the flagging of prior results with similar values performed with this method. Interpretation of those prior results can be made in the context of the updated reference intervals.    AST 16 0 - 45 U/L      Comment:      Reference intervals for this test were updated on 6/12/2023 to more accurately reflect our healthy population. There may be differences in the flagging of prior results with similar values performed with this method. Interpretation of those prior results can be made in the context of the updated reference intervals.    ALT 13 0 - 50 U/L      Comment:      Reference intervals for this test were updated on 6/12/2023 to more accurately reflect our healthy population. There may be differences in the flagging of prior results with similar values performed with this method. Interpretation of those prior results can be made in the context of the updated reference intervals.      Protein Total 7.3 6.4 - 8.3 g/dL    Albumin 3.9 3.5 - 5.2 g/dL    Bilirubin Total 0.2 <=1.2 mg/dL   TSH   Result Value Ref Range    TSH 2.32 0.30 - 4.20 uIU/mL       If you have any questions or concerns, please call the clinic at the number listed above.       Sincerely,      Ana Goetz MD

## 2024-01-29 ENCOUNTER — TELEPHONE (OUTPATIENT)
Dept: FAMILY MEDICINE | Facility: CLINIC | Age: 64
End: 2024-01-29
Payer: COMMERCIAL

## 2024-02-14 ENCOUNTER — OFFICE VISIT (OUTPATIENT)
Dept: FAMILY MEDICINE | Facility: CLINIC | Age: 64
End: 2024-02-14
Payer: COMMERCIAL

## 2024-02-14 VITALS
BODY MASS INDEX: 34.41 KG/M2 | WEIGHT: 187 LBS | SYSTOLIC BLOOD PRESSURE: 128 MMHG | OXYGEN SATURATION: 98 % | HEIGHT: 62 IN | RESPIRATION RATE: 20 BRPM | DIASTOLIC BLOOD PRESSURE: 75 MMHG | HEART RATE: 75 BPM | TEMPERATURE: 97.3 F

## 2024-02-14 DIAGNOSIS — Z12.31 VISIT FOR SCREENING MAMMOGRAM: ICD-10-CM

## 2024-02-14 DIAGNOSIS — M72.6 NECROTIZING FASCIITIS (H): ICD-10-CM

## 2024-02-14 DIAGNOSIS — Z12.11 SCREEN FOR COLON CANCER: ICD-10-CM

## 2024-02-14 DIAGNOSIS — M79.89 NECROTIZING SOFT TISSUE INFECTION: ICD-10-CM

## 2024-02-14 DIAGNOSIS — Z00.00 ADULT GENERAL MEDICAL EXAM: Primary | ICD-10-CM

## 2024-02-14 DIAGNOSIS — F17.211 CIGARETTE NICOTINE DEPENDENCE IN REMISSION: ICD-10-CM

## 2024-02-14 DIAGNOSIS — B37.2 CANDIDAL INTERTRIGO: ICD-10-CM

## 2024-02-14 DIAGNOSIS — E11.65 TYPE 2 DIABETES MELLITUS WITH HYPERGLYCEMIA, WITHOUT LONG-TERM CURRENT USE OF INSULIN (H): ICD-10-CM

## 2024-02-14 DIAGNOSIS — R33.9 URINARY RETENTION: ICD-10-CM

## 2024-02-14 DIAGNOSIS — E03.9 HYPOTHYROIDISM, UNSPECIFIED TYPE: ICD-10-CM

## 2024-02-14 PROCEDURE — 99396 PREV VISIT EST AGE 40-64: CPT | Performed by: FAMILY MEDICINE

## 2024-02-14 PROCEDURE — 99213 OFFICE O/P EST LOW 20 MIN: CPT | Mod: 25 | Performed by: FAMILY MEDICINE

## 2024-02-14 PROCEDURE — 99207 PR FOOT EXAM NO CHARGE: CPT | Performed by: FAMILY MEDICINE

## 2024-02-14 RX ORDER — PREGABALIN 25 MG/1
25 CAPSULE ORAL 3 TIMES DAILY
Qty: 90 CAPSULE | Refills: 0 | Status: SHIPPED | OUTPATIENT
Start: 2024-02-14 | End: 2024-03-18

## 2024-02-14 RX ORDER — CLOTRIMAZOLE 1 %
CREAM (GRAM) TOPICAL 2 TIMES DAILY
Qty: 60 G | Refills: 1 | Status: SHIPPED | OUTPATIENT
Start: 2024-02-14 | End: 2024-05-05

## 2024-02-14 SDOH — HEALTH STABILITY: PHYSICAL HEALTH: ON AVERAGE, HOW MANY MINUTES DO YOU ENGAGE IN EXERCISE AT THIS LEVEL?: 10 MIN

## 2024-02-14 SDOH — HEALTH STABILITY: PHYSICAL HEALTH: ON AVERAGE, HOW MANY DAYS PER WEEK DO YOU ENGAGE IN MODERATE TO STRENUOUS EXERCISE (LIKE A BRISK WALK)?: 2 DAYS

## 2024-02-14 ASSESSMENT — SOCIAL DETERMINANTS OF HEALTH (SDOH): HOW OFTEN DO YOU GET TOGETHER WITH FRIENDS OR RELATIVES?: ONCE A WEEK

## 2024-02-14 NOTE — PROGRESS NOTES
Preventive Care Visit  [facility]  RUSSELL K MD LEONA, Family Medicine  Feb 14, 2024    1. Adult general medical exam  This is a 64 yo female here for physical exam     2. Type 2 diabetes mellitus with hyperglycemia, without long-term current use of insulin (H)  H/o type II DM - generally controlled at this time ; foot exam performed - reviewed most recent labs/glucose monitoring   - FOOT EXAM    3. Necrotizing soft tissue infection  Patient continues to recover from necrotizing soft tissue infection involving lower left abdomen/groin/left leg - pain generally controlled now by Lyrica   - pregabalin (LYRICA) 25 MG capsule; Take 1 capsule (25 mg) by mouth 3 times daily  Dispense: 90 capsule; Refill: 0    4. Screen for colon cancer  Due for colon cancer screening - not ready to do this yet     5. Visit for screening mammogram  Due for breast cancer screening - discussed - declines -     6. Candidal intertrigo  Still has some candidal intertrigo as well - add Clotrimazole cream   - clotrimazole (LOTRIMIN) 1 % external cream; Apply topically 2 times daily  Dispense: 60 g; Refill: 1    7. Urinary retention  Urinary retention - began with severe illness, necessitating prolonged catheterization - improving     8. Necrotizing fasciitis (H)  As above - improving/healing     9. Hypothyroidism, unspecified type  Large goiter with hypothyroidism - improving with replacement therapy -     10. Cigarette nicotine dependence in remission  Patient no longer smoking - continue to encourage cessation -     Tara Murillo is a 63 year old, presenting for the following:  Physical        2/14/2024     6:57 AM   Additional Questions   Roomed by [facility] Care Directive  Patient does not have a Health Care Directive or Living Will: no written documents     Bladder - better  Sugars - doing well - doesn't check  Cholesterol - doesn't want medication     Had gone down to Lyrica 25 mg  BID  Having more pain with therapy -   Therapy is twice weekly - rubbing on legs really hurts -   Would like to increase Lyrica to TID  Ibuprofen prn , tylenol prn              2/14/2024   General Health   How would you rate your overall physical health? Good   Feel stress (tense, anxious, or unable to sleep) Not at all         2/14/2024   Nutrition   Three or more servings of calcium each day? Yes   Diet: Diabetic    Carbohydrate counting   How many servings of fruit and vegetables per day? (!) 2-3   How many sweetened beverages each day? (!) 2         2/14/2024   Exercise   Days per week of moderate/strenous exercise 2 days   Average minutes spent exercising at this level 10 min   (!) EXERCISE CONCERN      2/14/2024   Social Factors   Frequency of gathering with friends or relatives Once a week   Worry food won't last until get money to buy more No   Food not last or not have enough money for food? Yes   Do you have housing?  Yes   Are you worried about losing your housing? No   Lack of transportation? No   Unable to get utilities (heat,electricity)? No   (!) FOOD SECURITY CONCERN PRESENT      2/14/2024   Fall Risk   Fallen 2 or more times in the past year? No   Trouble with walking or balance? Yes          2/14/2024   Dental   Dentist two times every year? (!) NO         2/14/2024   TB Screening   Were you born outside of [place]?  No           Today's PHQ-2 Score:       1/26/2024     9:33 AM   PHQ-2 ( 1999 [employer])   Q1: Little interest or pleasure in doing things 0   Q2: Feeling down, depressed or hopeless 1   PHQ-2 Score 1   Q1: Little interest or pleasure in doing things Not at all   Q2: Feeling down, depressed or hopeless Several days   PHQ-2 Score 1         2/14/2024   Substance Use   Alcohol more than 3/day or more than 7/wk No   Do you use any other substances recreationally? (!) ALCOHOL     Social History     Tobacco Use    Smoking status: Former     Packs/day: 1.00     Years: 39.00     Additional pack years:  0.00     Total pack years: 39.00     Types: Cigarettes    Smokeless tobacco: Never   Vaping Use    Vaping Use: Never used   Substance Use Topics    Alcohol use: Yes             2/14/2024   Breast Cancer Screening   Family history of breast, colon, or ovarian cancer? No / Unknown      Mammogram Screening - Mammogram every 1-2 years updated in Health Maintenance based on mutual decision making        2/14/2024   STI Screening   New sexual partner(s) since last STI/HIV test? No     History of abnormal Pap smear: NO - age 30-65 PAP every 5 years with negative HPV co-testing recommended        Latest Ref Rng & Units 9/7/2022    10:49 AM   PAP / HPV   PAP  Negative for Intraepithelial Lesion or Malignancy (NILM)    HPV 16 DNA Negative Negative    HPV 18 DNA Negative Negative    Other HR HPV Negative Negative      The 10-year ASCVD risk score (Fernando SERNA, et al., 2019) is: 8.8%    Values used to calculate the score:      Age: 63 years      Sex: Female      Is Non- : No      Diabetic: Yes      Tobacco smoker: No      Systolic Blood Pressure: 128 mmHg      Is BP treated: No      HDL Cholesterol: 48 mg/dL      Total Cholesterol: 219 mg/dL           Reviewed and updated as needed this visit by Provider                    No past medical history on file.  Past Surgical History:   Procedure Laterality Date    EXCISE LESION PERINEAL N/A 10/2/2023    Procedure: vulvectomy;  Surgeon: Ken Howard MD;  Location: [facility] OR    INCISION AND DRAINAGE LOWER EXTREMITY, COMBINED Left 10/4/2023    Procedure: INCISION AND DRAINAGE, LEFT LEG;  Surgeon: Julito Molina MD;  Location: [facility] OR    INCISION AND DRAINAGE PERINEAL, COMBINED Left 10/4/2023    Procedure: INCISION AND DRAINAGE, PERINEUM AND;  Surgeon: Julito Molina MD;  Location: [facility] OR    IRRIGATION AND DEBRIDEMENT LOWER EXTREMITY, COMBINED Left 10/2/2023    Procedure: and left thigh;  Surgeon: Anita  Ken Beckett MD;  Location: [facility] OR    IRRIGATION AND DEBRIDEMENT SACRAL WOUND, COMBINED Left 10/2/2023    Procedure: Broad debridment of left buttock;  Surgeon: Ken Howard MD;  Location: [facility] OR    PICC TRIPLE LUMEN PLACEMENT  10/3/2023    ZZC APPENDECTOMY      Description: Appendectomy;  Recorded: 11/21/2008;    ZZC LIGATE FALLOPIAN TUBE      Description: Tubal Ligation;  Recorded: 11/21/2008;     OB History   No obstetric history on file.     BP Readings from Last 3 Encounters:   02/14/24 128/75   01/26/24 124/77   10/27/23 96/56    Wt Readings from Last 3 Encounters:   02/14/24 84.8 kg (187 lb)   01/26/24 83.9 kg (185 lb)   10/26/23 81.3 kg (179 lb 3.2 oz)                  Patient Active Problem List   Diagnosis    Obesity    Nicotine Dependence    Urticaria    Allergies    Thyromegaly    Nicotine dependence    Type 2 diabetes mellitus with hyperglycemia, without long-term current use of insulin (H)    Hard to intubate    Severe sepsis (H)    Necrotizing soft tissue infection    Septic shock (H)    Necrotizing fasciitis (H)     Past Surgical History:   Procedure Laterality Date    EXCISE LESION PERINEAL N/A 10/2/2023    Procedure: vulvectomy;  Surgeon: Ken Howard MD;  Location: [facility] OR    INCISION AND DRAINAGE LOWER EXTREMITY, COMBINED Left 10/4/2023    Procedure: INCISION AND DRAINAGE, LEFT LEG;  Surgeon: Julito Molina MD;  Location: [facility] OR    INCISION AND DRAINAGE PERINEAL, COMBINED Left 10/4/2023    Procedure: INCISION AND DRAINAGE, PERINEUM AND;  Surgeon: Julito Molina MD;  Location: [facility] OR    IRRIGATION AND DEBRIDEMENT LOWER EXTREMITY, COMBINED Left 10/2/2023    Procedure: and left thigh;  Surgeon: Ken Howard MD;  Location: [facility] OR    IRRIGATION AND DEBRIDEMENT SACRAL WOUND, COMBINED Left 10/2/2023    Procedure: Broad debridment of left buttock;  Surgeon: Ken Howard MD;   Location: [facility] OR    [facility] TRIPLE LUMEN PLACEMENT  10/3/2023    [facility] APPENDECTOMY      Description: Appendectomy;  Recorded: 11/21/2008;    ZZC LIGATE FALLOPIAN TUBE      Description: Tubal Ligation;  Recorded: 11/21/2008;       Social History     Tobacco Use    Smoking status: Former     Packs/day: 1.00     Years: 39.00     Additional pack years: 0.00     Total pack years: 39.00     Types: Cigarettes    Smokeless tobacco: Never   Substance Use Topics    Alcohol use: Yes     No family history on file.      Current Outpatient Medications   Medication Sig Dispense Refill    acetaminophen (TYLENOL) 325 MG tablet Take 2 tablets (650 mg) by mouth every 4 hours as needed for other (For optimal non-opioid multimodal pain management to improve pain control.)      clotrimazole (LOTRIMIN) 1 % external cream Apply topically 2 times daily 60 g 1    levothyroxine (SYNTHROID/LEVOTHROID) 112 MCG tablet Take 1 tablet (112 mcg) by mouth daily 90 tablet 1    metFORMIN (GLUCOPHAGE XR) 500 MG 24 hr tablet Take 2 tablets (1,000 mg) by mouth daily (with dinner) (Patient taking differently: Take 500 mg by mouth daily (with dinner)) 180 tablet 1    polyethylene glycol (MIRALAX) 17 GM/Dose powder Take 17 g by mouth daily as needed      pregabalin (LYRICA) 25 MG capsule Take 1 capsule (25 mg) by mouth 3 times daily 90 capsule 0    tamsulosin (FLOMAX) 0.4 MG capsule Take 1 capsule (0.4 mg) by mouth every evening 30 capsule 4     Allergies   Allergen Reactions    Erythromycin     Psyllium     Seasonal Allergies      Hay fever - tree pollens, dust, mold, mildew       Review of Systems   Constitutional:  Positive for fatigue. Negative for chills and fever.   HENT: Negative.     Eyes:  Negative for visual disturbance.   Respiratory:  Negative for cough and shortness of breath.    Cardiovascular:  Negative for chest pain.   Gastrointestinal:  Negative for abdominal pain.   Endocrine: Negative for polydipsia and polyuria.   Genitourinary:   "       Urinary retention       Musculoskeletal:  Positive for arthralgias. Negative for back pain.   Skin:  Positive for wound (large wounds left leg - from anterior left groin/lower abdomen, perineum, posterior left leg with still open area posterior to left knee).   Allergic/Immunologic: Negative.    Neurological: Negative.    Hematological:  Does not bruise/bleed easily.   Psychiatric/Behavioral: Negative.     All other systems reviewed and are negative.         Objective    Exam  /75 (BP Location: Right arm, Patient Position: Sitting, Cuff Size: Adult Regular)   Pulse 75   Temp 97.3  F (36.3  C) (Temporal)   Resp 20   Ht 1.575 m (5' 2\")   Wt 84.8 kg (187 lb)   LMP  (LMP Unknown)   SpO2 98%   BMI 34.20 kg/m     Estimated body mass index is 34.2 kg/m  as calculated from the following:    Height as of this encounter: 1.575 m (5' 2\").    Weight as of this encounter: 84.8 kg (187 lb).    Physical Exam  Vitals reviewed.   Constitutional:       General: She is not in acute distress.     Appearance: Normal appearance.   HENT:      Head: Normocephalic.      Right Ear: Tympanic membrane, ear canal and external ear normal.      Left Ear: Tympanic membrane, ear canal and external ear normal.      Nose: Nose normal.      Mouth/Throat:      Mouth: Mucous membranes are moist.      Pharynx: No posterior oropharyngeal erythema.   Eyes:      Extraocular Movements: Extraocular movements intact.      Conjunctiva/sclera: Conjunctivae normal.      Pupils: Pupils are equal, round, and reactive to light.   Neck:      Comments: Huge goiter  Cardiovascular:      Rate and Rhythm: Normal rate and regular rhythm.      Pulses: Normal pulses.      Heart sounds: Normal heart sounds. No murmur heard.  Pulmonary:      Effort: Pulmonary effort is normal.      Breath sounds: Normal breath sounds.   Abdominal:      Palpations: Abdomen is soft. There is no mass.      Tenderness: There is no abdominal tenderness. There is no guarding or " rebound.   Musculoskeletal:         General: No deformity. Normal range of motion.      Cervical back: Normal range of motion and neck supple.   Lymphadenopathy:      Cervical: No cervical adenopathy.   Skin:     General: Skin is warm and dry.      Comments: Healed skin graft anterior left thigh; healing wound left lower quadrant abdomen, groin, posterior left leg with still open area posterior left knee - no sign of infection    Neurological:      General: No focal deficit present.      Mental Status: She is alert.   Psychiatric:         Mood and Affect: Mood normal.         Behavior: Behavior normal.             Signed Electronically by: RUSSELL DELGADILLO MD      Prior to immunization administration, verified patients identity using patient s name and date of birth. Please see Immunization Activity for additional information.     Screening Questionnaire for Adult Immunization    Are you sick today?   No   Do you have allergies to medications, food, a vaccine component or latex?   Yes   Have you ever had a serious reaction after receiving a vaccination?   No   Do you have a long-term health problem with heart, lung, kidney, or metabolic disease (e.g., diabetes), asthma, a blood disorder, no spleen, complement component deficiency, a cochlear implant, or a spinal fluid leak?  Are you on long-term aspirin therapy?   Yes   Do you have cancer, leukemia, HIV/AIDS, or any other immune system problem?   No   Do you have a parent, brother, or sister with an immune system problem?   No   In the past 3 months, have you taken medications that affect  your immune system, such as prednisone, other steroids, or anticancer drugs; drugs for the treatment of rheumatoid arthritis, Crohn s disease, or psoriasis; or have you had radiation treatments?   Don't Know   Have you had a seizure, or a brain or other nervous system problem?   No   During the past year, have you received a transfusion of blood or blood    products,  or been given immune (gamma) globulin or antiviral drug?   Don't Know   For women: Are you pregnant or is there a chance you could become       pregnant during the next month?   No   Have you received any vaccinations in the past 4 weeks?   No     Immunization questionnaire was positive for at least one answer.  Notified Dr. Trevizo.      Patient instructed to remain in clinic for 15 minutes afterwards, and to report any adverse reactions.     Screening performed by Iwona Og CMA on 2/14/2024 at 7:01 AM.

## 2024-02-14 NOTE — COMMUNITY RESOURCES LIST (ENGLISH)
02/14/2024   [facility]  N/A  For questions about this resource list or additional care needs, please contact your primary care clinic or care manager.  Phone: [phone]   Email: N/A   Address: [address] [place] [address]   Hours: N/A        Food and Nutrition       Food pantry  1  Camille BENAVIDES [facility], [facility] Distance: 0.6 miles      Delivery, Pickup   [address] [place] [address]  Language: English, Amharic  Hours: Mon 9:00 AM - 6:00 PM Appt. Only, Tue - Fri 9:00 AM - 5:00 PM Appt. Only  Fees: Free   Phone: (328) 118-4501 Email: info@[employer] Website: http://www.[employer].org/site     2  [facility] [employer] Distance: 1.3 miles      [facility]   [address] [place] [address]  Language: English  Hours: Mon 9:00 AM - 11:00 AM , Wed 9:00 AM - 11:00 AM , Thu 1:00 PM - 3:00 PM  Fees: Free, Self Pay   Phone: (333) 153-7783 Email: [email or web address].org Website: http://[facility].org/[facility]-[address] [place] [address]/     SNAP application assistance  3  [employer] [place] Distance: 0.69 miles      Phone/Virtual   [address] [facility] [address] [place] [address]  Language: English, Hmong, Salvadorean, Lithuanian, Amharic  Hours: Mon - Fri 8:30 AM - 4:30 PM  Fees: Free   Phone: (774) 984-4759 Email: helpline@[employer].org Website: https://www.[employer].org/programs/[place]-food-helpline/     4  [facility] Distance: 1.39 miles      In-Person, Phone/Virtual   [address] [place] [address]  Language: English  Hours: Mon - Fri 8:00 AM - 4:30 PM  Fees: Free   Phone: (616) 755-5624 Email: syed@[facility].us Website: https://www.[employer].us/your-government/departments/health-and-wellness     Soup kitchen or free meals  5  [facility] [place] [facility] - Free Summer Meals Distance: 1.47 miles      In-Person   [address] [place] [address]  Language: English  Hours: Mon - Fri 12:00 PM - 1:00 PM , Mon - Fri 3:00 PM - 4:00 PM  Fees: Free   Phone: (250) 788-8152 Email: [email or web address]@[email or web address]. Website: https://www.[facility]/departments/brady-recreation/[place]     6  [facility] [place] [facility] Distance: 2.07 miles      [address] [address]  Language: English  Hours: Tue 2:00 PM - 4:00 PM , Thu 2:00 PM - 4:00 PM  Fees: Free   Phone: (858) 919-6246 Email: jose [email or web address]. Website: https://www.[facility]/facilities/ywvdqy-sittaajii-pvfnvc          Important Numbers & Websites       Emergency Services   911  [facility]   311  Poison Control   (147) 468-2536  Suicide Prevention Lifeline   (219) 263-4196 (TALK)  Child Abuse Hotline   (330) 213-1998 (4-A-Child)  Sexual Assault Hotline   (203) 204-2248 (HOPE)  National Runaway Safeline   (665) 452-5069 (RUNAWAY)  All-Options Talkline   (181) 953-9001  Substance Abuse Referral   (512) 473-7855 (HELP)

## 2024-02-25 ASSESSMENT — ENCOUNTER SYMPTOMS
COUGH: 0
NEUROLOGICAL NEGATIVE: 1
SHORTNESS OF BREATH: 0
BACK PAIN: 0
ABDOMINAL PAIN: 0
FATIGUE: 1
ALLERGIC/IMMUNOLOGIC NEGATIVE: 1
POLYDIPSIA: 0
PSYCHIATRIC NEGATIVE: 1
BRUISES/BLEEDS EASILY: 0
ARTHRALGIAS: 1
CHILLS: 0
WOUND: 1
FEVER: 0

## 2024-02-29 ENCOUNTER — TELEPHONE (OUTPATIENT)
Dept: FAMILY MEDICINE | Facility: CLINIC | Age: 64
End: 2024-02-29

## 2024-02-29 NOTE — TELEPHONE ENCOUNTER
General Call    Contacts         Type Contact Phone/Fax    02/29/2024 12:47 PM CST Phone (Incoming) Lidia Nam (Self) [phone] (M)        Reason for Call:  Patient was seen in clinic on 2/14/24 by PCP, Dr Trevizo and wondering when she should return to clinic and also have labs?    Date of last appointment with provider: Dr Trevizo    Okay to leave a detailed message?: Yes at Cell number on file:    Telephone Information:   Mobile [phone]     Message routed to Dr Trevizo fpr review / plan.  Patient aware that Dr Trevizo out of clinic today.    Linh Boone RN  [facility]

## 2024-03-04 DIAGNOSIS — E11.65 TYPE 2 DIABETES MELLITUS WITH HYPERGLYCEMIA, WITHOUT LONG-TERM CURRENT USE OF INSULIN (H): ICD-10-CM

## 2024-03-04 DIAGNOSIS — E03.9 HYPOTHYROIDISM, UNSPECIFIED TYPE: ICD-10-CM

## 2024-03-04 DIAGNOSIS — E01.0 THYROMEGALY: ICD-10-CM

## 2024-03-04 RX ORDER — LEVOTHYROXINE SODIUM 112 UG/1
112 TABLET ORAL DAILY
Qty: 90 TABLET | Refills: 1 | Status: SHIPPED | OUTPATIENT
Start: 2024-03-04 | End: 2024-08-30

## 2024-03-04 RX ORDER — METFORMIN HCL 500 MG
1000 TABLET, EXTENDED RELEASE 24 HR ORAL
Qty: 180 TABLET | Refills: 1 | Status: SHIPPED | OUTPATIENT
Start: 2024-03-04 | End: 2024-09-30

## 2024-03-04 NOTE — TELEPHONE ENCOUNTER
Medication Question or Refill        What medication are you calling about (include dose and sig)?: levothyroxine (SYNTHROID/LEVOTHROID) 112 MCG tablet  metFORMIN (GLUCOPHAGE XR) 500 MG 24 hr tablet      Preferred Pharmacy:      [id number] - [place], [place] - [address] [place] [address] AT [facility] [address]  [address] [place] [address]  Phone: [phone] Fax: [phone]        Controlled Substance Agreement on file:   CSA -- Patient Level:    CSA: None found at the patient level.       Who prescribed the medication?: PCP    Do you need a refill? Yes    When did you use the medication last? 03/04/2024    Patient offered an appointment? No    Do you have any questions or concerns?  No      Okay to leave a detailed message?: Yes at Cell number on file:    Telephone Information:   Mobile [phone]

## 2024-03-05 NOTE — TELEPHONE ENCOUNTER
Called and left message#1 for patient to return call to clinic to schedule. Okay to schedule on return call

## 2024-03-05 NOTE — TELEPHONE ENCOUNTER
I would like to see her back in mid-late April for follow up.  Sooner if she is having any issues with wound healing.

## 2024-03-06 NOTE — TELEPHONE ENCOUNTER
Unable to leave message x 2 kept getting busy dial tone. Please review message thread below and advise the patient as indicated. Please schedule if necessary or indicated in message thread.

## 2024-03-18 DIAGNOSIS — M79.89 NECROTIZING SOFT TISSUE INFECTION: ICD-10-CM

## 2024-03-18 RX ORDER — PREGABALIN 25 MG/1
25 CAPSULE ORAL 3 TIMES DAILY
Qty: 90 CAPSULE | Refills: 0 | Status: SHIPPED | OUTPATIENT
Start: 2024-03-18 | End: 2024-04-19

## 2024-03-18 NOTE — TELEPHONE ENCOUNTER
Medication Question or Refill        What medication are you calling about (include dose and sig)?:   pregabalin (LYRICA) 25 MG capsule     Preferred Pharmacy:     [id number] - [place], [place] - [address] [place] [address] AT [facility] & [address]  [address] [place] [address]  Phone: [phone] Fax: [phone]    Controlled Substance Agreement on file:   CSA -- Patient Level:    CSA: None found at the patient level.       Who prescribed the medication?: PCP    Do you need a refill? Yes    When did you use the medication last? NA    Patient offered an appointment? No    Do you have any questions or concerns?  No

## 2024-04-06 ENCOUNTER — MEDICAL CORRESPONDENCE (OUTPATIENT)
Dept: HEALTH INFORMATION MANAGEMENT | Facility: CLINIC | Age: 64
End: 2024-04-06
Payer: COMMERCIAL

## 2024-04-19 DIAGNOSIS — M79.89 NECROTIZING SOFT TISSUE INFECTION: ICD-10-CM

## 2024-04-19 RX ORDER — PREGABALIN 25 MG/1
25 CAPSULE ORAL 3 TIMES DAILY
Qty: 90 CAPSULE | Refills: 0 | Status: SHIPPED | OUTPATIENT
Start: 2024-04-19 | End: 2024-05-28

## 2024-04-19 NOTE — TELEPHONE ENCOUNTER
Medication Question or Refill        What medication are you calling about (include dose and sig)?:   pregabalin (LYRICA) 25 MG capsule  0 ordered       25 mg, 3 TIMES DAILY          Preferred Pharmacy:   WRITTEN PRESCRIPTION REQUESTED  No address on file      [id number] - [place], [place] - [address] [place] [address] AT [facility] [address]  [address] [place] [address]  Phone: [phone] Fax: [phone]          Controlled Substance Agreement on file:   CSA -- Patient Level:    CSA: None found at the patient level.       Who prescribed the medication?: pcp    Do you need a refill? Yes    When did you use the medication last? Has  3 days left left    Patient offered an appointment? Yes: will make an appt also    Do you have any questions or concerns?  No      Okay to leave a detailed message?: Yes at Home number on file [phone] (home)

## 2024-04-22 ENCOUNTER — TELEPHONE (OUTPATIENT)
Dept: FAMILY MEDICINE | Facility: CLINIC | Age: 64
End: 2024-04-22
Payer: COMMERCIAL

## 2024-04-22 NOTE — TELEPHONE ENCOUNTER
Patient Quality Outreach    Patient is due for the following:   Breast Cancer Screening - Mammogram    Next Steps:   Schedule a Adult Preventative    Type of outreach:    Tried to call patient but no one answered and it didn't go to voicemail.  Pt does not have mychart.      Questions for provider review:    None           Andrea Behrend

## 2024-04-24 ENCOUNTER — OFFICE VISIT (OUTPATIENT)
Dept: FAMILY MEDICINE | Facility: CLINIC | Age: 64
End: 2024-04-24
Payer: COMMERCIAL

## 2024-04-24 VITALS
RESPIRATION RATE: 20 BRPM | SYSTOLIC BLOOD PRESSURE: 132 MMHG | WEIGHT: 203 LBS | HEART RATE: 75 BPM | OXYGEN SATURATION: 96 % | BODY MASS INDEX: 37.36 KG/M2 | HEIGHT: 62 IN | TEMPERATURE: 97.1 F | DIASTOLIC BLOOD PRESSURE: 79 MMHG

## 2024-04-24 DIAGNOSIS — E66.01 CLASS 2 SEVERE OBESITY DUE TO EXCESS CALORIES WITH SERIOUS COMORBIDITY AND BODY MASS INDEX (BMI) OF 37.0 TO 37.9 IN ADULT (H): ICD-10-CM

## 2024-04-24 DIAGNOSIS — Z12.31 VISIT FOR SCREENING MAMMOGRAM: ICD-10-CM

## 2024-04-24 DIAGNOSIS — Z12.11 SCREEN FOR COLON CANCER: ICD-10-CM

## 2024-04-24 DIAGNOSIS — E66.812 CLASS 2 SEVERE OBESITY DUE TO EXCESS CALORIES WITH SERIOUS COMORBIDITY AND BODY MASS INDEX (BMI) OF 37.0 TO 37.9 IN ADULT (H): ICD-10-CM

## 2024-04-24 DIAGNOSIS — E03.9 HYPOTHYROIDISM, UNSPECIFIED TYPE: ICD-10-CM

## 2024-04-24 DIAGNOSIS — R33.9 URINARY RETENTION: Primary | ICD-10-CM

## 2024-04-24 DIAGNOSIS — E11.65 TYPE 2 DIABETES MELLITUS WITH HYPERGLYCEMIA, WITHOUT LONG-TERM CURRENT USE OF INSULIN (H): ICD-10-CM

## 2024-04-24 LAB
ALBUMIN UR-MCNC: NEGATIVE MG/DL
APPEARANCE UR: CLEAR
BACTERIA #/AREA URNS HPF: ABNORMAL /HPF
BILIRUB UR QL STRIP: NEGATIVE
COLOR UR AUTO: YELLOW
GLUCOSE UR STRIP-MCNC: NEGATIVE MG/DL
HBA1C MFR BLD: 6.2 % (ref 0–5.6)
HGB UR QL STRIP: NEGATIVE
KETONES UR STRIP-MCNC: NEGATIVE MG/DL
LEUKOCYTE ESTERASE UR QL STRIP: NEGATIVE
NITRATE UR QL: NEGATIVE
PH UR STRIP: 5.5 [PH] (ref 5–8)
RBC #/AREA URNS AUTO: ABNORMAL /HPF
SP GR UR STRIP: <=1.005 (ref 1–1.03)
SQUAMOUS #/AREA URNS AUTO: ABNORMAL /LPF
UROBILINOGEN UR STRIP-ACNC: 0.2 E.U./DL
WBC #/AREA URNS AUTO: ABNORMAL /HPF

## 2024-04-24 PROCEDURE — 80061 LIPID PANEL: CPT | Performed by: FAMILY MEDICINE

## 2024-04-24 PROCEDURE — 81001 URINALYSIS AUTO W/SCOPE: CPT | Performed by: FAMILY MEDICINE

## 2024-04-24 PROCEDURE — 84443 ASSAY THYROID STIM HORMONE: CPT | Performed by: FAMILY MEDICINE

## 2024-04-24 PROCEDURE — 99214 OFFICE O/P EST MOD 30 MIN: CPT | Performed by: FAMILY MEDICINE

## 2024-04-24 PROCEDURE — 83036 HEMOGLOBIN GLYCOSYLATED A1C: CPT | Performed by: FAMILY MEDICINE

## 2024-04-24 PROCEDURE — 80048 BASIC METABOLIC PNL TOTAL CA: CPT | Performed by: FAMILY MEDICINE

## 2024-04-24 PROCEDURE — 36415 COLL VENOUS BLD VENIPUNCTURE: CPT | Performed by: FAMILY MEDICINE

## 2024-04-24 NOTE — LETTER
May 1, 2024      Lidia Nam  [address] [place] [id number]        Dear ,    We are writing to inform you of your test results.    Your labs look good, except your cholesterol numbers (way too high).      Resulted Orders   HEMOGLOBIN A1C   Result Value Ref Range    Hemoglobin A1C 6.2 (H) 0.0 - 5.6 %      Comment:      Normal <5.7%   Prediabetes 5.7-6.4%    Diabetes 6.5% or higher     Note: Adopted from ADA consensus guidelines.   Lipid Profile (Chol, Trig, HDL, LDL calc)   Result Value Ref Range    Cholesterol 248 (H) <200 mg/dL    Triglycerides 84 <150 mg/dL    Direct Measure HDL 69 >=50 mg/dL    LDL Cholesterol Calculated 162 (H) <=100 mg/dL    Non HDL Cholesterol 179 (H) <130 mg/dL    Patient Fasting > 8hrs? Yes     Narrative    Cholesterol  Desirable:  <200 mg/dL    Triglycerides  Normal:  Less than 150 mg/dL  Borderline High:  150-199 mg/dL  High:  200-499 mg/dL  Very High:  Greater than or equal to 500 mg/dL    Direct Measure HDL  Female:  Greater than or equal to 50 mg/dL   Male:  Greater than or equal to 40 mg/dL    LDL Cholesterol  Desirable:  <100mg/dL  Above Desirable:  100-129 mg/dL   Borderline High:  130-159 mg/dL   High:  160-189 mg/dL   Very High:  >= 190 mg/dL    Non HDL Cholesterol  Desirable:  130 mg/dL  Above Desirable:  130-159 mg/dL  Borderline High:  160-189 mg/dL  High:  190-219 mg/dL  Very High:  Greater than or equal to 220 mg/dL   TSH   Result Value Ref Range    TSH 3.99 0.30 - 4.20 uIU/mL   Basic metabolic panel  (Ca, Cl, CO2, Creat, Gluc, K, Na, BUN)   Result Value Ref Range    Sodium 136 135 - 145 mmol/L      Comment:      Reference intervals for this test were updated on 09/26/2023 to more accurately reflect our healthy population. There may be differences in the flagging of prior results with similar values performed with this method. Interpretation of those prior results can be made in the context of the updated reference intervals.     Potassium 4.5 3.4 - 5.3  mmol/L    Chloride 100 98 - 107 mmol/L    Carbon Dioxide (CO2) 27 22 - 29 mmol/L    Anion Gap 9 7 - 15 mmol/L    Urea Nitrogen 15.4 8.0 - 23.0 mg/dL    Creatinine 0.63 0.51 - 0.95 mg/dL    GFR Estimate >90 >60 mL/min/1.73m2    Calcium 9.9 8.8 - 10.2 mg/dL    Glucose 125 (H) 70 - 99 mg/dL   UA with Microscopic - lab collect   Result Value Ref Range    Color Urine Yellow Colorless, Straw, Light Yellow, Yellow    Appearance Urine Clear Clear    Glucose Urine Negative Negative mg/dL    Bilirubin Urine Negative Negative    Ketones Urine Negative Negative mg/dL    Specific Gravity Urine <=1.005 1.005 - 1.030    Blood Urine Negative Negative    pH Urine 5.5 5.0 - 8.0    Protein Albumin Urine Negative Negative mg/dL    Urobilinogen Urine 0.2 0.2, 1.0 E.U./dL    Nitrite Urine Negative Negative    Leukocyte Esterase Urine Negative Negative   Urine Microscopic Exam   Result Value Ref Range    Bacteria Urine Few (A) None Seen /HPF    RBC Urine 0-2 0-2 /HPF /HPF    WBC Urine 0-5 0-5 /HPF /HPF    Squamous Epithelials Urine Few (A) None Seen /LPF       If you have any questions or concerns, please call the clinic at the number listed above.       Sincerely,      Ana Goetz MD
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
## 2024-04-25 LAB
ANION GAP SERPL CALCULATED.3IONS-SCNC: 9 MMOL/L (ref 7–15)
BUN SERPL-MCNC: 15.4 MG/DL (ref 8–23)
CALCIUM SERPL-MCNC: 9.9 MG/DL (ref 8.8–10.2)
CHLORIDE SERPL-SCNC: 100 MMOL/L (ref 98–107)
CHOLEST SERPL-MCNC: 248 MG/DL
CREAT SERPL-MCNC: 0.63 MG/DL (ref 0.51–0.95)
DEPRECATED HCO3 PLAS-SCNC: 27 MMOL/L (ref 22–29)
EGFRCR SERPLBLD CKD-EPI 2021: >90 ML/MIN/1.73M2
FASTING STATUS PATIENT QL REPORTED: YES
GLUCOSE SERPL-MCNC: 125 MG/DL (ref 70–99)
HDLC SERPL-MCNC: 69 MG/DL
LDLC SERPL CALC-MCNC: 162 MG/DL
NONHDLC SERPL-MCNC: 179 MG/DL
POTASSIUM SERPL-SCNC: 4.5 MMOL/L (ref 3.4–5.3)
SODIUM SERPL-SCNC: 136 MMOL/L (ref 135–145)
TRIGL SERPL-MCNC: 84 MG/DL
TSH SERPL DL<=0.005 MIU/L-ACNC: 3.99 UIU/ML (ref 0.3–4.2)

## 2024-05-01 NOTE — TELEPHONE ENCOUNTER
Patient Quality Outreach    Patient is due for the following:   Breast Cancer Screening - Mammogram    Next Steps:   Schedule a Adult Preventative    Type of outreach:    Phone, spoke to patient/parent. Patient declined to schedule.  Stated okay to call in a few months.    Next Steps:  Reach out within 90 days via Phone.    Max number of attempts reached: Yes. Will try again in 90 days if patient still on fail list.    Questions for provider review:    None           Andrea Behrend

## 2024-05-05 ASSESSMENT — ENCOUNTER SYMPTOMS
BACK PAIN: 0
VOMITING: 0
COLOR CHANGE: 0
FEVER: 0
ABDOMINAL PAIN: 0
WOUND: 1
COUGH: 0
HEMATURIA: 0
CHILLS: 0
DYSURIA: 0
SORE THROAT: 0
SHORTNESS OF BREATH: 0
PALPITATIONS: 0
SEIZURES: 0
ARTHRALGIAS: 0
EYE PAIN: 0

## 2024-05-24 ENCOUNTER — TELEPHONE (OUTPATIENT)
Dept: FAMILY MEDICINE | Facility: CLINIC | Age: 64
End: 2024-05-24
Payer: COMMERCIAL

## 2024-05-24 NOTE — TELEPHONE ENCOUNTER
Forms/Letter Request    Type of form/letter: OTHER: PHYSICAL THERAPY initial evaluation       Do we have the form/letter: Yes:     Who is the form from? [facility] OUTPATIENT [facility]HAB (if other please explain)    Where did/will the form come from? form was faxed in    When is form/letter needed by: NA    How would you like the form/letter returned: Fax : [phone]

## 2024-05-28 DIAGNOSIS — M79.89 NECROTIZING SOFT TISSUE INFECTION: ICD-10-CM

## 2024-05-28 RX ORDER — PREGABALIN 25 MG/1
CAPSULE ORAL
Qty: 90 CAPSULE | Refills: 0 | Status: SHIPPED | OUTPATIENT
Start: 2024-05-28

## 2024-05-28 NOTE — TELEPHONE ENCOUNTER
Medication Question or Refill        What medication are you calling about (include dose and sig)?:   pregabalin (LYRICA) 25 MG capsule     Preferred Pharmacy:     [id number] - [place], [place] - [address] [place] [address] AT [facility] & [address]  [address] [place] [address]  Phone: [phone] Fax: [phone]    Controlled Substance Agreement on file:   CSA -- Patient Level:    CSA: None found at the patient level.       Who prescribed the medication?: PCP    Do you need a refill? Yes    When did you use the medication last? NA    Patient offered an appointment? No    Do you have any questions or concerns?  No

## 2024-05-29 RX ORDER — PREGABALIN 25 MG/1
25 CAPSULE ORAL 3 TIMES DAILY
Qty: 90 CAPSULE | Refills: 0 | Status: SHIPPED | OUTPATIENT
Start: 2024-05-29 | End: 2024-09-23

## 2024-06-10 ENCOUNTER — TELEPHONE (OUTPATIENT)
Dept: FAMILY MEDICINE | Facility: CLINIC | Age: 64
End: 2024-06-10
Payer: COMMERCIAL

## 2024-06-10 NOTE — TELEPHONE ENCOUNTER
Forms/Letter Request    Type of form/letter: OTHER: [facility]  Physical Therapy Outpatient Recert,Progress and Physical Therapy       Do we have the form/letter: Yes:     Who is the form from? [facility]     Where did/will the form come from? form was faxed in    When is form/letter needed by: na    How would you like the form/letter returned: Fax : [phone]

## 2024-06-14 ENCOUNTER — MEDICAL CORRESPONDENCE (OUTPATIENT)
Dept: HEALTH INFORMATION MANAGEMENT | Facility: CLINIC | Age: 64
End: 2024-06-14
Payer: COMMERCIAL

## 2024-06-17 NOTE — TELEPHONE ENCOUNTER
Form signed by provider and faxed back to [facility] at [phone]. Sent to [facility] for scanning. Completing task.

## 2024-06-27 DIAGNOSIS — R33.9 URINARY RETENTION: ICD-10-CM

## 2024-06-27 RX ORDER — TAMSULOSIN HYDROCHLORIDE 0.4 MG/1
0.4 CAPSULE ORAL EVERY EVENING
Qty: 30 CAPSULE | Refills: 4 | Status: SHIPPED | OUTPATIENT
Start: 2024-06-27

## 2024-06-27 NOTE — TELEPHONE ENCOUNTER
Medication Refill    Contacts       Contact Date/Time Type Contact Phone/Fax    06/27/2024 03:41 PM CDT Phone (Outgoing) Lidia Nam (Self) [phone] (H)        What medication are you calling about (include dose and sig)?:    Disp Refills Start End LAYTON   tamsulosin (FLOMAX) 0.4 MG capsule 30 capsule 4 1/26/2024 -- No   Sig - Route: Take 1 capsule (0.4 mg) by mouth every evening - Oral     Preferred Pharmacy:    [id number] - [place], [place] - [address]  [address] [place] [address]  Phone: [phone] Fax: [phone]    Who prescribed the medication?: Dr Trevizo    Do you need a refill? Yes    When did you use the medication last? Today    Okay to leave a detailed message?: Yes at Cell number on file:    Telephone Information:   Mobile [phone]     Message sent to TOMAS Mujica as Dr Trevizo out of clinic.    Linh Boone RN  [facility]

## 2024-08-07 ENCOUNTER — TELEPHONE (OUTPATIENT)
Dept: FAMILY MEDICINE | Facility: CLINIC | Age: 64
End: 2024-08-07
Payer: COMMERCIAL

## 2024-08-07 NOTE — TELEPHONE ENCOUNTER
Patient Quality Outreach    Patient is due for the following:   Breast Cancer Screening - Mammogram    Next Steps:   Schedule a Adult Preventative    Type of outreach:    Phone, spoke to patient/parent. Patient declined to schedule. This is the 3rd decline, will contact her next year.      Questions for provider review:    None           Andrea Behrend

## 2024-08-30 DIAGNOSIS — E01.0 THYROMEGALY: ICD-10-CM

## 2024-08-30 DIAGNOSIS — E03.9 HYPOTHYROIDISM, UNSPECIFIED TYPE: ICD-10-CM

## 2024-08-30 RX ORDER — LEVOTHYROXINE SODIUM 112 UG/1
112 TABLET ORAL DAILY
Qty: 90 TABLET | Refills: 0 | Status: SHIPPED | OUTPATIENT
Start: 2024-08-30

## 2024-09-13 ENCOUNTER — PATIENT OUTREACH (OUTPATIENT)
Dept: CARE COORDINATION | Facility: CLINIC | Age: 64
End: 2024-09-13
Payer: COMMERCIAL

## 2024-09-13 NOTE — PROGRESS NOTES
9/13/2024  Clinic Care Coordination Contact  UT/Voicemail    Clinical Data: Care Coordinator Outreach    Outreach Documentation Number of Outreach Attempt   9/13/2024   3:39 PM 1   Clinic Care Coordination Contact  Community Health Worker Initial Outreach  UT/Voicemail    Self referral: Pt called the clinic  Need insurance for her and        Reason for Call:  Appointment Request  Patient requesting this type of appt:    Requested provider:   Reason patient unable to be scheduled: Needs to be scheduled by clinic  When does patient want to be seen/preferred time: 3-7 days  Comments: Pt needs insurance for her and her .   Okay to leave a detailed message?: Yes at Home number on file [phone] (home)  Call taken on 9/13/2024 at 11:49 AM by Olive Ray           Clinical Data: Care Coordinator Outreach: Discuss CCC enrollment   Outreach attempted x 1.  Left message on patient's voicemail with call back information and requested return call.    Plan: Care Coordinator will try to reach patient again in 1-3 business days.    CHW Outreach: 2nd attempt 9-17-24    Sd Parker  Community Health Worker  [facility] Care Coordination  [email or web address]   Office: [phone]  Fax: [phone]

## 2024-09-16 NOTE — PROGRESS NOTES
9/16/2024  Clinic Care Coordination Contact  Community Health Worker Initial Outreach    CHW Initial Information Gathering:  Referral Source: Self-patient/Caregiver  Preferred Urgent Care: [facility], [phone]  Current living arrangement:: I live in a private home with spouse  Type of residence:: Private home - stairs  Informal Support system:: Children, Spouse  No PCP office visit in Past Year: No  Transportation means:: Regular car  CHW Additional Questions  If ED/Hospital discharge, follow-up appointment scheduled as recommended?: N/A  Medication changes made following ED/Hospital discharge?: N/A  MyChart active?: No  Patient agreeable to assistance with activating MyChart?: No    Patient accepts CC: No, already has health insurance. Patient will be sent Care Coordination introduction letter for future reference.     Self referral: Pt needs insurance for her and her .     Her insurance is situated and settle but not her .  She has been calling [facility] and they put in a rushed for her insurance because of her insurance.  She now has [place] but not her .    issues with reinstating his health insurance since he turned 66 years old.  MA was making payment but his insurance dropped.  Her  insurance lapse.  She spoke with [facility] staff and told it is in process.  She called [employer] and was told she is on vacation.  It finally his case got assigned to someone with [employer].  He has DIXIE Jones with [facility].    Patient stated her [place] insurance is active and all resolved.  It is just her .  Patient does not need support from CCC team at this time.  If she needs support she has CHW contact information.    No CCC needed at this time.    Sd Parker  Community Health Worker  [facility]  Clinic Care Coordination  [email or web address]   Office: [phone]  Fax: [phone]

## 2024-09-16 NOTE — PROGRESS NOTES
9/16/2024  [place] Department of [place]: [place] Health [place] Programs Eligibility Response (608)    SUBSCRIBER INFORMATION  Date of Service Subscriber ID Subscriber Name Birthdate Age Gender  09/16/2024 [id number] BRONWYN HODGES 1960 64 FEMALE     Address  [address] , , [place]  [address]     PROVIDER INFORMATION  Provider ID Submitter Transaction ID Provider Name Taxonomy Code Qualifier Taxonomy Code  [id number] xx [facility]  This subscriber has eligibility for [place]: Medical [place].  Elig Type AX: [place] Early Expansion  Eligibility Begin Date: 07/01/2022  Eligibility End Date: --/--/----  This subscriber is eligible for the following service types: Medical Care ,  Chiropractic ,  Dental Care ,  Hospital ,  Hospital - Inpatient ,  Hospital - Outpatient ,  Emergency Services ,  Pharmacy ,  Professional (Physician) Visit - Office ,  Vision (Optometry) ,  Mental Health ,  Urgent Care  Prepaid Health Plan  This subscriber receives [place]12 - Prepaid Medical Assistance Program (PMAP) delivered through [facility]. The phone numbers are: [phone] (metro) or [phone] (toll free).  Other Eligibility Information  No Special Transportation.  This subscribers eligibility is not determined for Long term Care and waiver services.  No Hospice.  County of residence is unknown.  Refer to Health Care Programs and Services Overview of the [facility] Provider Manual for a list of covered services.  Waivers  None    Subscriber Responsibility Information  None    Restricted Recipient Program  None     Medicare  None

## 2024-09-23 DIAGNOSIS — M79.89 NECROTIZING SOFT TISSUE INFECTION: ICD-10-CM

## 2024-09-23 RX ORDER — PREGABALIN 25 MG/1
CAPSULE ORAL
Qty: 90 CAPSULE | Refills: 0 | Status: SHIPPED | OUTPATIENT
Start: 2024-09-23

## 2024-09-24 NOTE — TELEPHONE ENCOUNTER
Patient calling to check status of refill--relayed this was sent 9/23/24.    Patient checking qty and directions--has frequent issues with [facility] not dispensing full supply.  Verified qty 90, instructions 25mg TID.  Advised patient to call [facility] and check supply prior to picking up RX.    Jazmyn Burleson RN  [facility]

## 2024-09-26 DIAGNOSIS — E11.65 TYPE 2 DIABETES MELLITUS WITH HYPERGLYCEMIA, WITHOUT LONG-TERM CURRENT USE OF INSULIN (H): ICD-10-CM

## 2024-09-29 NOTE — TELEPHONE ENCOUNTER
Clinic RN: Please contact patient because  Someone put a note in last order - Patient taking differently: Take 500 mg by mouth daily (with dinner)  Please call patient to confirm what dose she is taking and then route to provider to authorize. I made high priority just because this is a diabetes medication.

## 2024-09-30 RX ORDER — METFORMIN HCL 500 MG
1000 TABLET, EXTENDED RELEASE 24 HR ORAL
Qty: 180 TABLET | Refills: 0 | Status: SHIPPED | OUTPATIENT
Start: 2024-09-30

## 2024-09-30 NOTE — TELEPHONE ENCOUNTER
Dr. Goetz-please review, sign if agree and may close encounter.    Writer called patient who confirmed Metformin dose is 1000 mg once daily with dinner.    Might be calling back regarding jury duty; will be calling the court to figure out if a doctor's letter is needed.  Has already had two continuances due to extensive hospitalization and rehab stay over the past year.    Thank you!  HEYDI NguyenN, RN-[facility]

## 2024-10-29 DIAGNOSIS — M79.89 NECROTIZING SOFT TISSUE INFECTION: ICD-10-CM

## 2024-10-30 RX ORDER — PREGABALIN 25 MG/1
CAPSULE ORAL
Qty: 90 CAPSULE | Refills: 0 | Status: SHIPPED | OUTPATIENT
Start: 2024-10-30

## 2024-10-30 NOTE — TELEPHONE ENCOUNTER
Patient calling to check that refill request was received. Advised patient it is in-process. Patient will receive notification from pharmacy once refilled.    Jazmyn Burleson RN  [facility]

## 2024-11-30 DIAGNOSIS — E03.9 HYPOTHYROIDISM, UNSPECIFIED TYPE: ICD-10-CM

## 2024-11-30 DIAGNOSIS — R33.9 URINARY RETENTION: ICD-10-CM

## 2024-11-30 DIAGNOSIS — E01.0 THYROMEGALY: ICD-10-CM

## 2024-11-30 DIAGNOSIS — M79.89 NECROTIZING SOFT TISSUE INFECTION: ICD-10-CM

## 2024-12-01 RX ORDER — PREGABALIN 25 MG/1
CAPSULE ORAL
Qty: 90 CAPSULE | Refills: 0 | Status: SHIPPED | OUTPATIENT
Start: 2024-12-01

## 2024-12-01 RX ORDER — LEVOTHYROXINE SODIUM 112 UG/1
112 TABLET ORAL DAILY
Qty: 90 TABLET | Refills: 0 | Status: SHIPPED | OUTPATIENT
Start: 2024-12-01

## 2024-12-02 RX ORDER — TAMSULOSIN HYDROCHLORIDE 0.4 MG/1
CAPSULE ORAL
Qty: 30 CAPSULE | Refills: 2 | Status: SHIPPED | OUTPATIENT
Start: 2024-12-02

## 2025-01-14 DIAGNOSIS — M79.89 NECROTIZING SOFT TISSUE INFECTION: ICD-10-CM

## 2025-01-14 RX ORDER — PREGABALIN 25 MG/1
CAPSULE ORAL
Qty: 90 CAPSULE | Refills: 0 | Status: SHIPPED | OUTPATIENT
Start: 2025-01-14

## 2025-01-31 ENCOUNTER — TELEPHONE (OUTPATIENT)
Dept: FAMILY MEDICINE | Facility: CLINIC | Age: 65
End: 2025-01-31
Payer: COMMERCIAL

## 2025-01-31 NOTE — TELEPHONE ENCOUNTER
Patient Quality Outreach    Patient is due for the following:   Breast Cancer Screening - Mammogram    Action(s) Taken:   Schedule a Adult Preventative    Type of outreach:    Tried to call patient; no answer or voicemail.    Questions for provider review:    None           Andrea Behrend         10-May-2021 11:48 24-May-2021 11:37 19-May-2021 09:35 07-May-2021 11:59 14-May-2021 11:04 17-May-2021 11:35 12-May-2021 12:00

## 2025-02-17 NOTE — TELEPHONE ENCOUNTER
Patient Quality Outreach    Patient is due for the following:   Breast Cancer Screening - Mammogram    Action(s) Taken:   Schedule a Adult Preventative    Type of outreach:    Tried to call patient, got a busy signal.      Questions for provider review:    None           Andrea Behrend, ARRT

## 2025-02-19 DIAGNOSIS — M79.89 NECROTIZING SOFT TISSUE INFECTION: ICD-10-CM

## 2025-02-19 RX ORDER — PREGABALIN 25 MG/1
CAPSULE ORAL
Qty: 90 CAPSULE | Refills: 0 | Status: SHIPPED | OUTPATIENT
Start: 2025-02-19

## 2025-02-24 ENCOUNTER — OFFICE VISIT (OUTPATIENT)
Dept: FAMILY MEDICINE | Facility: CLINIC | Age: 65
End: 2025-02-24
Payer: COMMERCIAL

## 2025-02-24 VITALS
RESPIRATION RATE: 20 BRPM | OXYGEN SATURATION: 96 % | SYSTOLIC BLOOD PRESSURE: 117 MMHG | TEMPERATURE: 98.1 F | DIASTOLIC BLOOD PRESSURE: 78 MMHG | HEART RATE: 68 BPM | WEIGHT: 227 LBS | HEIGHT: 62 IN | BODY MASS INDEX: 41.77 KG/M2

## 2025-02-24 DIAGNOSIS — Z12.11 SCREEN FOR COLON CANCER: ICD-10-CM

## 2025-02-24 DIAGNOSIS — E01.0 THYROMEGALY: ICD-10-CM

## 2025-02-24 DIAGNOSIS — Z23 NEED FOR IMMUNIZATION AGAINST INFLUENZA: ICD-10-CM

## 2025-02-24 DIAGNOSIS — E11.65 TYPE 2 DIABETES MELLITUS WITH HYPERGLYCEMIA, WITHOUT LONG-TERM CURRENT USE OF INSULIN (H): Primary | ICD-10-CM

## 2025-02-24 DIAGNOSIS — Z12.31 VISIT FOR SCREENING MAMMOGRAM: ICD-10-CM

## 2025-02-24 DIAGNOSIS — Z23 NEED FOR COVID-19 VACCINE: ICD-10-CM

## 2025-02-24 DIAGNOSIS — E11.65 TYPE 2 DIABETES MELLITUS WITH HYPERGLYCEMIA, WITHOUT LONG-TERM CURRENT USE OF INSULIN (H): ICD-10-CM

## 2025-02-24 DIAGNOSIS — Z87.39 HISTORY OF NECROTIZING FASCIITIS: ICD-10-CM

## 2025-02-24 DIAGNOSIS — Z00.00 ADULT GENERAL MEDICAL EXAM: Primary | ICD-10-CM

## 2025-02-24 DIAGNOSIS — E03.9 HYPOTHYROIDISM, UNSPECIFIED TYPE: ICD-10-CM

## 2025-02-24 DIAGNOSIS — R60.0 BILATERAL LOWER EXTREMITY EDEMA: ICD-10-CM

## 2025-02-24 LAB
EST. AVERAGE GLUCOSE BLD GHB EST-MCNC: 148 MG/DL
HBA1C MFR BLD: 6.8 % (ref 0–5.6)

## 2025-02-24 PROCEDURE — 91320 SARSCV2 VAC 30MCG TRS-SUC IM: CPT | Performed by: FAMILY MEDICINE

## 2025-02-24 PROCEDURE — 80053 COMPREHEN METABOLIC PANEL: CPT | Performed by: FAMILY MEDICINE

## 2025-02-24 PROCEDURE — 80061 LIPID PANEL: CPT | Performed by: FAMILY MEDICINE

## 2025-02-24 PROCEDURE — 3074F SYST BP LT 130 MM HG: CPT | Performed by: FAMILY MEDICINE

## 2025-02-24 PROCEDURE — 99214 OFFICE O/P EST MOD 30 MIN: CPT | Mod: 25 | Performed by: FAMILY MEDICINE

## 2025-02-24 PROCEDURE — 99396 PREV VISIT EST AGE 40-64: CPT | Mod: 25 | Performed by: FAMILY MEDICINE

## 2025-02-24 PROCEDURE — 3078F DIAST BP <80 MM HG: CPT | Performed by: FAMILY MEDICINE

## 2025-02-24 PROCEDURE — 82570 ASSAY OF URINE CREATININE: CPT | Performed by: FAMILY MEDICINE

## 2025-02-24 PROCEDURE — 90673 RIV3 VACCINE NO PRESERV IM: CPT | Performed by: FAMILY MEDICINE

## 2025-02-24 PROCEDURE — 90471 IMMUNIZATION ADMIN: CPT | Performed by: FAMILY MEDICINE

## 2025-02-24 PROCEDURE — 90480 ADMN SARSCOV2 VAC 1/ONLY CMP: CPT | Performed by: FAMILY MEDICINE

## 2025-02-24 PROCEDURE — 36415 COLL VENOUS BLD VENIPUNCTURE: CPT | Performed by: FAMILY MEDICINE

## 2025-02-24 PROCEDURE — 82043 UR ALBUMIN QUANTITATIVE: CPT | Performed by: FAMILY MEDICINE

## 2025-02-24 PROCEDURE — 83036 HEMOGLOBIN GLYCOSYLATED A1C: CPT | Performed by: FAMILY MEDICINE

## 2025-02-24 PROCEDURE — G2211 COMPLEX E/M VISIT ADD ON: HCPCS | Performed by: FAMILY MEDICINE

## 2025-02-24 SDOH — HEALTH STABILITY: PHYSICAL HEALTH: ON AVERAGE, HOW MANY MINUTES DO YOU ENGAGE IN EXERCISE AT THIS LEVEL?: 10 MIN

## 2025-02-24 SDOH — HEALTH STABILITY: PHYSICAL HEALTH: ON AVERAGE, HOW MANY DAYS PER WEEK DO YOU ENGAGE IN MODERATE TO STRENUOUS EXERCISE (LIKE A BRISK WALK)?: 2 DAYS

## 2025-02-24 ASSESSMENT — SOCIAL DETERMINANTS OF HEALTH (SDOH): HOW OFTEN DO YOU GET TOGETHER WITH FRIENDS OR RELATIVES?: TWICE A WEEK

## 2025-02-24 NOTE — PROGRESS NOTES
Prior to immunization administration, verified patients identity using patient s name and date of birth. Please see Immunization Activity for additional information.     Screening Questionnaire for Adult Immunization    Are you sick today?   No   Do you have allergies to medications, food, a vaccine component or latex?   Yes   Have you ever had a serious reaction after receiving a vaccination?   No   Do you have a long-term health problem with heart, lung, kidney, or metabolic disease (e.g., diabetes), asthma, a blood disorder, no spleen, complement component deficiency, a cochlear implant, or a spinal fluid leak?  Are you on long-term aspirin therapy?   Yes   Do you have cancer, leukemia, HIV/AIDS, or any other immune system problem?   No   Do you have a parent, brother, or sister with an immune system problem?   No   In the past 3 months, have you taken medications that affect  your immune system, such as prednisone, other steroids, or anticancer drugs; drugs for the treatment of rheumatoid arthritis, Crohn s disease, or psoriasis; or have you had radiation treatments?   No   Have you had a seizure, or a brain or other nervous system problem?   No   During the past year, have you received a transfusion of blood or blood    products, or been given immune (gamma) globulin or antiviral drug?   No   For women: Are you pregnant or is there a chance you could become       pregnant during the next month?   No   Have you received any vaccinations in the past 4 weeks?   No     Immunization questionnaire was positive for at least one answer.  Notified       Patient instructed to remain in clinic for 15 minutes afterwards, and to report any adverse reactions.     Screening performed by Aspen Babcock MA on 2/24/2025 at 8:25 AM.

## 2025-02-24 NOTE — PROGRESS NOTES
Preventive Care Visit  [facility]  RUSSELL DELGADILLO MD, Family Medicine  Feb 24, 2025  {Provider  Link to SmartSet :878969}    {PROVIDER CHARTING PREFERENCE:853507}    Tara Murillo is a 64 year old, presenting for the following:  Physical, skin  (check), and Other (Toe nail)        2/24/2025     8:11 AM   Additional Questions   Roomed by sven salo   Accompanied by self          If up too long, gets low back pain  If sits too long, then butt,legs hurt  Just shifts position all day long   Gets activity about twice a week -   She and her  both move poorly -   Not smoking!!  Does drink some alcohol   Blood pressure is up   Gets a little down in the dum          Health Care Directive  Patient does not have a Health Care Directive: no written documents      2/24/2025   General Health   How would you rate your overall physical health? (!) FAIR   Feel stress (tense, anxious, or unable to sleep) To some extent   (!) STRESS CONCERN      2/24/2025   Nutrition   Three or more servings of calcium each day? Yes   Diet: Low salt    Low fat/cholesterol    Diabetic   How many servings of fruit and vegetables per day? (!) 2-3   How many sweetened beverages each day? 0-1       Multiple values from one day are sorted in reverse-chronological order         2/24/2025   Exercise   Days per week of moderate/strenous exercise 2 days   Average minutes spent exercising at this level 10 min   (!) EXERCISE CONCERN      2/24/2025   Social Factors   Frequency of gathering with friends or relatives Twice a week   Worry food won't last until get money to buy more Yes   Food not last or not have enough money for food? Yes   Do you have housing? (Housing is defined as stable permanent housing and does not include staying ouside in a car, in a tent, in an abandoned building, in an overnight shelter, or couch-surfing.) Yes   Are you worried about losing your housing? No   Lack of transportation? Yes    Unable to get utilities (heat,electricity)? No   (!) FOOD SECURITY CONCERN PRESENT (!) TRANSPORTATION CONCERN PRESENT      2/24/2025   Fall Risk   Fallen 2 or more times in the past year? No   Trouble with walking or balance? Yes   Gait Speed Test (Document in seconds) 6   Gait Speed Test Interpretation Greater than 5.01 seconds - ABNORMAL          2/24/2025   Dental   Dentist two times every year? (!) NO         2/14/2024   TB Screening   Were you born outside of the [place]? No         Today's PHQ-2 Score:       2/24/2025     7:47 AM   PHQ-2 ( 1999 [employer])   Q1: Little interest or pleasure in doing things 1   Q2: Feeling down, depressed or hopeless 1   PHQ-2 Score 2    Q1: Little interest or pleasure in doing things Several days   Q2: Feeling down, depressed or hopeless Several days   PHQ-2 Score 2       Patient-reported           2/24/2025   Substance Use   Alcohol more than 3/day or more than 7/wk Yes   How often do you have a drink containing alcohol 2 to 3 times a week   How many alcohol drinks on typical day 3 or 4   How often do you have 5+ drinks at one occasion Less than monthly   Audit 2/3 Score 2   How often not able to stop drinking once started Never   How often failed to do what normally expected Never   How often needed first drink in am after a heavy drinking session Never   How often feeling of guilt or remorse after drinking Less than monthly   How often unable to remember what happened the night before Never   Have you or someone else been injured because of your drinking No   Has anyone been concerned or suggested you cut down on drinking Yes, but not in the last year   TOTAL SCORE - AUDIT 8   Do you use any other substances recreationally? (!) CANNABIS PRODUCTS     Social History     Tobacco Use     Smoking status: Former     Current packs/day: 1.00     Average packs/day: 1 pack/day for 39.0 years (39.0 ttl pk-yrs)     Types: Cigarettes     Smokeless tobacco: Never   Vaping Use     Vaping status:  "Never Used   Substance Use Topics     Alcohol use: Yes     {Provider  If there are gaps in the social history shown above, please follow the link to update and then refresh the note Link to Social and Substance History :737204}     {Mammogram Decision Support (Optional):591001}        2/24/2025   STI Screening   New sexual partner(s) since last STI/HIV test? No     History of abnormal Pap smear: { :286275}        Latest Ref Rng & Units 9/7/2022    10:49 AM   PAP / HPV   PAP  Negative for Intraepithelial Lesion or Malignancy (NILM)    HPV 16 DNA Negative Negative    HPV 18 DNA Negative Negative    Other HR HPV Negative Negative      ASCVD Risk   The 10-year ASCVD risk score (Fernando SERNA, et al., 2019) is: 15%    Values used to calculate the score:      Age: 64 years      Sex: Female      Is Non- : No      Diabetic: Yes      Tobacco smoker: No      Systolic Blood Pressure: 172 mmHg      Is BP treated: No      HDL Cholesterol: 69 mg/dL      Total Cholesterol: 248 mg/dL    {Link to Fracture Risk Assessment Tool (Optional):124370}    {Provider  REQUIRED FOR AWV Use the storyboard to review patient history, after sections have been marked as reviewed, refresh note to capture documentation:938360}   Reviewed and updated as needed this visit by Provider                    {HISTORY OPTIONS (Optional):923340}    {ROS Picklists (Optional):561192}     Objective    Exam  BP (!) 172/88 (BP Location: Left arm, Patient Position: Sitting, Cuff Size: Adult Regular)   Pulse 68   Temp 98.1  F (36.7  C) (Oral)   Resp 20   Ht 1.57 m (5' 1.81\")   Wt 103 kg (227 lb)   LMP  (LMP Unknown)   SpO2 96%   BMI 41.77 kg/m     Estimated body mass index is 41.77 kg/m  as calculated from the following:    Height as of this encounter: 1.57 m (5' 1.81\").    Weight as of this encounter: 103 kg (227 lb).    Physical Exam  {Exam Choices (Optional):339981}        Signed Electronically by: RUSSELL MORALES" MD LEONA  {Email feedback regarding this note to [email or web address]   :362426}   10/2/2023    Procedure: and left thigh;  Surgeon: Ken Howard MD;  Location: [facility] [place] [facility]    IRRIGATION AND DEBRIDEMENT SACRAL WOUND, COMBINED Left 10/2/2023    Procedure: Broad debridment of left buttock;  Surgeon: Ken Howard MD;  Location: [facility] [place] [facility]    PICC TRIPLE LUMEN PLACEMENT  10/3/2023    ZZC APPENDECTOMY      Description: Appendectomy;  Recorded: 11/21/2008;    ZZC LIGATE FALLOPIAN TUBE      Description: Tubal Ligation;  Recorded: 11/21/2008;     OB History   No obstetric history on file.     BP Readings from Last 3 Encounters:   02/24/25 117/78   04/24/24 132/79   02/14/24 128/75    Wt Readings from Last 3 Encounters:   02/24/25 103 kg (227 lb)   04/24/24 92.1 kg (203 lb)   02/14/24 84.8 kg (187 lb)                  Patient Active Problem List   Diagnosis    Obesity    Nicotine Dependence    Urticaria    Allergies    Thyromegaly    Nicotine dependence    Type 2 diabetes mellitus with hyperglycemia, without long-term current use of insulin (H)    Hard to intubate    Severe sepsis (H)    Necrotizing soft tissue infection    Septic shock (H)    Necrotizing fasciitis (H)    Class 2 severe obesity due to excess calories with serious comorbidity in adult (H)    History of necrotizing fasciitis    Hypothyroidism, unspecified type    Bilateral lower extremity edema     Past Surgical History:   Procedure Laterality Date    EXCISE LESION PERINEAL N/A 10/2/2023    Procedure: vulvectomy;  Surgeon: Ken Howard MD;  Location: [facility] [place] [facility]    INCISION AND DRAINAGE LOWER EXTREMITY, COMBINED Left 10/4/2023    Procedure: INCISION AND DRAINAGE, LEFT LEG;  Surgeon: Julito Molina MD;  Location: [facility]    INCISION AND DRAINAGE PERINEAL, COMBINED Left 10/4/2023    Procedure: INCISION AND DRAINAGE, PERINEUM AND;  Surgeon: Julito Molina MD;  Location: [facility]    IRRIGATION AND DEBRIDEMENT LOWER EXTREMITY, COMBINED Left 10/2/2023     Procedure: and left thigh;  Surgeon: Ken Howard MD;  Location: [facility] OR    IRRIGATION AND DEBRIDEMENT SACRAL WOUND, COMBINED Left 10/2/2023    Procedure: Broad debridment of left buttock;  Surgeon: Ken Howard MD;  Location: [facility] OR    PICC TRIPLE LUMEN PLACEMENT  10/3/2023    ZZC APPENDECTOMY      Description: Appendectomy;  Recorded: 11/21/2008;    ZZC LIGATE FALLOPIAN TUBE      Description: Tubal Ligation;  Recorded: 11/21/2008;       Social History     Tobacco Use    Smoking status: Former     Current packs/day: 1.00     Average packs/day: 1 pack/day for 39.0 years (39.0 ttl pk-yrs)     Types: Cigarettes    Smokeless tobacco: Never   Substance Use Topics    Alcohol use: Yes     History reviewed. No pertinent family history.      Current Outpatient Medications   Medication Sig Dispense Refill    acetaminophen (TYLENOL) 325 MG tablet Take 2 tablets (650 mg) by mouth every 4 hours as needed for other (For optimal non-opioid multimodal pain management to improve pain control.)      metFORMIN (GLUCOPHAGE XR) 500 MG 24 hr tablet TAKE 2 TABLETS(1000 MG) BY MOUTH DAILY WITH DINNER 180 tablet 0    pregabalin (LYRICA) 25 MG capsule TAKE 1 CAPSULE(25 MG) BY MOUTH THREE TIMES DAILY 90 capsule 0    pregabalin (LYRICA) 25 MG capsule TAKE 1 CAPSULE(25 MG) BY MOUTH THREE TIMES DAILY 90 capsule 0    levothyroxine (SYNTHROID/LEVOTHROID) 112 MCG tablet Take 1 tablet (112 mcg) by mouth daily. 90 tablet 0    polyethylene glycol (MIRALAX) 17 GM/Dose powder Take 17 g by mouth daily as needed (Patient not taking: Reported on 2/24/2025)      tamsulosin (FLOMAX) 0.4 MG capsule TAKE 1 CAPSULE(0.4 MG) BY MOUTH EVERY EVENING 30 capsule 10     Allergies   Allergen Reactions    Erythromycin     Psyllium     Seasonal Allergies      Hay fever - tree pollens, dust, mold, mildew     Recent Labs   Lab Test 02/24/25  0940 04/24/24  0827 01/26/24  1052 10/15/23  0629 10/14/23  0614 09/07/22  1122  "06/23/20  0957   A1C 6.8* 6.2* 6.3*  --   --    < > 6.8*   * 162* 153*  --   --    < >  --    HDL 50 69 48*  --   --    < >  --    TRIG 172* 84 90  --   --    < >  --    ALT 77*  --  13  --  14   < > 27   CR 0.66 0.63 0.53   < > 0.42*   < > 0.73   GFRESTIMATED >90 >90 >90   < > >90   < > >60   GFRESTBLACK  --   --   --   --   --   --  >60   POTASSIUM 4.6 4.5 4.6   < > 3.7   < > 4.4   TSH  --  3.99 2.32  --   --    < > 14.85*    < > = values in this interval not displayed.             Review of Systems       Objective    Exam  /78 (BP Location: Left arm, Patient Position: Sitting, Cuff Size: Adult Large)   Pulse 68   Temp 98.1  F (36.7  C) (Oral)   Resp 20   Ht 1.57 m (5' 1.81\")   Wt 103 kg (227 lb)   LMP  (LMP Unknown)   SpO2 96%   BMI 41.77 kg/m     Estimated body mass index is 41.77 kg/m  as calculated from the following:    Height as of this encounter: 1.57 m (5' 1.81\").    Weight as of this encounter: 103 kg (227 lb).    Physical Exam  Vitals reviewed.   Constitutional:       General: She is not in acute distress.     Appearance: Normal appearance.   HENT:      Head: Normocephalic.      Right Ear: Tympanic membrane, ear canal and external ear normal.      Left Ear: Tympanic membrane, ear canal and external ear normal.      Nose: Nose normal.      Mouth/Throat:      Mouth: Mucous membranes are moist.      Pharynx: No posterior oropharyngeal erythema.   Eyes:      Extraocular Movements: Extraocular movements intact.      Conjunctiva/sclera: Conjunctivae normal.      Pupils: Pupils are equal, round, and reactive to light.   Neck:      Comments: Large thyroid - nontender   Cardiovascular:      Rate and Rhythm: Normal rate and regular rhythm.      Pulses: Normal pulses.      Heart sounds: Normal heart sounds. No murmur heard.  Pulmonary:      Effort: Pulmonary effort is normal.      Breath sounds: Normal breath sounds.   Abdominal:      Palpations: Abdomen is soft. There is no mass.      " Tenderness: There is no abdominal tenderness. There is no guarding or rebound.   Musculoskeletal:         General: No deformity. Normal range of motion.      Cervical back: Normal range of motion and neck supple.   Lymphadenopathy:      Cervical: No cervical adenopathy.   Skin:     General: Skin is warm and dry.      Comments: Left lower extremity - extensive grafting with healed skin - area of very superficial healing in posterior left calf -    Neurological:      General: No focal deficit present.      Mental Status: She is alert.   Psychiatric:         Mood and Affect: Mood normal.         Behavior: Behavior normal.       Results for orders placed or performed in visit on 02/24/25   HEMOGLOBIN A1C     Status: Abnormal   Result Value Ref Range    Estimated Average Glucose 148 (H) <117 mg/dL    Hemoglobin A1C 6.8 (H) 0.0 - 5.6 %   Comprehensive metabolic panel (BMP + Alb, Alk Phos, ALT, AST, Total. Bili, TP)     Status: Abnormal   Result Value Ref Range    Sodium 140 135 - 145 mmol/L    Potassium 4.6 3.4 - 5.3 mmol/L    Carbon Dioxide (CO2) 26 22 - 29 mmol/L    Anion Gap 13 7 - 15 mmol/L    Urea Nitrogen 9.8 8.0 - 23.0 mg/dL    Creatinine 0.66 0.51 - 0.95 mg/dL    GFR Estimate >90 >60 mL/min/1.73m2    Calcium 10.2 8.8 - 10.4 mg/dL    Chloride 101 98 - 107 mmol/L    Glucose 165 (H) 70 - 99 mg/dL    Alkaline Phosphatase 82 40 - 150 U/L    AST 53 (H) 0 - 45 U/L    ALT 77 (H) 0 - 50 U/L    Protein Total 7.8 6.4 - 8.3 g/dL    Albumin 4.3 3.5 - 5.2 g/dL    Bilirubin Total 0.4 <=1.2 mg/dL    Patient Fasting > 8hrs? Yes    Lipid Profile (Chol, Trig, HDL, LDL calc)     Status: Abnormal   Result Value Ref Range    Cholesterol 277 (H) <200 mg/dL    Triglycerides 172 (H) <150 mg/dL    Direct Measure HDL 50 >=50 mg/dL    LDL Cholesterol Calculated 193 (H) <100 mg/dL    Non HDL Cholesterol 227 (H) <130 mg/dL    Patient Fasting > 8hrs? Yes     Narrative    Cholesterol  Desirable: < 200 mg/dL  Borderline High: 200 - 239  mg/dL  High: >= 240 mg/dL    Triglycerides  Normal: < 150 mg/dL  Borderline High: 150 - 199 mg/dL  High: 200-499 mg/dL  Very High: >= 500 mg/dL    Direct Measure HDL  Female: >= 50 mg/dL   Male: >= 40 mg/dL    LDL Cholesterol  Desirable: < 100 mg/dL  Above Desirable: 100 - 129 mg/dL   Borderline High: 130 - 159 mg/dL   High:  160 - 189 mg/dL   Very High: >= 190 mg/dL    Non HDL Cholesterol  Desirable: < 130 mg/dL  Above Desirable: 130 - 159 mg/dL  Borderline High: 160 - 189 mg/dL  High: 190 - 219 mg/dL  Very High: >= 220 mg/dL   Albumin Random Urine Quantitative with Creat Ratio     Status: Abnormal   Result Value Ref Range    Creatinine Urine mg/dL 21.0 mg/dL    Albumin Urine mg/L 21.0 mg/L    Albumin Urine mg/g Cr 100.00 (H) 0.00 - 25.00 mg/g Cr               Signed Electronically by: RUSSELL DELGADILLO MD

## 2025-02-24 NOTE — LETTER
February 25, 2025      Lidia Nam  [address] [place] [id number]        Dear ,    We are writing to inform you of your test results.    Labs show:  Blood sugars have climbed a little bit - A1c is now 6.8.  this is still acceptable, but we don't want to see this climbing further.    Liver tests (ALT and AST) have climbed - this may reflect the increase in alcohol recently.  We should follow this.   Cholesterol labs are too high - LDL is 193 (should be less than 100).  It would be reasonable to take a statin to help your numbers.  There is protein leaking from your kidneys - it is important to keep your sugars under control, and your blood pressure.        Resulted Orders   HEMOGLOBIN A1C   Result Value Ref Range    Estimated Average Glucose 148 (H) <117 mg/dL    Hemoglobin A1C 6.8 (H) 0.0 - 5.6 %      Comment:      Normal <5.7%   Prediabetes 5.7-6.4%    Diabetes 6.5% or higher     Note: Adopted from ADA consensus guidelines.   Comprehensive metabolic panel (BMP + Alb, Alk Phos, ALT, AST, Total. Bili, TP)   Result Value Ref Range    Sodium 140 135 - 145 mmol/L    Potassium 4.6 3.4 - 5.3 mmol/L    Carbon Dioxide (CO2) 26 22 - 29 mmol/L    Anion Gap 13 7 - 15 mmol/L    Urea Nitrogen 9.8 8.0 - 23.0 mg/dL    Creatinine 0.66 0.51 - 0.95 mg/dL    GFR Estimate >90 >60 mL/min/1.73m2      Comment:      eGFR calculated using 2021 CKD-EPI equation.    Calcium 10.2 8.8 - 10.4 mg/dL    Chloride 101 98 - 107 mmol/L    Glucose 165 (H) 70 - 99 mg/dL    Alkaline Phosphatase 82 40 - 150 U/L    AST 53 (H) 0 - 45 U/L    ALT 77 (H) 0 - 50 U/L    Protein Total 7.8 6.4 - 8.3 g/dL    Albumin 4.3 3.5 - 5.2 g/dL    Bilirubin Total 0.4 <=1.2 mg/dL    Patient Fasting > 8hrs? Yes    Lipid Profile (Chol, Trig, HDL, LDL calc)   Result Value Ref Range    Cholesterol 277 (H) <200 mg/dL    Triglycerides 172 (H) <150 mg/dL    Direct Measure HDL 50 >=50 mg/dL    LDL Cholesterol Calculated 193 (H) <100 mg/dL    Non HDL Cholesterol  227 (H) <130 mg/dL    Patient Fasting > 8hrs? Yes     Narrative    Cholesterol  Desirable: < 200 mg/dL  Borderline High: 200 - 239 mg/dL  High: >= 240 mg/dL    Triglycerides  Normal: < 150 mg/dL  Borderline High: 150 - 199 mg/dL  High: 200-499 mg/dL  Very High: >= 500 mg/dL    Direct Measure HDL  Female: >= 50 mg/dL   Male: >= 40 mg/dL    LDL Cholesterol  Desirable: < 100 mg/dL  Above Desirable: 100 - 129 mg/dL   Borderline High: 130 - 159 mg/dL   High:  160 - 189 mg/dL   Very High: >= 190 mg/dL    Non HDL Cholesterol  Desirable: < 130 mg/dL  Above Desirable: 130 - 159 mg/dL  Borderline High: 160 - 189 mg/dL  High: 190 - 219 mg/dL  Very High: >= 220 mg/dL   Albumin Random Urine Quantitative with Creat Ratio   Result Value Ref Range    Creatinine Urine mg/dL 21.0 mg/dL      Comment:      The reference ranges have not been established in urine creatinine. The results should be integrated into the clinical context for interpretation.    Albumin Urine mg/L 21.0 mg/L      Comment:      The reference ranges have not been established in urine albumin. The results should be integrated into the clinical context for interpretation.    Albumin Urine mg/g Cr 100.00 (H) 0.00 - 25.00 mg/g Cr      Comment:      Microalbuminuria is defined as an albumin:creatinine ratio of 17 to 299 for males and 25 to 299 for females. A ratio of albumin:creatinine of 300 or higher is indicative of overt proteinuria.  Due to biologic variability, positive results should be confirmed by a second, first-morning random or 24-hour timed urine specimen. If there is discrepancy, a third specimen is recommended. When 2 out of 3 results are in the microalbuminuria range, this is evidence for incipient nephropathy and warrants increased efforts at glucose control, blood pressure control, and institution of therapy with an angiotensin-converting-enzyme (ACE) inhibitor (if the patient can tolerate it).         If you have any questions or concerns, please  call the clinic at the number listed above.       Sincerely,      Ana Goetz MD    Electronically signed

## 2025-02-25 LAB
ALBUMIN SERPL BCG-MCNC: 4.3 G/DL (ref 3.5–5.2)
ALP SERPL-CCNC: 82 U/L (ref 40–150)
ALT SERPL W P-5'-P-CCNC: 77 U/L (ref 0–50)
ANION GAP SERPL CALCULATED.3IONS-SCNC: 13 MMOL/L (ref 7–15)
AST SERPL W P-5'-P-CCNC: 53 U/L (ref 0–45)
BILIRUB SERPL-MCNC: 0.4 MG/DL
BUN SERPL-MCNC: 9.8 MG/DL (ref 8–23)
CALCIUM SERPL-MCNC: 10.2 MG/DL (ref 8.8–10.4)
CHLORIDE SERPL-SCNC: 101 MMOL/L (ref 98–107)
CHOLEST SERPL-MCNC: 277 MG/DL
CREAT SERPL-MCNC: 0.66 MG/DL (ref 0.51–0.95)
CREAT UR-MCNC: 21 MG/DL
EGFRCR SERPLBLD CKD-EPI 2021: >90 ML/MIN/1.73M2
FASTING STATUS PATIENT QL REPORTED: YES
FASTING STATUS PATIENT QL REPORTED: YES
GLUCOSE SERPL-MCNC: 165 MG/DL (ref 70–99)
HCO3 SERPL-SCNC: 26 MMOL/L (ref 22–29)
HDLC SERPL-MCNC: 50 MG/DL
LDLC SERPL CALC-MCNC: 193 MG/DL
MICROALBUMIN UR-MCNC: 21 MG/L
MICROALBUMIN/CREAT UR: 100 MG/G CR (ref 0–25)
NONHDLC SERPL-MCNC: 227 MG/DL
POTASSIUM SERPL-SCNC: 4.6 MMOL/L (ref 3.4–5.3)
PROT SERPL-MCNC: 7.8 G/DL (ref 6.4–8.3)
SODIUM SERPL-SCNC: 140 MMOL/L (ref 135–145)
TRIGL SERPL-MCNC: 172 MG/DL

## 2025-02-27 ENCOUNTER — TELEPHONE (OUTPATIENT)
Dept: FAMILY MEDICINE | Facility: CLINIC | Age: 65
End: 2025-02-27
Payer: COMMERCIAL

## 2025-02-27 DIAGNOSIS — E01.0 THYROMEGALY: ICD-10-CM

## 2025-02-27 DIAGNOSIS — E03.9 HYPOTHYROIDISM, UNSPECIFIED TYPE: Primary | ICD-10-CM

## 2025-02-27 NOTE — TELEPHONE ENCOUNTER
Patient calling with several concerns:    Medication list is not accurate.  Miralax: she reported to MA during 2/24 visit that she was no longer taking this, hasn't taken for >1 year. Inquiring today why MA did not removed from her chart. Advised that MA can flag medications as taking or not taking, but provider is responsible for discontinuing medications at their discretion. She would like this discontinued.    Pregabalin 25 mg TID: two scripts on file. Patient would like 5/28 rx discontinued.     TSH was not checked during recent visit--hasn't been checked since 4/2024. She would like to recheck this while here for her 's next scheduled visit in May, sooner if Dr. Trevizo insists. Requesting order--pended.      Inquiring status of 2/24 labs. Requesting letter. Advised this was sent by Dr. Trevizo 2 days ago.

## 2025-03-02 DIAGNOSIS — E03.9 HYPOTHYROIDISM, UNSPECIFIED TYPE: ICD-10-CM

## 2025-03-02 DIAGNOSIS — R33.9 URINARY RETENTION: ICD-10-CM

## 2025-03-02 DIAGNOSIS — E01.0 THYROMEGALY: ICD-10-CM

## 2025-03-02 PROBLEM — Z87.39 HISTORY OF NECROTIZING FASCIITIS: Status: ACTIVE | Noted: 2025-03-02

## 2025-03-02 PROBLEM — R60.0 BILATERAL LOWER EXTREMITY EDEMA: Status: ACTIVE | Noted: 2025-03-02

## 2025-03-02 RX ORDER — TAMSULOSIN HYDROCHLORIDE 0.4 MG/1
CAPSULE ORAL
Qty: 30 CAPSULE | Refills: 10 | Status: SHIPPED | OUTPATIENT
Start: 2025-03-02

## 2025-03-02 RX ORDER — LEVOTHYROXINE SODIUM 112 UG/1
112 TABLET ORAL DAILY
Qty: 90 TABLET | Refills: 0 | Status: SHIPPED | OUTPATIENT
Start: 2025-03-02 | End: 2025-03-02

## 2025-03-02 RX ORDER — LEVOTHYROXINE SODIUM 112 UG/1
112 TABLET ORAL DAILY
Qty: 90 TABLET | Refills: 0 | Status: SHIPPED | OUTPATIENT
Start: 2025-03-02

## 2025-03-02 NOTE — TELEPHONE ENCOUNTER
Patient called and wanted to make sure refill request were in for these two medications: tamsulosin, levothyroxine

## 2025-03-04 NOTE — TELEPHONE ENCOUNTER
Miralax is not on her list - I did remove the duplicate Pregabalin rx listing.  The orders are signed for thyroid testing.

## 2025-03-04 NOTE — TELEPHONE ENCOUNTER
RN called and relayed message below from Dr. Trevizo. Patient verbalized understanding.    Gino GOYAL RN  [facility]

## 2025-04-02 DIAGNOSIS — M79.89 NECROTIZING SOFT TISSUE INFECTION: ICD-10-CM

## 2025-04-02 RX ORDER — PREGABALIN 25 MG/1
CAPSULE ORAL
Qty: 90 CAPSULE | Refills: 0 | OUTPATIENT
Start: 2025-04-02

## 2025-04-02 RX ORDER — PREGABALIN 25 MG/1
CAPSULE ORAL
Qty: 90 CAPSULE | Refills: 0 | Status: SHIPPED | OUTPATIENT
Start: 2025-04-02

## 2025-04-02 NOTE — TELEPHONE ENCOUNTER
Medication Question or Refill        What medication are you calling about (include dose and sig)?: pregabalin (LYRICA) 25 MG capsule     Preferred Pharmacy:   [id number] - [place], [place] - [address] [place] [address] AT [facility] & [address]  [address] [place] [address]  Phone: [phone] Fax: [phone]    Controlled Substance Agreement on file:   CSA -- Patient Level:    CSA: None found at the patient level.       Who prescribed the medication?: PCP    Do you need a refill? Yes    When did you use the medication last? NA    Patient offered an appointment? No    Do you have any questions or concerns?  No      Okay to leave a detailed message?: Yes at Home number on file [phone] (home)

## 2025-04-04 DIAGNOSIS — M79.89 NECROTIZING SOFT TISSUE INFECTION: ICD-10-CM

## 2025-04-06 RX ORDER — PREGABALIN 25 MG/1
CAPSULE ORAL
Qty: 90 CAPSULE | Refills: 0 | Status: SHIPPED | OUTPATIENT
Start: 2025-04-06

## 2025-05-10 DIAGNOSIS — M79.89 NECROTIZING SOFT TISSUE INFECTION: ICD-10-CM

## 2025-05-10 NOTE — TELEPHONE ENCOUNTER
Medication Question or Refill    Contacts       Contact Date/Time Type Contact Phone/Fax    05/10/2025 02:14 PM CDT Phone (Incoming) Lidia Nam (Self) [phone] (H)     Ok to leave a detailed            What medication are you calling about (include dose and sig)?: pregabalin (LYRICA) 25 MG capsule     Preferred Pharmacy:   WRITTEN PRESCRIPTION REQUESTED  No address on file      [id number] - [place], [place] - [address] AT [facility] & [address]  [address] [place] [address]  Phone: [phone] Fax: [phone]        Controlled Substance Agreement on file:   CSA -- Patient Level:    CSA: None found at the patient level.       Who prescribed the medication?:     Do you need a refill? Yes    When did you use the medication last? 5/10/25    Patient offered an appointment? No    Do you have any questions or concerns?  No      Okay to leave a detailed message?: Yes at Home number on file [phone] (home)

## 2025-05-12 RX ORDER — PREGABALIN 25 MG/1
25 CAPSULE ORAL 3 TIMES DAILY
Qty: 90 CAPSULE | Refills: 0 | Status: SHIPPED | OUTPATIENT
Start: 2025-05-12

## 2025-06-02 ENCOUNTER — LAB (OUTPATIENT)
Dept: LAB | Facility: CLINIC | Age: 65
End: 2025-06-02
Payer: COMMERCIAL

## 2025-06-02 DIAGNOSIS — E01.0 THYROMEGALY: ICD-10-CM

## 2025-06-02 DIAGNOSIS — E03.9 HYPOTHYROIDISM, UNSPECIFIED TYPE: ICD-10-CM

## 2025-06-02 DIAGNOSIS — E11.65 TYPE 2 DIABETES MELLITUS WITH HYPERGLYCEMIA, WITHOUT LONG-TERM CURRENT USE OF INSULIN (H): Primary | ICD-10-CM

## 2025-06-02 LAB
EST. AVERAGE GLUCOSE BLD GHB EST-MCNC: 166 MG/DL
HBA1C MFR BLD: 7.4 % (ref 0–5.6)
T4 FREE SERPL-MCNC: 1.15 NG/DL (ref 0.9–1.7)
TSH SERPL DL<=0.005 MIU/L-ACNC: 3.51 UIU/ML (ref 0.3–4.2)

## 2025-06-02 PROCEDURE — 83036 HEMOGLOBIN GLYCOSYLATED A1C: CPT

## 2025-06-02 PROCEDURE — 84443 ASSAY THYROID STIM HORMONE: CPT

## 2025-06-02 PROCEDURE — 36415 COLL VENOUS BLD VENIPUNCTURE: CPT

## 2025-06-02 PROCEDURE — 84439 ASSAY OF FREE THYROXINE: CPT

## 2025-06-02 NOTE — TELEPHONE ENCOUNTER
Test Results        Who ordered the test:  Dr. Trevizo    Type of test: Lab    Date of test:  06/02/25    Where was the test performed:  [facility]     What are your questions/concerns?:  Would like a call regarding lab results and please mail results to home address.     Okay to leave a detailed message?: Yes at Home number on file [phone] (home)

## 2025-06-03 ENCOUNTER — RESULTS FOLLOW-UP (OUTPATIENT)
Dept: FAMILY MEDICINE | Facility: CLINIC | Age: 65
End: 2025-06-03

## 2025-06-03 RX ORDER — LEVOTHYROXINE SODIUM 112 UG/1
112 TABLET ORAL DAILY
Qty: 90 TABLET | Refills: 3 | Status: SHIPPED | OUTPATIENT
Start: 2025-06-03

## 2025-06-03 NOTE — TELEPHONE ENCOUNTER
-- will you be adjusting the dose at all? New rx pended    Patient has two days of levothyroxine left. Was calling to check if her results from yesterday are back. Advised a message will be sent to PCP for refills    Marsha Ca RN  [facility]

## 2025-06-07 NOTE — PATIENT INSTRUCTIONS
Podiatry Clinics that offer foot and toenail care  [facility]  [facility]  [phone]  [facility], [facility]  [phone]  [facility]  [place] - Dr. Cruz on Tuesdays  [phone]  [facility]  [place]  [phone]  [facility]  [facility]  [phone]  [facility]  [phone]  [facility]  [phone]  [facility]  *Nurse comes to your home for nail care.  Yael Aggarwal RN Foot Specialist  [phone]    
Assistance with ambulation/Assistance OOB with selected safe patient handling equipment/Communicate Risk of Fall with Harm to all staff/Discuss with provider need for PT consult/Monitor gait and stability/Provide patient with walking aids - walker, cane, crutches/Reinforce activity limits and safety measures with patient and family/Review medications for side effects contributing to fall risk/Sit up slowly, dangle for a short time, stand at bedside before walking/Tailored Fall Risk Interventions/Toileting schedule using arm’s reach rule for commode and bathroom/Visual Cue: Yellow wristband and red socks/Bed in lowest position, wheels locked, appropriate side rails in place/Call bell, personal items and telephone in reach/Instruct patient to call for assistance before getting out of bed or chair/Non-slip footwear when patient is out of bed/[place] to call system/Physically safe environment - no spills, clutter or unnecessary equipment/Purposeful Proactive Rounding/Room/bathroom lighting operational, light cord in reach

## 2025-07-09 DIAGNOSIS — E11.65 TYPE 2 DIABETES MELLITUS WITH HYPERGLYCEMIA, WITHOUT LONG-TERM CURRENT USE OF INSULIN (H): ICD-10-CM

## 2025-07-09 RX ORDER — METFORMIN HYDROCHLORIDE 500 MG/1
1000 TABLET, EXTENDED RELEASE ORAL
Qty: 180 TABLET | Refills: 0 | Status: SHIPPED | OUTPATIENT
Start: 2025-07-09

## 2025-07-28 DIAGNOSIS — M79.89 NECROTIZING SOFT TISSUE INFECTION: ICD-10-CM

## 2025-07-28 NOTE — TELEPHONE ENCOUNTER
Pt has been completely out of pregabalin for 2 weeks. 90 capsules last prescribed 6/13 sig TAKE 1 CAPSULE(25 MG) BY MOUTH THREE TIMES DAILY.    Pt wondering why request was denied on 7/24 when she was only given 1 month supply at last fill

## 2025-07-29 RX ORDER — PREGABALIN 25 MG/1
CAPSULE ORAL
Qty: 90 CAPSULE | Refills: 0 | Status: SHIPPED | OUTPATIENT
Start: 2025-07-29

## 2025-09-02 DIAGNOSIS — M79.89 NECROTIZING SOFT TISSUE INFECTION: ICD-10-CM

## 2025-09-02 RX ORDER — PREGABALIN 25 MG/1
CAPSULE ORAL
Qty: 90 CAPSULE | Refills: 0 | Status: SHIPPED | OUTPATIENT
Start: 2025-09-02

## (undated) DEVICE — CUSTOM PACK PERI GYN SMA5BPGHEA

## (undated) DEVICE — ESU PENCIL SMOKE EVAC W/ROCKER SWITCH 0703-047-000

## (undated) DEVICE — CUSTOM PACK GEN MAJOR SBA5BGMHEA

## (undated) DEVICE — NEEDLE HYPO MAGELLAN SAFETY 22GA 1 1/2IN 8881850215

## (undated) DEVICE — TUBING SUCTION MEDI-VAC 1/4"X20' N620A

## (undated) DEVICE — PULSE LAVAGE IRRIGATION SYSTEM 0210-114-100

## (undated) DEVICE — SOL NACL 0.9% IRRIG 1000ML BOTTLE 2F7124

## (undated) DEVICE — GLOVE BIOGEL PI INDICATOR 8.0 LF 41680

## (undated) DEVICE — DRAPE SHEET TABLE COVER KC 42301*

## (undated) DEVICE — PREP POVIDONE-IODINE 7.5% SCRUB 4OZ BOTTLE MDS093945

## (undated) DEVICE — DRSG KERLIX 4 1/2"X4YDS ROLL 6715

## (undated) DEVICE — DRSG ABD TNDRSRB WET PRUF 8IN X 10IN STRL  9194A

## (undated) DEVICE — SU PROLENE 2-0 SHDA 36" 8523H

## (undated) DEVICE — PREP DYNA-HEX 4% CHG SCRUB 4OZ BOTTLE MDS098710

## (undated) DEVICE — GOWN LG DISP 9515

## (undated) DEVICE — GLOVE BIOGEL PI ORTHOPRO SZ 7.5 47675

## (undated) DEVICE — GLOVE UNDER INDICATOR PI SZ 7.0 LF 41670

## (undated) DEVICE — GLOVE BIOGEL PI ULTRATOUCH G SZ 7.5 42175

## (undated) DEVICE — MARKER SURG SKIN STRL 77734

## (undated) DEVICE — SUCTION TIP POOLE STERILE 35040

## (undated) DEVICE — GLOVE SURG PI ULTRA TOUCH M SZ 6-1/2 LF

## (undated) DEVICE — BLADE KNIFE SURG 15 371115

## (undated) DEVICE — PREP POVIDONE-IODINE 10% SOLUTION 4OZ BOTTLE MDS093944

## (undated) DEVICE — SPONGE LAP 18X18" X8435

## (undated) DEVICE — PLATE GROUNDING ADULT W/CORD 9165L

## (undated) DEVICE — DRAPE OR LAPAROTOMY KC 89228*

## (undated) DEVICE — SOL WATER IRRIG 1000ML BOTTLE 2F7114

## (undated) DEVICE — DRSG GAUZE 4X4" 3033

## (undated) DEVICE — DRSG GAUZE FLUFTEX RADIOPAQUE 4.5"X4.1YD 11-020

## (undated) DEVICE — DRAPE MAYO STAND 29.5X55.5" 8339

## (undated) DEVICE — SUCTION MANIFOLD NEPTUNE 2 SYS 1 PORT 702-025-000

## (undated) DEVICE — PACK MINOR SINGLE BASIN SSK3001

## (undated) RX ORDER — FENTANYL CITRATE-0.9 % NACL/PF 10 MCG/ML
PLASTIC BAG, INJECTION (ML) INTRAVENOUS
Status: DISPENSED
Start: 2023-10-02

## (undated) RX ORDER — GLYCOPYRROLATE 0.2 MG/ML
INJECTION, SOLUTION INTRAMUSCULAR; INTRAVENOUS
Status: DISPENSED
Start: 2023-10-04

## (undated) RX ORDER — PROPOFOL 10 MG/ML
INJECTION, EMULSION INTRAVENOUS
Status: DISPENSED
Start: 2023-10-02

## (undated) RX ORDER — LIDOCAINE HYDROCHLORIDE 10 MG/ML
INJECTION, SOLUTION EPIDURAL; INFILTRATION; INTRACAUDAL; PERINEURAL
Status: DISPENSED
Start: 2023-10-02

## (undated) RX ORDER — VASOPRESSIN IN 0.9 % NACL 2 UNIT/2ML
SYRINGE (ML) INTRAVENOUS
Status: DISPENSED
Start: 2023-10-02

## (undated) RX ORDER — ONDANSETRON 2 MG/ML
INJECTION INTRAMUSCULAR; INTRAVENOUS
Status: DISPENSED
Start: 2023-10-04

## (undated) RX ORDER — FENTANYL CITRATE 50 UG/ML
INJECTION, SOLUTION INTRAMUSCULAR; INTRAVENOUS
Status: DISPENSED
Start: 2023-10-04

## (undated) RX ORDER — LIDOCAINE HYDROCHLORIDE AND EPINEPHRINE 10; 10 MG/ML; UG/ML
INJECTION, SOLUTION INFILTRATION; PERINEURAL
Status: DISPENSED
Start: 2023-10-02

## (undated) RX ORDER — LIDOCAINE HYDROCHLORIDE 10 MG/ML
INJECTION, SOLUTION EPIDURAL; INFILTRATION; INTRACAUDAL; PERINEURAL
Status: DISPENSED
Start: 2023-10-04

## (undated) RX ORDER — PROPOFOL 10 MG/ML
INJECTION, EMULSION INTRAVENOUS
Status: DISPENSED
Start: 2023-10-04

## (undated) RX ORDER — DEXAMETHASONE SODIUM PHOSPHATE 10 MG/ML
INJECTION, SOLUTION INTRAMUSCULAR; INTRAVENOUS
Status: DISPENSED
Start: 2023-10-02

## (undated) RX ORDER — EPHEDRINE SULFATE 50 MG/ML
INJECTION, SOLUTION INTRAMUSCULAR; INTRAVENOUS; SUBCUTANEOUS
Status: DISPENSED
Start: 2023-10-04

## (undated) RX ORDER — FENTANYL CITRATE 50 UG/ML
INJECTION, SOLUTION INTRAMUSCULAR; INTRAVENOUS
Status: DISPENSED
Start: 2023-10-02

## (undated) RX ORDER — ONDANSETRON 2 MG/ML
INJECTION INTRAMUSCULAR; INTRAVENOUS
Status: DISPENSED
Start: 2023-10-02

## (undated) RX ORDER — ETOMIDATE 2 MG/ML
INJECTION INTRAVENOUS
Status: DISPENSED
Start: 2023-10-02

## (undated) RX ORDER — BUPIVACAINE HYDROCHLORIDE 2.5 MG/ML
INJECTION, SOLUTION EPIDURAL; INFILTRATION; INTRACAUDAL
Status: DISPENSED
Start: 2023-10-02